# Patient Record
Sex: MALE | Race: WHITE | NOT HISPANIC OR LATINO | URBAN - METROPOLITAN AREA
[De-identification: names, ages, dates, MRNs, and addresses within clinical notes are randomized per-mention and may not be internally consistent; named-entity substitution may affect disease eponyms.]

---

## 2019-01-01 ENCOUNTER — OUTPATIENT (OUTPATIENT)
Dept: OUTPATIENT SERVICES | Facility: HOSPITAL | Age: 64
LOS: 1 days | End: 2019-01-01
Payer: MEDICAID

## 2019-01-01 PROCEDURE — G9001: CPT

## 2019-01-11 ENCOUNTER — INPATIENT (INPATIENT)
Facility: HOSPITAL | Age: 64
LOS: 4 days | Discharge: HOME HEALTH | End: 2019-01-16
Attending: INTERNAL MEDICINE | Admitting: INTERNAL MEDICINE
Payer: MEDICAID

## 2019-01-11 VITALS
SYSTOLIC BLOOD PRESSURE: 167 MMHG | HEART RATE: 88 BPM | TEMPERATURE: 98 F | DIASTOLIC BLOOD PRESSURE: 82 MMHG | OXYGEN SATURATION: 82 % | RESPIRATION RATE: 16 BRPM

## 2019-01-11 LAB
ALBUMIN SERPL ELPH-MCNC: 4 G/DL — SIGNIFICANT CHANGE UP (ref 3.3–5)
ALP SERPL-CCNC: 112 U/L — SIGNIFICANT CHANGE UP (ref 40–120)
ALT FLD-CCNC: 25 U/L — SIGNIFICANT CHANGE UP (ref 4–41)
ANION GAP SERPL CALC-SCNC: 10 MEQ/L — SIGNIFICANT CHANGE UP (ref 7–14)
APTT BLD: 33.1 SEC — SIGNIFICANT CHANGE UP (ref 27.5–36.3)
AST SERPL-CCNC: 20 U/L — SIGNIFICANT CHANGE UP (ref 4–40)
BASE EXCESS BLDV CALC-SCNC: 11.8 MMOL/L — SIGNIFICANT CHANGE UP
BASOPHILS # BLD AUTO: 0.09 K/UL — SIGNIFICANT CHANGE UP (ref 0–0.2)
BASOPHILS NFR BLD AUTO: 0.7 % — SIGNIFICANT CHANGE UP (ref 0–2)
BILIRUB SERPL-MCNC: 0.4 MG/DL — SIGNIFICANT CHANGE UP (ref 0.2–1.2)
BLOOD GAS VENOUS - CREATININE: 0.71 MG/DL — SIGNIFICANT CHANGE UP (ref 0.5–1.3)
BUN SERPL-MCNC: 10 MG/DL — SIGNIFICANT CHANGE UP (ref 7–23)
CALCIUM SERPL-MCNC: 9.1 MG/DL — SIGNIFICANT CHANGE UP (ref 8.4–10.5)
CHLORIDE BLDV-SCNC: 100 MMOL/L — SIGNIFICANT CHANGE UP (ref 96–108)
CHLORIDE SERPL-SCNC: 97 MMOL/L — LOW (ref 98–107)
CO2 SERPL-SCNC: 33 MMOL/L — HIGH (ref 22–31)
CREAT SERPL-MCNC: 0.79 MG/DL — SIGNIFICANT CHANGE UP (ref 0.5–1.3)
EOSINOPHIL # BLD AUTO: 0.39 K/UL — SIGNIFICANT CHANGE UP (ref 0–0.5)
EOSINOPHIL NFR BLD AUTO: 3 % — SIGNIFICANT CHANGE UP (ref 0–6)
GAS PNL BLDV: 139 MMOL/L — SIGNIFICANT CHANGE UP (ref 136–146)
GLUCOSE BLDV-MCNC: 166 — HIGH (ref 70–99)
GLUCOSE SERPL-MCNC: 153 MG/DL — HIGH (ref 70–99)
HCO3 BLDV-SCNC: 31 MMOL/L — HIGH (ref 20–27)
HCT VFR BLD CALC: 45.1 % — SIGNIFICANT CHANGE UP (ref 39–50)
HCT VFR BLDV CALC: 45 % — SIGNIFICANT CHANGE UP (ref 39–51)
HGB BLD-MCNC: 14.3 G/DL — SIGNIFICANT CHANGE UP (ref 13–17)
HGB BLDV-MCNC: 14.7 G/DL — SIGNIFICANT CHANGE UP (ref 13–17)
IMM GRANULOCYTES NFR BLD AUTO: 0.9 % — SIGNIFICANT CHANGE UP (ref 0–1.5)
INR BLD: 1.05 — SIGNIFICANT CHANGE UP (ref 0.88–1.17)
LACTATE BLDV-MCNC: 1.4 MMOL/L — SIGNIFICANT CHANGE UP (ref 0.5–2)
LYMPHOCYTES # BLD AUTO: 1.85 K/UL — SIGNIFICANT CHANGE UP (ref 1–3.3)
LYMPHOCYTES # BLD AUTO: 14.3 % — SIGNIFICANT CHANGE UP (ref 13–44)
MCHC RBC-ENTMCNC: 27.1 PG — SIGNIFICANT CHANGE UP (ref 27–34)
MCHC RBC-ENTMCNC: 31.7 % — LOW (ref 32–36)
MCV RBC AUTO: 85.6 FL — SIGNIFICANT CHANGE UP (ref 80–100)
MONOCYTES # BLD AUTO: 0.87 K/UL — SIGNIFICANT CHANGE UP (ref 0–0.9)
MONOCYTES NFR BLD AUTO: 6.7 % — SIGNIFICANT CHANGE UP (ref 2–14)
NEUTROPHILS # BLD AUTO: 9.63 K/UL — HIGH (ref 1.8–7.4)
NEUTROPHILS NFR BLD AUTO: 74.4 % — SIGNIFICANT CHANGE UP (ref 43–77)
NRBC # FLD: 0 K/UL — LOW (ref 25–125)
NT-PROBNP SERPL-SCNC: 204.7 PG/ML — SIGNIFICANT CHANGE UP
PCO2 BLDV: 80 MMHG — CRITICAL HIGH (ref 41–51)
PH BLDV: 7.3 PH — LOW (ref 7.32–7.43)
PLATELET # BLD AUTO: 344 K/UL — SIGNIFICANT CHANGE UP (ref 150–400)
PMV BLD: 9.2 FL — SIGNIFICANT CHANGE UP (ref 7–13)
PO2 BLDV: 25 MMHG — LOW (ref 35–40)
POTASSIUM BLDV-SCNC: 3.9 MMOL/L — SIGNIFICANT CHANGE UP (ref 3.4–4.5)
POTASSIUM SERPL-MCNC: 4.1 MMOL/L — SIGNIFICANT CHANGE UP (ref 3.5–5.3)
POTASSIUM SERPL-SCNC: 4.1 MMOL/L — SIGNIFICANT CHANGE UP (ref 3.5–5.3)
PROT SERPL-MCNC: 7.2 G/DL — SIGNIFICANT CHANGE UP (ref 6–8.3)
PROTHROM AB SERPL-ACNC: 11.7 SEC — SIGNIFICANT CHANGE UP (ref 9.8–13.1)
RBC # BLD: 5.27 M/UL — SIGNIFICANT CHANGE UP (ref 4.2–5.8)
RBC # FLD: 14.4 % — SIGNIFICANT CHANGE UP (ref 10.3–14.5)
SAO2 % BLDV: 40.4 % — LOW (ref 60–85)
SODIUM SERPL-SCNC: 140 MMOL/L — SIGNIFICANT CHANGE UP (ref 135–145)
TROPONIN T, HIGH SENSITIVITY: 19 NG/L — SIGNIFICANT CHANGE UP (ref ?–14)
WBC # BLD: 12.94 K/UL — HIGH (ref 3.8–10.5)
WBC # FLD AUTO: 12.94 K/UL — HIGH (ref 3.8–10.5)

## 2019-01-11 PROCEDURE — 71046 X-RAY EXAM CHEST 2 VIEWS: CPT | Mod: 26

## 2019-01-11 PROCEDURE — 71250 CT THORAX DX C-: CPT | Mod: 26

## 2019-01-11 RX ORDER — CEFTRIAXONE 500 MG/1
1 INJECTION, POWDER, FOR SOLUTION INTRAMUSCULAR; INTRAVENOUS ONCE
Qty: 0 | Refills: 0 | Status: COMPLETED | OUTPATIENT
Start: 2019-01-11 | End: 2019-01-11

## 2019-01-11 RX ORDER — IPRATROPIUM/ALBUTEROL SULFATE 18-103MCG
3 AEROSOL WITH ADAPTER (GRAM) INHALATION ONCE
Qty: 0 | Refills: 0 | Status: COMPLETED | OUTPATIENT
Start: 2019-01-11 | End: 2019-01-11

## 2019-01-11 RX ORDER — AZITHROMYCIN 500 MG/1
500 TABLET, FILM COATED ORAL ONCE
Qty: 0 | Refills: 0 | Status: COMPLETED | OUTPATIENT
Start: 2019-01-11 | End: 2019-01-11

## 2019-01-11 RX ADMIN — AZITHROMYCIN 250 MILLIGRAM(S): 500 TABLET, FILM COATED ORAL at 20:39

## 2019-01-11 RX ADMIN — Medication 3 MILLILITER(S): at 19:30

## 2019-01-11 RX ADMIN — CEFTRIAXONE 100 GRAM(S): 500 INJECTION, POWDER, FOR SOLUTION INTRAMUSCULAR; INTRAVENOUS at 19:53

## 2019-01-11 RX ADMIN — CEFTRIAXONE 1 GRAM(S): 500 INJECTION, POWDER, FOR SOLUTION INTRAMUSCULAR; INTRAVENOUS at 20:38

## 2019-01-11 NOTE — ED PROVIDER NOTE - OBJECTIVE STATEMENT
Pt is a 62 y/o M heavy smoker PMHx HTN, DM, HLD p/w sob and cough x 6 weeks.  Pt notes gradual onset, worsening nonproductive nonbloody cough associated with gradually worsening shortness of breath and AGUIRRE, particularly worsening for past "few days."  Pt notes associated orthopnea and wheezing.  Pt notes years of bilateral lower extremity swelling, which is unchanged. Pt denies any fevers, chills, chest pain, calf pain, h/o dvt/pe in past, hemoptysis, recent surgeries, h/o malignancy, recent prolonged immobilization. Pt is a 62 y/o M heavy smoker PMHx HTN, DM, HLD p/w sob and cough x 6 weeks.  Pt notes gradual onset, worsening nonproductive nonbloody cough associated with gradually worsening shortness of breath and AGUIRRE, particularly worsening for past "few days."  Pt notes associated orthopnea and wheezing.  Pt notes years of bilateral lower extremity swelling, which is unchanged. Pt denies any fevers, chills, chest pain, calf pain, h/o dvt/pe in past, hemoptysis, recent surgeries, h/o malignancy, recent prolonged immobilization.  PMD: Triston Chavez

## 2019-01-11 NOTE — ED PROVIDER NOTE - MEDICAL DECISION MAKING DETAILS
Pt is a 62 y/o M heavy smoker PMHx HTN, DM, HLD p/w sob and cough x 6 weeks -- possible new onset COPD, possible CHF, low concern for ACS -- labs, trop, bnp, cxr, duonebs, steroids, admit

## 2019-01-11 NOTE — ED ADULT TRIAGE NOTE - CHIEF COMPLAINT QUOTE
Pt. c/o sob and cough x 3-4 wks. Respirations labored and Spo2 in 80s. Placed on 4L NC. Denies dizziness or palpitations.

## 2019-01-11 NOTE — ED PROVIDER NOTE - ATTENDING CONTRIBUTION TO CARE
I agree with the above H&P.  Briefly this is a 63 year old with extensive smoking hx presents with progressive sob and hypoxia on ra. concern for copd exacerbation vs pna vs lung mass vs ptx will get labs, abg, blood gas, cxr.  will give o2 vs bipap and nebs. likely admit

## 2019-01-11 NOTE — ED PROVIDER NOTE - CHPI ED SYMPTOMS NEG
no body aches/no diaphoresis/no headache/no hemoptysis/no chills no diaphoresis/no hemoptysis/no body aches/no headache

## 2019-01-11 NOTE — ED ADULT NURSE NOTE - OBJECTIVE STATEMENT
Pt w/ hx of hld htn DM received to rm 8 aaox4 ambulatory c/o dry cough and SOB x 1 month.  Pt denies chest pain fever chills NO uro or GI symptoms Pt p/w dry cough otherwise in NAD breaths even unlabored skin warm dry intact 20 g IV access obtained at R.AC labs and nebulizer  as ordered.  Pt reports some resolution of symptoms, will continue to monitor

## 2019-01-11 NOTE — ED PROVIDER NOTE - PROGRESS NOTE DETAILS
Corrie WELLINGTON, PGY-4: bipap started for hypercarbia. CT shows probable lung cancer (much more likely than PNA with empyema as pt well-appearing and afebrile). Spoke to [facility] fellow - they have a bed as long as RVP negative. Corrie WELLINGTON, PGY-4: bipap started for hypercarbia. CT shows probable lung cancer (much more likely than PNA with empyema as pt well-appearing and afebrile). Abx given. Spoke to [facility] fellow - they have a bed as long as RVP negative. Corrie WELLINGTON, PGY-4: pt accepted to [facility].

## 2019-01-12 DIAGNOSIS — R91.8 OTHER NONSPECIFIC ABNORMAL FINDING OF LUNG FIELD: ICD-10-CM

## 2019-01-12 DIAGNOSIS — I10 ESSENTIAL (PRIMARY) HYPERTENSION: ICD-10-CM

## 2019-01-12 DIAGNOSIS — J44.1 CHRONIC OBSTRUCTIVE PULMONARY DISEASE WITH (ACUTE) EXACERBATION: ICD-10-CM

## 2019-01-12 DIAGNOSIS — E11.9 TYPE 2 DIABETES MELLITUS WITHOUT COMPLICATIONS: ICD-10-CM

## 2019-01-12 DIAGNOSIS — Z29.9 ENCOUNTER FOR PROPHYLACTIC MEASURES, UNSPECIFIED: ICD-10-CM

## 2019-01-12 LAB
ANION GAP SERPL CALC-SCNC: 11 MEQ/L — SIGNIFICANT CHANGE UP (ref 7–14)
B PERT DNA SPEC QL NAA+PROBE: NOT DETECTED — SIGNIFICANT CHANGE UP
BASE EXCESS BLDV CALC-SCNC: 9.9 MMOL/L — SIGNIFICANT CHANGE UP
BUN SERPL-MCNC: 11 MG/DL — SIGNIFICANT CHANGE UP (ref 7–23)
C PNEUM DNA SPEC QL NAA+PROBE: NOT DETECTED — SIGNIFICANT CHANGE UP
CALCIUM SERPL-MCNC: 9.5 MG/DL — SIGNIFICANT CHANGE UP (ref 8.4–10.5)
CHLORIDE SERPL-SCNC: 97 MMOL/L — LOW (ref 98–107)
CO2 SERPL-SCNC: 33 MMOL/L — HIGH (ref 22–31)
CREAT SERPL-MCNC: 0.82 MG/DL — SIGNIFICANT CHANGE UP (ref 0.5–1.3)
FLUAV H1 2009 PAND RNA SPEC QL NAA+PROBE: NOT DETECTED — SIGNIFICANT CHANGE UP
FLUAV H1 RNA SPEC QL NAA+PROBE: NOT DETECTED — SIGNIFICANT CHANGE UP
FLUAV H3 RNA SPEC QL NAA+PROBE: NOT DETECTED — SIGNIFICANT CHANGE UP
FLUAV SUBTYP SPEC NAA+PROBE: NOT DETECTED — SIGNIFICANT CHANGE UP
FLUBV RNA SPEC QL NAA+PROBE: NOT DETECTED — SIGNIFICANT CHANGE UP
GAS PNL BLDV: 133 MMOL/L — LOW (ref 136–146)
GLUCOSE BLDC GLUCOMTR-MCNC: 181 MG/DL — HIGH (ref 70–99)
GLUCOSE BLDC GLUCOMTR-MCNC: 185 MG/DL — HIGH (ref 70–99)
GLUCOSE BLDC GLUCOMTR-MCNC: 265 MG/DL — HIGH (ref 70–99)
GLUCOSE BLDC GLUCOMTR-MCNC: 326 MG/DL — HIGH (ref 70–99)
GLUCOSE BLDV-MCNC: 270 — HIGH (ref 70–99)
GLUCOSE SERPL-MCNC: 281 MG/DL — HIGH (ref 70–99)
HADV DNA SPEC QL NAA+PROBE: NOT DETECTED — SIGNIFICANT CHANGE UP
HBA1C BLD-MCNC: 9.4 % — HIGH (ref 4–5.6)
HCO3 BLDV-SCNC: 31 MMOL/L — HIGH (ref 20–27)
HCOV PNL SPEC NAA+PROBE: SIGNIFICANT CHANGE UP
HCT VFR BLD CALC: 47.4 % — SIGNIFICANT CHANGE UP (ref 39–50)
HCT VFR BLDV CALC: 44.5 % — SIGNIFICANT CHANGE UP (ref 39–51)
HGB BLD-MCNC: 14.2 G/DL — SIGNIFICANT CHANGE UP (ref 13–17)
HGB BLDV-MCNC: 14.5 G/DL — SIGNIFICANT CHANGE UP (ref 13–17)
HMPV RNA SPEC QL NAA+PROBE: NOT DETECTED — SIGNIFICANT CHANGE UP
HPIV1 RNA SPEC QL NAA+PROBE: NOT DETECTED — SIGNIFICANT CHANGE UP
HPIV2 RNA SPEC QL NAA+PROBE: NOT DETECTED — SIGNIFICANT CHANGE UP
HPIV3 RNA SPEC QL NAA+PROBE: NOT DETECTED — SIGNIFICANT CHANGE UP
HPIV4 RNA SPEC QL NAA+PROBE: NOT DETECTED — SIGNIFICANT CHANGE UP
MCHC RBC-ENTMCNC: 26.2 PG — LOW (ref 27–34)
MCHC RBC-ENTMCNC: 30 % — LOW (ref 32–36)
MCV RBC AUTO: 87.5 FL — SIGNIFICANT CHANGE UP (ref 80–100)
NRBC # FLD: 0 K/UL — LOW (ref 25–125)
PCO2 BLDV: 82 MMHG — CRITICAL HIGH (ref 41–51)
PH BLDV: 7.28 PH — LOW (ref 7.32–7.43)
PLATELET # BLD AUTO: 355 K/UL — SIGNIFICANT CHANGE UP (ref 150–400)
PMV BLD: 9.5 FL — SIGNIFICANT CHANGE UP (ref 7–13)
PO2 BLDV: 125 MMHG — HIGH (ref 35–40)
POTASSIUM BLDV-SCNC: 4.2 MMOL/L — SIGNIFICANT CHANGE UP (ref 3.4–4.5)
POTASSIUM SERPL-MCNC: 4.6 MMOL/L — SIGNIFICANT CHANGE UP (ref 3.5–5.3)
POTASSIUM SERPL-SCNC: 4.6 MMOL/L — SIGNIFICANT CHANGE UP (ref 3.5–5.3)
RBC # BLD: 5.42 M/UL — SIGNIFICANT CHANGE UP (ref 4.2–5.8)
RBC # FLD: 14.6 % — HIGH (ref 10.3–14.5)
RSV RNA SPEC QL NAA+PROBE: NOT DETECTED — SIGNIFICANT CHANGE UP
RV+EV RNA SPEC QL NAA+PROBE: NOT DETECTED — SIGNIFICANT CHANGE UP
SAO2 % BLDV: 96.7 % — HIGH (ref 60–85)
SODIUM SERPL-SCNC: 141 MMOL/L — SIGNIFICANT CHANGE UP (ref 135–145)
WBC # BLD: 13.81 K/UL — HIGH (ref 3.8–10.5)
WBC # FLD AUTO: 13.81 K/UL — HIGH (ref 3.8–10.5)

## 2019-01-12 PROCEDURE — 99223 1ST HOSP IP/OBS HIGH 75: CPT

## 2019-01-12 PROCEDURE — 99223 1ST HOSP IP/OBS HIGH 75: CPT | Mod: GC

## 2019-01-12 RX ORDER — GLUCAGON INJECTION, SOLUTION 0.5 MG/.1ML
1 INJECTION, SOLUTION SUBCUTANEOUS ONCE
Qty: 0 | Refills: 0 | Status: DISCONTINUED | OUTPATIENT
Start: 2019-01-12 | End: 2019-01-14

## 2019-01-12 RX ORDER — INSULIN LISPRO 100/ML
VIAL (ML) SUBCUTANEOUS
Qty: 0 | Refills: 0 | Status: DISCONTINUED | OUTPATIENT
Start: 2019-01-12 | End: 2019-01-14

## 2019-01-12 RX ORDER — DEXTROSE 50 % IN WATER 50 %
25 SYRINGE (ML) INTRAVENOUS ONCE
Qty: 0 | Refills: 0 | Status: DISCONTINUED | OUTPATIENT
Start: 2019-01-12 | End: 2019-01-14

## 2019-01-12 RX ORDER — IPRATROPIUM/ALBUTEROL SULFATE 18-103MCG
3 AEROSOL WITH ADAPTER (GRAM) INHALATION EVERY 6 HOURS
Qty: 0 | Refills: 0 | Status: DISCONTINUED | OUTPATIENT
Start: 2019-01-12 | End: 2019-01-16

## 2019-01-12 RX ORDER — LOSARTAN POTASSIUM 100 MG/1
50 TABLET, FILM COATED ORAL DAILY
Qty: 0 | Refills: 0 | Status: DISCONTINUED | OUTPATIENT
Start: 2019-01-12 | End: 2019-01-16

## 2019-01-12 RX ORDER — CEFTRIAXONE 500 MG/1
1 INJECTION, POWDER, FOR SOLUTION INTRAMUSCULAR; INTRAVENOUS EVERY 24 HOURS
Qty: 0 | Refills: 0 | Status: DISCONTINUED | OUTPATIENT
Start: 2019-01-12 | End: 2019-01-16

## 2019-01-12 RX ORDER — AZITHROMYCIN 500 MG/1
500 TABLET, FILM COATED ORAL EVERY 24 HOURS
Qty: 0 | Refills: 0 | Status: DISCONTINUED | OUTPATIENT
Start: 2019-01-12 | End: 2019-01-16

## 2019-01-12 RX ORDER — HEPARIN SODIUM 5000 [USP'U]/ML
5000 INJECTION INTRAVENOUS; SUBCUTANEOUS EVERY 8 HOURS
Qty: 0 | Refills: 0 | Status: DISCONTINUED | OUTPATIENT
Start: 2019-01-12 | End: 2019-01-16

## 2019-01-12 RX ORDER — DEXTROSE 50 % IN WATER 50 %
12.5 SYRINGE (ML) INTRAVENOUS ONCE
Qty: 0 | Refills: 0 | Status: DISCONTINUED | OUTPATIENT
Start: 2019-01-12 | End: 2019-01-14

## 2019-01-12 RX ORDER — SODIUM CHLORIDE 9 MG/ML
1000 INJECTION, SOLUTION INTRAVENOUS
Qty: 0 | Refills: 0 | Status: DISCONTINUED | OUTPATIENT
Start: 2019-01-12 | End: 2019-01-14

## 2019-01-12 RX ORDER — DEXTROSE 50 % IN WATER 50 %
15 SYRINGE (ML) INTRAVENOUS ONCE
Qty: 0 | Refills: 0 | Status: DISCONTINUED | OUTPATIENT
Start: 2019-01-12 | End: 2019-01-14

## 2019-01-12 RX ADMIN — Medication 6: at 12:29

## 2019-01-12 RX ADMIN — CEFTRIAXONE 100 GRAM(S): 500 INJECTION, POWDER, FOR SOLUTION INTRAMUSCULAR; INTRAVENOUS at 08:12

## 2019-01-12 RX ADMIN — AZITHROMYCIN 250 MILLIGRAM(S): 500 TABLET, FILM COATED ORAL at 09:02

## 2019-01-12 RX ADMIN — Medication 2: at 17:35

## 2019-01-12 RX ADMIN — Medication 3 MILLILITER(S): at 11:04

## 2019-01-12 RX ADMIN — LOSARTAN POTASSIUM 50 MILLIGRAM(S): 100 TABLET, FILM COATED ORAL at 15:32

## 2019-01-12 RX ADMIN — Medication 2: at 09:01

## 2019-01-12 RX ADMIN — HEPARIN SODIUM 5000 UNIT(S): 5000 INJECTION INTRAVENOUS; SUBCUTANEOUS at 15:32

## 2019-01-12 RX ADMIN — Medication 3 MILLILITER(S): at 21:50

## 2019-01-12 RX ADMIN — HEPARIN SODIUM 5000 UNIT(S): 5000 INJECTION INTRAVENOUS; SUBCUTANEOUS at 22:19

## 2019-01-12 RX ADMIN — Medication 3 MILLILITER(S): at 15:59

## 2019-01-12 RX ADMIN — Medication 40 MILLIGRAM(S): at 15:32

## 2019-01-12 NOTE — H&P ADULT - NSHPPHYSICALEXAM_GEN_ALL_CORE
Vital Signs Last 24 Hrs  T(C): 36.3 (12 Jan 2019 05:56), Max: 37.1 (11 Jan 2019 20:44)  T(F): 97.3 (12 Jan 2019 05:56), Max: 98.7 (11 Jan 2019 20:44)  HR: 80 (12 Jan 2019 07:34) (76 - 97)  BP: 131/67 (12 Jan 2019 05:56) (131/67 - 170/79)  BP(mean): --  RR: 18 (12 Jan 2019 05:56) (16 - 18)  SpO2: 96% (12 Jan 2019 07:34) (82% - 99%)

## 2019-01-12 NOTE — H&P ADULT - ATTENDING COMMENTS
Agree with above. Seen with NP and fellow on rounds. Has loculated effusion on left that needs to be tapped for diagnosis. Otherwise stable respiratory status.

## 2019-01-12 NOTE — PROVIDER CONTACT NOTE (CRITICAL VALUE NOTIFICATION) - ACTION/TREATMENT ORDERED:
Provider notified, ordered to monitor. No action ordered. Call bell within reach. Hourly rounding done. Safety maintained; will continue to monitor.

## 2019-01-12 NOTE — H&P ADULT - HISTORY OF PRESENT ILLNESS
63 y.o. man with history of DM II, HLD, P/W cough with sputum production and SOB of several days in duration. Patient state that he smokes 1 ppd and has been doing so for the past 50 years and only quit 2 days ago. Patient came to the ER for further and was noted to have an elevated PCO2 and was admitted to the pulmonary service for further care. Patient was given solumedrol and placed on BiPAP. Presently, he has no complaints. He also reports that he discontinued all his medications.

## 2019-01-12 NOTE — H&P ADULT - PROBLEM SELECTOR PLAN 1
- Prednisone 40 mg PO daily  - Duoneb Q 6 hrs  - Continue with BiPAP  - Repeat VBG in AM  - Pulmonary evaluation in AM

## 2019-01-13 LAB
ANION GAP SERPL CALC-SCNC: 10 MEQ/L — SIGNIFICANT CHANGE UP (ref 7–14)
BUN SERPL-MCNC: 18 MG/DL — SIGNIFICANT CHANGE UP (ref 7–23)
CALCIUM SERPL-MCNC: 9.8 MG/DL — SIGNIFICANT CHANGE UP (ref 8.4–10.5)
CHLORIDE SERPL-SCNC: 98 MMOL/L — SIGNIFICANT CHANGE UP (ref 98–107)
CO2 SERPL-SCNC: 33 MMOL/L — HIGH (ref 22–31)
CREAT SERPL-MCNC: 0.87 MG/DL — SIGNIFICANT CHANGE UP (ref 0.5–1.3)
GLUCOSE BLDC GLUCOMTR-MCNC: 174 MG/DL — HIGH (ref 70–99)
GLUCOSE BLDC GLUCOMTR-MCNC: 320 MG/DL — HIGH (ref 70–99)
GLUCOSE BLDC GLUCOMTR-MCNC: 329 MG/DL — HIGH (ref 70–99)
GLUCOSE BLDC GLUCOMTR-MCNC: 350 MG/DL — HIGH (ref 70–99)
GLUCOSE SERPL-MCNC: 175 MG/DL — HIGH (ref 70–99)
HCT VFR BLD CALC: 45.1 % — SIGNIFICANT CHANGE UP (ref 39–50)
HGB BLD-MCNC: 13.6 G/DL — SIGNIFICANT CHANGE UP (ref 13–17)
MCHC RBC-ENTMCNC: 26.4 PG — LOW (ref 27–34)
MCHC RBC-ENTMCNC: 30.2 % — LOW (ref 32–36)
MCV RBC AUTO: 87.4 FL — SIGNIFICANT CHANGE UP (ref 80–100)
NRBC # FLD: 0 K/UL — LOW (ref 25–125)
PLATELET # BLD AUTO: 330 K/UL — SIGNIFICANT CHANGE UP (ref 150–400)
PMV BLD: 9.3 FL — SIGNIFICANT CHANGE UP (ref 7–13)
POTASSIUM SERPL-MCNC: 4.9 MMOL/L — SIGNIFICANT CHANGE UP (ref 3.5–5.3)
POTASSIUM SERPL-SCNC: 4.9 MMOL/L — SIGNIFICANT CHANGE UP (ref 3.5–5.3)
RBC # BLD: 5.16 M/UL — SIGNIFICANT CHANGE UP (ref 4.2–5.8)
RBC # FLD: 14.2 % — SIGNIFICANT CHANGE UP (ref 10.3–14.5)
SODIUM SERPL-SCNC: 141 MMOL/L — SIGNIFICANT CHANGE UP (ref 135–145)
WBC # BLD: 12.13 K/UL — HIGH (ref 3.8–10.5)
WBC # FLD AUTO: 12.13 K/UL — HIGH (ref 3.8–10.5)

## 2019-01-13 RX ORDER — INSULIN GLARGINE 100 [IU]/ML
5 INJECTION, SOLUTION SUBCUTANEOUS AT BEDTIME
Qty: 0 | Refills: 0 | Status: DISCONTINUED | OUTPATIENT
Start: 2019-01-13 | End: 2019-01-14

## 2019-01-13 RX ADMIN — Medication 3 MILLILITER(S): at 22:36

## 2019-01-13 RX ADMIN — INSULIN GLARGINE 5 UNIT(S): 100 INJECTION, SOLUTION SUBCUTANEOUS at 21:37

## 2019-01-13 RX ADMIN — HEPARIN SODIUM 5000 UNIT(S): 5000 INJECTION INTRAVENOUS; SUBCUTANEOUS at 06:02

## 2019-01-13 RX ADMIN — HEPARIN SODIUM 5000 UNIT(S): 5000 INJECTION INTRAVENOUS; SUBCUTANEOUS at 17:52

## 2019-01-13 RX ADMIN — HEPARIN SODIUM 5000 UNIT(S): 5000 INJECTION INTRAVENOUS; SUBCUTANEOUS at 21:37

## 2019-01-13 RX ADMIN — CEFTRIAXONE 100 GRAM(S): 500 INJECTION, POWDER, FOR SOLUTION INTRAMUSCULAR; INTRAVENOUS at 09:02

## 2019-01-13 RX ADMIN — Medication 3 MILLILITER(S): at 03:44

## 2019-01-13 RX ADMIN — Medication 3 MILLILITER(S): at 09:45

## 2019-01-13 RX ADMIN — LOSARTAN POTASSIUM 50 MILLIGRAM(S): 100 TABLET, FILM COATED ORAL at 06:02

## 2019-01-13 RX ADMIN — Medication 8: at 12:34

## 2019-01-13 RX ADMIN — Medication 3 MILLILITER(S): at 16:11

## 2019-01-13 RX ADMIN — Medication 2: at 09:02

## 2019-01-13 RX ADMIN — Medication 40 MILLIGRAM(S): at 06:02

## 2019-01-13 RX ADMIN — Medication 8: at 17:52

## 2019-01-13 RX ADMIN — AZITHROMYCIN 250 MILLIGRAM(S): 500 TABLET, FILM COATED ORAL at 12:34

## 2019-01-13 NOTE — PROGRESS NOTE ADULT - PROBLEM SELECTOR PLAN 2
CT chest showed moderate to large complex mildly loculated left pleural effusion with layering mildly dense material; onsolidations within the left upper and lower lobes, partially related to mass effect from the pleural effusion; suggestion of ill-defined low density within a left upper lobe consolidation, concerning for underlying infection and/or mass; Mediastinal and hilar lymphadenopathy.  - Pulmonary evaluation in AM CT chest showed moderate to large complex mildly loculated left pleural effusion with layering mildly dense material; onsolidations within the left upper and lower lobes, partially related to mass effect from the pleural effusion; suggestion of ill-defined low density within a left upper lobe consolidation, concerning for underlying infection and/or mass; Mediastinal and hilar lymphadenopathy.  - Pulmonary evaluation in AM  - Diagnostic thora?

## 2019-01-13 NOTE — PROGRESS NOTE ADULT - PROBLEM SELECTOR PLAN 4
HBA1c 9.4  - FSBS with moderate dose lispro coverage HBA1c 9.4. Persistent Hyperglycemia in 300s  - Will initiate Lantus 5 un qHS  - FSBS with moderate dose lispro coverage

## 2019-01-13 NOTE — PROGRESS NOTE ADULT - PROBLEM SELECTOR PLAN 1
50 pack year smoking history. Admission Pco2 80 on VBG. Patient was given solumedrol and placed on BiPAP. RVP negative. CT chest showed moderate to large complex mildly loculated left pleural effusion with layering mildly dense material; onsolidations within the left upper and lower lobes, partially related to mass effect from the pleural effusion; suggestion of ill-defined low density within a left upper lobe consolidation, concerning for underlying infection and/or mass; Mediastinal and hilar lymphadenopathy.  - c/w Prednisone 40 mg PO daily  - c/w Ceft and Azithro  - c/w Duoneb Q 6 hrs  - c/w BiPAP  - Repeat VBG in AM  - Pulmonary evaluation in AM 50 pack year smoking history. Admission Pco2 80 on VBG. Patient was given solumedrol and placed on BiPAP. RVP negative. CT chest showed moderate to large complex mildly loculated left pleural effusion with layering mildly dense material; consolidations within the left upper and lower lobes, partially related to mass effect from the pleural effusion; suggestion of ill-defined low density within a left upper lobe consolidation, concerning for underlying infection and/or mass; Mediastinal and hilar lymphadenopathy.  - c/w Prednisone 40 mg PO daily  - c/w Ceft and Azithro  - c/w Duoneb Q 6 hrs  - c/w BiPAP  - Repeat VBG in AM  - Pulmonary evaluation in AM

## 2019-01-13 NOTE — PROGRESS NOTE ADULT - ASSESSMENT
Patient is a 64 yo M w/ HLD, HTN, T2DM, smoker admitted for COPD exacerbation and possible lung mass. Patient is a 64 yo M w/ HLD, HTN, T2DM, smoker admitted for progressive chronic SOB/ cough 2/2 COPD exacerbation and possible lung mass.

## 2019-01-13 NOTE — PROGRESS NOTE ADULT - SUBJECTIVE AND OBJECTIVE BOX
Patient is a 63y old  Male who presents with a chief complaint of SOB (12 Jan 2019 06:33)      SUBJECTIVE / OVERNIGHT EVENTS:    MEDICATIONS  (STANDING):  ALBUTerol/ipratropium for Nebulization 3 milliLiter(s) Nebulizer every 6 hours  azithromycin  IVPB 500 milliGRAM(s) IV Intermittent every 24 hours  cefTRIAXone   IVPB 1 Gram(s) IV Intermittent every 24 hours  dextrose 5%. 1000 milliLiter(s) (50 mL/Hr) IV Continuous <Continuous>  dextrose 50% Injectable 12.5 Gram(s) IV Push once  dextrose 50% Injectable 25 Gram(s) IV Push once  dextrose 50% Injectable 25 Gram(s) IV Push once  heparin  Injectable 5000 Unit(s) SubCutaneous every 8 hours  insulin lispro (HumaLOG) corrective regimen sliding scale   SubCutaneous three times a day before meals  losartan 50 milliGRAM(s) Oral daily  predniSONE   Tablet 40 milliGRAM(s) Oral daily    MEDICATIONS  (PRN):  dextrose 40% Gel 15 Gram(s) Oral once PRN Blood Glucose LESS THAN 70 milliGRAM(s)/deciliter  glucagon  Injectable 1 milliGRAM(s) IntraMuscular once PRN Glucose LESS THAN 70 milligrams/deciliter      Vital Signs Last 24 Hrs  T(C): 36.3 (13 Jan 2019 05:58), Max: 36.7 (12 Jan 2019 15:18)  T(F): 97.4 (13 Jan 2019 05:58), Max: 98.1 (12 Jan 2019 15:18)  HR: 65 (13 Jan 2019 05:58) (63 - 87)  BP: 114/58 (13 Jan 2019 05:58) (114/58 - 138/62)  BP(mean): --  RR: 18 (13 Jan 2019 05:58) (18 - 19)  SpO2: 100% (13 Jan 2019 05:58) (95% - 100%)  CAPILLARY BLOOD GLUCOSE      POCT Blood Glucose.: 174 mg/dL (13 Jan 2019 08:30)  POCT Blood Glucose.: 326 mg/dL (12 Jan 2019 21:52)  POCT Blood Glucose.: 185 mg/dL (12 Jan 2019 17:07)  POCT Blood Glucose.: 265 mg/dL (12 Jan 2019 12:23)    I&O's Summary      REVIEW OF SYSTEMS    General: No fevers/chills  Skin/Breast: No rash  Ophthalmologic: No vision changes  ENMT:No dysphagia or odynophagia  Respiratory and Thorax: No SOB, wheezing, cough  Cardiovascular: No chest pain or palpitations  Gastrointestinal: No n/v/d, No melena, No Hematochezia  Genitourinary:  No dysuria, No change in urinary frequency  Musculoskeletal: No joint or muscle pains.  Neurological: No focal deficits, No headache    PHYSICAL EXAM:  GENERAL: NAD, well-developed  HEAD:  Atraumatic, Normocephalic  EYES: EOMI, PERRLA, conjunctiva and sclera clear  NECK: Supple, No JVD  CHEST/LUNG: Clear to auscultation bilaterally; No wheeze  HEART: Regular rate and rhythm; No murmurs, rubs, or gallops  ABDOMEN: Soft, Nontender, Nondistended; Bowel sounds present  EXTREMITIES:  2+ Peripheral Pulses, No clubbing, cyanosis, or edema  PSYCH: AAOx3  NEUROLOGY: non-focal  SKIN: No rashes or lesions    LABS:                        13.6   12.13 )-----------( 330      ( 13 Jan 2019 06:20 )             45.1     01-13    141  |  98  |  18  ----------------------------<  175<H>  4.9   |  33<H>  |  0.87    Ca    9.8      13 Jan 2019 06:20    TPro  7.2  /  Alb  4.0  /  TBili  0.4  /  DBili  x   /  AST  20  /  ALT  25  /  AlkPhos  112  01-11    PT/INR - ( 11 Jan 2019 19:23 )   PT: 11.7 SEC;   INR: 1.05          PTT - ( 11 Jan 2019 19:23 )  PTT:33.1 SEC          RADIOLOGY & ADDITIONAL TESTS:    Imaging Personally Reviewed:    Consultant(s) Notes Reviewed:      Care Discussed with Consultants/Other Providers: Patient is a 63y old  Male who presents with a chief complaint of SOB (12 Jan 2019 06:33)      SUBJECTIVE / OVERNIGHT EVENTS: Complaints of difficulty sleeping overnight due to people coming in and out. No complaints otherwise.     MEDICATIONS  (STANDING):  ALBUTerol/ipratropium for Nebulization 3 milliLiter(s) Nebulizer every 6 hours  azithromycin  IVPB 500 milliGRAM(s) IV Intermittent every 24 hours  cefTRIAXone   IVPB 1 Gram(s) IV Intermittent every 24 hours  dextrose 5%. 1000 milliLiter(s) (50 mL/Hr) IV Continuous <Continuous>  dextrose 50% Injectable 12.5 Gram(s) IV Push once  dextrose 50% Injectable 25 Gram(s) IV Push once  dextrose 50% Injectable 25 Gram(s) IV Push once  heparin  Injectable 5000 Unit(s) SubCutaneous every 8 hours  insulin lispro (HumaLOG) corrective regimen sliding scale   SubCutaneous three times a day before meals  losartan 50 milliGRAM(s) Oral daily  predniSONE   Tablet 40 milliGRAM(s) Oral daily    MEDICATIONS  (PRN):  dextrose 40% Gel 15 Gram(s) Oral once PRN Blood Glucose LESS THAN 70 milliGRAM(s)/deciliter  glucagon  Injectable 1 milliGRAM(s) IntraMuscular once PRN Glucose LESS THAN 70 milligrams/deciliter      Vital Signs Last 24 Hrs  T(C): 36.3 (13 Jan 2019 05:58), Max: 36.7 (12 Jan 2019 15:18)  T(F): 97.4 (13 Jan 2019 05:58), Max: 98.1 (12 Jan 2019 15:18)  HR: 65 (13 Jan 2019 05:58) (63 - 87)  BP: 114/58 (13 Jan 2019 05:58) (114/58 - 138/62)  BP(mean): --  RR: 18 (13 Jan 2019 05:58) (18 - 19)  SpO2: 100% (13 Jan 2019 05:58) (95% - 100%)  CAPILLARY BLOOD GLUCOSE      POCT Blood Glucose.: 174 mg/dL (13 Jan 2019 08:30)  POCT Blood Glucose.: 326 mg/dL (12 Jan 2019 21:52)  POCT Blood Glucose.: 185 mg/dL (12 Jan 2019 17:07)  POCT Blood Glucose.: 265 mg/dL (12 Jan 2019 12:23)    I&O's Summary      REVIEW OF SYSTEMS    General: No fevers/chills  Skin/Breast: No rash  Ophthalmologic: No vision changes  ENMT:No dysphagia or odynophagia  Respiratory and Thorax: No SOB, wheezing, cough  Cardiovascular: No chest pain or palpitations  Gastrointestinal: No n/v/d, No melena, No Hematochezia  Genitourinary:  No dysuria, No change in urinary frequency  Musculoskeletal: No joint or muscle pains.  Neurological: No focal deficits, No headache    PHYSICAL EXAM:  GENERAL: NAD, well-developed  HEAD:  Atraumatic, Normocephalic  EYES: EOMI, PERRLA, conjunctiva and sclera clear  NECK: Supple, No JVD  CHEST/LUNG: Clear to auscultation bilaterally, Decreased breath sounds throughout, less on L; No wheeze  HEART: Regular rate and rhythm; No murmurs, rubs, or gallops  ABDOMEN: Soft, Nontender, Nondistended; Bowel sounds present  EXTREMITIES:  No clubbing, cyanosis, or edema  PSYCH: AAOx3  NEUROLOGY: non-focal  SKIN: No rashes or lesions    LABS:                        13.6   12.13 )-----------( 330      ( 13 Jan 2019 06:20 )             45.1     01-13    141  |  98  |  18  ----------------------------<  175<H>  4.9   |  33<H>  |  0.87    Ca    9.8      13 Jan 2019 06:20    TPro  7.2  /  Alb  4.0  /  TBili  0.4  /  DBili  x   /  AST  20  /  ALT  25  /  AlkPhos  112  01-11    PT/INR - ( 11 Jan 2019 19:23 )   PT: 11.7 SEC;   INR: 1.05          PTT - ( 11 Jan 2019 19:23 )  PTT:33.1 SEC          RADIOLOGY & ADDITIONAL TESTS:    Imaging Personally Reviewed:    Consultant(s) Notes Reviewed:      Care Discussed with Consultants/Other Providers: Patient is a 63y old  Male who presents with a chief complaint of SOB (12 Jan 2019 06:33)      SUBJECTIVE / OVERNIGHT EVENTS: Complaints of difficulty sleeping overnight due to people coming in and out. No complaints otherwise.     MEDICATIONS  (STANDING):  ALBUTerol/ipratropium for Nebulization 3 milliLiter(s) Nebulizer every 6 hours  azithromycin  IVPB 500 milliGRAM(s) IV Intermittent every 24 hours  cefTRIAXone   IVPB 1 Gram(s) IV Intermittent every 24 hours  dextrose 5%. 1000 milliLiter(s) (50 mL/Hr) IV Continuous <Continuous>  dextrose 50% Injectable 12.5 Gram(s) IV Push once  dextrose 50% Injectable 25 Gram(s) IV Push once  dextrose 50% Injectable 25 Gram(s) IV Push once  heparin  Injectable 5000 Unit(s) SubCutaneous every 8 hours  insulin lispro (HumaLOG) corrective regimen sliding scale   SubCutaneous three times a day before meals  losartan 50 milliGRAM(s) Oral daily  predniSONE   Tablet 40 milliGRAM(s) Oral daily    MEDICATIONS  (PRN):  dextrose 40% Gel 15 Gram(s) Oral once PRN Blood Glucose LESS THAN 70 milliGRAM(s)/deciliter  glucagon  Injectable 1 milliGRAM(s) IntraMuscular once PRN Glucose LESS THAN 70 milligrams/deciliter      Vital Signs Last 24 Hrs  T(C): 36.3 (13 Jan 2019 05:58), Max: 36.7 (12 Jan 2019 15:18)  T(F): 97.4 (13 Jan 2019 05:58), Max: 98.1 (12 Jan 2019 15:18)  HR: 65 (13 Jan 2019 05:58) (63 - 87)  BP: 114/58 (13 Jan 2019 05:58) (114/58 - 138/62)  BP(mean): --  RR: 18 (13 Jan 2019 05:58) (18 - 19)  SpO2: 100% (13 Jan 2019 05:58) (95% - 100%)  CAPILLARY BLOOD GLUCOSE      POCT Blood Glucose.: 174 mg/dL (13 Jan 2019 08:30)  POCT Blood Glucose.: 326 mg/dL (12 Jan 2019 21:52)  POCT Blood Glucose.: 185 mg/dL (12 Jan 2019 17:07)  POCT Blood Glucose.: 265 mg/dL (12 Jan 2019 12:23)    I&O's Summary      REVIEW OF SYSTEMS    General: No fevers/chills  Skin/Breast: No rash  Ophthalmologic: No vision changes  ENMT:No dysphagia or odynophagia  Respiratory and Thorax: No SOB, wheezing, cough  Cardiovascular: No chest pain or palpitations  Gastrointestinal: No n/v/d, No melena, No Hematochezia  Genitourinary:  No dysuria, No change in urinary frequency  Musculoskeletal: No joint or muscle pains.  Neurological: No focal deficits, No headache    PHYSICAL EXAM:  GENERAL: NAD, well-developed  HEAD:  Atraumatic, Normocephalic  EYES: EOMI, PERRLA, conjunctiva and sclera clear  NECK: Supple, No JVD  CHEST/LUNG: Clear to auscultation bilaterally, Decreased breath sounds throughout, less on L; No wheeze  HEART: Regular rate and rhythm; No murmurs, rubs, or gallops  ABDOMEN: Soft, Nontender, Nondistended; Bowel sounds present  EXTREMITIES:  Trace pitting edema b/l shins. No clubbing or cyanosis  PSYCH: AAOx3  NEUROLOGY: non-focal  SKIN: No rashes or lesions    LABS:                        13.6   12.13 )-----------( 330      ( 13 Jan 2019 06:20 )             45.1     01-13    141  |  98  |  18  ----------------------------<  175<H>  4.9   |  33<H>  |  0.87    Ca    9.8      13 Jan 2019 06:20    TPro  7.2  /  Alb  4.0  /  TBili  0.4  /  DBili  x   /  AST  20  /  ALT  25  /  AlkPhos  112  01-11    PT/INR - ( 11 Jan 2019 19:23 )   PT: 11.7 SEC;   INR: 1.05          PTT - ( 11 Jan 2019 19:23 )  PTT:33.1 SEC          RADIOLOGY & ADDITIONAL TESTS:    Imaging Personally Reviewed:    Consultant(s) Notes Reviewed:      Care Discussed with Consultants/Other Providers:

## 2019-01-14 ENCOUNTER — RESULT REVIEW (OUTPATIENT)
Age: 64
End: 2019-01-14

## 2019-01-14 DIAGNOSIS — E78.5 HYPERLIPIDEMIA, UNSPECIFIED: ICD-10-CM

## 2019-01-14 DIAGNOSIS — I10 ESSENTIAL (PRIMARY) HYPERTENSION: ICD-10-CM

## 2019-01-14 LAB
ALBUMIN FLD-MCNC: 2.3 G/DL — SIGNIFICANT CHANGE UP
ANION GAP SERPL CALC-SCNC: 12 MEQ/L — SIGNIFICANT CHANGE UP (ref 7–14)
BASE EXCESS BLDA CALC-SCNC: 10.2 MMOL/L — SIGNIFICANT CHANGE UP
BASE EXCESS BLDV CALC-SCNC: 10.6 MMOL/L — SIGNIFICANT CHANGE UP
BODY FLUID TYPE: SIGNIFICANT CHANGE UP
BUN SERPL-MCNC: 19 MG/DL — SIGNIFICANT CHANGE UP (ref 7–23)
CA-I BLDA-SCNC: 1.15 MMOL/L — SIGNIFICANT CHANGE UP (ref 1.15–1.29)
CALCIUM SERPL-MCNC: 9.1 MG/DL — SIGNIFICANT CHANGE UP (ref 8.4–10.5)
CHLORIDE SERPL-SCNC: 99 MMOL/L — SIGNIFICANT CHANGE UP (ref 98–107)
CLARITY SPEC: SIGNIFICANT CHANGE UP
CO2 SERPL-SCNC: 30 MMOL/L — SIGNIFICANT CHANGE UP (ref 22–31)
COLOR FLD: SIGNIFICANT CHANGE UP
CREAT SERPL-MCNC: 0.85 MG/DL — SIGNIFICANT CHANGE UP (ref 0.5–1.3)
GAS PNL BLDV: 141 MMOL/L — SIGNIFICANT CHANGE UP (ref 136–146)
GLUCOSE BLDA-MCNC: 354 MG/DL — HIGH (ref 70–99)
GLUCOSE BLDC GLUCOMTR-MCNC: 205 MG/DL — HIGH (ref 70–99)
GLUCOSE BLDC GLUCOMTR-MCNC: 296 MG/DL — HIGH (ref 70–99)
GLUCOSE BLDC GLUCOMTR-MCNC: 338 MG/DL — HIGH (ref 70–99)
GLUCOSE BLDC GLUCOMTR-MCNC: 351 MG/DL — HIGH (ref 70–99)
GLUCOSE BLDV-MCNC: 181 — HIGH (ref 70–99)
GLUCOSE FLD-MCNC: 46 MG/DL — SIGNIFICANT CHANGE UP
GLUCOSE SERPL-MCNC: 183 MG/DL — HIGH (ref 70–99)
GRAM STN FLD: SIGNIFICANT CHANGE UP
HCO3 BLDA-SCNC: 33 MMOL/L — HIGH (ref 22–26)
HCO3 BLDV-SCNC: 30 MMOL/L — HIGH (ref 20–27)
HCT VFR BLD CALC: 42.4 % — SIGNIFICANT CHANGE UP (ref 39–50)
HCT VFR BLDA CALC: 40.3 % — SIGNIFICANT CHANGE UP (ref 39–51)
HCT VFR BLDV CALC: 41.9 % — SIGNIFICANT CHANGE UP (ref 39–51)
HGB BLD-MCNC: 12.9 G/DL — LOW (ref 13–17)
HGB BLDA-MCNC: 13.1 G/DL — SIGNIFICANT CHANGE UP (ref 13–17)
HGB BLDV-MCNC: 13.6 G/DL — SIGNIFICANT CHANGE UP (ref 13–17)
LACTATE BLDA-SCNC: 2 MMOL/L — SIGNIFICANT CHANGE UP (ref 0.5–2)
LDH SERPL L TO P-CCNC: 266 U/L — HIGH (ref 135–225)
LDH SERPL L TO P-CCNC: 4036 U/L — SIGNIFICANT CHANGE UP
LYMPHOCYTES NFR FLD: 60 % — SIGNIFICANT CHANGE UP
MAGNESIUM SERPL-MCNC: 2.2 MG/DL — SIGNIFICANT CHANGE UP (ref 1.6–2.6)
MCHC RBC-ENTMCNC: 26.4 PG — LOW (ref 27–34)
MCHC RBC-ENTMCNC: 30.4 % — LOW (ref 32–36)
MCV RBC AUTO: 86.9 FL — SIGNIFICANT CHANGE UP (ref 80–100)
MESOTHL CELL # FLD: 23 % — SIGNIFICANT CHANGE UP
MONOCYTES # FLD: 5 % — SIGNIFICANT CHANGE UP
NEUTS SEG NFR FLD MANUAL: 12 % — SIGNIFICANT CHANGE UP
NRBC # FLD: 0 K/UL — LOW (ref 25–125)
PCO2 BLDA: 57 MMHG — HIGH (ref 35–48)
PCO2 BLDV: 82 MMHG — CRITICAL HIGH (ref 41–51)
PH BLDA: 7.41 PH — SIGNIFICANT CHANGE UP (ref 7.35–7.45)
PH BLDV: 7.28 PH — LOW (ref 7.32–7.43)
PHOSPHATE SERPL-MCNC: 2.9 MG/DL — SIGNIFICANT CHANGE UP (ref 2.5–4.5)
PLATELET # BLD AUTO: 325 K/UL — SIGNIFICANT CHANGE UP (ref 150–400)
PMV BLD: 9.5 FL — SIGNIFICANT CHANGE UP (ref 7–13)
PO2 BLDA: 65 MMHG — LOW (ref 83–108)
PO2 BLDV: 31 MMHG — LOW (ref 35–40)
POTASSIUM BLDA-SCNC: 4.4 MMOL/L — SIGNIFICANT CHANGE UP (ref 3.4–4.5)
POTASSIUM BLDV-SCNC: 5 MMOL/L — HIGH (ref 3.4–4.5)
POTASSIUM SERPL-MCNC: 5 MMOL/L — SIGNIFICANT CHANGE UP (ref 3.5–5.3)
POTASSIUM SERPL-SCNC: 5 MMOL/L — SIGNIFICANT CHANGE UP (ref 3.5–5.3)
PROT FLD-MCNC: 4 G/DL — SIGNIFICANT CHANGE UP
RBC # BLD: 4.88 M/UL — SIGNIFICANT CHANGE UP (ref 4.2–5.8)
RBC # FLD: 14.3 % — SIGNIFICANT CHANGE UP (ref 10.3–14.5)
RCV VOL RI: HIGH CELL/UL (ref 0–5)
SAO2 % BLDA: 92 % — LOW (ref 95–99)
SAO2 % BLDV: 52.4 % — LOW (ref 60–85)
SODIUM BLDA-SCNC: 133 MMOL/L — LOW (ref 136–146)
SODIUM SERPL-SCNC: 141 MMOL/L — SIGNIFICANT CHANGE UP (ref 135–145)
SPECIMEN SOURCE: SIGNIFICANT CHANGE UP
TOTAL CELLS COUNTED, BODY FLUID: 100 CELLS — SIGNIFICANT CHANGE UP
TOTAL NUCLEATED CELL COUNT, BODY FLUID: 8890 CELL/UL — HIGH (ref 0–5)
WBC # BLD: 13.42 K/UL — HIGH (ref 3.8–10.5)
WBC # FLD AUTO: 13.42 K/UL — HIGH (ref 3.8–10.5)

## 2019-01-14 PROCEDURE — 88342 IMHCHEM/IMCYTCHM 1ST ANTB: CPT | Mod: 26

## 2019-01-14 PROCEDURE — 88112 CYTOPATH CELL ENHANCE TECH: CPT | Mod: 26

## 2019-01-14 PROCEDURE — 32555 ASPIRATE PLEURA W/ IMAGING: CPT | Mod: GC

## 2019-01-14 PROCEDURE — 99233 SBSQ HOSP IP/OBS HIGH 50: CPT | Mod: GC,25

## 2019-01-14 PROCEDURE — 99223 1ST HOSP IP/OBS HIGH 75: CPT

## 2019-01-14 PROCEDURE — 88305 TISSUE EXAM BY PATHOLOGIST: CPT | Mod: 26

## 2019-01-14 PROCEDURE — 71045 X-RAY EXAM CHEST 1 VIEW: CPT | Mod: 26

## 2019-01-14 PROCEDURE — 88341 IMHCHEM/IMCYTCHM EA ADD ANTB: CPT | Mod: 26

## 2019-01-14 RX ORDER — LIDOCAINE HCL 20 MG/ML
10 VIAL (ML) INJECTION ONCE
Qty: 0 | Refills: 0 | Status: DISCONTINUED | OUTPATIENT
Start: 2019-01-14 | End: 2019-01-14

## 2019-01-14 RX ORDER — INSULIN LISPRO 100/ML
8 VIAL (ML) SUBCUTANEOUS
Qty: 0 | Refills: 0 | Status: DISCONTINUED | OUTPATIENT
Start: 2019-01-14 | End: 2019-01-16

## 2019-01-14 RX ORDER — INSULIN GLARGINE 100 [IU]/ML
4 INJECTION, SOLUTION SUBCUTANEOUS AT BEDTIME
Qty: 0 | Refills: 0 | Status: DISCONTINUED | OUTPATIENT
Start: 2019-01-14 | End: 2019-01-16

## 2019-01-14 RX ORDER — CHLORHEXIDINE GLUCONATE 213 G/1000ML
1 SOLUTION TOPICAL
Qty: 0 | Refills: 0 | Status: DISCONTINUED | OUTPATIENT
Start: 2019-01-14 | End: 2019-01-16

## 2019-01-14 RX ORDER — DEXTROSE 50 % IN WATER 50 %
15 SYRINGE (ML) INTRAVENOUS ONCE
Qty: 0 | Refills: 0 | Status: DISCONTINUED | OUTPATIENT
Start: 2019-01-14 | End: 2019-01-16

## 2019-01-14 RX ORDER — HUMAN INSULIN 100 [IU]/ML
10 INJECTION, SUSPENSION SUBCUTANEOUS
Qty: 0 | Refills: 0 | Status: DISCONTINUED | OUTPATIENT
Start: 2019-01-14 | End: 2019-01-14

## 2019-01-14 RX ORDER — KETOROLAC TROMETHAMINE 30 MG/ML
30 SYRINGE (ML) INJECTION ONCE
Qty: 0 | Refills: 0 | Status: DISCONTINUED | OUTPATIENT
Start: 2019-01-14 | End: 2019-01-14

## 2019-01-14 RX ORDER — LIDOCAINE HCL 20 MG/ML
10 VIAL (ML) INJECTION ONCE
Qty: 0 | Refills: 0 | Status: COMPLETED | OUTPATIENT
Start: 2019-01-14 | End: 2019-01-14

## 2019-01-14 RX ORDER — GLUCAGON INJECTION, SOLUTION 0.5 MG/.1ML
1 INJECTION, SOLUTION SUBCUTANEOUS ONCE
Qty: 0 | Refills: 0 | Status: DISCONTINUED | OUTPATIENT
Start: 2019-01-14 | End: 2019-01-16

## 2019-01-14 RX ORDER — INSULIN GLARGINE 100 [IU]/ML
12 INJECTION, SOLUTION SUBCUTANEOUS AT BEDTIME
Qty: 0 | Refills: 0 | Status: DISCONTINUED | OUTPATIENT
Start: 2019-01-14 | End: 2019-01-14

## 2019-01-14 RX ORDER — HUMAN INSULIN 100 [IU]/ML
10 INJECTION, SUSPENSION SUBCUTANEOUS DAILY
Qty: 0 | Refills: 0 | Status: DISCONTINUED | OUTPATIENT
Start: 2019-01-15 | End: 2019-01-16

## 2019-01-14 RX ORDER — SODIUM CHLORIDE 9 MG/ML
1000 INJECTION, SOLUTION INTRAVENOUS
Qty: 0 | Refills: 0 | Status: DISCONTINUED | OUTPATIENT
Start: 2019-01-14 | End: 2019-01-16

## 2019-01-14 RX ORDER — DEXTROSE 50 % IN WATER 50 %
12.5 SYRINGE (ML) INTRAVENOUS ONCE
Qty: 0 | Refills: 0 | Status: DISCONTINUED | OUTPATIENT
Start: 2019-01-14 | End: 2019-01-16

## 2019-01-14 RX ORDER — INSULIN LISPRO 100/ML
VIAL (ML) SUBCUTANEOUS
Qty: 0 | Refills: 0 | Status: DISCONTINUED | OUTPATIENT
Start: 2019-01-14 | End: 2019-01-16

## 2019-01-14 RX ORDER — DEXTROSE 50 % IN WATER 50 %
25 SYRINGE (ML) INTRAVENOUS ONCE
Qty: 0 | Refills: 0 | Status: DISCONTINUED | OUTPATIENT
Start: 2019-01-14 | End: 2019-01-16

## 2019-01-14 RX ORDER — INSULIN LISPRO 100/ML
VIAL (ML) SUBCUTANEOUS AT BEDTIME
Qty: 0 | Refills: 0 | Status: DISCONTINUED | OUTPATIENT
Start: 2019-01-14 | End: 2019-01-15

## 2019-01-14 RX ADMIN — Medication 2: at 21:57

## 2019-01-14 RX ADMIN — HEPARIN SODIUM 5000 UNIT(S): 5000 INJECTION INTRAVENOUS; SUBCUTANEOUS at 17:41

## 2019-01-14 RX ADMIN — Medication 3 MILLILITER(S): at 10:43

## 2019-01-14 RX ADMIN — CEFTRIAXONE 100 GRAM(S): 500 INJECTION, POWDER, FOR SOLUTION INTRAMUSCULAR; INTRAVENOUS at 08:31

## 2019-01-14 RX ADMIN — Medication 8: at 12:11

## 2019-01-14 RX ADMIN — HEPARIN SODIUM 5000 UNIT(S): 5000 INJECTION INTRAVENOUS; SUBCUTANEOUS at 21:58

## 2019-01-14 RX ADMIN — LOSARTAN POTASSIUM 50 MILLIGRAM(S): 100 TABLET, FILM COATED ORAL at 05:31

## 2019-01-14 RX ADMIN — INSULIN GLARGINE 4 UNIT(S): 100 INJECTION, SOLUTION SUBCUTANEOUS at 21:56

## 2019-01-14 RX ADMIN — Medication 3 MILLILITER(S): at 15:39

## 2019-01-14 RX ADMIN — Medication 40 MILLIGRAM(S): at 05:31

## 2019-01-14 RX ADMIN — Medication 10 MILLILITER(S): at 17:34

## 2019-01-14 RX ADMIN — HEPARIN SODIUM 5000 UNIT(S): 5000 INJECTION INTRAVENOUS; SUBCUTANEOUS at 05:31

## 2019-01-14 RX ADMIN — Medication 4: at 08:30

## 2019-01-14 RX ADMIN — AZITHROMYCIN 250 MILLIGRAM(S): 500 TABLET, FILM COATED ORAL at 11:03

## 2019-01-14 RX ADMIN — Medication 5: at 17:41

## 2019-01-14 RX ADMIN — Medication 8 UNIT(S): at 17:40

## 2019-01-14 NOTE — CONSULT NOTE ADULT - PROBLEM SELECTOR RECOMMENDATION 9
check FSBG TID qac and hs  carb consistent diet   94kg= TDD 38 units, bolus insulin will 60% on steroids, therefore 8 units bolus TID, and 14 on basal (NPH 10 and lantus 4 units)  - Lantus  4 u qhs (may dc all together with NPH on board tomorrow)  -Humalog 8-8-8 with meals  -low dose SS TIDAC  -NPH 10 units with prednisone dose tomorrow morning (will need to assess 24 hr data, may increase NPH next day and dc the Lantus)    for DC  dc on max dose metformin if GFR and LFTS remain stable and Januvia (please assess coverage)  If patient wishes to follow up with [facility]     [facility]  [address], [address], [place] [address]  [phone]   Please call to schedule appointment with CDE/Nutritionist and MD appointment next available

## 2019-01-14 NOTE — PROGRESS NOTE ADULT - PROBLEM SELECTOR PLAN 1
50 pack year smoking history. Admission Pco2 80 on VBG. Patient was given solumedrol and placed on BiPAP. RVP negative. CT chest showed moderate to large complex mildly loculated left pleural effusion with layering mildly dense material; consolidations within the left upper and lower lobes, partially related to mass effect from the pleural effusion; suggestion of ill-defined low density within a left upper lobe consolidation, concerning for underlying infection and/or mass; Mediastinal and hilar lymphadenopathy.  - c/w Prednisone 40 mg PO daily  - c/w Ceft and Azithro  - c/w Duoneb Q 6 hrs  - c/w BiPAP  - c/w BC  - thoracentesis today?  - chest CT after thoracentesis

## 2019-01-14 NOTE — PROGRESS NOTE ADULT - ASSESSMENT
Patient is a 62 yo M w/ HLD, HTN, T2DM, smoker admitted for progressive chronic SOB/ cough 2/2 COPD exacerbation and possible lung mass.

## 2019-01-14 NOTE — PROGRESS NOTE ADULT - SUBJECTIVE AND OBJECTIVE BOX
CHIEF COMPLAINT:    Interval Events:    REVIEW OF SYSTEMS:  Constitutional: denies fever, chills, general malaise, fatigue, weight loss, weight gain, diaphoresis   HEENT: denies dry mouth, sore throat, runny nose, photophobia, blurry vision, double vision, discharge, eye pain, difficulty hearing, vertigo, dysphagia, epistaxis, recent dental work    Neck: denies pain or stiffness  Breasts: denies pain, masses, or nipple discharge  Respiratory: denies SOB, AGUIRRE, cough, phlegm, wheezing, hemoptysis  Cardiovascular: denies CP, palpitations, edema, diaphoresis   Gastrointestinal: denies nausea, vomiting or hematemesis, diarrhea, constipation, abdominal or epigastric pain, melena or hematochezia   Genitourinary: denies dysuria, frequency, urgency, incontinence, hematuria   Skin: denies rash, hives, itching, burning, masses  Musculoskeletal: denies myalgias, arthritis, joint swelling, muscle weakness  Neurologic: denies syncope, LOC, headache, weakness, dizziness, parasthesias, numbness, seizures, confusion, dementia   Psychiatric: denies feeling anxious, depressed, suicidal, homicidal  Endocrine: denies increased fingerstick glucoses, cold or heat intolerance, polydipsia, polyuria, polyphagia, hair loss  Hematology/Oncology: denies bruising, tender or enlarged lymph nodes   Allergy/immunologic: denies hives or eczema  ROS negative x 10 systems except as noted above    OBJECTIVE:  ICU Vital Signs Last 24 Hrs  T(C): 36.6 (14 Jan 2019 05:28), Max: 36.7 (13 Jan 2019 14:56)  T(F): 97.9 (14 Jan 2019 05:28), Max: 98.1 (13 Jan 2019 14:56)  HR: 63 (14 Jan 2019 07:25) (62 - 77)  BP: 124/53 (14 Jan 2019 05:28) (120/61 - 148/65)  BP(mean): --  ABP: --  ABP(mean): --  RR: 17 (14 Jan 2019 05:28) (17 - 18)  SpO2: 92% (14 Jan 2019 07:25) (92% - 100%)        CAPILLARY BLOOD GLUCOSE      POCT Blood Glucose.: 205 mg/dL (14 Jan 2019 08:06)      PHYSICAL EXAM:  Constitutional: NAD, well groomed, well-developed  HEENT: PERRLA, EOMI, no drainage or redness  Neck: Supple, No JVD  Back: Normal spine flexure, No CVA tenderness, No deformity or limitation of movement  Respiratory: Breath sounds equal & clear bilaterally to auscultation, no accessory muscle use noted  CV: Regular rate and rhythm, normal S1,S2; no murmurs or rubs  GI: Soft, non-tender, non distended, no hepatosplenomegaly, normal bowel sounds  Extremities: JOYNER x 4, no peripheral edema, no cyanosis, no clubbing  Vascular: Equal and normal pulses; 2+ peripheral pulses noted throughout  Neuro: A&O x 3; speech clear and intact; no sensory or motor deficits, normal reflexes  Psych: calm, normal mood and affect  MSK: No joint swelling or deformity; no limitation of movement  Skin: warm, dry, well perfused, no rashes    LINES:    HOSPITAL MEDICATIONS:  Standing Meds:  ALBUTerol/ipratropium for Nebulization 3 milliLiter(s) Nebulizer every 6 hours  azithromycin  IVPB 500 milliGRAM(s) IV Intermittent every 24 hours  cefTRIAXone   IVPB 1 Gram(s) IV Intermittent every 24 hours  dextrose 5%. 1000 milliLiter(s) IV Continuous <Continuous>  dextrose 50% Injectable 12.5 Gram(s) IV Push once  dextrose 50% Injectable 25 Gram(s) IV Push once  dextrose 50% Injectable 25 Gram(s) IV Push once  heparin  Injectable 5000 Unit(s) SubCutaneous every 8 hours  insulin glargine Injectable (LANTUS) 5 Unit(s) SubCutaneous at bedtime  insulin lispro (HumaLOG) corrective regimen sliding scale   SubCutaneous three times a day before meals  losartan 50 milliGRAM(s) Oral daily  predniSONE   Tablet 40 milliGRAM(s) Oral daily      PRN Meds:  dextrose 40% Gel 15 Gram(s) Oral once PRN  glucagon  Injectable 1 milliGRAM(s) IntraMuscular once PRN      LABS:                        12.9   13.42 )-----------( 325      ( 14 Jan 2019 05:40 )             42.4     Hgb Trend: 12.9<--, 13.6<--, 14.2<--, 14.3<--  01-14    141  |  99  |  19  ----------------------------<  183<H>  5.0   |  30  |  0.85    Ca    9.1      14 Jan 2019 05:40  Phos  2.9     01-14  Mg     2.2     01-14      Creatinine Trend: 0.85<--, 0.87<--, 0.82<--, 0.79<--        Venous Blood Gas:  01-14 @ 05:40  7.28/82/31/30/52.4  VBG Lactate: --  Venous Blood Gas:  01-12 @ 11:21  7.28/82/125/31/96.7  VBG Lactate: --      MICROBIOLOGY:     RADIOLOGY:  [ ] Reviewed and interpreted by me    EKG: CHIEF COMPLAINT: SOB 2/2 COPD    Interval Events: no events overnigh    REVIEW OF SYSTEMS:    Respiratory: denies SOB, AGUIRRE, cough, phlegm, wheezing, hemoptysis  Cardiovascular: denies CP, palpitations, edema, diaphoresis       OBJECTIVE:  ICU Vital Signs Last 24 Hrs  T(C): 36.6 (14 Jan 2019 05:28), Max: 36.7 (13 Jan 2019 14:56)  T(F): 97.9 (14 Jan 2019 05:28), Max: 98.1 (13 Jan 2019 14:56)  HR: 63 (14 Jan 2019 07:25) (62 - 77)  BP: 124/53 (14 Jan 2019 05:28) (120/61 - 148/65)  BP(mean): --  ABP: --  ABP(mean): --  RR: 17 (14 Jan 2019 05:28) (17 - 18)  SpO2: 92% (14 Jan 2019 07:25) (92% - 100%)        CAPILLARY BLOOD GLUCOSE      POCT Blood Glucose.: 205 mg/dL (14 Jan 2019 08:06)      PHYSICAL EXAM:  Constitutional: NAD, well groomed, well-developed  HEENT: PERRLA, EOMI, no drainage or redness  Neck: Supple, No JVD  Back: Normal spine flexure, No CVA tenderness, No deformity or limitation of movement  Respiratory: Breath sounds equal & clear bilaterally to auscultation, no accessory muscle use noted  CV: Regular rate and rhythm, normal S1,S2; no murmurs or rubs  GI: Soft, non-tender, non distended, no hepatosplenomegaly, normal bowel sounds  Extremities: JOYNER x 4, no peripheral edema, no cyanosis, no clubbing  Vascular: Equal and normal pulses; 2+ peripheral pulses noted throughout  Neuro: A&O x 3; speech clear and intact; no sensory or motor deficits, normal reflexes  Psych: calm, normal mood and affect  MSK: No joint swelling or deformity; no limitation of movement  Skin: warm, dry, well perfused, no rashes    LINES:    HOSPITAL MEDICATIONS:  Standing Meds:  ALBUTerol/ipratropium for Nebulization 3 milliLiter(s) Nebulizer every 6 hours  azithromycin  IVPB 500 milliGRAM(s) IV Intermittent every 24 hours  cefTRIAXone   IVPB 1 Gram(s) IV Intermittent every 24 hours  dextrose 5%. 1000 milliLiter(s) IV Continuous <Continuous>  dextrose 50% Injectable 12.5 Gram(s) IV Push once  dextrose 50% Injectable 25 Gram(s) IV Push once  dextrose 50% Injectable 25 Gram(s) IV Push once  heparin  Injectable 5000 Unit(s) SubCutaneous every 8 hours  insulin glargine Injectable (LANTUS) 5 Unit(s) SubCutaneous at bedtime  insulin lispro (HumaLOG) corrective regimen sliding scale   SubCutaneous three times a day before meals  losartan 50 milliGRAM(s) Oral daily  predniSONE   Tablet 40 milliGRAM(s) Oral daily      PRN Meds:  dextrose 40% Gel 15 Gram(s) Oral once PRN  glucagon  Injectable 1 milliGRAM(s) IntraMuscular once PRN      LABS:                        12.9   13.42 )-----------( 325      ( 14 Jan 2019 05:40 )             42.4     Hgb Trend: 12.9<--, 13.6<--, 14.2<--, 14.3<--  01-14    141  |  99  |  19  ----------------------------<  183<H>  5.0   |  30  |  0.85    Ca    9.1      14 Jan 2019 05:40  Phos  2.9     01-14  Mg     2.2     01-14      Creatinine Trend: 0.85<--, 0.87<--, 0.82<--, 0.79<--        Venous Blood Gas:  01-14 @ 05:40  7.28/82/31/30/52.4  VBG Lactate: --  Venous Blood Gas:  01-12 @ 11:21  7.28/82/125/31/96.7  VBG Lactate: --      MICROBIOLOGY:     RADIOLOGY:  [ ] Reviewed and interpreted by me    EKG:

## 2019-01-14 NOTE — PROCEDURE NOTE - NSPROCDETAILS_GEN_ALL_CORE
ultrasound assessment of effusion (size)/location identified, draped/prepped, sterile technique used, needle inserted/introduced/Seldinger technique/ultrasound assessment of effusion (localization)/catheter inserted over needle/connection to syringe

## 2019-01-14 NOTE — PROGRESS NOTE ADULT - PROBLEM SELECTOR PLAN 2
CT chest showed moderate to large complex mildly loculated left pleural effusion with layering mildly dense material;   -consolidations within the left upper and lower lobes, partially related to mass effect from the pleural effusion; suggestion of ill-defined low density within a left upper lobe consolidation,   -concerning for underlying infection and/or mass; Mediastinal and hilar lymphadenopathy.  - Diagnostic thora today ?  - repeat CT after Thora

## 2019-01-14 NOTE — CONSULT NOTE ADULT - ASSESSMENT
63 year old with HTN, HL, DM type 2 A1C 9.4 on metformin who comes in with SOB found to hypercarbic respiratory failure, left pleural effusion and probable underlying mass. s/p thoracentesis today undergoing further workup.  Pt is currently on prednisone and has marked hyperglycemia.  endocrine called for further management      Pt needs basal coverage with prednisone and bolus insulin to cover prednisone induced hyperglycemia.  Pt actually need minimal Lantus as he dropped significantly from 350 to 205 this morning with 5 lantus.  Therefore will focus on prandial coverage and basal coverage with prednisone

## 2019-01-14 NOTE — DIETITIAN INITIAL EVALUATION ADULT. - OTHER INFO
Nutrition consult received for education on 1/12/19 , noted 2+ R & L ankle edema , Hemoglobin A1C 9.4% , met with pt.  and spouse , reviewed therapeutic diet, handed written  information on diet , appeared understanding , expect compliance , encouraged follow up with endocrine as out patient . Pt. tolerating PO nutrition, able to take > 75% of the meals , no known food allergies .

## 2019-01-14 NOTE — PROGRESS NOTE ADULT - PROBLEM SELECTOR PLAN 4
HBA1c 9.4. Persistent Hyperglycemia in 300s  - Will initiate Lantus 5 un qHS  - FSBS with moderate dose lispro coverage

## 2019-01-14 NOTE — CONSULT NOTE ADULT - ATTENDING COMMENTS
Rosaura Lucas MD  Pager [phone] ([facility])/ [phone] ([facility]) [please provide 10 digit call back number]  Nights and weekends: [phone]  Please note that this patient may be followed by a different provider tomorrow. If no answer or after hours, please contact [phone].  For final dc reccomendations, please call [phone] or page the endocrine fellow on call.

## 2019-01-14 NOTE — CONSULT NOTE ADULT - SUBJECTIVE AND OBJECTIVE BOX
Reason For Consult:     HPI:  63 y.o. man with history of DM II, HLD, P/W cough with sputum production and SOB of several days in duration. Patient state that he smokes 1 ppd and has been doing so for the past 50 years and only quit 2 days ago. Patient came to the ER for further and was noted to have an elevated PCO2 and was admitted to the pulmonary service for further care. Patient was given solumedrol and placed on BiPAP. Presently, he has no complaints. He also reports that he discontinued all his medications. (12 Jan 2019 06:33)      Endocrine called for help with management of hyperglycemia      Endocrine hx:  pt states he was recently diagnosed last year with DM and placed on metformin 500mg BID.  he does not check FSBG.  he does endorse a high carb diet at home which he has been trying to change and  adjust, also with juice, snacking and soda at home.  pt has not seen a podiatrist or opthomologist since the diagnoses.  He has a strong family hx of DM in the family with parents and grandparents.           PAST MEDICAL & SURGICAL HISTORY:  HLD (hyperlipidemia)  Diabetes mellitus  Hypertension  No significant past surgical history      FAMILY HISTORY:  Family history of diabetes mellitus (Father, Mother): Parents      Social History:  works as   current every day smoker, however states he quit right prior to the hospital and is motivated to quit.  lives with wife    Outpatient Medications:  lasix  valsartan   amolodpine   lipitor 40mg   metformin 500mg bid     MEDICATIONS  (STANDING):  ALBUTerol/ipratropium for Nebulization 3 milliLiter(s) Nebulizer every 6 hours  azithromycin  IVPB 500 milliGRAM(s) IV Intermittent every 24 hours  cefTRIAXone   IVPB 1 Gram(s) IV Intermittent every 24 hours  chlorhexidine 4% Liquid 1 Application(s) Topical <User Schedule>  heparin  Injectable 5000 Unit(s) SubCutaneous every 8 hours  lidocaine 2% Injectable 10 milliLiter(s) Local Injection once  losartan 50 milliGRAM(s) Oral daily  predniSONE   Tablet 40 milliGRAM(s) Oral daily    MEDICATIONS  (PRN):      Allergies    No Known Allergies    Intolerances      Review of Systems:  Constitutional: No fever  Eyes: No blurry vision  Neuro: No tremors  HEENT: No pain  Cardiovascular: No chest pain, palpitations  Respiratory: + SOB, no cough  GI: No nausea, vomiting, abdominal pain  : No dysuria  Skin: no rash  Psych: no depression  Endocrine: no polyuria, polydipsia  Hem/lymph: no swelling      ALL OTHER SYSTEMS REVIEWED AND NEGATIVE      PHYSICAL EXAM:  VITALS: T(C): 36.8 (01-14-19 @ 14:25)  T(F): 98.3 (01-14-19 @ 14:25), Max: 98.3 (01-14-19 @ 14:25)  HR: 86 (01-14-19 @ 15:39) (62 - 88)  BP: 143/70 (01-14-19 @ 14:25) (124/53 - 153/68)  RR:  (17 - 20)  SpO2:  (92% - 100%)  Wt(kg): --  GENERAL: NAD, well-groomed, well-developed  EYES: No proptosis, no lid lag, anicteric  HEENT:  Atraumatic, Normocephalic, moist mucous membranes  RESPIRATORY:: decreased BS bl at the bases   CARDIOVASCULAR: Regular rate and rhythm; No murmurs; no peripheral edema  GI: Soft, nontender, non distended, normal bowel sounds, +obese ABDOMEN, CENTRAL OBESITY   SKIN: 2+ LE edema in the LE, gross sensation intact in the feet, dry skin   MUSCULOSKELETAL: Full range of motion, normal strength  NEURO: sensation intact, extraocular movements intact, no tremor,  PSYCH: Alert and oriented x 3, normal affect, normal mood      POCT Blood Glucose.: 338 mg/dL (01-14-19 @ 11:43)  POCT Blood Glucose.: 205 mg/dL (01-14-19 @ 08:06)  POCT Blood Glucose.: 350 mg/dL (01-13-19 @ 21:24)  POCT Blood Glucose.: 320 mg/dL (01-13-19 @ 16:56)  POCT Blood Glucose.: 329 mg/dL (01-13-19 @ 12:27)  POCT Blood Glucose.: 174 mg/dL (01-13-19 @ 08:30)  POCT Blood Glucose.: 326 mg/dL (01-12-19 @ 21:52)  POCT Blood Glucose.: 185 mg/dL (01-12-19 @ 17:07)  POCT Blood Glucose.: 265 mg/dL (01-12-19 @ 12:23)  POCT Blood Glucose.: 181 mg/dL (01-12-19 @ 08:27)                            12.9   13.42 )-----------( 325      ( 14 Jan 2019 05:40 )             42.4       01-14    141  |  99  |  19  ----------------------------<  183<H>  5.0   |  30  |  0.85    EGFR if : 107  EGFR if non : 92    Ca    9.1      01-14  Mg     2.2     01-14  Phos  2.9     01-14    TPro  7.2  /  Alb  4.0  /  TBili  0.4  /  DBili  x   /  AST  20  /  ALT  25  /  AlkPhos  112  01-11      Thyroid Function Tests:      Hemoglobin A1C, Whole Blood: 9.4 % <H> [4.0 - 5.6] (01-12-19 @ 11:21)          Radiology:

## 2019-01-15 ENCOUNTER — TRANSCRIPTION ENCOUNTER (OUTPATIENT)
Age: 64
End: 2019-01-15

## 2019-01-15 PROBLEM — E78.5 HYPERLIPIDEMIA, UNSPECIFIED: Chronic | Status: ACTIVE | Noted: 2019-01-11

## 2019-01-15 PROBLEM — E11.9 TYPE 2 DIABETES MELLITUS WITHOUT COMPLICATIONS: Chronic | Status: ACTIVE | Noted: 2019-01-11

## 2019-01-15 PROBLEM — I10 ESSENTIAL (PRIMARY) HYPERTENSION: Chronic | Status: ACTIVE | Noted: 2019-01-11

## 2019-01-15 LAB
BASE EXCESS BLDA CALC-SCNC: 10.9 MMOL/L — SIGNIFICANT CHANGE UP
CULTURE - ACID FAST SMEAR CONCENTRATED: SIGNIFICANT CHANGE UP
GLUCOSE BLDA-MCNC: 192 MG/DL — HIGH (ref 70–99)
GLUCOSE BLDC GLUCOMTR-MCNC: 173 MG/DL — HIGH (ref 70–99)
GLUCOSE BLDC GLUCOMTR-MCNC: 193 MG/DL — HIGH (ref 70–99)
GLUCOSE BLDC GLUCOMTR-MCNC: 212 MG/DL — HIGH (ref 70–99)
GLUCOSE BLDC GLUCOMTR-MCNC: 240 MG/DL — HIGH (ref 70–99)
GLUCOSE BLDC GLUCOMTR-MCNC: 256 MG/DL — HIGH (ref 70–99)
HCO3 BLDA-SCNC: 33 MMOL/L — HIGH (ref 22–26)
HCT VFR BLDA CALC: 41.2 % — SIGNIFICANT CHANGE UP (ref 39–51)
HGB BLDA-MCNC: 13.4 G/DL — SIGNIFICANT CHANGE UP (ref 13–17)
PCO2 BLDA: 57 MMHG — HIGH (ref 35–48)
PH BLDA: 7.42 PH — SIGNIFICANT CHANGE UP (ref 7.35–7.45)
PH FLD: 7.3 PH — SIGNIFICANT CHANGE UP
PO2 BLDA: 90 MMHG — SIGNIFICANT CHANGE UP (ref 83–108)
POTASSIUM BLDA-SCNC: 3.6 MMOL/L — SIGNIFICANT CHANGE UP (ref 3.4–4.5)
SAO2 % BLDA: 94.9 % — LOW (ref 95–99)
SODIUM BLDA-SCNC: 137 MMOL/L — SIGNIFICANT CHANGE UP (ref 136–146)
SPECIMEN SOURCE: SIGNIFICANT CHANGE UP

## 2019-01-15 PROCEDURE — 71260 CT THORAX DX C+: CPT | Mod: 26

## 2019-01-15 PROCEDURE — 99233 SBSQ HOSP IP/OBS HIGH 50: CPT | Mod: GC

## 2019-01-15 PROCEDURE — 99232 SBSQ HOSP IP/OBS MODERATE 35: CPT

## 2019-01-15 RX ORDER — FUROSEMIDE 40 MG
20 TABLET ORAL ONCE
Qty: 0 | Refills: 0 | Status: COMPLETED | OUTPATIENT
Start: 2019-01-15 | End: 2019-01-15

## 2019-01-15 RX ADMIN — LOSARTAN POTASSIUM 50 MILLIGRAM(S): 100 TABLET, FILM COATED ORAL at 05:45

## 2019-01-15 RX ADMIN — Medication 8 UNIT(S): at 12:34

## 2019-01-15 RX ADMIN — Medication 3 MILLILITER(S): at 21:36

## 2019-01-15 RX ADMIN — CHLORHEXIDINE GLUCONATE 1 APPLICATION(S): 213 SOLUTION TOPICAL at 09:16

## 2019-01-15 RX ADMIN — Medication 3 MILLILITER(S): at 16:24

## 2019-01-15 RX ADMIN — Medication 1: at 12:34

## 2019-01-15 RX ADMIN — Medication 40 MILLIGRAM(S): at 12:33

## 2019-01-15 RX ADMIN — AZITHROMYCIN 250 MILLIGRAM(S): 500 TABLET, FILM COATED ORAL at 09:15

## 2019-01-15 RX ADMIN — HEPARIN SODIUM 5000 UNIT(S): 5000 INJECTION INTRAVENOUS; SUBCUTANEOUS at 05:45

## 2019-01-15 RX ADMIN — Medication 8 UNIT(S): at 17:37

## 2019-01-15 RX ADMIN — HUMAN INSULIN 10 UNIT(S): 100 INJECTION, SUSPENSION SUBCUTANEOUS at 12:33

## 2019-01-15 RX ADMIN — CEFTRIAXONE 100 GRAM(S): 500 INJECTION, POWDER, FOR SOLUTION INTRAMUSCULAR; INTRAVENOUS at 08:14

## 2019-01-15 RX ADMIN — Medication 20 MILLIGRAM(S): at 17:38

## 2019-01-15 RX ADMIN — Medication 3 MILLILITER(S): at 09:41

## 2019-01-15 RX ADMIN — Medication 1: at 08:15

## 2019-01-15 RX ADMIN — Medication 2: at 17:37

## 2019-01-15 RX ADMIN — Medication 8 UNIT(S): at 08:15

## 2019-01-15 RX ADMIN — HEPARIN SODIUM 5000 UNIT(S): 5000 INJECTION INTRAVENOUS; SUBCUTANEOUS at 21:49

## 2019-01-15 RX ADMIN — INSULIN GLARGINE 4 UNIT(S): 100 INJECTION, SOLUTION SUBCUTANEOUS at 21:49

## 2019-01-15 RX ADMIN — HEPARIN SODIUM 5000 UNIT(S): 5000 INJECTION INTRAVENOUS; SUBCUTANEOUS at 13:38

## 2019-01-15 NOTE — PROGRESS NOTE ADULT - SUBJECTIVE AND OBJECTIVE BOX
CHIEF COMPLAINT:    Interval Events:    REVIEW OF SYSTEMS:  Constitutional: denies fever, chills, general malaise, fatigue, weight loss, weight gain, diaphoresis   HEENT: denies dry mouth, sore throat, runny nose, photophobia, blurry vision, double vision, discharge, eye pain, difficulty hearing, vertigo, dysphagia, epistaxis, recent dental work    Neck: denies pain or stiffness  Breasts: denies pain, masses, or nipple discharge  Respiratory: denies SOB, AGUIRRE, cough, phlegm, wheezing, hemoptysis  Cardiovascular: denies CP, palpitations, edema, diaphoresis   Gastrointestinal: denies nausea, vomiting or hematemesis, diarrhea, constipation, abdominal or epigastric pain, melena or hematochezia   Genitourinary: denies dysuria, frequency, urgency, incontinence, hematuria   Skin: denies rash, hives, itching, burning, masses  Musculoskeletal: denies myalgias, arthritis, joint swelling, muscle weakness  Neurologic: denies syncope, LOC, headache, weakness, dizziness, parasthesias, numbness, seizures, confusion, dementia   Psychiatric: denies feeling anxious, depressed, suicidal, homicidal  Endocrine: denies increased fingerstick glucoses, cold or heat intolerance, polydipsia, polyuria, polyphagia, hair loss  Hematology/Oncology: denies bruising, tender or enlarged lymph nodes   Allergy/immunologic: denies hives or eczema  ROS negative x 10 systems except as noted above    OBJECTIVE:  ICU Vital Signs Last 24 Hrs  T(C): 36.7 (15 Ugo 2019 05:45), Max: 36.8 (14 Jan 2019 14:25)  T(F): 98.1 (15 Ugo 2019 05:45), Max: 98.3 (14 Jan 2019 14:25)  HR: 71 (15 Ugo 2019 08:24) (70 - 88)  BP: 132/74 (15 Ugo 2019 05:45) (129/72 - 153/68)  BP(mean): --  ABP: --  ABP(mean): --  RR: 22 (15 Ugo 2019 05:45) (20 - 22)  SpO2: 94% (15 Ugo 2019 08:24) (94% - 96%)        01-14 @ 07:01  -  01-15 @ 07:00  --------------------------------------------------------  IN: 0 mL / OUT: 1000 mL / NET: -1000 mL      CAPILLARY BLOOD GLUCOSE      POCT Blood Glucose.: 173 mg/dL (15 Ugo 2019 07:58)      PHYSICAL EXAM:  Constitutional: NAD, well groomed, well-developed  HEENT: PERRLA, EOMI, no drainage or redness  Neck: Supple, No JVD  Back: Normal spine flexure, No CVA tenderness, No deformity or limitation of movement  Respiratory: Breath sounds equal & clear bilaterally to auscultation, no accessory muscle use noted  CV: Regular rate and rhythm, normal S1,S2; no murmurs or rubs  GI: Soft, non-tender, non distended, no hepatosplenomegaly, normal bowel sounds  Extremities: JOYNER x 4, no peripheral edema, no cyanosis, no clubbing  Vascular: Equal and normal pulses; 2+ peripheral pulses noted throughout  Neuro: A&O x 3; speech clear and intact; no sensory or motor deficits, normal reflexes  Psych: calm, normal mood and affect  MSK: No joint swelling or deformity; no limitation of movement  Skin: warm, dry, well perfused, no rashes    LINES:    HOSPITAL MEDICATIONS:  Standing Meds:  ALBUTerol/ipratropium for Nebulization 3 milliLiter(s) Nebulizer every 6 hours  azithromycin  IVPB 500 milliGRAM(s) IV Intermittent every 24 hours  cefTRIAXone   IVPB 1 Gram(s) IV Intermittent every 24 hours  chlorhexidine 4% Liquid 1 Application(s) Topical <User Schedule>  dextrose 5%. 1000 milliLiter(s) IV Continuous <Continuous>  dextrose 50% Injectable 12.5 Gram(s) IV Push once  dextrose 50% Injectable 25 Gram(s) IV Push once  dextrose 50% Injectable 25 Gram(s) IV Push once  heparin  Injectable 5000 Unit(s) SubCutaneous every 8 hours  insulin glargine Injectable (LANTUS) 4 Unit(s) SubCutaneous at bedtime  insulin lispro (HumaLOG) corrective regimen sliding scale   SubCutaneous three times a day before meals  insulin lispro (HumaLOG) corrective regimen sliding scale   SubCutaneous at bedtime  insulin lispro Injectable (HumaLOG) 8 Unit(s) SubCutaneous three times a day before meals  insulin NPH human recombinant 10 Unit(s) SubCutaneous daily  losartan 50 milliGRAM(s) Oral daily  predniSONE   Tablet 40 milliGRAM(s) Oral daily      PRN Meds:  dextrose 40% Gel 15 Gram(s) Oral once PRN  glucagon  Injectable 1 milliGRAM(s) IntraMuscular once PRN      LABS:                        12.9   13.42 )-----------( 325      ( 14 Jan 2019 05:40 )             42.4     Hgb Trend: 12.9<--, 13.6<--, 14.2<--, 14.3<--  01-14    141  |  99  |  19  ----------------------------<  183<H>  5.0   |  30  |  0.85    Ca    9.1      14 Jan 2019 05:40  Phos  2.9     01-14  Mg     2.2     01-14      Creatinine Trend: 0.85<--, 0.87<--, 0.82<--, 0.79<--      Arterial Blood Gas:  01-15 @ 08:50  7.42/57/90/33/94.9/10.9  ABG lactate: --  Arterial Blood Gas:  01-14 @ 15:30  7.41/57/65/33/92.0/10.2  ABG lactate: 2.0    Venous Blood Gas:  01-14 @ 05:40  7.28/82/31/30/52.4  VBG Lactate: --      MICROBIOLOGY:     Culture - Body Fluid with Gram Stain (collected 14 Jan 2019 17:09)  Source: PLEURAL FLUID  Gram Stain (14 Jan 2019 19:13):    NOS^No Organisms Seen    WBC^White Blood Cells    QNTY CELLS IN GRAM STAIN: FEW (2+)  Preliminary Report (15 Ugo 2019 07:20):    CULTURE IN PROGRESS, FURTHER REPORT TO FOLLOW.      RADIOLOGY:  [ ] Reviewed and interpreted by me    EKG: CHIEF COMPLAINT:    Interval Events: No acute events overnight    HPI:  63 y.o. man with history of DM II, HLD, P/W cough with sputum production and SOB of several days in duration. Patient state that he smokes 1 ppd and has been doing so for the past 50 years and only quit 2 days ago. Patient came to the ER for further and was noted to have an elevated PCO2 and was admitted to the pulmonary service for further care. Patient was given solumedrol and placed on BiPAP. Presently, he has no complaints. He also reports that he discontinued all his medications. (12 Jan 2019 06:33)      REVIEW OF SYSTEMS:  Constitutional: denies fever, chills, general malaise, fatigue, weight loss, weight gain, diaphoresis   HEENT: denies dry mouth, sore throat, runny nose, photophobia, blurry vision, double vision, discharge, eye pain, difficulty hearing  Neck: denies pain or stiffness  Respiratory: denies SOB, AGUIRRE, cough, phlegm, wheezing, hemoptysis  Cardiovascular: denies CP, palpitations, edema, diaphoresis   Gastrointestinal: denies nausea, vomiting or hematemesis, diarrhea, constipation, abdominal or epigastric pain, melena or hematochezia   Genitourinary: denies dysuria, frequency, urgency, incontinence, hematuria   Skin: denies rash, hives, itching, burning, masses  Musculoskeletal: denies myalgias, arthritis, joint swelling, muscle weakness  Neurologic: denies syncope, LOC, headache, weakness, dizziness, parasthesias, numbness, seizures, confusion, dementia   Psychiatric: denies feeling anxious, depressed, suicidal, homicidal  ROS negative x 10 systems except as noted above    OBJECTIVE:  ICU Vital Signs Last 24 Hrs  T(C): 36.7 (15 Ugo 2019 05:45), Max: 36.8 (14 Jan 2019 14:25)  T(F): 98.1 (15 Ugo 2019 05:45), Max: 98.3 (14 Jan 2019 14:25)  HR: 71 (15 Ugo 2019 08:24) (70 - 88)  BP: 132/74 (15 Ugo 2019 05:45) (129/72 - 153/68)  BP(mean): --  ABP: --  ABP(mean): --  RR: 22 (15 Ugo 2019 05:45) (20 - 22)  SpO2: 94% (15 Ugo 2019 08:24) (94% - 96%)        01-14 @ 07:01  -  01-15 @ 07:00  --------------------------------------------------------  IN: 0 mL / OUT: 1000 mL / NET: -1000 mL      CAPILLARY BLOOD GLUCOSE      POCT Blood Glucose.: 173 mg/dL (15 Ugo 2019 07:58)      PHYSICAL EXAM:  Constitutional: NAD, well groomed, well-developed  HEENT: PERRLA, EOMI, no drainage or redness  Neck: Supple, No JVD  Back: Normal spine flexure, No CVA tenderness, No deformity or limitation of movement  Respiratory: Breath sounds equal & clear bilaterally to auscultation, no accessory muscle use noted  CV: Regular rate and rhythm, normal S1,S2; no murmurs or rubs  GI: Soft, non-tender, non distended, no hepatosplenomegaly, normal bowel sounds  Extremities: JOYNER x 4, no peripheral edema, no cyanosis, no clubbing  Vascular: Equal and normal pulses; 2+ peripheral pulses noted throughout  Neuro: A&O x 3; speech clear and intact; no sensory or motor deficits, normal reflexes  Psych: calm, normal mood and affect  MSK: No joint swelling or deformity; no limitation of movement  Skin: warm, dry, well perfused, no rashes    LINES:    HOSPITAL MEDICATIONS:  Standing Meds:  ALBUTerol/ipratropium for Nebulization 3 milliLiter(s) Nebulizer every 6 hours  azithromycin  IVPB 500 milliGRAM(s) IV Intermittent every 24 hours  cefTRIAXone   IVPB 1 Gram(s) IV Intermittent every 24 hours  chlorhexidine 4% Liquid 1 Application(s) Topical <User Schedule>  dextrose 5%. 1000 milliLiter(s) IV Continuous <Continuous>  dextrose 50% Injectable 12.5 Gram(s) IV Push once  dextrose 50% Injectable 25 Gram(s) IV Push once  dextrose 50% Injectable 25 Gram(s) IV Push once  heparin  Injectable 5000 Unit(s) SubCutaneous every 8 hours  insulin glargine Injectable (LANTUS) 4 Unit(s) SubCutaneous at bedtime  insulin lispro (HumaLOG) corrective regimen sliding scale   SubCutaneous three times a day before meals  insulin lispro (HumaLOG) corrective regimen sliding scale   SubCutaneous at bedtime  insulin lispro Injectable (HumaLOG) 8 Unit(s) SubCutaneous three times a day before meals  insulin NPH human recombinant 10 Unit(s) SubCutaneous daily  losartan 50 milliGRAM(s) Oral daily  predniSONE   Tablet 40 milliGRAM(s) Oral daily      PRN Meds:  dextrose 40% Gel 15 Gram(s) Oral once PRN  glucagon  Injectable 1 milliGRAM(s) IntraMuscular once PRN      LABS:                        12.9   13.42 )-----------( 325      ( 14 Jan 2019 05:40 )             42.4     Hgb Trend: 12.9<--, 13.6<--, 14.2<--, 14.3<--  01-14    141  |  99  |  19  ----------------------------<  183<H>  5.0   |  30  |  0.85    Ca    9.1      14 Jan 2019 05:40  Phos  2.9     01-14  Mg     2.2     01-14      Creatinine Trend: 0.85<--, 0.87<--, 0.82<--, 0.79<--      Arterial Blood Gas:  01-15 @ 08:50  7.42/57/90/33/94.9/10.9  ABG lactate: --  Arterial Blood Gas:  01-14 @ 15:30  7.41/57/65/33/92.0/10.2  ABG lactate: 2.0    Venous Blood Gas:  01-14 @ 05:40  7.28/82/31/30/52.4  VBG Lactate: --      MICROBIOLOGY:     Culture - Body Fluid with Gram Stain (collected 14 Jan 2019 17:09)  Source: PLEURAL FLUID  Gram Stain (14 Jan 2019 19:13):    NOS^No Organisms Seen    WBC^White Blood Cells    QNTY CELLS IN GRAM STAIN: FEW (2+)  Preliminary Report (15 Ugo 2019 07:20):    CULTURE IN PROGRESS, FURTHER REPORT TO FOLLOW.      RADIOLOGY:  [ ] Reviewed and interpreted by me    EKG:

## 2019-01-15 NOTE — PROGRESS NOTE ADULT - ASSESSMENT
63 year old with HTN, HL, DM type 2 A1C 9.4 on metformin who comes in with SOB found to hypercarbic respiratory failure, left pleural effusion and probable underlying mass. s/p thoracentesis today undergoing further workup.  Pt is currently on prednisone and has marked hyperglycemia.  endocrine called for further management      Pt needs basal coverage with prednisone and bolus insulin to cover prednisone induced hyperglycemia.  Pt actually need minimal Lantus as he dropped significantly from 350 to 205 with 5 lantus.  Therefore will focus on prandial coverage and basal coverage with prednisone with NPH

## 2019-01-15 NOTE — DISCHARGE NOTE ADULT - HOME CARE AGENCY
[facility] [phone] Nurse to visit on the day following discharge. Other appropriate services to be arranged thereafter. Please contact the home care agency at the above phone number if you have not heard from them by approximately 12 noon on the day after your hospital discharge.

## 2019-01-15 NOTE — DISCHARGE NOTE ADULT - HOSPITAL COURSE
63 year old male with history of DM II, HLD, P/W cough with sputum production and SOB of several days in duration. Patient state that he smokes 1 ppd and has been doing so for the past 50 years and only quit 2 days ago. Patient came to the ER for further and was noted to have an elevated PCO2 and was admitted to the pulmonary service for further care. Patient was given solumedrol and placed on BiPAP. Presently, he has no complaints. He also reports that he discontinued all his medications. (12 Jan 2019 06:33)    1/14/19 - Diagnostic thoracentesis perfomed.  1 liter drained and sent for cyto, patho, cell count.  Treated COPD exacerbation with azithromycin/ceftriaxone, nebulizer treatments, steroids and BIPAP.      1/15/19 - Off of BIPAP x 24 hours.  ABG - ( 15 Ugo 2019 08:50 )  pH, Arterial: 7.42  pH, Blood: x     /  pCO2: 57    /  pO2: 90    / HCO3: 33    / Base Excess: 10.9  /  SaO2: 94.9    Rescan CT Chest with oral contrast for evaluation of mass now that lung has reexpanded. 63 year old male with history of DM II, HLD, P/W cough with sputum production and SOB of several days in duration. Patient state that he smokes 1 ppd and has been doing so for the past 50 years and only quit 2 days ago. Patient came to the ER for further and was noted to have an elevated PCO2 and was admitted to the pulmonary service for further care. Patient was given solumedrol and placed on BiPAP. Presently, he has no complaints. He also reports that he discontinued all his medications. (12 Jan 2019 06:33)    1/14/19 - Diagnostic thoracentesis perfomed.  1 liter drained and sent for cyto, patho, cell count.  Treated COPD exacerbation with azithromycin/ceftriaxone, nebulizer treatments, steroids and BIPAP.      1/15/19 - Off of BIPAP x 24 hours.  ABG - ( 15 Ugo 2019 08:50 )  pH, Arterial: 7.42  pH, Blood: x     /  pCO2: 57    /  pO2: 90    / HCO3: 33    / Base Excess: 10.9  /  SaO2: 94.9    Rescan CT Chest with oral contrast for evaluation of mass now that lung has reexpanded.      Dispo: home with home PT 63 year old male with history of DM II, HLD, P/W cough with sputum production and SOB of several days in duration. Patient state that he smokes 1 ppd and has been doing so for the past 50 years and only quit 2 days ago. Patient came to the ER for further and was noted to have an elevated PCO2 and was admitted to the pulmonary service for further care. Patient was given solumedrol and placed on BiPAP. Presently, he has no complaints. He also reports that he discontinued all his medications. (12 Jan 2019 06:33)  1/14/19 - Diagnostic thoracentesis performed.  1 liter drained and sent for cyto, patho, cell count.  Treated COPD exacerbation with azithromycin/ceftriaxone, nebulizer treatments, steroids(completed 5 day course) and BIPAP.    1/15/19 - Off of BIPAP x 24 hours.  ABG - ( 15 Ugo 2019 08:50 )  pH, Arterial: 7.42  pH, Blood: x     /  pCO2: 57    /  pO2: 90    / HCO3: 33    / Base Excess: 10.9  /  SaO2: 94.9    Rescan CT Chest with oral contrast for evaluation of mass now that lung has reexpanded.    - COPD - course of antibiotics and steroids completed  Diabetes - evaluated by [facility] endocrine    Dispo: home with home care

## 2019-01-15 NOTE — PROGRESS NOTE ADULT - PROBLEM SELECTOR PLAN 2
CT chest showed moderate to large complex mildly loculated left pleural effusion with layering mildly dense material;   -consolidations within the left upper and lower lobes, partially related to mass effect from the pleural effusion; suggestion of ill-defined low density within a left upper lobe consolidation,   -concerning for underlying infection and/or mass; Mediastinal and hilar lymphadenopathy.  - Diagnostic thora today ?  - repeat CT after Thora CT chest showed moderate to large complex mildly loculated left pleural effusion with layering mildly dense material;   -consolidations within the left upper and lower lobes, partially related to mass effect from the pleural effusion; suggestion of ill-defined low density within a left upper lobe consolidation,   -concerning for underlying infection and/or mass; Mediastinal and hilar lymphadenopathy.  - repeat CT after Thora  -to follow up as an outpatient

## 2019-01-15 NOTE — DISCHARGE NOTE ADULT - PATIENT PORTAL LINK FT
You can access the [facility], offered by [facility], by registering with the following website: [email or web address]

## 2019-01-15 NOTE — DISCHARGE NOTE ADULT - CARE PLAN
Principal Discharge DX:	COPD exacerbation  Goal:	Return to Baseline pulmonary function  Secondary Diagnosis:	Lung mass  Goal:	Work up Principal Discharge DX:	COPD exacerbation  Goal:	Return to Baseline pulmonary function  Assessment and plan of treatment:	ABG normalized.  Follow up with Dr. Kern within 1 week.  You will take 1 more day of azithromycin by mouth tomorrow to complete a 5 day course of antibiotics.  Secondary Diagnosis:	Lung mass  Goal:	Work up  Assessment and plan of treatment:	Follow up with Dr. Kern within 1 week Principal Discharge DX:	COPD exacerbation  Goal:	Return to Baseline pulmonary function  Assessment and plan of treatment:	ABG normalized.  Follow up with Dr. Kern within 1 week.  You will take 1 more day of azithromycin by mouth tomorrow to complete a 5 day course of antibiotics.  Secondary Diagnosis:	Lung mass  Goal:	Work up  Assessment and plan of treatment:	Follow up with Dr. Kern within 1 week  Secondary Diagnosis:	Hypertension  Goal:	Normalize blood pressure  Assessment and plan of treatment:	Losartan 50mg by mouth daily  Follow up with PMD within 1 week  Secondary Diagnosis:	Diabetes mellitus  Goal:	Keep glucose below 200  Assessment and plan of treatment:	metformin 1000mg BID  sitagliptin 100mg daily  Follow up with [facility] Endocrinology within 1 week. Principal Discharge DX:	COPD exacerbation  Goal:	Return to Baseline pulmonary function  Assessment and plan of treatment:	ABG normalized.  Follow up with Dr. Kern within 1 week.  You will take 1 more day of azithromycin by mouth tomorrow to complete a 5 day course of antibiotics.  Secondary Diagnosis:	Lung mass  Goal:	Work up  Assessment and plan of treatment:	Follow up with Dr. Kern within 1 week  Secondary Diagnosis:	Hypertension  Goal:	Normalize blood pressure  Assessment and plan of treatment:	Losartan 50mg by mouth daily  Follow up with PMD within 1 week  Secondary Diagnosis:	Diabetes mellitus  Goal:	Keep glucose below 200  Assessment and plan of treatment:	metformin ER 1000mg BID with breakfast and lunch  sitagliptin 100mg daily  Humalog ISS Correction with prednisone  Follow up with [facility] Endocrinology within 1 week. Principal Discharge DX:	COPD exacerbation  Goal:	Return to Baseline pulmonary function  Assessment and plan of treatment:	ABG normalized.  Follow up with Dr. Kern within 1 week.  You will take 1 more day of azithromycin by mouth tomorrow to complete a 5 day course of antibiotics. Please continue to use your inhalers as prescribed and follow-up with your primary care provider/pulmonologist for further care/recommendations. It is recommended to undergo annual pulmonary function testings. Monitor for signs/symptoms of COPD exacerbation, such as, shortness of breath, increased sputum production, increased cough, wheezing, difficulty breathing, or fever - Report to the emergency room if symptoms are not relieved by usual regimen.  Secondary Diagnosis:	Lung mass  Goal:	Work up  Assessment and plan of treatment:	Follow up with Dr. Kern within 1 week  Secondary Diagnosis:	Hypertension  Goal:	Normalize blood pressure  Assessment and plan of treatment:	Losartan 50mg by mouth daily.  Continue blood pressure medication regimen as directed. Monitor for any visual changes, headaches or dizziness.  Monitor blood pressure regularly.  Follow up with your PCP for further management for high blood pressure.  Secondary Diagnosis:	Diabetes mellitus  Goal:	Keep glucose below 200  Assessment and plan of treatment:	metformin ER 1000mg BID with breakfast and lunch  sitagliptin 100mg daily  Continue your medication regimen and a consistent carbohydrate diet (Meaning eating the same amount of carbohydrates at the same time each day). Monitor blood glucose levels throughout the day before meals and at bedtime. Record blood sugars and bring to outpatient providers appointment in order to be reviewed by your doctor for management modifications. If your sugars are more than 400 or less than 70 you should contact your PCP immediately. Monitor for signs/symptoms of low blood glucose, such as, dizziness, altered mental status, or cool/clammy skin. In addition, monitor for signs/symptoms of high blood glucose, such as, feeling hot, dry, fatigued, or with increased thirst/urination. Make regular podiatry appointments in order to have feet checked for wounds and uncontrolled toe nail growth to prevent infections, as well as, appointments with an ophthalmologist to monitor your vision.    Humalog ISS Correction with prednisone  Follow up with [facility] Endocrinology within 1 week. Principal Discharge DX:	COPD exacerbation  Goal:	Return to Baseline pulmonary function  Assessment and plan of treatment:	ABG normalized.  Follow up with Dr. Kern within 1 week.    Continue to use oxygen supplementation as needed  Please continue to use your inhalers as prescribed and follow-up with your primary care provider/pulmonologist for further care/recommendations. It is recommended to undergo annual pulmonary function testings. Monitor for signs/symptoms of COPD exacerbation, such as, shortness of breath, increased sputum production, increased cough, wheezing, difficulty breathing, or fever - Report to the emergency room if symptoms are not relieved by usual regimen.  Secondary Diagnosis:	Lung mass  Goal:	Work up  Assessment and plan of treatment:	Follow up with Dr. Kern within 1 week  Secondary Diagnosis:	Hypertension  Goal:	Normalize blood pressure  Assessment and plan of treatment:	Diovan by mouth daily.  Continue blood pressure medication regimen as directed. Monitor for any visual changes, headaches or dizziness.  Monitor blood pressure regularly.  Follow up with your PCP for further management for high blood pressure.  Secondary Diagnosis:	Diabetes mellitus  Goal:	Keep glucose below 200  Assessment and plan of treatment:	metformin ER 1000mg BID with breakfast and lunch  sitagliptin 100mg daily  Continue your medication regimen and a consistent carbohydrate diet (Meaning eating the same amount of carbohydrates at the same time each day). Monitor blood glucose levels throughout the day before meals and at bedtime. Record blood sugars and bring to outpatient providers appointment in order to be reviewed by your doctor for management modifications. If your sugars are more than 400 or less than 70 you should contact your PCP immediately. Monitor for signs/symptoms of low blood glucose, such as, dizziness, altered mental status, or cool/clammy skin. In addition, monitor for signs/symptoms of high blood glucose, such as, feeling hot, dry, fatigued, or with increased thirst/urination. Make regular podiatry appointments in order to have feet checked for wounds and uncontrolled toe nail growth to prevent infections, as well as, appointments with an ophthalmologist to monitor your vision.  Follow up with [facility] Endocrinology within 1 week. Principal Discharge DX:	COPD exacerbation  Goal:	Return to Baseline pulmonary function  Assessment and plan of treatment:	ABG normalized.  Follow up with Dr. Kern within 1 week.    Continue to use oxygen supplementation as needed  Please continue to use your inhalers as prescribed and follow-up with your primary care provider/pulmonologist for further care/recommendations. It is recommended to undergo annual pulmonary function testings. Monitor for signs/symptoms of COPD exacerbation, such as, shortness of breath, increased sputum production, increased cough, wheezing, difficulty breathing, or fever - Report to the emergency room if symptoms are not relieved by usual regimen.  Secondary Diagnosis:	Lung mass  Goal:	Work up  Assessment and plan of treatment:	Follow up with Dr. Kern within 1 week - 1/23/19 at 9:00 am  Secondary Diagnosis:	Hypertension  Goal:	Normalize blood pressure  Assessment and plan of treatment:	Diovan by mouth daily.  Continue blood pressure medication regimen as directed. Monitor for any visual changes, headaches or dizziness.  Monitor blood pressure regularly.  Follow up with your PCP for further management for high blood pressure.  Secondary Diagnosis:	Diabetes mellitus  Goal:	Keep glucose below 200  Assessment and plan of treatment:	metformin ER 1000mg BID with breakfast and lunch  sitagliptin 100mg daily  Continue your medication regimen and a consistent carbohydrate diet (Meaning eating the same amount of carbohydrates at the same time each day). Monitor blood glucose levels throughout the day before meals and at bedtime. Record blood sugars and bring to outpatient providers appointment in order to be reviewed by your doctor for management modifications. If your sugars are more than 400 or less than 70 you should contact your PCP immediately. Monitor for signs/symptoms of low blood glucose, such as, dizziness, altered mental status, or cool/clammy skin. In addition, monitor for signs/symptoms of high blood glucose, such as, feeling hot, dry, fatigued, or with increased thirst/urination. Make regular podiatry appointments in order to have feet checked for wounds and uncontrolled toe nail growth to prevent infections, as well as, appointments with an ophthalmologist to monitor your vision.  Follow up with [facility] Endocrinology within 1 week.

## 2019-01-15 NOTE — PROGRESS NOTE ADULT - PROBLEM SELECTOR PLAN 4
HBA1c 9.4. Persistent Hyperglycemia in 300s  - Will initiate Lantus 5 un qHS  - FSBS with moderate dose lispro coverage HBA1c 9.4. Persistent Hyperglycemia in 300s  -Lantus 4 u, Humalog 8 units before meals with correctional, NPH 10 units SQ with prednisone.

## 2019-01-15 NOTE — DISCHARGE NOTE ADULT - CARE PROVIDER_API CALL
Chucho Kern), Critical Care Medicine; Internal Medicine; Pulmonary Disease  [address] [id number]  Phone: [phone]  Fax: [phone] Chucho Kern), Critical Care Medicine; Internal Medicine; Pulmonary Disease  [address] [id number]  Phone: [phone]  Fax: [phone]    [address]  Phone: [phone]  Fax: (   )    -

## 2019-01-15 NOTE — DISCHARGE NOTE ADULT - CARE PROVIDERS DIRECT ADDRESSES
[email or web address],DirectAddress_Unknown

## 2019-01-15 NOTE — PROGRESS NOTE ADULT - PROBLEM SELECTOR PLAN 1
check FSBG TID qac and hs  carb consistent diet   94kg= TDD 38 units, bolus insulin will 60% on steroids, therefore 8 units bolus TID, and 14 on basal (NPH 10 and lantus 4 units)  - Lantus  4 u qhs   -Humalog 8-8-8 with meals  -low dose SS TIDAC  -NPH 10 units with prednisone dose tomorrow morning     for DC  dc on max dose metformin if GFR and LFTS remain stable and Januvia 100mg (please assess coverage)  If patient wishes to follow up with [facility] Endocrinology     [facility]  [address] [place] [address] [address], [place] [address]  [phone]   Please call to schedule appointment with CDE/Nutritionist and MD appointment next available.

## 2019-01-15 NOTE — DISCHARGE NOTE ADULT - PLAN OF CARE
Return to Baseline pulmonary function Work up ABG normalized.  Follow up with Dr. Kern within 1 week.  You will take 1 more day of azithromycin by mouth tomorrow to complete a 5 day course of antibiotics. Follow up with Dr. Kern within 1 week Losartan 50mg by mouth daily  Follow up with PMD within 1 week Keep glucose below 200 metformin 1000mg BID  sitagliptin 100mg daily  Follow up with [facility] Endocrinology within 1 week. Normalize blood pressure metformin ER 1000mg BID with breakfast and lunch  sitagliptin 100mg daily  Humalog ISS Correction with prednisone  Follow up with [facility] Endocrinology within 1 week. ABG normalized.  Follow up with Dr. Kern within 1 week.  You will take 1 more day of azithromycin by mouth tomorrow to complete a 5 day course of antibiotics. Please continue to use your inhalers as prescribed and follow-up with your primary care provider/pulmonologist for further care/recommendations. It is recommended to undergo annual pulmonary function testings. Monitor for signs/symptoms of COPD exacerbation, such as, shortness of breath, increased sputum production, increased cough, wheezing, difficulty breathing, or fever - Report to the emergency room if symptoms are not relieved by usual regimen. Losartan 50mg by mouth daily.  Continue blood pressure medication regimen as directed. Monitor for any visual changes, headaches or dizziness.  Monitor blood pressure regularly.  Follow up with your PCP for further management for high blood pressure. metformin ER 1000mg BID with breakfast and lunch  sitagliptin 100mg daily  Continue your medication regimen and a consistent carbohydrate diet (Meaning eating the same amount of carbohydrates at the same time each day). Monitor blood glucose levels throughout the day before meals and at bedtime. Record blood sugars and bring to outpatient providers appointment in order to be reviewed by your doctor for management modifications. If your sugars are more than 400 or less than 70 you should contact your PCP immediately. Monitor for signs/symptoms of low blood glucose, such as, dizziness, altered mental status, or cool/clammy skin. In addition, monitor for signs/symptoms of high blood glucose, such as, feeling hot, dry, fatigued, or with increased thirst/urination. Make regular podiatry appointments in order to have feet checked for wounds and uncontrolled toe nail growth to prevent infections, as well as, appointments with an ophthalmologist to monitor your vision.    Humalog ISS Correction with prednisone  Follow up with [facility] Endocrinology within 1 week. ABG normalized.  Follow up with Dr. Kern within 1 week.    Continue to use oxygen supplementation as needed  Please continue to use your inhalers as prescribed and follow-up with your primary care provider/pulmonologist for further care/recommendations. It is recommended to undergo annual pulmonary function testings. Monitor for signs/symptoms of COPD exacerbation, such as, shortness of breath, increased sputum production, increased cough, wheezing, difficulty breathing, or fever - Report to the emergency room if symptoms are not relieved by usual regimen. Diovan by mouth daily.  Continue blood pressure medication regimen as directed. Monitor for any visual changes, headaches or dizziness.  Monitor blood pressure regularly.  Follow up with your PCP for further management for high blood pressure. metformin ER 1000mg BID with breakfast and lunch  sitagliptin 100mg daily  Continue your medication regimen and a consistent carbohydrate diet (Meaning eating the same amount of carbohydrates at the same time each day). Monitor blood glucose levels throughout the day before meals and at bedtime. Record blood sugars and bring to outpatient providers appointment in order to be reviewed by your doctor for management modifications. If your sugars are more than 400 or less than 70 you should contact your PCP immediately. Monitor for signs/symptoms of low blood glucose, such as, dizziness, altered mental status, or cool/clammy skin. In addition, monitor for signs/symptoms of high blood glucose, such as, feeling hot, dry, fatigued, or with increased thirst/urination. Make regular podiatry appointments in order to have feet checked for wounds and uncontrolled toe nail growth to prevent infections, as well as, appointments with an ophthalmologist to monitor your vision.  Follow up with [facility] Endocrinology within 1 week. Follow up with Dr. Kern within 1 week - 1/23/19 at 9:00 am

## 2019-01-15 NOTE — PROGRESS NOTE ADULT - SUBJECTIVE AND OBJECTIVE BOX
Chief Complaint/Follow-up on:   DM    Subjective:  AM FSBG and lunch FSBG reviewed   better controlled blood glucose with NPH   feels okay   seen ambulating in the unit         MEDICATIONS  (STANDING):  ALBUTerol/ipratropium for Nebulization 3 milliLiter(s) Nebulizer every 6 hours  azithromycin  IVPB 500 milliGRAM(s) IV Intermittent every 24 hours  cefTRIAXone   IVPB 1 Gram(s) IV Intermittent every 24 hours  chlorhexidine 4% Liquid 1 Application(s) Topical <User Schedule>  dextrose 5%. 1000 milliLiter(s) (50 mL/Hr) IV Continuous <Continuous>  dextrose 50% Injectable 12.5 Gram(s) IV Push once  dextrose 50% Injectable 25 Gram(s) IV Push once  dextrose 50% Injectable 25 Gram(s) IV Push once  heparin  Injectable 5000 Unit(s) SubCutaneous every 8 hours  insulin glargine Injectable (LANTUS) 4 Unit(s) SubCutaneous at bedtime  insulin lispro (HumaLOG) corrective regimen sliding scale   SubCutaneous three times a day before meals  insulin lispro (HumaLOG) corrective regimen sliding scale   SubCutaneous at bedtime  insulin lispro Injectable (HumaLOG) 8 Unit(s) SubCutaneous three times a day before meals  insulin NPH human recombinant 10 Unit(s) SubCutaneous daily  losartan 50 milliGRAM(s) Oral daily  predniSONE   Tablet 40 milliGRAM(s) Oral daily    MEDICATIONS  (PRN):  dextrose 40% Gel 15 Gram(s) Oral once PRN Blood Glucose LESS THAN 70 milliGRAM(s)/deciliter  glucagon  Injectable 1 milliGRAM(s) IntraMuscular once PRN Glucose LESS THAN 70 milligrams/deciliter      PHYSICAL EXAM:  VITALS: T(C): 36.6 (01-15-19 @ 13:35)  T(F): 97.9 (01-15-19 @ 13:35), Max: 98.1 (01-15-19 @ 05:45)  HR: 82 (01-15-19 @ 17:47) (70 - 85)  BP: 148/72 (01-15-19 @ 17:47) (118/96 - 148/72)  RR:  (20 - 22)  SpO2:  (86% - 98%)  Wt(kg): --  GENERAL: NAD, well-groomed, well-developed  EYES: No proptosis, no injection  HEENT:  Atraumatic, Normocephalic, moist mucous membranes  RESPIRATORY: Clear to auscultation bilaterally; No rales, rhonchi, wheezing, or rubs  CARDIOVASCULAR: Regular rate and rhythm; No murmurs; no peripheral edema  GI: OBESE, Soft, nontender, non distended, normal bowel sounds      POCT Blood Glucose.: 193 mg/dL (01-15-19 @ 11:51)  POCT Blood Glucose.: 173 mg/dL (01-15-19 @ 07:58)  POCT Blood Glucose.: 212 mg/dL (01-15-19 @ 05:13)  POCT Blood Glucose.: 296 mg/dL (01-14-19 @ 21:40)  POCT Blood Glucose.: 351 mg/dL (01-14-19 @ 17:03)  POCT Blood Glucose.: 338 mg/dL (01-14-19 @ 11:43)  POCT Blood Glucose.: 205 mg/dL (01-14-19 @ 08:06)  POCT Blood Glucose.: 350 mg/dL (01-13-19 @ 21:24)  POCT Blood Glucose.: 320 mg/dL (01-13-19 @ 16:56)  POCT Blood Glucose.: 329 mg/dL (01-13-19 @ 12:27)  POCT Blood Glucose.: 174 mg/dL (01-13-19 @ 08:30)  POCT Blood Glucose.: 326 mg/dL (01-12-19 @ 21:52)    01-14    141  |  99  |  19  ----------------------------<  183<H>  5.0   |  30  |  0.85    EGFR if : 107  EGFR if non : 92    Ca    9.1      01-14  Mg     2.2     01-14  Phos  2.9     01-14            Thyroid Function Tests:      Hemoglobin A1C, Whole Blood: 9.4 % <H> [4.0 - 5.6] (01-12-19 @ 11:21)

## 2019-01-15 NOTE — DISCHARGE NOTE ADULT - MEDICATION SUMMARY - MEDICATIONS TO TAKE
I will START or STAY ON the medications listed below when I get home from the hospital:    aspirin 81 mg oral delayed release tablet  -- 1 tab(s) by mouth once a day  -- Indication: For CAD prophylaxis    valsartan 320 mg oral tablet  -- 1 tab(s) by mouth once a day  -- Indication: For Hypertension    metFORMIN 1000 mg oral tablet, extended release  -- 1 tab(s) by mouth 2 times a day   -- Check with your doctor before becoming pregnant.  Do not drink alcoholic beverages when taking this medication.  Obtain medical advice before taking any non-prescription drugs as some may affect the action of this medication.  Swallow whole.  Do not crush.  Take with food or milk.    -- Indication: For Diabetes mellitus    Januvia 100 mg oral tablet  -- 1 tab(s) by mouth once a day   -- Do not drink alcoholic beverages when taking this medication.    -- Indication: For Diabetes mellitus    Lipitor 40 mg oral tablet  -- 1 tab(s) by mouth once a day  -- Indication: For hyperlipidemia    albuterol 90 mcg/inh inhalation aerosol  -- 2 puff(s) inhaled every 6 hours, As Needed   -- For inhalation only.  It is very important that you take or use this exactly as directed.  Do not skip doses or discontinue unless directed by your doctor.  Obtain medical advice before taking any non-prescription drugs as some may affect the action of this medication.  Shake well before use.    -- Indication: For shortness of breath    amLODIPine 10 mg oral tablet  -- 1 tab(s) by mouth once a day  -- Indication: For Hypertension    Lasix 40 mg oral tablet  -- 1 tab(s) by mouth once a day  -- Indication: For Diuretic    Vitamin B6 100 mg oral tablet  -- 1 tab(s) by mouth once a day  -- Indication: For supplement

## 2019-01-15 NOTE — PROGRESS NOTE ADULT - ASSESSMENT
Patient is a 64 yo M w/ HLD, HTN, T2DM, smoker admitted for progressive chronic SOB/ cough 2/2 COPD exacerbation and possible lung mass.

## 2019-01-15 NOTE — DISCHARGE NOTE ADULT - PROVIDER TOKENS
TOKEN:'[id number]' TOKEN:'[id number]',FREE:[LAST:[[facility]],PHONE:[phone]],FAX:[(   )    -],ADDRESS:[[address] [place] [place] [address]]]

## 2019-01-15 NOTE — PROGRESS NOTE ADULT - PROBLEM SELECTOR PLAN 1
50 pack year smoking history. Admission Pco2 80 on VBG. Patient was given solumedrol and placed on BiPAP. RVP negative. CT chest showed moderate to large complex mildly loculated left pleural effusion with layering mildly dense material; consolidations within the left upper and lower lobes, partially related to mass effect from the pleural effusion; suggestion of ill-defined low density within a left upper lobe consolidation, concerning for underlying infection and/or mass; Mediastinal and hilar lymphadenopathy.  - c/w Prednisone 40 mg PO daily  - c/w Ceft and Azithro  - c/w Duoneb Q 6 hrs  - c/w BiPAP  - c/w BC  - thoracentesis today?  - chest CT after thoracentesis 50 pack year smoking history. Admission Pco2 80 on VBG. Patient was given solumedrol and placed on BiPAP. RVP negative. CT chest showed moderate to large complex mildly loculated left pleural effusion with layering mildly dense material; consolidations within the left upper and lower lobes, partially related to mass effect from the pleural effusion; suggestion of ill-defined low density within a left upper lobe consolidation, concerning for underlying infection and/or mass; Mediastinal and hilar lymphadenopathy.  - c/w Prednisone 40 mg PO daily  - c/w Ceft and Azithro  - c/w Duoneb Q 6 hrs  - c/w BiPAP  - c/w BC  -chest CT after thoracentesis

## 2019-01-16 VITALS
HEART RATE: 78 BPM | TEMPERATURE: 98 F | OXYGEN SATURATION: 97 % | DIASTOLIC BLOOD PRESSURE: 75 MMHG | SYSTOLIC BLOOD PRESSURE: 132 MMHG | RESPIRATION RATE: 18 BRPM

## 2019-01-16 DIAGNOSIS — Z71.89 OTHER SPECIFIED COUNSELING: ICD-10-CM

## 2019-01-16 PROBLEM — Z00.00 ENCOUNTER FOR PREVENTIVE HEALTH EXAMINATION: Status: ACTIVE | Noted: 2019-01-16

## 2019-01-16 LAB
GLUCOSE BLDC GLUCOMTR-MCNC: 179 MG/DL — HIGH (ref 70–99)
GLUCOSE BLDC GLUCOMTR-MCNC: 242 MG/DL — HIGH (ref 70–99)
NON-GYNECOLOGICAL CYTOLOGY STUDY: SIGNIFICANT CHANGE UP
SPECIMEN SOURCE: SIGNIFICANT CHANGE UP

## 2019-01-16 PROCEDURE — 99232 SBSQ HOSP IP/OBS MODERATE 35: CPT | Mod: GC

## 2019-01-16 PROCEDURE — 99232 SBSQ HOSP IP/OBS MODERATE 35: CPT

## 2019-01-16 RX ORDER — METFORMIN HYDROCHLORIDE 850 MG/1
1 TABLET ORAL
Qty: 0 | Refills: 0 | COMMUNITY

## 2019-01-16 RX ORDER — SITAGLIPTIN 50 MG/1
1 TABLET, FILM COATED ORAL
Qty: 30 | Refills: 0 | OUTPATIENT
Start: 2019-01-16 | End: 2019-02-14

## 2019-01-16 RX ORDER — METFORMIN HYDROCHLORIDE 850 MG/1
1 TABLET ORAL
Qty: 60 | Refills: 0 | OUTPATIENT
Start: 2019-01-16 | End: 2019-02-14

## 2019-01-16 RX ORDER — ALBUTEROL 90 UG/1
2 AEROSOL, METERED ORAL
Qty: 1 | Refills: 0 | OUTPATIENT
Start: 2019-01-16

## 2019-01-16 RX ADMIN — HUMAN INSULIN 10 UNIT(S): 100 INJECTION, SUSPENSION SUBCUTANEOUS at 07:47

## 2019-01-16 RX ADMIN — LOSARTAN POTASSIUM 50 MILLIGRAM(S): 100 TABLET, FILM COATED ORAL at 05:35

## 2019-01-16 RX ADMIN — Medication 40 MILLIGRAM(S): at 05:39

## 2019-01-16 RX ADMIN — Medication 3 MILLILITER(S): at 09:33

## 2019-01-16 RX ADMIN — Medication 3 MILLILITER(S): at 04:20

## 2019-01-16 RX ADMIN — Medication 1: at 07:47

## 2019-01-16 RX ADMIN — CHLORHEXIDINE GLUCONATE 1 APPLICATION(S): 213 SOLUTION TOPICAL at 12:24

## 2019-01-16 RX ADMIN — Medication 2: at 12:23

## 2019-01-16 RX ADMIN — Medication 8 UNIT(S): at 12:23

## 2019-01-16 RX ADMIN — AZITHROMYCIN 250 MILLIGRAM(S): 500 TABLET, FILM COATED ORAL at 08:27

## 2019-01-16 RX ADMIN — Medication 8 UNIT(S): at 07:47

## 2019-01-16 RX ADMIN — HEPARIN SODIUM 5000 UNIT(S): 5000 INJECTION INTRAVENOUS; SUBCUTANEOUS at 05:35

## 2019-01-16 RX ADMIN — CEFTRIAXONE 100 GRAM(S): 500 INJECTION, POWDER, FOR SOLUTION INTRAMUSCULAR; INTRAVENOUS at 07:48

## 2019-01-16 NOTE — PROGRESS NOTE ADULT - PROBLEM SELECTOR PLAN 1
check FSBG TID qac and hs- target bg 100-180 mg/dl  carb consistent diet   - c/w Lantus  4 u qhs   -c/w Humalog 8-8-8 with meals  -c/w low dose SS TIDAC  - one dose left of NPH for tomorrow however patient is being discharged.  Last prednisone dose today.  Discussed with patient that BG's may be higher than ususal tomorrow but should imrove by following day once prednisone has cleared from systrem.      for DC  dc on max dose metformin 1000mg po bid and Januvia 100mg po Daily  If patient wishes to follow up with [facility] Endocrinology     [facility]  [address] [facility], [address], [place] [address]  [phone] , pt can also follow up with PMD.

## 2019-01-16 NOTE — PROGRESS NOTE ADULT - PROBLEM SELECTOR PLAN 3
Started on losartan 50 mg PO daily for BP control  - Low sodium diet  - c/w losartan Started on losartan 50 mg PO daily for BP control  - Low sodium diet  - c/w losartan - restart patients home diovan upon discharge

## 2019-01-16 NOTE — PROGRESS NOTE ADULT - PROBLEM SELECTOR PLAN 4
HBA1c 9.4. Persistent Hyperglycemia in 300s  -Lantus 4 u, Humalog 8 units before meals with correctional, NPH 10 units SQ with prednisone. HBA1c 9.4. Persistent Hyperglycemia in 300s  -Lantus 4 u, Humalog 8 units before meals with correctional, NPH 10 units SQ with prednisone.  discharge recommendations for DM control obtained form endocrine - metformin ER 1000mg po BID with januvia 100 mg po daily

## 2019-01-16 NOTE — PROGRESS NOTE ADULT - RS GEN PE MLT RESP DETAILS PC
good air movement/wheezes/diminished breath sounds, L
respirations non-labored/breath sounds equal/good air movement/airway patent/normal

## 2019-01-16 NOTE — PROGRESS NOTE ADULT - PROBLEM SELECTOR PROBLEM 1
COPD exacerbation
COPD exacerbation
Type 2 diabetes mellitus without complication, without long-term current use of insulin
Type 2 diabetes mellitus without complication, without long-term current use of insulin
COPD exacerbation
COPD exacerbation

## 2019-01-16 NOTE — PROGRESS NOTE ADULT - ATTENDING COMMENTS
Rosaura Lucas MD  Pager [phone] ([facility])/ [phone] ([facility]) [please provide 10 digit call back number]  Nights and weekends: [phone]  Please note that this patient may be followed by a different provider tomorrow. If no answer or after hours, please contact [phone].  For final dc reccomendations, please call [phone] or page the endocrine fellow on call.
patient improved after thoracentesis. Will f/u results as outpatient. Repeat CT chest today.
Patient s/p thoracentesis, will d/c home and f/u cytology results as outpatient.
Patient with hypercarbic respiratory failure, left pleural effusion and probable underlying mass. Plan for thoracentesis today, repeat Ct to evaluate underlying parenchyma

## 2019-01-16 NOTE — PROGRESS NOTE ADULT - SUBJECTIVE AND OBJECTIVE BOX
Chief Complaint/Follow-up on:   DM    Subjective:  DC home today    MEDICATIONS  (STANDING):  ALBUTerol/ipratropium for Nebulization 3 milliLiter(s) Nebulizer every 6 hours  azithromycin  IVPB 500 milliGRAM(s) IV Intermittent every 24 hours  cefTRIAXone   IVPB 1 Gram(s) IV Intermittent every 24 hours  chlorhexidine 4% Liquid 1 Application(s) Topical <User Schedule>  dextrose 5%. 1000 milliLiter(s) (50 mL/Hr) IV Continuous <Continuous>  dextrose 50% Injectable 12.5 Gram(s) IV Push once  dextrose 50% Injectable 25 Gram(s) IV Push once  dextrose 50% Injectable 25 Gram(s) IV Push once  heparin  Injectable 5000 Unit(s) SubCutaneous every 8 hours  insulin glargine Injectable (LANTUS) 4 Unit(s) SubCutaneous at bedtime  insulin lispro (HumaLOG) corrective regimen sliding scale   SubCutaneous three times a day before meals  insulin lispro (HumaLOG) corrective regimen sliding scale   SubCutaneous at bedtime  insulin lispro Injectable (HumaLOG) 8 Unit(s) SubCutaneous three times a day before meals  insulin NPH human recombinant 10 Unit(s) SubCutaneous daily  losartan 50 milliGRAM(s) Oral daily  predniSONE   Tablet 40 milliGRAM(s) Oral daily    MEDICATIONS  (PRN):  dextrose 40% Gel 15 Gram(s) Oral once PRN Blood Glucose LESS THAN 70 milliGRAM(s)/deciliter  glucagon  Injectable 1 milliGRAM(s) IntraMuscular once PRN Glucose LESS THAN 70 milligrams/deciliter      PHYSICAL EXAM:  Vital Signs Last 24 Hrs  T(C): 36.6 (16 Jan 2019 05:35), Max: 36.7 (15 Ugo 2019 22:21)  T(F): 97.9 (16 Jan 2019 05:35), Max: 98 (15 Ugo 2019 22:21)  HR: 74 (16 Jan 2019 09:33) (65 - 82)  BP: 134/72 (16 Jan 2019 05:35) (134/72 - 148/72)  BP(mean): --  RR: 18 (16 Jan 2019 05:35) (18 - 22)  SpO2: 96% (16 Jan 2019 09:33) (86% - 98%)  GENERAL: NAD, well-groomed, well-developed  EYES: No proptosis, no injection  HEENT:  Atraumatic, Normocephalic, moist mucous membranes  RESPIRATORY: Clear to auscultation bilaterally; No rales, rhonchi, wheezing, or rubs  CARDIOVASCULAR: Regular rate and rhythm; No murmurs; no peripheral edema  GI: OBESE, Soft, nontender, non distended, normal bowel sounds    CAPILLARY BLOOD GLUCOSE      POCT Blood Glucose.: 242 mg/dL (16 Jan 2019 11:50)  POCT Blood Glucose.: 179 mg/dL (16 Jan 2019 07:22)  POCT Blood Glucose.: 256 mg/dL (15 Ugo 2019 22:06)  POCT Blood Glucose.: 240 mg/dL (15 Ugo 2019 17:18)    POCT Blood Glucose.: 193 mg/dL (01-15-19 @ 11:51)  POCT Blood Glucose.: 173 mg/dL (01-15-19 @ 07:58)  POCT Blood Glucose.: 212 mg/dL (01-15-19 @ 05:13)  POCT Blood Glucose.: 296 mg/dL (01-14-19 @ 21:40)  POCT Blood Glucose.: 351 mg/dL (01-14-19 @ 17:03)  POCT Blood Glucose.: 338 mg/dL (01-14-19 @ 11:43)  POCT Blood Glucose.: 205 mg/dL (01-14-19 @ 08:06)  POCT Blood Glucose.: 350 mg/dL (01-13-19 @ 21:24)  POCT Blood Glucose.: 320 mg/dL (01-13-19 @ 16:56)  POCT Blood Glucose.: 329 mg/dL (01-13-19 @ 12:27)  POCT Blood Glucose.: 174 mg/dL (01-13-19 @ 08:30)  POCT Blood Glucose.: 326 mg/dL (01-12-19 @ 21:52)    01-14    141  |  99  |  19  ----------------------------<  183<H>  5.0   |  30  |  0.85    EGFR if : 107  EGFR if non : 92    Ca    9.1      01-14  Mg     2.2     01-14  Phos  2.9     01-14            Thyroid Function Tests:      Hemoglobin A1C, Whole Blood: 9.4 % <H> [4.0 - 5.6] (01-12-19 @ 11:21)

## 2019-01-16 NOTE — PROGRESS NOTE ADULT - PROBLEM SELECTOR PLAN 2
CT chest showed moderate to large complex mildly loculated left pleural effusion with layering mildly dense material;   -consolidations within the left upper and lower lobes, partially related to mass effect from the pleural effusion; suggestion of ill-defined low density within a left upper lobe consolidation,   -concerning for underlying infection and/or mass; Mediastinal and hilar lymphadenopathy.  - repeat CT after Thora  -to follow up as an outpatient CT chest showed moderate to large complex mildly loculated left pleural effusion with layering mildly dense material;   -consolidations within the left upper and lower lobes, partially related to mass effect from the pleural effusion; suggestion of ill-defined low density within a left upper lobe consolidation,   -concerning for underlying infection and/or mass; Mediastinal and hilar lymphadenopathy.  - repeat CT after Thora  -to follow up as an outpatient - appointment made for 1/23

## 2019-01-16 NOTE — PROGRESS NOTE ADULT - SUBJECTIVE AND OBJECTIVE BOX
CHIEF COMPLAINT: Patient is a 63y old  Male who presents with a chief complaint of SOB (15 Ugo 2019 17:54)    Interval Events:    OBJECTIVE:  ICU Vital Signs Last 24 Hrs  T(C): 36.6 (16 Jan 2019 05:35), Max: 36.7 (15 Ugo 2019 22:21)  T(F): 97.9 (16 Jan 2019 05:35), Max: 98 (15 Ugo 2019 22:21)  HR: 72 (16 Jan 2019 05:35) (65 - 82)  BP: 134/72 (16 Jan 2019 05:35) (118/96 - 148/72)  BP(mean): --  ABP: --  ABP(mean): --  RR: 18 (16 Jan 2019 05:35) (18 - 22)  SpO2: 96% (16 Jan 2019 05:35) (86% - 98%)        CAPILLARY BLOOD GLUCOSE      POCT Blood Glucose.: 179 mg/dL (16 Jan 2019 07:22)        HOSPITAL MEDICATIONS:  MEDICATIONS  (STANDING):  ALBUTerol/ipratropium for Nebulization 3 milliLiter(s) Nebulizer every 6 hours  azithromycin  IVPB 500 milliGRAM(s) IV Intermittent every 24 hours  cefTRIAXone   IVPB 1 Gram(s) IV Intermittent every 24 hours  chlorhexidine 4% Liquid 1 Application(s) Topical <User Schedule>  dextrose 5%. 1000 milliLiter(s) (50 mL/Hr) IV Continuous <Continuous>  dextrose 50% Injectable 12.5 Gram(s) IV Push once  dextrose 50% Injectable 25 Gram(s) IV Push once  dextrose 50% Injectable 25 Gram(s) IV Push once  heparin  Injectable 5000 Unit(s) SubCutaneous every 8 hours  insulin glargine Injectable (LANTUS) 4 Unit(s) SubCutaneous at bedtime  insulin lispro (HumaLOG) corrective regimen sliding scale   SubCutaneous three times a day before meals  insulin lispro Injectable (HumaLOG) 8 Unit(s) SubCutaneous three times a day before meals  insulin NPH human recombinant 10 Unit(s) SubCutaneous daily  losartan 50 milliGRAM(s) Oral daily  predniSONE   Tablet 40 milliGRAM(s) Oral daily    MEDICATIONS  (PRN):  dextrose 40% Gel 15 Gram(s) Oral once PRN Blood Glucose LESS THAN 70 milliGRAM(s)/deciliter  glucagon  Injectable 1 milliGRAM(s) IntraMuscular once PRN Glucose LESS THAN 70 milligrams/deciliter      LABS:              Arterial Blood Gas:  01-15 @ 08:50  7.42/57/90/33/94.9/10.9  ABG lactate: --  Arterial Blood Gas:  01-14 @ 15:30  7.41/57/65/33/92.0/10.2  ABG lactate: 2.0        MICROBIOLOGY:     RADIOLOGY:  [ ] Reviewed and interpreted by me    PULMONARY FUNCTION TESTS:    EKG: CHIEF COMPLAINT: Patient is a 63y old  Male who presents with a chief complaint of SOB (15 Ugo 2019 17:54)    Interval Events: no acute events    OBJECTIVE:  ICU Vital Signs Last 24 Hrs  T(C): 36.6 (16 Jan 2019 05:35), Max: 36.7 (15 Ugo 2019 22:21)  T(F): 97.9 (16 Jan 2019 05:35), Max: 98 (15 Ugo 2019 22:21)  HR: 72 (16 Jan 2019 05:35) (65 - 82)  BP: 134/72 (16 Jan 2019 05:35) (118/96 - 148/72)  BP(mean): --  ABP: --  ABP(mean): --  RR: 18 (16 Jan 2019 05:35) (18 - 22)  SpO2: 96% (16 Jan 2019 05:35) (86% - 98%)        CAPILLARY BLOOD GLUCOSE      POCT Blood Glucose.: 179 mg/dL (16 Jan 2019 07:22)        HOSPITAL MEDICATIONS:  MEDICATIONS  (STANDING):  ALBUTerol/ipratropium for Nebulization 3 milliLiter(s) Nebulizer every 6 hours  azithromycin  IVPB 500 milliGRAM(s) IV Intermittent every 24 hours  cefTRIAXone   IVPB 1 Gram(s) IV Intermittent every 24 hours  chlorhexidine 4% Liquid 1 Application(s) Topical <User Schedule>  dextrose 5%. 1000 milliLiter(s) (50 mL/Hr) IV Continuous <Continuous>  dextrose 50% Injectable 12.5 Gram(s) IV Push once  dextrose 50% Injectable 25 Gram(s) IV Push once  dextrose 50% Injectable 25 Gram(s) IV Push once  heparin  Injectable 5000 Unit(s) SubCutaneous every 8 hours  insulin glargine Injectable (LANTUS) 4 Unit(s) SubCutaneous at bedtime  insulin lispro (HumaLOG) corrective regimen sliding scale   SubCutaneous three times a day before meals  insulin lispro Injectable (HumaLOG) 8 Unit(s) SubCutaneous three times a day before meals  insulin NPH human recombinant 10 Unit(s) SubCutaneous daily  losartan 50 milliGRAM(s) Oral daily  predniSONE   Tablet 40 milliGRAM(s) Oral daily    MEDICATIONS  (PRN):  dextrose 40% Gel 15 Gram(s) Oral once PRN Blood Glucose LESS THAN 70 milliGRAM(s)/deciliter  glucagon  Injectable 1 milliGRAM(s) IntraMuscular once PRN Glucose LESS THAN 70 milligrams/deciliter      LABS:              Arterial Blood Gas:  01-15 @ 08:50  7.42/57/90/33/94.9/10.9  ABG lactate: --  Arterial Blood Gas:  01-14 @ 15:30  7.41/57/65/33/92.0/10.2  ABG lactate: 2.0        MICROBIOLOGY:     RADIOLOGY:  [ ] Reviewed and interpreted by me    PULMONARY FUNCTION TESTS:    EKG:

## 2019-01-16 NOTE — PROGRESS NOTE ADULT - PROBLEM SELECTOR PLAN 5
- Heparin 5,000 units s/c Q8hrs

## 2019-01-16 NOTE — PROGRESS NOTE ADULT - PROBLEM SELECTOR PLAN 1
50 pack year smoking history. Admission Pco2 80 on VBG. Patient was given solumedrol and placed on BiPAP. RVP negative. CT chest showed moderate to large complex mildly loculated left pleural effusion with layering mildly dense material; consolidations within the left upper and lower lobes, partially related to mass effect from the pleural effusion; suggestion of ill-defined low density within a left upper lobe consolidation, concerning for underlying infection and/or mass; Mediastinal and hilar lymphadenopathy.  - c/w Prednisone 40 mg PO daily  - c/w Ceft and Azithro  - c/w Duoneb Q 6 hrs  - c/w BiPAP  - c/w BC  -chest CT after thoracentesis 50 pack year smoking history. Admission Pco2 80 on VBG. Patient was given solumedrol and placed on BiPAP. RVP negative. CT chest showed moderate to large complex mildly loculated left pleural effusion with layering mildly dense material; consolidations within the left upper and lower lobes, partially related to mass effect from the pleural effusion; suggestion of ill-defined low density within a left upper lobe consolidation, concerning for underlying infection and/or mass; Mediastinal and hilar lymphadenopathy.  - c/w Prednisone 40 mg PO daily- completed 5 day course today  - c/w Ceft and Azithro - completed 5 day course today  - c/w Duoneb Q 6 hrs - albuterol prn upon discharge

## 2019-01-17 ENCOUNTER — MEDICATION RENEWAL (OUTPATIENT)
Age: 64
End: 2019-01-17

## 2019-01-17 RX ORDER — ALBUTEROL SULFATE 2.5 MG/3ML
(2.5 MG/3ML) SOLUTION RESPIRATORY (INHALATION)
Qty: 1 | Refills: 3 | Status: ACTIVE | COMMUNITY
Start: 2019-01-17 | End: 1900-01-01

## 2019-01-20 LAB — BACTERIA FLD CULT: SIGNIFICANT CHANGE UP

## 2019-01-23 ENCOUNTER — APPOINTMENT (OUTPATIENT)
Dept: PULMONOLOGY | Facility: CLINIC | Age: 64
End: 2019-01-23
Payer: MEDICAID

## 2019-01-23 VITALS
HEART RATE: 96 BPM | HEIGHT: 66 IN | OXYGEN SATURATION: 95 % | DIASTOLIC BLOOD PRESSURE: 68 MMHG | RESPIRATION RATE: 18 BRPM | WEIGHT: 214 LBS | TEMPERATURE: 99.1 F | SYSTOLIC BLOOD PRESSURE: 126 MMHG | BODY MASS INDEX: 34.39 KG/M2

## 2019-01-23 PROCEDURE — 99214 OFFICE O/P EST MOD 30 MIN: CPT

## 2019-01-23 NOTE — HISTORY OF PRESENT ILLNESS
[FreeTextEntry1] : Patient recently seen in hospital with COPD exacerbation (new dx, no PFTs) and new loculated pleural effusion on left. Thoracentesis performed, suggestive but not dx for carcinoma. Patieint clinically improved on current inhaler regimen. Patient already approved for thoracic needle bx of BLANCA mass.

## 2019-01-23 NOTE — ASSESSMENT
[FreeTextEntry1] : 1. COPD: PFts next visit, continue inhaler regimen of duoneb by nebulizer. \par \par 2. ?CA: Patient approved for transthoracic bx of BLANCA mass.

## 2019-01-23 NOTE — PHYSICAL EXAM
[General Appearance - Well Developed] : well developed [Normal Appearance] : normal appearance [Well Groomed] : well groomed [General Appearance - Well Nourished] : well nourished [No Deformities] : no deformities [General Appearance - In No Acute Distress] : no acute distress [Normal Conjunctiva] : the conjunctiva exhibited no abnormalities [Eyelids - No Xanthelasma] : the eyelids demonstrated no xanthelasmas [Normal Oropharynx] : normal oropharynx [Neck Appearance] : the appearance of the neck was normal [Neck Cervical Mass (___cm)] : no neck mass was observed [Jugular Venous Distention Increased] : there was no jugular-venous distention [Thyroid Diffuse Enlargement] : the thyroid was not enlarged [Thyroid Nodule] : there were no palpable thyroid nodules [Heart Rate And Rhythm] : heart rate and rhythm were normal [Heart Sounds] : normal S1 and S2 [Murmurs] : no murmurs present [FreeTextEntry1] : decreased breath sounds left base, occ wheeze [Abdomen Soft] : soft [Abdomen Tenderness] : non-tender [Abdomen Mass (___ Cm)] : no abdominal mass palpated [Nail Clubbing] : no clubbing of the fingernails [Cyanosis, Localized] : no localized cyanosis [Petechial Hemorrhages (___cm)] : no petechial hemorrhages [] : no ischemic changes

## 2019-01-29 ENCOUNTER — APPOINTMENT (OUTPATIENT)
Dept: ENDOCRINOLOGY | Facility: CLINIC | Age: 64
End: 2019-01-29
Payer: MEDICAID

## 2019-01-29 LAB — HBA1C MFR BLD HPLC: 9.4

## 2019-01-29 PROCEDURE — G0108 DIAB MANAGE TRN  PER INDIV: CPT

## 2019-01-29 RX ORDER — SITAGLIPTIN 100 MG/1
100 TABLET, FILM COATED ORAL DAILY
Qty: 90 | Refills: 0 | Status: ACTIVE | COMMUNITY
Start: 2019-01-29 | End: 1900-01-01

## 2019-01-29 RX ORDER — METFORMIN HYDROCHLORIDE 1000 MG/1
1000 TABLET, COATED ORAL
Qty: 180 | Refills: 0 | Status: ACTIVE | COMMUNITY
Start: 2019-01-29 | End: 1900-01-01

## 2019-01-30 ENCOUNTER — OUTPATIENT (OUTPATIENT)
Dept: OUTPATIENT SERVICES | Facility: HOSPITAL | Age: 64
LOS: 1 days | End: 2019-01-30
Payer: MEDICAID

## 2019-01-30 ENCOUNTER — RESULT REVIEW (OUTPATIENT)
Age: 64
End: 2019-01-30

## 2019-01-30 ENCOUNTER — APPOINTMENT (OUTPATIENT)
Dept: ULTRASOUND IMAGING | Facility: HOSPITAL | Age: 64
End: 2019-01-30

## 2019-01-30 DIAGNOSIS — J94.9 PLEURAL CONDITION, UNSPECIFIED: ICD-10-CM

## 2019-01-30 PROCEDURE — 76942 ECHO GUIDE FOR BIOPSY: CPT

## 2019-01-30 PROCEDURE — 88341 IMHCHEM/IMCYTCHM EA ADD ANTB: CPT | Mod: 26

## 2019-01-30 PROCEDURE — 88173 CYTOPATH EVAL FNA REPORT: CPT | Mod: 26

## 2019-01-30 PROCEDURE — 88342 IMHCHEM/IMCYTCHM 1ST ANTB: CPT | Mod: 26

## 2019-01-30 PROCEDURE — 88305 TISSUE EXAM BY PATHOLOGIST: CPT

## 2019-01-30 PROCEDURE — 76942 ECHO GUIDE FOR BIOPSY: CPT | Mod: 26

## 2019-01-30 PROCEDURE — 32405: CPT

## 2019-01-30 PROCEDURE — 88305 TISSUE EXAM BY PATHOLOGIST: CPT | Mod: 26

## 2019-01-30 PROCEDURE — 71045 X-RAY EXAM CHEST 1 VIEW: CPT

## 2019-01-30 PROCEDURE — 88341 IMHCHEM/IMCYTCHM EA ADD ANTB: CPT

## 2019-01-30 PROCEDURE — 88360 TUMOR IMMUNOHISTOCHEM/MANUAL: CPT

## 2019-01-30 PROCEDURE — 88172 CYTP DX EVAL FNA 1ST EA SITE: CPT

## 2019-01-30 PROCEDURE — 71045 X-RAY EXAM CHEST 1 VIEW: CPT | Mod: 26

## 2019-01-30 PROCEDURE — 88360 TUMOR IMMUNOHISTOCHEM/MANUAL: CPT | Mod: 26,59

## 2019-01-30 PROCEDURE — 88342 IMHCHEM/IMCYTCHM 1ST ANTB: CPT

## 2019-01-30 PROCEDURE — 88173 CYTOPATH EVAL FNA REPORT: CPT

## 2019-02-04 ENCOUNTER — OUTPATIENT (OUTPATIENT)
Dept: OUTPATIENT SERVICES | Facility: HOSPITAL | Age: 64
LOS: 1 days | Discharge: HOME | End: 2019-02-04

## 2019-02-04 DIAGNOSIS — C34.90 MALIGNANT NEOPLASM OF UNSPECIFIED PART OF UNSPECIFIED BRONCHUS OR LUNG: ICD-10-CM

## 2019-02-04 DIAGNOSIS — C34.92 MALIGNANT NEOPLASM OF UNSPECIFIED PART OF LEFT BRONCHUS OR LUNG: ICD-10-CM

## 2019-02-08 ENCOUNTER — INPATIENT (INPATIENT)
Facility: HOSPITAL | Age: 64
LOS: 6 days | Discharge: HOME HEALTH | End: 2019-02-15
Attending: THORACIC SURGERY (CARDIOTHORACIC VASCULAR SURGERY) | Admitting: THORACIC SURGERY (CARDIOTHORACIC VASCULAR SURGERY)
Payer: MEDICAID

## 2019-02-08 VITALS
TEMPERATURE: 98 F | SYSTOLIC BLOOD PRESSURE: 145 MMHG | RESPIRATION RATE: 19 BRPM | HEART RATE: 81 BPM | DIASTOLIC BLOOD PRESSURE: 64 MMHG | OXYGEN SATURATION: 99 %

## 2019-02-08 DIAGNOSIS — J90 PLEURAL EFFUSION, NOT ELSEWHERE CLASSIFIED: ICD-10-CM

## 2019-02-08 LAB
ALBUMIN SERPL ELPH-MCNC: 3.7 G/DL — SIGNIFICANT CHANGE UP (ref 3.3–5)
ALP SERPL-CCNC: 94 U/L — SIGNIFICANT CHANGE UP (ref 40–120)
ALT FLD-CCNC: 18 U/L — SIGNIFICANT CHANGE UP (ref 4–41)
ANION GAP SERPL CALC-SCNC: 13 MMO/L — SIGNIFICANT CHANGE UP (ref 7–14)
AST SERPL-CCNC: 16 U/L — SIGNIFICANT CHANGE UP (ref 4–40)
B PERT DNA SPEC QL NAA+PROBE: NOT DETECTED — SIGNIFICANT CHANGE UP
BASE EXCESS BLDV CALC-SCNC: 10.3 MMOL/L — SIGNIFICANT CHANGE UP
BASOPHILS # BLD AUTO: 0.09 K/UL — SIGNIFICANT CHANGE UP (ref 0–0.2)
BASOPHILS NFR BLD AUTO: 0.7 % — SIGNIFICANT CHANGE UP (ref 0–2)
BILIRUB SERPL-MCNC: 0.3 MG/DL — SIGNIFICANT CHANGE UP (ref 0.2–1.2)
BLOOD GAS VENOUS - CREATININE: 0.59 MG/DL — SIGNIFICANT CHANGE UP (ref 0.5–1.3)
BUN SERPL-MCNC: 9 MG/DL — SIGNIFICANT CHANGE UP (ref 7–23)
C PNEUM DNA SPEC QL NAA+PROBE: NOT DETECTED — SIGNIFICANT CHANGE UP
CALCIUM SERPL-MCNC: 8.9 MG/DL — SIGNIFICANT CHANGE UP (ref 8.4–10.5)
CHLORIDE BLDV-SCNC: 101 MMOL/L — SIGNIFICANT CHANGE UP (ref 96–108)
CHLORIDE SERPL-SCNC: 95 MMOL/L — LOW (ref 98–107)
CO2 SERPL-SCNC: 31 MMOL/L — SIGNIFICANT CHANGE UP (ref 22–31)
CREAT SERPL-MCNC: 0.71 MG/DL — SIGNIFICANT CHANGE UP (ref 0.5–1.3)
EOSINOPHIL # BLD AUTO: 0.38 K/UL — SIGNIFICANT CHANGE UP (ref 0–0.5)
EOSINOPHIL NFR BLD AUTO: 2.8 % — SIGNIFICANT CHANGE UP (ref 0–6)
FLUAV H1 2009 PAND RNA SPEC QL NAA+PROBE: NOT DETECTED — SIGNIFICANT CHANGE UP
FLUAV H1 RNA SPEC QL NAA+PROBE: NOT DETECTED — SIGNIFICANT CHANGE UP
FLUAV H3 RNA SPEC QL NAA+PROBE: NOT DETECTED — SIGNIFICANT CHANGE UP
FLUAV SUBTYP SPEC NAA+PROBE: NOT DETECTED — SIGNIFICANT CHANGE UP
FLUBV RNA SPEC QL NAA+PROBE: NOT DETECTED — SIGNIFICANT CHANGE UP
GAS PNL BLDV: 133 MMOL/L — LOW (ref 136–146)
GLUCOSE BLDC GLUCOMTR-MCNC: 155 MG/DL — HIGH (ref 70–99)
GLUCOSE BLDC GLUCOMTR-MCNC: 195 MG/DL — HIGH (ref 70–99)
GLUCOSE BLDV-MCNC: 153 — HIGH (ref 70–99)
GLUCOSE SERPL-MCNC: 153 MG/DL — HIGH (ref 70–99)
HADV DNA SPEC QL NAA+PROBE: NOT DETECTED — SIGNIFICANT CHANGE UP
HCO3 BLDV-SCNC: 31 MMOL/L — HIGH (ref 20–27)
HCOV PNL SPEC NAA+PROBE: SIGNIFICANT CHANGE UP
HCT VFR BLD CALC: 43 % — SIGNIFICANT CHANGE UP (ref 39–50)
HCT VFR BLDV CALC: 40.9 % — SIGNIFICANT CHANGE UP (ref 39–51)
HGB BLD-MCNC: 13.2 G/DL — SIGNIFICANT CHANGE UP (ref 13–17)
HGB BLDV-MCNC: 13.3 G/DL — SIGNIFICANT CHANGE UP (ref 13–17)
HMPV RNA SPEC QL NAA+PROBE: NOT DETECTED — SIGNIFICANT CHANGE UP
HPIV1 RNA SPEC QL NAA+PROBE: NOT DETECTED — SIGNIFICANT CHANGE UP
HPIV2 RNA SPEC QL NAA+PROBE: NOT DETECTED — SIGNIFICANT CHANGE UP
HPIV3 RNA SPEC QL NAA+PROBE: NOT DETECTED — SIGNIFICANT CHANGE UP
HPIV4 RNA SPEC QL NAA+PROBE: NOT DETECTED — SIGNIFICANT CHANGE UP
IMM GRANULOCYTES NFR BLD AUTO: 0.8 % — SIGNIFICANT CHANGE UP (ref 0–1.5)
LACTATE BLDV-MCNC: 1.2 MMOL/L — SIGNIFICANT CHANGE UP (ref 0.5–2)
LYMPHOCYTES # BLD AUTO: 1.17 K/UL — SIGNIFICANT CHANGE UP (ref 1–3.3)
LYMPHOCYTES # BLD AUTO: 8.6 % — LOW (ref 13–44)
MCHC RBC-ENTMCNC: 26.2 PG — LOW (ref 27–34)
MCHC RBC-ENTMCNC: 30.7 % — LOW (ref 32–36)
MCV RBC AUTO: 85.3 FL — SIGNIFICANT CHANGE UP (ref 80–100)
MONOCYTES # BLD AUTO: 0.88 K/UL — SIGNIFICANT CHANGE UP (ref 0–0.9)
MONOCYTES NFR BLD AUTO: 6.5 % — SIGNIFICANT CHANGE UP (ref 2–14)
NEUTROPHILS # BLD AUTO: 10.98 K/UL — HIGH (ref 1.8–7.4)
NEUTROPHILS NFR BLD AUTO: 80.6 % — HIGH (ref 43–77)
NRBC # FLD: 0 K/UL — LOW (ref 25–125)
NT-PROBNP SERPL-SCNC: 244.4 PG/ML — SIGNIFICANT CHANGE UP
PCO2 BLDV: 77 MMHG — HIGH (ref 41–51)
PH BLDV: 7.3 PH — LOW (ref 7.32–7.43)
PLATELET # BLD AUTO: 373 K/UL — SIGNIFICANT CHANGE UP (ref 150–400)
PMV BLD: 9.2 FL — SIGNIFICANT CHANGE UP (ref 7–13)
PO2 BLDV: 51 MMHG — HIGH (ref 35–40)
POTASSIUM BLDV-SCNC: 4.3 MMOL/L — SIGNIFICANT CHANGE UP (ref 3.4–4.5)
POTASSIUM SERPL-MCNC: 4.3 MMOL/L — SIGNIFICANT CHANGE UP (ref 3.5–5.3)
POTASSIUM SERPL-SCNC: 4.3 MMOL/L — SIGNIFICANT CHANGE UP (ref 3.5–5.3)
PROT SERPL-MCNC: 7.1 G/DL — SIGNIFICANT CHANGE UP (ref 6–8.3)
RBC # BLD: 5.04 M/UL — SIGNIFICANT CHANGE UP (ref 4.2–5.8)
RBC # FLD: 14.3 % — SIGNIFICANT CHANGE UP (ref 10.3–14.5)
RSV RNA SPEC QL NAA+PROBE: NOT DETECTED — SIGNIFICANT CHANGE UP
RV+EV RNA SPEC QL NAA+PROBE: NOT DETECTED — SIGNIFICANT CHANGE UP
SAO2 % BLDV: 81.2 % — SIGNIFICANT CHANGE UP (ref 60–85)
SODIUM SERPL-SCNC: 139 MMOL/L — SIGNIFICANT CHANGE UP (ref 135–145)
TROPONIN T, HIGH SENSITIVITY: 21 NG/L — SIGNIFICANT CHANGE UP (ref ?–14)
TROPONIN T, HIGH SENSITIVITY: 22 NG/L — SIGNIFICANT CHANGE UP (ref ?–14)
WBC # BLD: 13.61 K/UL — HIGH (ref 3.8–10.5)
WBC # FLD AUTO: 13.61 K/UL — HIGH (ref 3.8–10.5)

## 2019-02-08 PROCEDURE — 71045 X-RAY EXAM CHEST 1 VIEW: CPT | Mod: 26,77

## 2019-02-08 PROCEDURE — 74176 CT ABD & PELVIS W/O CONTRAST: CPT | Mod: 26

## 2019-02-08 PROCEDURE — 99223 1ST HOSP IP/OBS HIGH 75: CPT

## 2019-02-08 PROCEDURE — 71250 CT THORAX DX C-: CPT | Mod: 26

## 2019-02-08 PROCEDURE — 71045 X-RAY EXAM CHEST 1 VIEW: CPT | Mod: 26

## 2019-02-08 RX ORDER — HEPARIN SODIUM 5000 [USP'U]/ML
5000 INJECTION INTRAVENOUS; SUBCUTANEOUS EVERY 12 HOURS
Qty: 0 | Refills: 0 | Status: DISCONTINUED | OUTPATIENT
Start: 2019-02-08 | End: 2019-02-15

## 2019-02-08 RX ORDER — DEXTROSE 50 % IN WATER 50 %
25 SYRINGE (ML) INTRAVENOUS ONCE
Qty: 0 | Refills: 0 | Status: DISCONTINUED | OUTPATIENT
Start: 2019-02-08 | End: 2019-02-15

## 2019-02-08 RX ORDER — AMLODIPINE BESYLATE 2.5 MG/1
10 TABLET ORAL DAILY
Qty: 0 | Refills: 0 | Status: DISCONTINUED | OUTPATIENT
Start: 2019-02-08 | End: 2019-02-08

## 2019-02-08 RX ORDER — IPRATROPIUM BROMIDE 0.2 MG/ML
500 SOLUTION, NON-ORAL INHALATION EVERY 6 HOURS
Qty: 0 | Refills: 0 | Status: DISCONTINUED | OUTPATIENT
Start: 2019-02-08 | End: 2019-02-15

## 2019-02-08 RX ORDER — FUROSEMIDE 40 MG
40 TABLET ORAL DAILY
Qty: 0 | Refills: 0 | Status: DISCONTINUED | OUTPATIENT
Start: 2019-02-08 | End: 2019-02-10

## 2019-02-08 RX ORDER — PANTOPRAZOLE SODIUM 20 MG/1
40 TABLET, DELAYED RELEASE ORAL
Qty: 0 | Refills: 0 | Status: DISCONTINUED | OUTPATIENT
Start: 2019-02-08 | End: 2019-02-15

## 2019-02-08 RX ORDER — OXYCODONE HYDROCHLORIDE 5 MG/1
5 TABLET ORAL EVERY 6 HOURS
Qty: 0 | Refills: 0 | Status: ACTIVE | OUTPATIENT
Start: 2019-02-08 | End: 2019-02-15

## 2019-02-08 RX ORDER — PYRIDOXINE HCL (VITAMIN B6) 100 MG
100 TABLET ORAL DAILY
Qty: 0 | Refills: 0 | Status: DISCONTINUED | OUTPATIENT
Start: 2019-02-08 | End: 2019-02-15

## 2019-02-08 RX ORDER — LEVALBUTEROL 1.25 MG/.5ML
0.63 SOLUTION, CONCENTRATE RESPIRATORY (INHALATION) EVERY 6 HOURS
Qty: 0 | Refills: 0 | Status: DISCONTINUED | OUTPATIENT
Start: 2019-02-08 | End: 2019-02-15

## 2019-02-08 RX ORDER — ACETAMINOPHEN 500 MG
1000 TABLET ORAL ONCE
Qty: 0 | Refills: 0 | Status: COMPLETED | OUTPATIENT
Start: 2019-02-08 | End: 2019-02-08

## 2019-02-08 RX ORDER — GLUCAGON INJECTION, SOLUTION 0.5 MG/.1ML
1 INJECTION, SOLUTION SUBCUTANEOUS ONCE
Qty: 0 | Refills: 0 | Status: DISCONTINUED | OUTPATIENT
Start: 2019-02-08 | End: 2019-02-15

## 2019-02-08 RX ORDER — ALBUTEROL 90 UG/1
2 AEROSOL, METERED ORAL EVERY 6 HOURS
Qty: 0 | Refills: 0 | Status: DISCONTINUED | OUTPATIENT
Start: 2019-02-08 | End: 2019-02-08

## 2019-02-08 RX ORDER — DEXTROSE 50 % IN WATER 50 %
15 SYRINGE (ML) INTRAVENOUS ONCE
Qty: 0 | Refills: 0 | Status: DISCONTINUED | OUTPATIENT
Start: 2019-02-08 | End: 2019-02-15

## 2019-02-08 RX ORDER — ATORVASTATIN CALCIUM 80 MG/1
40 TABLET, FILM COATED ORAL AT BEDTIME
Qty: 0 | Refills: 0 | Status: DISCONTINUED | OUTPATIENT
Start: 2019-02-08 | End: 2019-02-15

## 2019-02-08 RX ORDER — METOPROLOL TARTRATE 50 MG
2.5 TABLET ORAL ONCE
Qty: 0 | Refills: 0 | Status: COMPLETED | OUTPATIENT
Start: 2019-02-08 | End: 2019-02-08

## 2019-02-08 RX ORDER — DEXTROSE 50 % IN WATER 50 %
12.5 SYRINGE (ML) INTRAVENOUS ONCE
Qty: 0 | Refills: 0 | Status: DISCONTINUED | OUTPATIENT
Start: 2019-02-08 | End: 2019-02-15

## 2019-02-08 RX ORDER — OXYCODONE HYDROCHLORIDE 5 MG/1
10 TABLET ORAL EVERY 6 HOURS
Qty: 0 | Refills: 0 | Status: DISCONTINUED | OUTPATIENT
Start: 2019-02-08 | End: 2019-02-12

## 2019-02-08 RX ORDER — SODIUM CHLORIDE 9 MG/ML
1000 INJECTION, SOLUTION INTRAVENOUS
Qty: 0 | Refills: 0 | Status: DISCONTINUED | OUTPATIENT
Start: 2019-02-08 | End: 2019-02-15

## 2019-02-08 RX ORDER — INSULIN LISPRO 100/ML
VIAL (ML) SUBCUTANEOUS
Qty: 0 | Refills: 0 | Status: DISCONTINUED | OUTPATIENT
Start: 2019-02-08 | End: 2019-02-15

## 2019-02-08 RX ORDER — METOPROLOL TARTRATE 50 MG
25 TABLET ORAL
Qty: 0 | Refills: 0 | Status: DISCONTINUED | OUTPATIENT
Start: 2019-02-08 | End: 2019-02-15

## 2019-02-08 RX ORDER — HYDROMORPHONE HYDROCHLORIDE 2 MG/ML
0.5 INJECTION INTRAMUSCULAR; INTRAVENOUS; SUBCUTANEOUS ONCE
Qty: 0 | Refills: 0 | Status: DISCONTINUED | OUTPATIENT
Start: 2019-02-08 | End: 2019-02-08

## 2019-02-08 RX ADMIN — Medication 2: at 17:44

## 2019-02-08 RX ADMIN — Medication 500 MICROGRAM(S): at 14:51

## 2019-02-08 RX ADMIN — LEVALBUTEROL 0.63 MILLIGRAM(S): 1.25 SOLUTION, CONCENTRATE RESPIRATORY (INHALATION) at 22:08

## 2019-02-08 RX ADMIN — ATORVASTATIN CALCIUM 40 MILLIGRAM(S): 80 TABLET, FILM COATED ORAL at 21:58

## 2019-02-08 RX ADMIN — Medication 40 MILLIGRAM(S): at 17:25

## 2019-02-08 RX ADMIN — Medication 500 MICROGRAM(S): at 22:09

## 2019-02-08 RX ADMIN — LEVALBUTEROL 0.63 MILLIGRAM(S): 1.25 SOLUTION, CONCENTRATE RESPIRATORY (INHALATION) at 14:57

## 2019-02-08 RX ADMIN — Medication 2.5 MILLIGRAM(S): at 14:44

## 2019-02-08 RX ADMIN — HYDROMORPHONE HYDROCHLORIDE 0.5 MILLIGRAM(S): 2 INJECTION INTRAMUSCULAR; INTRAVENOUS; SUBCUTANEOUS at 19:23

## 2019-02-08 RX ADMIN — OXYCODONE HYDROCHLORIDE 10 MILLIGRAM(S): 5 TABLET ORAL at 23:48

## 2019-02-08 RX ADMIN — HYDROMORPHONE HYDROCHLORIDE 0.5 MILLIGRAM(S): 2 INJECTION INTRAMUSCULAR; INTRAVENOUS; SUBCUTANEOUS at 20:00

## 2019-02-08 RX ADMIN — Medication 400 MILLIGRAM(S): at 17:25

## 2019-02-08 RX ADMIN — Medication 1000 MILLIGRAM(S): at 18:00

## 2019-02-08 RX ADMIN — Medication 25 MILLIGRAM(S): at 17:43

## 2019-02-08 RX ADMIN — HEPARIN SODIUM 5000 UNIT(S): 5000 INJECTION INTRAVENOUS; SUBCUTANEOUS at 17:25

## 2019-02-08 NOTE — H&P ADULT - ASSESSMENT
63M hx COPD former smoker with SOB and bx proven adenocarcinoma of the l. lung. Radiologic studies demonstrate recurrence of pleural effusion, likely chronic and loculated. Patient to be admitted to [facility] for pigtail and possible OR on Monday.    - admit to [facility] Moore MD. Thoracic surgery. 8T  - will place right pleural pigtail catheter for pleural effusion  - patient to be scheduled for possible OR 2/11 for l. vats, evacuation of pleural effusion, pleurodesis, possible pleux  - regular diet  - cbc, bmp, mg, phos, type and screen, coags  - encourage cough / deep breathing / ambulation  - pain control  - vte ppx    Patient seen and examined with CASSANDRA Loomis NP and plan d/w [facility] MD.    LEXUS Farrar MD, PGY-I  Thoracic Surgery  Pager: [phone]

## 2019-02-08 NOTE — PROCEDURE NOTE - ADDITIONAL PROCEDURE DETAILS
Left sided PTC placed for large pleural effusion on CT. US guided placement. 1000 blood tinged fluid drained on WS. CT clamped for re-expansion symptoms. CXR pending. Will re-eval this evening to see in PTC can be unclamped and put back to WS.

## 2019-02-08 NOTE — H&P ADULT - NSHPLABSRESULTS_GEN_ALL_CORE
02-08    139  |  95<L>  |  9   ----------------------------<  153<H>  4.3   |  31  |  0.71    Ca    8.9      08 Feb 2019 04:30    TPro  7.1  /  Alb  3.7  /  TBili  0.3  /  DBili  x   /  AST  16  /  ALT  18  /  AlkPhos  94  02-08                            13.2   13.61 )-----------( 373      ( 08 Feb 2019 04:30 )             43.0

## 2019-02-08 NOTE — PATIENT PROFILE ADULT - NSSCCAGEALCOHOLGUILTYABOUT_GEN_A_NUR
Pt c/o redness drainage from incision or left ankle. pt had  arthroscopy 1 week ago, had PT yesterday and states he was more active than usual but has no increased pain. Denies fevers.  Is ambulatory
no

## 2019-02-08 NOTE — ED PROVIDER NOTE - NS ED ROS FT
Paige Orellana MD:   General: denies fever, chills  HENT: denies nasal congestion, sore throat, rhinorrhea  Eyes: denies vision changes  CV: denies chest pain  Resp: +difficulty breathing, cough  Abdominal: denies nausea, vomiting, diarrhea, +abdominal pain, blood in stool, dark stool  : denies pain with urination  MSK: denies recent trauma  Neuro: denies headaches, numbness, tingling, dizziness, lightheadedness.  Skin: denies new rashes  Endocrine: denies recent weight loss

## 2019-02-08 NOTE — ED PROVIDER NOTE - PROGRESS NOTE DETAILS
Gopi Sauceda MD PGY4: signout Dr. Reyes refused admission w/o CT surgery consult, no beds in RCU, CT surgery consulted, pt stable Gopi Sauceda MD PGY4: CT surgery requesting CT chest abdomen and pelvis Gopi Sauceda MD PGY4: CT surgery waiting to hear back from their attending regarding tap.

## 2019-02-08 NOTE — ED ADULT NURSE REASSESSMENT NOTE - NS ED NURSE REASSESS COMMENT FT1
Pt. is alert and oriented x 4 no c/o pain no respiratory distress but experience shortness of breath with activities. Abdomen distended, will continue to monitor.

## 2019-02-08 NOTE — H&P ADULT - HISTORY OF PRESENT ILLNESS
This is an incomplete note to be updated as soon as possible-    In short- This is a 63M with bx proven adenocarcinoma of the l lung who presents with shortness of breath and found to have recurrent pleural effusion.     Patient should be admitted to [facility] MD [facility]. 63M hx COPD, HTN, HLD, bx proven adenocarcinoma of the l lung p/w progressively worsening shortness of breath. Patient with 1L of effusion drained 3 weeks ago with inconclusive pathology, s/p US guided bx demonstrating adenocarcinoma. Patient now presents with worsening shortness of breath on exertion and pain worse with deep inspiration at the left lower chest, left upper abdomen. Denies nausea / vomiting. Denies centrally located CP or CP on exertion. No changes in bowel or bladder.

## 2019-02-08 NOTE — ED PROVIDER NOTE - PHYSICAL EXAMINATION
Paige Orellana MD:   CONSTITUTIONAL: elderly male, sitting up in mild respiratory distress, tired appearing, on 3L O2 NC  HEAD: Normocephalic; atraumatic  EYES: Normal inspection, EOMI  ENMT: External appears normal; normal oropharynx  NECK: Supple; non-tender; no cervical lymphadenopathy  CARD: RRR; no audible murmurs, rubs, or gallops  RESP: No lungs sounds on left side, mild crackles at base of R  ABD: distended; non-tender; no rebound or guarding  EXT: 2+ LE pitting edema b/l, no calf tenderness; distal pulses intact with good capillary refill  SKIN: Warm, dry, intact  NEURO: aaox3, moving all extremities spontaneously

## 2019-02-08 NOTE — ED ADULT TRIAGE NOTE - CHIEF COMPLAINT QUOTE
pt with newly DX L. Lung CA not on treatment presenting with SOB and abdominal since this morning- PMH COPD on 2L O2 at home, denies CP- on 3L O2 currently pt with newly DX L. Lung CA not on treatment presenting with SOB and abdominal since this morning- abdomen appears distended, last BM today-PMH COPD on 2L O2 at home, denies CP, urinary symptoms- on 3L O2 currently

## 2019-02-08 NOTE — ED ADULT NURSE NOTE - NSIMPLEMENTINTERV_GEN_ALL_ED
Implemented All Universal Safety Interventions:  Novelty to call system. Call bell, personal items and telephone within reach. Instruct patient to call for assistance. Room bathroom lighting operational. Non-slip footwear when patient is off stretcher. Physically safe environment: no spills, clutter or unnecessary equipment. Stretcher in lowest position, wheels locked, appropriate side rails in place.

## 2019-02-08 NOTE — ED PROVIDER NOTE - ATTENDING CONTRIBUTION TO CARE
62 y/o M with h/o COPD on home O2 PRN, recently dx with lung ca (recent admission at end of Jan revealing left adenocarcinoma with left pleural effusion - pt had diagnostic thoracentesis at the time, pending follow-up regarding treatment options) here with SOB.  Pt reports similar sxs to previous hospitalizations.  States he has had orthopnea since prior to that visit - sleeps with 3 pillows and sister reports that he frequently wakes up in the middle of the night feeling like he cannot breathe.  SOB has been worsening since discharge - mostly exertional with any movement around the home but tonight pt reports SOB did not quickly improve with rest.  Pt reports relief with supplemental O2 via nasal cannula.  No fever, cough, cp, back pain, abd pain, leg pain or swelling.  Pt sitting in stretcher, awake and alert, nontoxic.  VSS, hypoxic to 90% on room air, improved to high 90s with 2L NC.  Lungs clear on right, no bs present on left.  Cards nl S1/S2, RRR, no MRG.  Abd soft ntnd.  No pedal edema or calf tenderness.

## 2019-02-08 NOTE — H&P ADULT - NSHPREVIEWOFSYSTEMS_GEN_ALL_CORE
GEN: [id number].  CV: No CP. No palpitations  RESP: +SOB. +l. chest pain, intermittent, worsened with deep inspiration.   GI: -nausea. -vomiting. +BM

## 2019-02-08 NOTE — H&P ADULT - NSHPPHYSICALEXAM_GEN_ALL_CORE
PHYSICAL EXAM:    Gen: WD, WN, in no acute distress.  HEENT: PERRLA, EOMI. oropharynx clear.  Lungs: Lung sounds decreased on the l. hemithorax. Wheezes present diffusely on the r. hemithorax.   Cor: S1S2. Sinus tachycardia  Abd: Soft, nontender, mildly distended. nontympanic   Neuro: A/Ox3. Cranial nerve intact. No focal deficit.

## 2019-02-08 NOTE — ED ADULT NURSE NOTE - OBJECTIVE STATEMENT
Pt received in rm 22, 63Y male, Aox4, c/o sob and abd pain. Pmhx COPD, HTN, DM, HLD, Lung Ca. Pt reports feeling sob x1 week worsening today. Pt was previously on O2 2L NC at home for COPD but stopped using it after newly diagnosed Lung Ca. Pt reports feeling sob worsening today and tried 3L NC at home with no relief. Noted abd distention. Pt is a previous smoker, denies any cp, dizziness, fevers, chills, N/V/D. Vitals as charted, MD at bedside for eval, will continue to monitor.

## 2019-02-08 NOTE — ED PROVIDER NOTE - PMH
COPD (chronic obstructive pulmonary disease)    Diabetes mellitus    HLD (hyperlipidemia)    Hypertension    Lung cancer

## 2019-02-08 NOTE — ED PROVIDER NOTE - OBJECTIVE STATEMENT
Paige Orellana MD: 62yo Male with PMH of recently diagnosed left lung ca (not on treatment), COPD, HTN, HLD, DM presents with 1 day of worsening SOB and abdominal pain. As per wife at bedside, pt was hypoxic to 70's at home, usually not on O2. Recent biopsy 1 week ago, with L atelectasis and pleural effusion that was drained. Wife also states pt has been having diffuse abdominal pain and swelling that is usually relieved by lifting abdomen. Pt was given 3x albuterol treatment at home with minimal improvement in symptoms. Was better when put on 2-3L of O2. No fever, chills, CP, N/V/D, syncope, lightheadedness, abdominal pain currently, dysuria, hematuria, hematochezia, melena, focal neurological deficits, recent sick contacts, recent travel.

## 2019-02-08 NOTE — PROCEDURE NOTE - PROCEDURE
<<-----Click on this checkbox to enter Procedure Chest tube placement  02/08/2019    Active  [id number]

## 2019-02-08 NOTE — ED ADULT NURSE NOTE - CHIEF COMPLAINT QUOTE
pt with newly DX L. Lung CA not on treatment presenting with SOB and abdominal since this morning- abdomen appears distended, last BM today-PMH COPD on 2L O2 at home, denies CP, urinary symptoms- on 3L O2 currently

## 2019-02-08 NOTE — ED PROVIDER NOTE - MEDICAL DECISION MAKING DETAILS
Paige Orellana MD: 64yo Male with PMH of recently diagnosed left lung ca (not on treatment), COPD, HTN, HLD, DM presents with 1 day of worsening SOB and abdominal pain. Pt hemodynamically stable, afebrile. hypoxic to 90% without NC, but otherwise satting high 90's with supplemental O2. No breath sounds on left. Possible PTX vs worsening pleural effusion vs other. Plan: labs, troponin, VBG, CXR, EKG

## 2019-02-09 LAB
ANION GAP SERPL CALC-SCNC: 12 MMO/L — SIGNIFICANT CHANGE UP (ref 7–14)
BUN SERPL-MCNC: 18 MG/DL — SIGNIFICANT CHANGE UP (ref 7–23)
CALCIUM SERPL-MCNC: 9.5 MG/DL — SIGNIFICANT CHANGE UP (ref 8.4–10.5)
CHLORIDE SERPL-SCNC: 96 MMOL/L — LOW (ref 98–107)
CO2 SERPL-SCNC: 33 MMOL/L — HIGH (ref 22–31)
CREAT SERPL-MCNC: 0.87 MG/DL — SIGNIFICANT CHANGE UP (ref 0.5–1.3)
GLUCOSE BLDC GLUCOMTR-MCNC: 151 MG/DL — HIGH (ref 70–99)
GLUCOSE BLDC GLUCOMTR-MCNC: 188 MG/DL — HIGH (ref 70–99)
GLUCOSE BLDC GLUCOMTR-MCNC: 198 MG/DL — HIGH (ref 70–99)
GLUCOSE BLDC GLUCOMTR-MCNC: 209 MG/DL — HIGH (ref 70–99)
GLUCOSE SERPL-MCNC: 174 MG/DL — HIGH (ref 70–99)
HCT VFR BLD CALC: 44.7 % — SIGNIFICANT CHANGE UP (ref 39–50)
HGB BLD-MCNC: 13.8 G/DL — SIGNIFICANT CHANGE UP (ref 13–17)
MCHC RBC-ENTMCNC: 26.2 PG — LOW (ref 27–34)
MCHC RBC-ENTMCNC: 30.9 % — LOW (ref 32–36)
MCV RBC AUTO: 84.8 FL — SIGNIFICANT CHANGE UP (ref 80–100)
NRBC # FLD: 0 K/UL — LOW (ref 25–125)
PLATELET # BLD AUTO: 384 K/UL — SIGNIFICANT CHANGE UP (ref 150–400)
PMV BLD: 9.2 FL — SIGNIFICANT CHANGE UP (ref 7–13)
POTASSIUM SERPL-MCNC: 4.4 MMOL/L — SIGNIFICANT CHANGE UP (ref 3.5–5.3)
POTASSIUM SERPL-SCNC: 4.4 MMOL/L — SIGNIFICANT CHANGE UP (ref 3.5–5.3)
RBC # BLD: 5.27 M/UL — SIGNIFICANT CHANGE UP (ref 4.2–5.8)
RBC # FLD: 14.1 % — SIGNIFICANT CHANGE UP (ref 10.3–14.5)
SODIUM SERPL-SCNC: 141 MMOL/L — SIGNIFICANT CHANGE UP (ref 135–145)
WBC # BLD: 12.51 K/UL — HIGH (ref 3.8–10.5)
WBC # FLD AUTO: 12.51 K/UL — HIGH (ref 3.8–10.5)

## 2019-02-09 PROCEDURE — 71045 X-RAY EXAM CHEST 1 VIEW: CPT | Mod: 26

## 2019-02-09 RX ADMIN — Medication 500 MICROGRAM(S): at 23:37

## 2019-02-09 RX ADMIN — Medication 25 MILLIGRAM(S): at 17:28

## 2019-02-09 RX ADMIN — LEVALBUTEROL 0.63 MILLIGRAM(S): 1.25 SOLUTION, CONCENTRATE RESPIRATORY (INHALATION) at 04:33

## 2019-02-09 RX ADMIN — Medication 500 MICROGRAM(S): at 11:23

## 2019-02-09 RX ADMIN — LEVALBUTEROL 0.63 MILLIGRAM(S): 1.25 SOLUTION, CONCENTRATE RESPIRATORY (INHALATION) at 23:37

## 2019-02-09 RX ADMIN — HEPARIN SODIUM 5000 UNIT(S): 5000 INJECTION INTRAVENOUS; SUBCUTANEOUS at 06:02

## 2019-02-09 RX ADMIN — Medication 4: at 12:44

## 2019-02-09 RX ADMIN — LEVALBUTEROL 0.63 MILLIGRAM(S): 1.25 SOLUTION, CONCENTRATE RESPIRATORY (INHALATION) at 15:11

## 2019-02-09 RX ADMIN — LEVALBUTEROL 0.63 MILLIGRAM(S): 1.25 SOLUTION, CONCENTRATE RESPIRATORY (INHALATION) at 11:23

## 2019-02-09 RX ADMIN — Medication 100 MILLIGRAM(S): at 11:58

## 2019-02-09 RX ADMIN — Medication 40 MILLIGRAM(S): at 06:02

## 2019-02-09 RX ADMIN — Medication 2: at 08:32

## 2019-02-09 RX ADMIN — Medication 2: at 18:21

## 2019-02-09 RX ADMIN — Medication 500 MICROGRAM(S): at 04:34

## 2019-02-09 RX ADMIN — Medication 25 MILLIGRAM(S): at 06:02

## 2019-02-09 RX ADMIN — HEPARIN SODIUM 5000 UNIT(S): 5000 INJECTION INTRAVENOUS; SUBCUTANEOUS at 17:28

## 2019-02-09 RX ADMIN — ATORVASTATIN CALCIUM 40 MILLIGRAM(S): 80 TABLET, FILM COATED ORAL at 21:12

## 2019-02-09 RX ADMIN — Medication 2: at 21:34

## 2019-02-09 RX ADMIN — Medication 500 MICROGRAM(S): at 15:11

## 2019-02-09 RX ADMIN — OXYCODONE HYDROCHLORIDE 10 MILLIGRAM(S): 5 TABLET ORAL at 00:40

## 2019-02-09 RX ADMIN — PANTOPRAZOLE SODIUM 40 MILLIGRAM(S): 20 TABLET, DELAYED RELEASE ORAL at 06:02

## 2019-02-09 NOTE — PROGRESS NOTE ADULT - SUBJECTIVE AND OBJECTIVE BOX
Subjective: Im breathing better  No events overnight    Vital Signs:  Vital Signs Last 24 Hrs  T(C): 37.1 (02-09-19 @ 16:35), Max: 37.1 (02-09-19 @ 16:35)  T(F): 98.8 (02-09-19 @ 16:35), Max: 98.8 (02-09-19 @ 16:35)  HR: 81 (02-09-19 @ 16:35) (60 - 113)  BP: 136/57 (02-09-19 @ 16:35) (104/47 - 136/57)  RR: 20 (02-09-19 @ 16:35) (20 - 20)  SpO2: 98% (02-09-19 @ 16:35) (90% - 98%) on (O2)    Telemetry/Alarms:  General: WN/WD NAD  Neurology: Awake, nonfocal, JOYNER x 4  Eyes: Scleras clear, PERRLA/ EOMI, Gross vision intact  ENT:Gross hearing intact, grossly patent pharynx, no stridor  Neck: Neck supple, trachea midline, No JVD,   Respiratory: CTA B/L, No wheezing, rales, rhonchi  CV: RRR, S1S2, no murmurs, rubs or gallops  Abdominal: Soft, NT, ND +BS,   Extremities: No edema, + peripheral pulses  Skin: No Rashes, Hematoma, Ecchymosis  Lymphatic: No Neck, axilla, groin LAD  Psych: Oriented x 3, normal affect  Incisions:   Tubes:  Relevant labs, radiology and Medications reviewed               < from: Xray Chest 1 View- PORTABLE-Routine (02.09.19 @ 08:44) >  EXAM:  XR CHEST PORTABLE ROUTINE 1V        PROCEDURE DATE:  Feb 9 2019         INTERPRETATION:  EXAMINATION: XR CHEST PORTABLE ROUTINE 1V    CLINICAL INDICATION: Lung cancer    TECHNIQUE: Frontal radiograph of the chest was obtained.    COMPARISON: 2.8.19.    FINDINGS:     Cardiac silhouette not well evaluated. Left-sided chest tube. Elevation   left hemidiaphragm with small left pleural effusion. No definite   pneumothorax. Right lung is clear.    IMPRESSION:   No change from the prior study.                  SHANNON DYSON M.D., ATTENDING RADIOLOGIST  This document has been electronically signed. Feb 9 2019 12:05PM    < end of copied text >               13.8   12.51 )-----------( 384      ( 09 Feb 2019 05:50 )             44.7     02-09    141  |  96<L>  |  18  ----------------------------<  174<H>  4.4   |  33<H>  |  0.87    Ca    9.5      09 Feb 2019 05:50    TPro  7.1  /  Alb  3.7  /  TBili  0.3  /  DBili  x   /  AST  16  /  ALT  18  /  AlkPhos  94  02-08      MEDICATIONS  (STANDING):  atorvastatin 40 milliGRAM(s) Oral at bedtime  dextrose 5%. 1000 milliLiter(s) (50 mL/Hr) IV Continuous <Continuous>  dextrose 50% Injectable 12.5 Gram(s) IV Push once  dextrose 50% Injectable 25 Gram(s) IV Push once  dextrose 50% Injectable 25 Gram(s) IV Push once  furosemide    Tablet 40 milliGRAM(s) Oral daily  heparin  Injectable 5000 Unit(s) SubCutaneous every 12 hours  insulin lispro (HumaLOG) corrective regimen sliding scale   SubCutaneous Before meals and at bedtime  ipratropium    for Nebulization 500 MICROGram(s) Nebulizer every 6 hours  levalbuterol Inhalation 0.63 milliGRAM(s) Inhalation every 6 hours  metoprolol tartrate 25 milliGRAM(s) Oral two times a day  pantoprazole    Tablet 40 milliGRAM(s) Oral before breakfast  pyridoxine 100 milliGRAM(s) Oral daily    MEDICATIONS  (PRN):  dextrose 40% Gel 15 Gram(s) Oral once PRN Blood Glucose LESS THAN 70 milliGRAM(s)/deciliter  glucagon  Injectable 1 milliGRAM(s) IntraMuscular once PRN Glucose LESS THAN 70 milligrams/deciliter  oxyCODONE    IR 10 milliGRAM(s) Oral every 6 hours PRN Severe Pain (7 - 10)  oxyCODONE    IR 5 milliGRAM(s) Oral every 6 hours PRN Moderate Pain (4 - 6)    Pertinent Physical Exam  I&O's Summary    08 Feb 2019 07:01  -  09 Feb 2019 07:00  --------------------------------------------------------  IN: 0 mL / OUT: 2240 mL / NET: -2240 mL    09 Feb 2019 07:01  -  09 Feb 2019 17:26  --------------------------------------------------------  IN: 720 mL / OUT: 112 mL / NET: 608 mL        Assessment  63y Male  w/ PAST MEDICAL & SURGICAL HISTORY:  Lung cancer  COPD (chronic obstructive pulmonary disease)  HLD (hyperlipidemia)  Diabetes mellitus  Hypertension  No significant past surgical history  admitted with complaints of Patient is a 63y old  Male who presents with a chief complaint of recurrent left pleural effusion (08 Feb 2019 13:33)  .  On  2/8/19, patient underwent Chest tube placement  Chest tube placement with US guidance  . Postoperative course/issues: Pt has a small air leak from pigtail drain    PLAN  Neuro: Pain management  Pulm: Encourage coughing, deep breathing and use of incentive spirometry. Wean off supplemental oxygen as able. Daily CXR.   Cardio: Monitor telemetry/alarms  GI: Tolerating diet. Continue stool softeners.  Renal: monitor urine output, supplement electrolytes as needed  Vasc: Heparin SC/SCDs for DVT prophylaxis  Heme: Stable H/H. .   ID: Off antibiotics. Stable.  Therapy: OOB/ambulate  Tubes: Monitor Chest tube output  Disposition: Aim to D/C to home after Pleurex catheter is placed on Monday  Discussed with Cardiothoracic Team at AM rounds.

## 2019-02-09 NOTE — CONSULT NOTE ADULT - ASSESSMENT
EKG : ST 1degree AV block , TWI I, AVL, V4-6    Echo pending     Tele Afib with RVR    Assessement and Plan     1) Afib : denies history of a fib, [id number] score of 1, will start asa 325 post op     2) Perioperative risk assessment : Abnormal EKG with TWI , will need Nuclear stress to r/o ischemia prior to VATS    3) Lung ca/ Pleural effusion : adenoca, t/t per Surgery and Oncology     4) DVT PPX heparin

## 2019-02-09 NOTE — CONSULT NOTE ADULT - SUBJECTIVE AND OBJECTIVE BOX
Robinson Hathaway MD  Interventional Cardiology   [place] Office : [address] [place] [address] [place] [address]  Tel:   [place] Office : [address] [facility] [place] [address]  Tel: [phone]  Cell : [phone]    HISTORY OF PRESENT ILLNESS:    63M hx COPD, HTN, HLD, bx proven adenocarcinoma of the l lung p/w progressively worsening shortness of breath. Patient with 1L of effusion drained 3 weeks ago with inconclusive pathology, s/p US guided bx demonstrating adenocarcinoma. Patient now presents with worsening shortness of breath on exertion and pain worse with deep inspiration at the left lower chest, left upper abdomen. Denies nausea / vomiting. Denies centrally located CP or CP on exertion. No changes in bowel or bladder. on admission found to have new onset a fib , Planned for VATS/evacuation of pleural effusion/Pleurodesis     PAST MEDICAL & SURGICAL HISTORY:  Lung cancer  COPD (chronic obstructive pulmonary disease)  HLD (hyperlipidemia)  Diabetes mellitus  Hypertension  No significant past surgical history    	    MEDICATIONS:  furosemide    Tablet 40 milliGRAM(s) Oral daily  heparin  Injectable 5000 Unit(s) SubCutaneous every 12 hours  metoprolol tartrate 25 milliGRAM(s) Oral two times a day      ipratropium    for Nebulization 500 MICROGram(s) Nebulizer every 6 hours  levalbuterol Inhalation 0.63 milliGRAM(s) Inhalation every 6 hours    oxyCODONE    IR 10 milliGRAM(s) Oral every 6 hours PRN  oxyCODONE    IR 5 milliGRAM(s) Oral every 6 hours PRN    pantoprazole    Tablet 40 milliGRAM(s) Oral before breakfast    atorvastatin 40 milliGRAM(s) Oral at bedtime  dextrose 40% Gel 15 Gram(s) Oral once PRN  dextrose 50% Injectable 12.5 Gram(s) IV Push once  dextrose 50% Injectable 25 Gram(s) IV Push once  dextrose 50% Injectable 25 Gram(s) IV Push once  glucagon  Injectable 1 milliGRAM(s) IntraMuscular once PRN  insulin lispro (HumaLOG) corrective regimen sliding scale   SubCutaneous Before meals and at bedtime    dextrose 5%. 1000 milliLiter(s) IV Continuous <Continuous>  pyridoxine 100 milliGRAM(s) Oral daily      FAMILY HISTORY:  Family history of diabetes mellitus (Father, Mother): Parents        Allergies    No Known Allergies    Intolerances    	      PHYSICAL EXAM:  T(C): 37.4 (02-09-19 @ 20:38), Max: 37.4 (02-09-19 @ 20:38)  HR: 73 (02-09-19 @ 20:38) (60 - 108)  BP: 117/54 (02-09-19 @ 20:38) (104/47 - 136/57)  RR: 20 (02-09-19 @ 20:38) (20 - 20)  SpO2: 98% (02-09-19 @ 20:38) (90% - 98%)  Wt(kg): --  I&O's Summary    08 Feb 2019 07:01  -  09 Feb 2019 07:00  --------------------------------------------------------  IN: 0 mL / OUT: 2240 mL / NET: -2240 mL    09 Feb 2019 07:01  -  09 Feb 2019 20:48  --------------------------------------------------------  IN: 1200 mL / OUT: 262 mL / NET: 938 mL        Appearance: 	  HEENT:   Normal oral mucosa, 2 L NC  Cardiovascular: Normal S1 S2, No JVD, No murmurs, No edema  Respiratory: Decreased bs B/L 	  Gastrointestinal:  Soft, Non-tender, + BS	  Extremities:  No clubbing, cyanosis, 1+ edema B/L     LABS:	 	    CARDIAC MARKERS:                                  13.8   12.51 )-----------( 384      ( 09 Feb 2019 05:50 )             44.7     02-09    141  |  96<L>  |  18  ----------------------------<  174<H>  4.4   |  33<H>  |  0.87    Ca    9.5      09 Feb 2019 05:50    TPro  7.1  /  Alb  3.7  /  TBili  0.3  /  DBili  x   /  AST  16  /  ALT  18  /  AlkPhos  94  02-08    proBNP:   Lipid Profile:   HgA1c:   TSH:     ASSESSMENT/PLAN:

## 2019-02-10 ENCOUNTER — TRANSCRIPTION ENCOUNTER (OUTPATIENT)
Age: 64
End: 2019-02-10

## 2019-02-10 LAB
ANION GAP SERPL CALC-SCNC: 12 MMO/L — SIGNIFICANT CHANGE UP (ref 7–14)
APTT BLD: 30.7 SEC — SIGNIFICANT CHANGE UP (ref 27.5–36.3)
BLD GP AB SCN SERPL QL: NEGATIVE — SIGNIFICANT CHANGE UP
BUN SERPL-MCNC: 17 MG/DL — SIGNIFICANT CHANGE UP (ref 7–23)
CALCIUM SERPL-MCNC: 8.9 MG/DL — SIGNIFICANT CHANGE UP (ref 8.4–10.5)
CHLORIDE SERPL-SCNC: 96 MMOL/L — LOW (ref 98–107)
CO2 SERPL-SCNC: 33 MMOL/L — HIGH (ref 22–31)
CREAT SERPL-MCNC: 0.79 MG/DL — SIGNIFICANT CHANGE UP (ref 0.5–1.3)
GLUCOSE BLDC GLUCOMTR-MCNC: 148 MG/DL — HIGH (ref 70–99)
GLUCOSE BLDC GLUCOMTR-MCNC: 155 MG/DL — HIGH (ref 70–99)
GLUCOSE BLDC GLUCOMTR-MCNC: 183 MG/DL — HIGH (ref 70–99)
GLUCOSE BLDC GLUCOMTR-MCNC: 215 MG/DL — HIGH (ref 70–99)
GLUCOSE BLDC GLUCOMTR-MCNC: 218 MG/DL — HIGH (ref 70–99)
GLUCOSE SERPL-MCNC: 155 MG/DL — HIGH (ref 70–99)
HCT VFR BLD CALC: 41.5 % — SIGNIFICANT CHANGE UP (ref 39–50)
HGB BLD-MCNC: 12.7 G/DL — LOW (ref 13–17)
INR BLD: 1.12 — SIGNIFICANT CHANGE UP (ref 0.88–1.17)
MCHC RBC-ENTMCNC: 25.7 PG — LOW (ref 27–34)
MCHC RBC-ENTMCNC: 30.6 % — LOW (ref 32–36)
MCV RBC AUTO: 84 FL — SIGNIFICANT CHANGE UP (ref 80–100)
NRBC # FLD: 0 K/UL — LOW (ref 25–125)
PLATELET # BLD AUTO: 386 K/UL — SIGNIFICANT CHANGE UP (ref 150–400)
PMV BLD: 9.7 FL — SIGNIFICANT CHANGE UP (ref 7–13)
POTASSIUM SERPL-MCNC: 3.9 MMOL/L — SIGNIFICANT CHANGE UP (ref 3.5–5.3)
POTASSIUM SERPL-SCNC: 3.9 MMOL/L — SIGNIFICANT CHANGE UP (ref 3.5–5.3)
PROTHROM AB SERPL-ACNC: 12.5 SEC — SIGNIFICANT CHANGE UP (ref 9.8–13.1)
RBC # BLD: 4.94 M/UL — SIGNIFICANT CHANGE UP (ref 4.2–5.8)
RBC # FLD: 14.2 % — SIGNIFICANT CHANGE UP (ref 10.3–14.5)
RH IG SCN BLD-IMP: POSITIVE — SIGNIFICANT CHANGE UP
SODIUM SERPL-SCNC: 141 MMOL/L — SIGNIFICANT CHANGE UP (ref 135–145)
WBC # BLD: 12.17 K/UL — HIGH (ref 3.8–10.5)
WBC # FLD AUTO: 12.17 K/UL — HIGH (ref 3.8–10.5)

## 2019-02-10 PROCEDURE — 71045 X-RAY EXAM CHEST 1 VIEW: CPT | Mod: 26

## 2019-02-10 RX ORDER — FUROSEMIDE 40 MG
40 TABLET ORAL
Qty: 0 | Refills: 0 | Status: DISCONTINUED | OUTPATIENT
Start: 2019-02-10 | End: 2019-02-12

## 2019-02-10 RX ADMIN — Medication 40 MILLIGRAM(S): at 05:18

## 2019-02-10 RX ADMIN — Medication 100 MILLIGRAM(S): at 12:54

## 2019-02-10 RX ADMIN — Medication 25 MILLIGRAM(S): at 05:18

## 2019-02-10 RX ADMIN — HEPARIN SODIUM 5000 UNIT(S): 5000 INJECTION INTRAVENOUS; SUBCUTANEOUS at 17:30

## 2019-02-10 RX ADMIN — PANTOPRAZOLE SODIUM 40 MILLIGRAM(S): 20 TABLET, DELAYED RELEASE ORAL at 05:18

## 2019-02-10 RX ADMIN — Medication 4: at 18:13

## 2019-02-10 RX ADMIN — LEVALBUTEROL 0.63 MILLIGRAM(S): 1.25 SOLUTION, CONCENTRATE RESPIRATORY (INHALATION) at 04:41

## 2019-02-10 RX ADMIN — Medication 500 MICROGRAM(S): at 15:35

## 2019-02-10 RX ADMIN — Medication 25 MILLIGRAM(S): at 17:31

## 2019-02-10 RX ADMIN — Medication 2: at 22:38

## 2019-02-10 RX ADMIN — HEPARIN SODIUM 5000 UNIT(S): 5000 INJECTION INTRAVENOUS; SUBCUTANEOUS at 05:18

## 2019-02-10 RX ADMIN — Medication 40 MILLIGRAM(S): at 17:31

## 2019-02-10 RX ADMIN — ATORVASTATIN CALCIUM 40 MILLIGRAM(S): 80 TABLET, FILM COATED ORAL at 22:48

## 2019-02-10 RX ADMIN — LEVALBUTEROL 0.63 MILLIGRAM(S): 1.25 SOLUTION, CONCENTRATE RESPIRATORY (INHALATION) at 15:35

## 2019-02-10 RX ADMIN — LEVALBUTEROL 0.63 MILLIGRAM(S): 1.25 SOLUTION, CONCENTRATE RESPIRATORY (INHALATION) at 09:45

## 2019-02-10 RX ADMIN — Medication 500 MICROGRAM(S): at 22:22

## 2019-02-10 RX ADMIN — Medication 500 MICROGRAM(S): at 09:45

## 2019-02-10 RX ADMIN — Medication 4: at 12:55

## 2019-02-10 RX ADMIN — LEVALBUTEROL 0.63 MILLIGRAM(S): 1.25 SOLUTION, CONCENTRATE RESPIRATORY (INHALATION) at 22:22

## 2019-02-10 RX ADMIN — Medication 500 MICROGRAM(S): at 04:41

## 2019-02-10 NOTE — PROGRESS NOTE ADULT - SUBJECTIVE AND OBJECTIVE BOX
Robinson Hathaway MD  Interventional Cardiology  [place] Office : [address] [place] [address]  Tel:   [place] Office : [address] [facility] [place] [address]  Tel: [phone]  Cell : [phone]    Subjective : Pt lying in bed comfortable, not in distress, denies any chest pain or SOB  	  MEDICATIONS:  furosemide   Injectable 40 milliGRAM(s) IV Push two times a day  heparin  Injectable 5000 Unit(s) SubCutaneous every 12 hours  metoprolol tartrate 25 milliGRAM(s) Oral two times a day      ipratropium    for Nebulization 500 MICROGram(s) Nebulizer every 6 hours  levalbuterol Inhalation 0.63 milliGRAM(s) Inhalation every 6 hours    oxyCODONE    IR 10 milliGRAM(s) Oral every 6 hours PRN  oxyCODONE    IR 5 milliGRAM(s) Oral every 6 hours PRN    pantoprazole    Tablet 40 milliGRAM(s) Oral before breakfast    atorvastatin 40 milliGRAM(s) Oral at bedtime  dextrose 40% Gel 15 Gram(s) Oral once PRN  dextrose 50% Injectable 12.5 Gram(s) IV Push once  dextrose 50% Injectable 25 Gram(s) IV Push once  dextrose 50% Injectable 25 Gram(s) IV Push once  glucagon  Injectable 1 milliGRAM(s) IntraMuscular once PRN  insulin lispro (HumaLOG) corrective regimen sliding scale   SubCutaneous Before meals and at bedtime    dextrose 5%. 1000 milliLiter(s) IV Continuous <Continuous>  pyridoxine 100 milliGRAM(s) Oral daily      PHYSICAL EXAM:  T(C): 37.4 (02-10-19 @ 23:57), Max: 37.6 (02-10-19 @ 19:52)  HR: 80 (02-10-19 @ 23:57) (68 - 103)  BP: 123/53 (02-10-19 @ 23:57) (111/54 - 131/56)  RR: 18 (02-10-19 @ 23:57) (16 - 20)  SpO2: 95% (02-10-19 @ 23:57) (93% - 98%)  Wt(kg): --  I&O's Summary    09 Feb 2019 07:01  -  10 Feb 2019 07:00  --------------------------------------------------------  IN: 1200 mL / OUT: 1212 mL / NET: -12 mL    10 Feb 2019 07:01  -  11 Feb 2019 00:40  --------------------------------------------------------  IN: 0 mL / OUT: 1500 mL / NET: -1500 mL        Appearance: 	  HEENT:   Normal oral mucosa, 2 L NC  Cardiovascular: Normal S1 S2, No JVD, No murmurs, No edema  Respiratory: Decreased bs B/L 	  Gastrointestinal:  Soft, Non-tender, + BS	  Extremities:  No clubbing, cyanosis, 1+ edema B/L                                     12.7   12.17 )-----------( 386      ( 10 Feb 2019 05:40 )             41.5     02-10    141  |  96<L>  |  17  ----------------------------<  155<H>  3.9   |  33<H>  |  0.79    Ca    8.9      10 Feb 2019 05:40      proBNP:   Lipid Profile:   HgA1c:   TSH:

## 2019-02-10 NOTE — PROGRESS NOTE ADULT - SUBJECTIVE AND OBJECTIVE BOX
Subjective: " my legs have been swollen for months," no other acute complaints    Vital Signs:  Vital Signs Last 24 Hrs  T(C): 36.7 (02-10-19 @ 12:42), Max: 37.4 (02-09-19 @ 20:38)  T(F): 98.1 (02-10-19 @ 12:42), Max: 99.3 (02-09-19 @ 20:38)  HR: 75 (02-10-19 @ 12:42) (66 - 84)  BP: 117/50 (02-10-19 @ 12:42) (111/54 - 136/57)  RR: 16 (02-10-19 @ 12:42) (16 - 20)  SpO2: 98% (02-10-19 @ 12:42) (95% - 98%) on (O2)    Telemetry/Alarms:  General: WN/WD NAD  Neurology: Awake, nonfocal, JOYNER x 4  Eyes: Scleras clear, PERRLA/ EOMI, Gross vision intact  ENT:Gross hearing intact, grossly patent pharynx, no stridor  Neck: Neck supple, trachea midline, No JVD,   Respiratory: CTA B/L, No wheezing, rales, rhonchi  CV: RRR, S1S2, no murmurs, rubs or gallops  Abdominal: Soft, NT, ND +BS,   Extremities: No edema, + peripheral pulses  Skin: No Rashes, Hematoma, Ecchymosis  Lymphatic: No Neck, axilla, groin LAD  Psych: Oriented x 3, normal affect  Incisions: c,d,i  Tubes: L PTC to waterseal, drained 462, no air leak  Relevant labs, radiology and Medications reviewed                        12.7   12.17 )-----------( 386      ( 10 Feb 2019 05:40 )             41.5     02-10    141  |  96<L>  |  17  ----------------------------<  155<H>  3.9   |  33<H>  |  0.79    Ca    8.9      10 Feb 2019 05:40      PT/INR - ( 10 Feb 2019 05:40 )   PT: 12.5 SEC;   INR: 1.12          PTT - ( 10 Feb 2019 05:40 )  PTT:30.7 SEC  MEDICATIONS  (STANDING):  atorvastatin 40 milliGRAM(s) Oral at bedtime  dextrose 5%. 1000 milliLiter(s) (50 mL/Hr) IV Continuous <Continuous>  dextrose 50% Injectable 12.5 Gram(s) IV Push once  dextrose 50% Injectable 25 Gram(s) IV Push once  dextrose 50% Injectable 25 Gram(s) IV Push once  furosemide   Injectable 40 milliGRAM(s) IV Push two times a day  heparin  Injectable 5000 Unit(s) SubCutaneous every 12 hours  insulin lispro (HumaLOG) corrective regimen sliding scale   SubCutaneous Before meals and at bedtime  ipratropium    for Nebulization 500 MICROGram(s) Nebulizer every 6 hours  levalbuterol Inhalation 0.63 milliGRAM(s) Inhalation every 6 hours  metoprolol tartrate 25 milliGRAM(s) Oral two times a day  pantoprazole    Tablet 40 milliGRAM(s) Oral before breakfast  pyridoxine 100 milliGRAM(s) Oral daily    MEDICATIONS  (PRN):  dextrose 40% Gel 15 Gram(s) Oral once PRN Blood Glucose LESS THAN 70 milliGRAM(s)/deciliter  glucagon  Injectable 1 milliGRAM(s) IntraMuscular once PRN Glucose LESS THAN 70 milligrams/deciliter  oxyCODONE    IR 10 milliGRAM(s) Oral every 6 hours PRN Severe Pain (7 - 10)  oxyCODONE    IR 5 milliGRAM(s) Oral every 6 hours PRN Moderate Pain (4 - 6)    Pertinent Physical Exam  I&O's Summary    09 Feb 2019 07:01  -  10 Feb 2019 07:00  --------------------------------------------------------  IN: 1200 mL / OUT: 1212 mL / NET: -12 mL    10 Feb 2019 07:01  -  10 Feb 2019 14:58  --------------------------------------------------------  IN: 0 mL / OUT: 500 mL / NET: -500 mL        Assessment  On  2/8/19, patient underwent Chest tube placement  Chest tube placement with US guidance  . Postoperative course/issues: Preop pleurX pending cardiology clearance    PLAN  Neuro: Pain management  Pulm: Encourage coughing, deep breathing and use of incentive spirometry. Wean off supplemental oxygen as able. Daily CXR.   Cardio: Monitor telemetry/alarms  GI: Tolerating diet. Continue stool softeners.  Renal: monitor urine output, supplement electrolytes as needed  Vasc: Heparin SC/SCDs for DVT prophylaxis  Heme: Stable H/H. .   ID: Off antibiotics. Stable.  Therapy: OOB/ambulate  Tubes: Monitor Chest tube output  Disposition: Awaiting cardiology clearnace, then aim to D/C to home after Pleurex catheter is placed on Monday  Discussed with Cardiothoracic Team at AM rounds.

## 2019-02-10 NOTE — PROGRESS NOTE ADULT - ASSESSMENT
EKG : ST 1degree AV block, LVH TWI I, AVL, V4-6    Echo pending     Tele Afib with RVR    Assessement and Plan     1) Afib : denies history of a fib, [id number] score of 1, will start asa 325 post op     2) Perioperative risk assessment : TWI on EKG likely 2/2 LVH, Denies CP, Echo pending, Pt at moderate risk for MACE with VATS/Pleurodesis pending echo with normal LV function and no significant valvular abnormality     3) Lung ca/ Pleural effusion : adenoca, t/t per Surgery and Oncology     4) DVT PPX heparin EKG : ST 1degree AV block, LVH TWI I, AVL, V4-6    Echo pending     Tele Afib with RVR    Assessement and Plan     1) Afib : denies history of a fib, [id number] score of 1, will start asa 325 post op , mild;y volume up on exam , switch to lasix 40 IV BID     2) Perioperative risk assessment : TWI on EKG likely 2/2 LVH, Denies CP, Echo pending, Pt at moderate risk for MACE with VATS/Pleurodesis pending echo with normal LV function and no significant valvular abnormality     3) Lung ca/ Pleural effusion : adenoca, t/t per Surgery and Oncology     4) DVT PPX heparin

## 2019-02-11 ENCOUNTER — TRANSCRIPTION ENCOUNTER (OUTPATIENT)
Age: 64
End: 2019-02-11

## 2019-02-11 ENCOUNTER — APPOINTMENT (OUTPATIENT)
Dept: THORACIC SURGERY | Facility: HOSPITAL | Age: 64
End: 2019-02-11

## 2019-02-11 LAB
ANION GAP SERPL CALC-SCNC: 11 MMO/L — SIGNIFICANT CHANGE UP (ref 7–14)
BLD GP AB SCN SERPL QL: NEGATIVE — SIGNIFICANT CHANGE UP
BUN SERPL-MCNC: 17 MG/DL — SIGNIFICANT CHANGE UP (ref 7–23)
CALCIUM SERPL-MCNC: 8.9 MG/DL — SIGNIFICANT CHANGE UP (ref 8.4–10.5)
CHLORIDE SERPL-SCNC: 97 MMOL/L — LOW (ref 98–107)
CO2 SERPL-SCNC: 34 MMOL/L — HIGH (ref 22–31)
CREAT SERPL-MCNC: 0.82 MG/DL — SIGNIFICANT CHANGE UP (ref 0.5–1.3)
GLUCOSE BLDC GLUCOMTR-MCNC: 148 MG/DL — HIGH (ref 70–99)
GLUCOSE BLDC GLUCOMTR-MCNC: 151 MG/DL — HIGH (ref 70–99)
GLUCOSE BLDC GLUCOMTR-MCNC: 159 MG/DL — HIGH (ref 70–99)
GLUCOSE SERPL-MCNC: 157 MG/DL — HIGH (ref 70–99)
POTASSIUM SERPL-MCNC: 3.6 MMOL/L — SIGNIFICANT CHANGE UP (ref 3.5–5.3)
POTASSIUM SERPL-SCNC: 3.6 MMOL/L — SIGNIFICANT CHANGE UP (ref 3.5–5.3)
RH IG SCN BLD-IMP: POSITIVE — SIGNIFICANT CHANGE UP
SODIUM SERPL-SCNC: 142 MMOL/L — SIGNIFICANT CHANGE UP (ref 135–145)

## 2019-02-11 PROCEDURE — 71045 X-RAY EXAM CHEST 1 VIEW: CPT | Mod: 26,77

## 2019-02-11 PROCEDURE — 32601 THORACOSCOPY DIAGNOSTIC: CPT

## 2019-02-11 PROCEDURE — 93306 TTE W/DOPPLER COMPLETE: CPT | Mod: 26

## 2019-02-11 PROCEDURE — 71045 X-RAY EXAM CHEST 1 VIEW: CPT | Mod: 26

## 2019-02-11 PROCEDURE — 32601 THORACOSCOPY DIAGNOSTIC: CPT | Mod: AS

## 2019-02-11 PROCEDURE — 32551 INSERTION OF CHEST TUBE: CPT | Mod: 59

## 2019-02-11 PROCEDURE — 31622 DX BRONCHOSCOPE/WASH: CPT

## 2019-02-11 RX ORDER — DIPHENHYDRAMINE HCL 50 MG
25 CAPSULE ORAL EVERY 4 HOURS
Qty: 0 | Refills: 0 | Status: DISCONTINUED | OUTPATIENT
Start: 2019-02-11 | End: 2019-02-12

## 2019-02-11 RX ORDER — DOCUSATE SODIUM 100 MG
100 CAPSULE ORAL THREE TIMES A DAY
Qty: 0 | Refills: 0 | Status: DISCONTINUED | OUTPATIENT
Start: 2019-02-11 | End: 2019-02-15

## 2019-02-11 RX ORDER — HYDROMORPHONE HYDROCHLORIDE 2 MG/ML
30 INJECTION INTRAMUSCULAR; INTRAVENOUS; SUBCUTANEOUS
Qty: 0 | Refills: 0 | Status: DISCONTINUED | OUTPATIENT
Start: 2019-02-11 | End: 2019-02-12

## 2019-02-11 RX ORDER — NALOXONE HYDROCHLORIDE 4 MG/.1ML
0.1 SPRAY NASAL
Qty: 0 | Refills: 0 | Status: DISCONTINUED | OUTPATIENT
Start: 2019-02-11 | End: 2019-02-12

## 2019-02-11 RX ORDER — SODIUM CHLORIDE 9 MG/ML
1000 INJECTION, SOLUTION INTRAVENOUS
Qty: 0 | Refills: 0 | Status: DISCONTINUED | OUTPATIENT
Start: 2019-02-11 | End: 2019-02-12

## 2019-02-11 RX ORDER — ONDANSETRON 8 MG/1
4 TABLET, FILM COATED ORAL EVERY 6 HOURS
Qty: 0 | Refills: 0 | Status: DISCONTINUED | OUTPATIENT
Start: 2019-02-11 | End: 2019-02-15

## 2019-02-11 RX ORDER — HYDROMORPHONE HYDROCHLORIDE 2 MG/ML
0.5 INJECTION INTRAMUSCULAR; INTRAVENOUS; SUBCUTANEOUS
Qty: 0 | Refills: 0 | Status: DISCONTINUED | OUTPATIENT
Start: 2019-02-11 | End: 2019-02-12

## 2019-02-11 RX ORDER — FENTANYL CITRATE 50 UG/ML
25 INJECTION INTRAVENOUS
Qty: 0 | Refills: 0 | Status: DISCONTINUED | OUTPATIENT
Start: 2019-02-11 | End: 2019-02-12

## 2019-02-11 RX ADMIN — Medication 500 MICROGRAM(S): at 22:29

## 2019-02-11 RX ADMIN — HYDROMORPHONE HYDROCHLORIDE 30 MILLILITER(S): 2 INJECTION INTRAMUSCULAR; INTRAVENOUS; SUBCUTANEOUS at 19:07

## 2019-02-11 RX ADMIN — Medication 25 MILLIGRAM(S): at 19:08

## 2019-02-11 RX ADMIN — LEVALBUTEROL 0.63 MILLIGRAM(S): 1.25 SOLUTION, CONCENTRATE RESPIRATORY (INHALATION) at 22:30

## 2019-02-11 RX ADMIN — LEVALBUTEROL 0.63 MILLIGRAM(S): 1.25 SOLUTION, CONCENTRATE RESPIRATORY (INHALATION) at 11:11

## 2019-02-11 RX ADMIN — ATORVASTATIN CALCIUM 40 MILLIGRAM(S): 80 TABLET, FILM COATED ORAL at 21:17

## 2019-02-11 RX ADMIN — Medication 25 MILLIGRAM(S): at 05:48

## 2019-02-11 RX ADMIN — Medication 40 MILLIGRAM(S): at 19:08

## 2019-02-11 RX ADMIN — FENTANYL CITRATE 25 MICROGRAM(S): 50 INJECTION INTRAVENOUS at 15:15

## 2019-02-11 RX ADMIN — FENTANYL CITRATE 25 MICROGRAM(S): 50 INJECTION INTRAVENOUS at 15:00

## 2019-02-11 RX ADMIN — Medication 500 MICROGRAM(S): at 11:11

## 2019-02-11 RX ADMIN — Medication 2: at 23:03

## 2019-02-11 RX ADMIN — Medication 500 MICROGRAM(S): at 03:47

## 2019-02-11 RX ADMIN — HEPARIN SODIUM 5000 UNIT(S): 5000 INJECTION INTRAVENOUS; SUBCUTANEOUS at 19:08

## 2019-02-11 RX ADMIN — FENTANYL CITRATE 25 MICROGRAM(S): 50 INJECTION INTRAVENOUS at 15:10

## 2019-02-11 RX ADMIN — Medication 100 MILLIGRAM(S): at 11:33

## 2019-02-11 RX ADMIN — HYDROMORPHONE HYDROCHLORIDE 30 MILLILITER(S): 2 INJECTION INTRAMUSCULAR; INTRAVENOUS; SUBCUTANEOUS at 16:25

## 2019-02-11 RX ADMIN — PANTOPRAZOLE SODIUM 40 MILLIGRAM(S): 20 TABLET, DELAYED RELEASE ORAL at 05:48

## 2019-02-11 RX ADMIN — HEPARIN SODIUM 5000 UNIT(S): 5000 INJECTION INTRAVENOUS; SUBCUTANEOUS at 05:48

## 2019-02-11 RX ADMIN — LEVALBUTEROL 0.63 MILLIGRAM(S): 1.25 SOLUTION, CONCENTRATE RESPIRATORY (INHALATION) at 03:46

## 2019-02-11 RX ADMIN — Medication 100 MILLIGRAM(S): at 21:17

## 2019-02-11 RX ADMIN — Medication 40 MILLIGRAM(S): at 07:57

## 2019-02-11 NOTE — PROGRESS NOTE ADULT - SUBJECTIVE AND OBJECTIVE BOX
Subjective: 62 y/o male found sitting up in chair in NAD.     Vital Signs:  Vital Signs Last 24 Hrs  T(C): 37 (02-11-19 @ 07:49), Max: 37.6 (02-10-19 @ 19:52)  T(F): 98.6 (02-11-19 @ 07:49), Max: 99.7 (02-10-19 @ 19:52)  HR: 67 (02-11-19 @ 07:49) (67 - 103)  BP: 140/46 (02-11-19 @ 07:49) (112/47 - 140/46)  RR: 18 (02-11-19 @ 07:49) (16 - 20)  SpO2: 93% (02-11-19 @ 07:49) (93% - 98%) on (O2)    Pertinent Physical Exam:  Telemetry/Alarms:  General: WN/WD NAD  Neurology: Awake and non focal  Eyes: Scleras clear, PERRLA/ EOMI, Gross vision intact  Neck: Neck supple, trachea midline, No JVD,   Respiratory: Diminished lung sounds to left lower   CV: RRR, S1S2, no murmurs, rubs or gallops  Abdominal: Soft, NT, ND +BS,   Extremities: No edema, + peripheral pulses  Skin: No Rashes, Hematoma, Ecchymosis  Lymphatic: No Neck, axilla, groin LAD  Psych: Oriented x 3, normal affect  Incisions:   Tubes:    Chest Tube: Right Side/Left Side    Air Leak: Yes[] / No[]    Drainage:     I&O's Summary    10 Feb 2019 07:01  -  11 Feb 2019 07:00  --------------------------------------------------------  IN: 0 mL / OUT: 1970 mL / NET: -1970 mL    11 Feb 2019 07:01  -  11 Feb 2019 08:38  --------------------------------------------------------  IN: 0 mL / OUT: 40 mL / NET: -40 mL        Relevant labs, radiology and Medications reviewed                        12.7   12.17 )-----------( 386      ( 10 Feb 2019 05:40 )             41.5     02-11    142  |  97<L>  |  17  ----------------------------<  157<H>  3.6   |  34<H>  |  0.82    Ca    8.9      11 Feb 2019 05:11      PT/INR - ( 10 Feb 2019 05:40 )   PT: 12.5 SEC;   INR: 1.12          PTT - ( 10 Feb 2019 05:40 )  PTT:30.7 SEC  MEDICATIONS  (STANDING):  atorvastatin 40 milliGRAM(s) Oral at bedtime  dextrose 5%. 1000 milliLiter(s) (50 mL/Hr) IV Continuous <Continuous>  dextrose 50% Injectable 12.5 Gram(s) IV Push once  dextrose 50% Injectable 25 Gram(s) IV Push once  dextrose 50% Injectable 25 Gram(s) IV Push once  furosemide   Injectable 40 milliGRAM(s) IV Push two times a day  heparin  Injectable 5000 Unit(s) SubCutaneous every 12 hours  insulin lispro (HumaLOG) corrective regimen sliding scale   SubCutaneous Before meals and at bedtime  ipratropium    for Nebulization 500 MICROGram(s) Nebulizer every 6 hours  levalbuterol Inhalation 0.63 milliGRAM(s) Inhalation every 6 hours  metoprolol tartrate 25 milliGRAM(s) Oral two times a day  pantoprazole    Tablet 40 milliGRAM(s) Oral before breakfast  pyridoxine 100 milliGRAM(s) Oral daily    MEDICATIONS  (PRN):  dextrose 40% Gel 15 Gram(s) Oral once PRN Blood Glucose LESS THAN 70 milliGRAM(s)/deciliter  glucagon  Injectable 1 milliGRAM(s) IntraMuscular once PRN Glucose LESS THAN 70 milligrams/deciliter  oxyCODONE    IR 10 milliGRAM(s) Oral every 6 hours PRN Severe Pain (7 - 10)  oxyCODONE    IR 5 milliGRAM(s) Oral every 6 hours PRN Moderate Pain (4 - 6)      Assessment  63y Male  w/ PAST MEDICAL & SURGICAL HISTORY:  Lung cancer  COPD (chronic obstructive pulmonary disease)  HLD (hyperlipidemia)  Diabetes mellitus  Hypertension  No significant past surgical history  admitted with complaints of Patient is a 63y old  Male who presents with a chief complaint of recurrent left pleural effusion (10 Feb 2019 14:57)  .  On (Date), patient underwent Chest tube placement  Chest tube placement with US guidance  .   Postoperative course/issues:    PLAN  Neuro: Pain management  Pulm: Encourage coughing, deep breathing and use of incentive spirometry. Wean off supplemental oxygen as able. Daily CXR.   Cardio: Monitor telemetry/alarms  GI: Tolerating diet. Continue stool softeners.  Renal: monitor urine output, supplement electrolytes as needed  Vasc: Heparin SC/SCDs for DVT prophylaxis  Heme: Stable H/H. .   ID: Off antibiotics. Stable.  Therapy: OOB/ambulate  Tubes: Monitor Chest tube output  Disposition: Aim to D/C to home on  Discussed with Cardiothoracic Team at AM rounds.

## 2019-02-11 NOTE — PROGRESS NOTE ADULT - SUBJECTIVE AND OBJECTIVE BOX
Robinson Hathaway MD  Interventional Cardiology / Advance Heart Failure and Cardiac Transplant Specialist  [place] Office : [address] [place] [address] [place] [address]  Tel:   [place] Office : [address] [facility] [place] [address]  Tel: [phone]  Cell : [phone]    Subjective : Pt lying in bed comfortable, not in distress, denies any chest pain or SOB  	  MEDICATIONS:  furosemide   Injectable 40 milliGRAM(s) IV Push two times a day  heparin  Injectable 5000 Unit(s) SubCutaneous every 12 hours  metoprolol tartrate 25 milliGRAM(s) Oral two times a day      diphenhydrAMINE   Injectable 25 milliGRAM(s) IV Push every 4 hours PRN  ipratropium    for Nebulization 500 MICROGram(s) Nebulizer every 6 hours  levalbuterol Inhalation 0.63 milliGRAM(s) Inhalation every 6 hours    fentaNYL    Injectable 25 MICROGram(s) IV Push every 5 minutes PRN  HYDROmorphone PCA (1 mG/mL) 30 milliLiter(s) PCA Continuous PCA Continuous  HYDROmorphone PCA (1 mG/mL) Rescue Clinician Bolus 0.5 milliGRAM(s) IV Push every 15 minutes PRN  ondansetron Injectable 4 milliGRAM(s) IV Push every 6 hours PRN  oxyCODONE    IR 10 milliGRAM(s) Oral every 6 hours PRN  oxyCODONE    IR 5 milliGRAM(s) Oral every 6 hours PRN    docusate sodium 100 milliGRAM(s) Oral three times a day  pantoprazole    Tablet 40 milliGRAM(s) Oral before breakfast    atorvastatin 40 milliGRAM(s) Oral at bedtime  dextrose 40% Gel 15 Gram(s) Oral once PRN  dextrose 50% Injectable 12.5 Gram(s) IV Push once  dextrose 50% Injectable 25 Gram(s) IV Push once  dextrose 50% Injectable 25 Gram(s) IV Push once  glucagon  Injectable 1 milliGRAM(s) IntraMuscular once PRN  insulin lispro (HumaLOG) corrective regimen sliding scale   SubCutaneous Before meals and at bedtime    dextrose 5%. 1000 milliLiter(s) IV Continuous <Continuous>  lactated ringers. 1000 milliLiter(s) IV Continuous <Continuous>  pyridoxine 100 milliGRAM(s) Oral daily      PHYSICAL EXAM:  T(C): 36.8 (02-11-19 @ 14:55), Max: 37.6 (02-10-19 @ 19:52)  HR: 79 (02-11-19 @ 16:00) (67 - 103)  BP: 102/54 (02-11-19 @ 16:00) (100/82 - 151/83)  RR: 20 (02-11-19 @ 16:00) (15 - 26)  SpO2: 94% (02-11-19 @ 16:00) (93% - 98%)  Wt(kg): --  I&O's Summary    10 Feb 2019 07:01  -  11 Feb 2019 07:00  --------------------------------------------------------  IN: 0 mL / OUT: 1970 mL / NET: -1970 mL    11 Feb 2019 07:01  -  11 Feb 2019 16:18  --------------------------------------------------------  IN: 0 mL / OUT: 460 mL / NET: -460 mL      Height (cm): 167.64 (02-11 @ 11:56)  Weight (kg): 87.4 (02-11 @ 11:56)  BMI (kg/m2): 31.1 (02-11 @ 11:56)  BSA (m2): 1.97 (02-11 @ 11:56)    Appearance: Normal	  HEENT:   Normal oral mucosa, PERRL, EOMI	  Cardiovascular: Normal S1 S2, No JVD, No murmurs, No edema  Respiratory: Lungs clear to auscultation	  Gastrointestinal:  Soft, Non-tender, + BS	  Extremities: Normal range of motion, No clubbing, cyanosis or edema                                    12.7   12.17 )-----------( 386      ( 10 Feb 2019 05:40 )             41.5     02-11    142  |  97<L>  |  17  ----------------------------<  157<H>  3.6   |  34<H>  |  0.82    Ca    8.9      11 Feb 2019 05:11      proBNP:   Lipid Profile:   HgA1c:   TSH:

## 2019-02-11 NOTE — BRIEF OPERATIVE NOTE - PROCEDURE
<<-----Click on this checkbox to enter Procedure Thoracoscopic drainage of pleural cavity  02/11/2019  Left uniport VATS, Drainage of pleural effusion, PleureX insertion  Active  [facility]

## 2019-02-11 NOTE — PROGRESS NOTE ADULT - ASSESSMENT
EKG : ST 1degree AV block, LVH TWI I, AVL, V4-6    Echo pending     Tele Afib with RVR    Assessement and Plan     1) Afib : denies history of a fib, [id number] score of 1, will start asa 325 post op , mild;y volume up on exam , switch to lasix 40 IV BID     2) Perioperative risk assessment : TWI on EKG likely 2/2 LVH, Denies CP, , Pt at moderate risk for MACE with VATS/Pleurodesis pt echo with normal LV function and no significant valvular abnormality     3) Lung ca/ Pleural effusion : adenoca, t/t per Surgery and Oncology     4) DVT PPX heparin

## 2019-02-11 NOTE — CONSULT NOTE ADULT - SUBJECTIVE AND OBJECTIVE BOX
Patient is a 63y old  Male for whom dental consult was sought for mobile teeth posing an aspiration risk for intended surgical procedure      HPI:  63M hx COPD, HTN, HLD, bx proven adenocarcinoma of the l lung p/w progressively worsening shortness of breath. Patient with 1L of effusion drained 3 weeks ago with inconclusive pathology, s/p US guided bx demonstrating adenocarcinoma. Patient now presents with worsening shortness of breath on exertion and pain worse with deep inspiration at the left lower chest, left upper abdomen. Denies nausea / vomiting. Denies centrally located CP or CP on exertion. No changes in bowel or bladder. (08 Feb 2019 13:33)      PAST MEDICAL & SURGICAL HISTORY:  Lung cancer  COPD (chronic obstructive pulmonary disease)  HLD (hyperlipidemia)  Diabetes mellitus  Hypertension  No significant past surgical history      MEDICATIONS  (STANDING):  atorvastatin 40 milliGRAM(s) Oral at bedtime  dextrose 5%. 1000 milliLiter(s) (50 mL/Hr) IV Continuous <Continuous>  dextrose 50% Injectable 12.5 Gram(s) IV Push once  dextrose 50% Injectable 25 Gram(s) IV Push once  dextrose 50% Injectable 25 Gram(s) IV Push once  docusate sodium 100 milliGRAM(s) Oral three times a day  furosemide   Injectable 40 milliGRAM(s) IV Push two times a day  heparin  Injectable 5000 Unit(s) SubCutaneous every 12 hours  HYDROmorphone PCA (1 mG/mL) 30 milliLiter(s) PCA Continuous PCA Continuous  insulin lispro (HumaLOG) corrective regimen sliding scale   SubCutaneous Before meals and at bedtime  ipratropium    for Nebulization 500 MICROGram(s) Nebulizer every 6 hours  lactated ringers. 1000 milliLiter(s) (30 mL/Hr) IV Continuous <Continuous>  levalbuterol Inhalation 0.63 milliGRAM(s) Inhalation every 6 hours  metoprolol tartrate 25 milliGRAM(s) Oral two times a day  pantoprazole    Tablet 40 milliGRAM(s) Oral before breakfast  pyridoxine 100 milliGRAM(s) Oral daily    MEDICATIONS  (PRN):  dextrose 40% Gel 15 Gram(s) Oral once PRN Blood Glucose LESS THAN 70 milliGRAM(s)/deciliter  diphenhydrAMINE   Injectable 25 milliGRAM(s) IV Push every 4 hours PRN Pruritus  fentaNYL    Injectable 25 MICROGram(s) IV Push every 5 minutes PRN Moderate Pain (4 - 6)  glucagon  Injectable 1 milliGRAM(s) IntraMuscular once PRN Glucose LESS THAN 70 milligrams/deciliter  HYDROmorphone PCA (1 mG/mL) Rescue Clinician Bolus 0.5 milliGRAM(s) IV Push every 15 minutes PRN for Pain Scale GREATER THAN 6  naloxone Injectable 0.1 milliGRAM(s) IV Push every 3 minutes PRN For ANY of the following changes in patient status:  A. RR LESS THAN 10 breaths per minute, B. Oxygen saturation LESS THAN 90%, C. Sedation score of 6  ondansetron Injectable 4 milliGRAM(s) IV Push every 6 hours PRN Nausea  oxyCODONE    IR 10 milliGRAM(s) Oral every 6 hours PRN Severe Pain (7 - 10)      Allergies    No Known Allergies      FAMILY HISTORY:  Family history of diabetes mellitus (Father, Mother): Parents        Vital Signs Last 24 Hrs  T(C): 37.1 (11 Feb 2019 19:48), Max: 37.4 (10 Feb 2019 23:57)  T(F): 98.8 (11 Feb 2019 19:48), Max: 99.4 (10 Feb 2019 23:57)  HR: 76 (11 Feb 2019 19:48) (67 - 103)  BP: 120/66 (11 Feb 2019 19:48) (94/66 - 151/83)  BP(mean): 66 (11 Feb 2019 17:45) (64 - 101)  RR: 20 (11 Feb 2019 19:48) (15 - 26)  SpO2: 97% (11 Feb 2019 19:48) (93% - 100%)    EOE:    Mandible - FROM             Facial bones and MOM grossly intact             ( - ) trismus             ( - ) LAD             ( - ) swelling             ( - ) asymmetry             ( - ) palpation             ( - ) SOB             ( - ) dysphagia             ( - ) LOC    IOE:  permanent dentition: multiply restored, multiple missing teeth           hard/soft palate:  ( - ) palatal torus           tongue/FOM - WNL           labial/buccal mucosa - WNL     Patient has a cantilevered fixed dental prosthesis representing teeth #s 23, 24, 25 and 26 (mandibular left and right central and lateral incisors), which is anchored on the root of #24 alone.       Radiographs: None-taken    LABS:                        12.7   12.17 )-----------( 386      ( 10 Feb 2019 05:40 )             41.5     02-11    142  |  97<L>  |  17  ----------------------------<  157<H>  3.6   |  34<H>  |  0.82    Ca    8.9      11 Feb 2019 05:11        Platelet Count - Automated: 386 K/uL [150 - 400] (02-10 @ 05:40)    INR: 1.12 [0.88 - 1.17] (02-10 @ 05:40)        ASSESSMENT:  The patient has a cantilevered fixed dental prosthesis representing teeth #s 23, 24, 25 and 26 (mandibular left and right central and lateral incisors), which is anchored on the root of #24 alone. The prosthesis demonstrates grade III mobility and appears to be retained primarily by soft tissue to the root of #24. This prosthesis represents an aspiration risk, and it was determined that its removal should be performed prior to the intended surgical procedure.    PROCEDURE:  EOE and IOE.   Verbal and written consent given - enclosed in patient's physical chart  Tooth #24 and associated soft tissue anesthetized with 1.7cc Septocaine 4% HCl with epi 1:100,000 via local infiltration  The tooth and its associated prosthesis (#s 23-26) was elevated and delivered with a periosteal elevator and forceps without complication.   Hemostasis was achieved with gauze and pressure. EBL: 2cc.  Post-op instructions given verbally and written    RECOMMENDATIONS:   1) Soft diet, avoiding hot/spicy food and drink  2) Avoid spitting, use of straws, and smoking   3) Maintain oral hygiene with warm salt water rinses  4) Dental F/U with outpatient dentist for comprehensive dental care.   5) If any difficulty swallowing/breathing, fever occur, page dental.     Migel Vyas DMD & Mariama Martinez DDS | Pager # [phone]

## 2019-02-11 NOTE — PROVIDER CONTACT NOTE (OTHER) - ACTION/TREATMENT ORDERED:
PA aware. Ordered accucheck q6, will change order, entered order to notify if below 70, or greater than 200, otherwise no insulin coverage needed.

## 2019-02-12 ENCOUNTER — APPOINTMENT (OUTPATIENT)
Dept: HEMATOLOGY ONCOLOGY | Facility: CLINIC | Age: 64
End: 2019-02-12

## 2019-02-12 LAB
ANION GAP SERPL CALC-SCNC: 14 MMO/L — SIGNIFICANT CHANGE UP (ref 7–14)
BUN SERPL-MCNC: 19 MG/DL — SIGNIFICANT CHANGE UP (ref 7–23)
CALCIUM SERPL-MCNC: 9.1 MG/DL — SIGNIFICANT CHANGE UP (ref 8.4–10.5)
CHLORIDE SERPL-SCNC: 91 MMOL/L — LOW (ref 98–107)
CO2 SERPL-SCNC: 34 MMOL/L — HIGH (ref 22–31)
CREAT SERPL-MCNC: 0.99 MG/DL — SIGNIFICANT CHANGE UP (ref 0.5–1.3)
FUNGUS SPEC QL CULT: SIGNIFICANT CHANGE UP
GLUCOSE BLDC GLUCOMTR-MCNC: 159 MG/DL — HIGH (ref 70–99)
GLUCOSE BLDC GLUCOMTR-MCNC: 229 MG/DL — HIGH (ref 70–99)
GLUCOSE BLDC GLUCOMTR-MCNC: 255 MG/DL — HIGH (ref 70–99)
GLUCOSE BLDC GLUCOMTR-MCNC: 283 MG/DL — HIGH (ref 70–99)
GLUCOSE SERPL-MCNC: 221 MG/DL — HIGH (ref 70–99)
HCT VFR BLD CALC: 41.3 % — SIGNIFICANT CHANGE UP (ref 39–50)
HGB BLD-MCNC: 12.7 G/DL — LOW (ref 13–17)
MCHC RBC-ENTMCNC: 26 PG — LOW (ref 27–34)
MCHC RBC-ENTMCNC: 30.8 % — LOW (ref 32–36)
MCV RBC AUTO: 84.5 FL — SIGNIFICANT CHANGE UP (ref 80–100)
NRBC # FLD: 0 K/UL — LOW (ref 25–125)
PLATELET # BLD AUTO: 405 K/UL — HIGH (ref 150–400)
PMV BLD: 9.6 FL — SIGNIFICANT CHANGE UP (ref 7–13)
POTASSIUM SERPL-MCNC: 3.8 MMOL/L — SIGNIFICANT CHANGE UP (ref 3.5–5.3)
POTASSIUM SERPL-SCNC: 3.8 MMOL/L — SIGNIFICANT CHANGE UP (ref 3.5–5.3)
RBC # BLD: 4.89 M/UL — SIGNIFICANT CHANGE UP (ref 4.2–5.8)
RBC # FLD: 14.2 % — SIGNIFICANT CHANGE UP (ref 10.3–14.5)
SODIUM SERPL-SCNC: 139 MMOL/L — SIGNIFICANT CHANGE UP (ref 135–145)
WBC # BLD: 11.9 K/UL — HIGH (ref 3.8–10.5)
WBC # FLD AUTO: 11.9 K/UL — HIGH (ref 3.8–10.5)

## 2019-02-12 PROCEDURE — 99223 1ST HOSP IP/OBS HIGH 75: CPT | Mod: GC

## 2019-02-12 PROCEDURE — 99233 SBSQ HOSP IP/OBS HIGH 50: CPT

## 2019-02-12 PROCEDURE — 71045 X-RAY EXAM CHEST 1 VIEW: CPT | Mod: 26

## 2019-02-12 RX ORDER — POTASSIUM CHLORIDE 20 MEQ
40 PACKET (EA) ORAL ONCE
Qty: 0 | Refills: 0 | Status: COMPLETED | OUTPATIENT
Start: 2019-02-12 | End: 2019-02-12

## 2019-02-12 RX ORDER — ASPIRIN/CALCIUM CARB/MAGNESIUM 324 MG
325 TABLET ORAL DAILY
Qty: 0 | Refills: 0 | Status: DISCONTINUED | OUTPATIENT
Start: 2019-02-12 | End: 2019-02-15

## 2019-02-12 RX ORDER — FUROSEMIDE 40 MG
40 TABLET ORAL DAILY
Qty: 0 | Refills: 0 | Status: DISCONTINUED | OUTPATIENT
Start: 2019-02-13 | End: 2019-02-14

## 2019-02-12 RX ORDER — OXYCODONE HYDROCHLORIDE 5 MG/1
5 TABLET ORAL
Qty: 0 | Refills: 0 | Status: DISCONTINUED | OUTPATIENT
Start: 2019-02-12 | End: 2019-02-15

## 2019-02-12 RX ORDER — ACETAMINOPHEN 500 MG
650 TABLET ORAL EVERY 6 HOURS
Qty: 0 | Refills: 0 | Status: COMPLETED | OUTPATIENT
Start: 2019-02-12 | End: 2019-02-14

## 2019-02-12 RX ORDER — OXYCODONE HYDROCHLORIDE 5 MG/1
2.5 TABLET ORAL
Qty: 0 | Refills: 0 | Status: DISCONTINUED | OUTPATIENT
Start: 2019-02-12 | End: 2019-02-15

## 2019-02-12 RX ADMIN — LEVALBUTEROL 0.63 MILLIGRAM(S): 1.25 SOLUTION, CONCENTRATE RESPIRATORY (INHALATION) at 10:46

## 2019-02-12 RX ADMIN — Medication 650 MILLIGRAM(S): at 11:58

## 2019-02-12 RX ADMIN — Medication 500 MICROGRAM(S): at 10:46

## 2019-02-12 RX ADMIN — Medication 500 MICROGRAM(S): at 21:05

## 2019-02-12 RX ADMIN — Medication 2: at 12:53

## 2019-02-12 RX ADMIN — Medication 650 MILLIGRAM(S): at 17:55

## 2019-02-12 RX ADMIN — Medication 100 MILLIGRAM(S): at 05:14

## 2019-02-12 RX ADMIN — HYDROMORPHONE HYDROCHLORIDE 30 MILLILITER(S): 2 INJECTION INTRAMUSCULAR; INTRAVENOUS; SUBCUTANEOUS at 07:31

## 2019-02-12 RX ADMIN — ATORVASTATIN CALCIUM 40 MILLIGRAM(S): 80 TABLET, FILM COATED ORAL at 21:40

## 2019-02-12 RX ADMIN — Medication 650 MILLIGRAM(S): at 12:55

## 2019-02-12 RX ADMIN — HEPARIN SODIUM 5000 UNIT(S): 5000 INJECTION INTRAVENOUS; SUBCUTANEOUS at 05:13

## 2019-02-12 RX ADMIN — PANTOPRAZOLE SODIUM 40 MILLIGRAM(S): 20 TABLET, DELAYED RELEASE ORAL at 05:14

## 2019-02-12 RX ADMIN — Medication 25 MILLIGRAM(S): at 05:14

## 2019-02-12 RX ADMIN — Medication 100 MILLIGRAM(S): at 21:40

## 2019-02-12 RX ADMIN — HEPARIN SODIUM 5000 UNIT(S): 5000 INJECTION INTRAVENOUS; SUBCUTANEOUS at 17:54

## 2019-02-12 RX ADMIN — Medication 100 MILLIGRAM(S): at 11:58

## 2019-02-12 RX ADMIN — Medication 650 MILLIGRAM(S): at 23:37

## 2019-02-12 RX ADMIN — Medication 40 MILLIEQUIVALENT(S): at 16:50

## 2019-02-12 RX ADMIN — Medication 6: at 08:49

## 2019-02-12 RX ADMIN — Medication 6: at 22:38

## 2019-02-12 RX ADMIN — Medication 650 MILLIGRAM(S): at 18:47

## 2019-02-12 RX ADMIN — Medication 40 MILLIGRAM(S): at 08:31

## 2019-02-12 RX ADMIN — Medication 325 MILLIGRAM(S): at 13:13

## 2019-02-12 RX ADMIN — Medication 25 MILLIGRAM(S): at 17:54

## 2019-02-12 RX ADMIN — LEVALBUTEROL 0.63 MILLIGRAM(S): 1.25 SOLUTION, CONCENTRATE RESPIRATORY (INHALATION) at 21:04

## 2019-02-12 RX ADMIN — Medication 4: at 17:54

## 2019-02-12 NOTE — DISCHARGE NOTE ADULT - CONDITION (STATED IN TERMS THAT PERMIT A SPECIFIC MEASURABLE COMPARISON WITH CONDITION ON ADMISSION):
Stable                                                                                                                                                                                                                                                                                                                                                                                                                                                 stable

## 2019-02-12 NOTE — DISCHARGE NOTE ADULT - PROVIDER TOKENS
PROVIDER:[TOKEN:[[id number]]] PROVIDER:[TOKEN:[[id number]]],PROVIDER:[TOKEN:[[id number]]]

## 2019-02-12 NOTE — DISCHARGE NOTE ADULT - ADDITIONAL INSTRUCTIONS
See Dr Moore in 2 weeks- call for an appointment and bring a new Chest X-ray when you come.  If there is any pus or bleeding from the chestt tube site, call Dr Moore. You had an irregular heart rhythm called Afib. You were started on a new heart medication. Please see a Cardiologist or your PCP about this next week. Call for an apt. You had an irregular heart rhythm called Afib. You were started on a new heart medication. Please see  Cardiologist Dr. Hathaway or your PCP about this next week. Call for an apt.

## 2019-02-12 NOTE — PROGRESS NOTE ADULT - SUBJECTIVE AND OBJECTIVE BOX
Pt is without complaints s/p L VATS, drainage of pleural effusion, PleurX placement.  He voided and was OOB.    VSS    Chest: L PleurX is in place, clean and dry; Positive AL noted with cough    Stable post-op    Plan: continue current management           VNS for PlaurX to be arranged

## 2019-02-12 NOTE — DISCHARGE NOTE ADULT - MEDICATION SUMMARY - MEDICATIONS TO TAKE
I will START or STAY ON the medications listed below when I get home from the hospital:    aspirin 81 mg oral delayed release tablet  -- 1 tab(s) by mouth once a day  -- Indication: For Anti platelet    oxyCODONE 5 mg oral tablet  -- 2 tab(s) by mouth every 4 hours, As Needed -Severe Pain (7 - 10) Can take 1 tab for moderate MDD:12  -- Indication: For Pain    valsartan 320 mg oral tablet  -- 1 tab(s) by mouth once a day  -- Indication: For blood pressure    metFORMIN 1000 mg oral tablet, extended release  -- 1 tab(s) by mouth 2 times a day   -- Check with your doctor before becoming pregnant.  Do not drink alcoholic beverages when taking this medication.  Obtain medical advice before taking any non-prescription drugs as some may affect the action of this medication.  Swallow whole.  Do not crush.  Take with food or milk.    -- Indication: For diabetes    Januvia 100 mg oral tablet  -- 1 tab(s) by mouth once a day   -- Do not drink alcoholic beverages when taking this medication.    -- Indication: For diabetes    Lipitor 40 mg oral tablet  -- 1 tab(s) by mouth once a day  -- Indication: For Cholesterol    metoprolol tartrate 25 mg oral tablet  -- 1 tab(s) by mouth 2 times a day  -- Indication: For heart rate control    albuterol 90 mcg/inh inhalation aerosol  -- 2 puff(s) inhaled every 6 hours, As Needed   -- For inhalation only.  It is very important that you take or use this exactly as directed.  Do not skip doses or discontinue unless directed by your doctor.  Obtain medical advice before taking any non-prescription drugs as some may affect the action of this medication.  Shake well before use.    -- Indication: For breathing    amLODIPine 10 mg oral tablet  -- 1 tab(s) by mouth once a day  -- Indication: For blood pressure    Lasix 40 mg oral tablet  -- 1 tab(s) by mouth once a day  -- Indication: For diuretic    docusate sodium 100 mg oral capsule  -- 1 cap(s) by mouth 3 times a day  -- Indication: For COnstiaption    Vitamin B6 100 mg oral tablet  -- 1 tab(s) by mouth once a day  -- Indication: For vitamin I will START or STAY ON the medications listed below when I get home from the hospital:    aspirin 81 mg oral delayed release tablet  -- 1 tab(s) by mouth once a day  -- Indication: For Anti platelet    oxyCODONE 5 mg oral tablet  -- 2 tab(s) by mouth every 4 hours, As Needed -Severe Pain (7 - 10) Can take 1 tab for moderate MDD:12  -- Indication: For Pain    valsartan 320 mg oral tablet  -- 1 tab(s) by mouth once a day  -- Indication: For blood pressure    metFORMIN 1000 mg oral tablet, extended release  -- 1 tab(s) by mouth 2 times a day   -- Check with your doctor before becoming pregnant.  Do not drink alcoholic beverages when taking this medication.  Obtain medical advice before taking any non-prescription drugs as some may affect the action of this medication.  Swallow whole.  Do not crush.  Take with food or milk.    -- Indication: For diabetes    Januvia 100 mg oral tablet  -- 1 tab(s) by mouth once a day   -- Do not drink alcoholic beverages when taking this medication.    -- Indication: For diabetes    Lipitor 40 mg oral tablet  -- 1 tab(s) by mouth once a day  -- Indication: For Cholesterol    metoprolol tartrate 25 mg oral tablet  -- 1 tab(s) by mouth 2 times a day  -- Indication: For heart rate control    albuterol 90 mcg/inh inhalation aerosol  -- 2 puff(s) inhaled every 6 hours, As Needed   -- For inhalation only.  It is very important that you take or use this exactly as directed.  Do not skip doses or discontinue unless directed by your doctor.  Obtain medical advice before taking any non-prescription drugs as some may affect the action of this medication.  Shake well before use.    -- Indication: For breathing    amLODIPine 10 mg oral tablet  -- 1 tab(s) by mouth once a day  -- Indication: For blood pressure    Lasix 40 mg oral tablet  -- 1.5 tab(s) by mouth once a day   -- Avoid prolonged or excessive exposure to direct and/or artificial sunlight while taking this medication.  It is very important that you take or use this exactly as directed.  Do not skip doses or discontinue unless directed by your doctor.  It may be advisable to drink a full glass orange juice or eat a banana daily while taking this medication.    -- Indication: For diuretic    docusate sodium 100 mg oral capsule  -- 1 cap(s) by mouth 3 times a day  -- Indication: For COnstiaption    potassium chloride 20 mEq oral tablet, extended release  -- 1 tab(s) by mouth once a day  -- Indication: For supplement     Vitamin B6 100 mg oral tablet  -- 1 tab(s) by mouth once a day  -- Indication: For vitamin

## 2019-02-12 NOTE — CONSULT NOTE ADULT - SUBJECTIVE AND OBJECTIVE BOX
63M hx COPD, HTN, HLD, bx proven adenocarcinoma of the l lung p/w progressively worsening shortness of breath. Patient was admitted 1/12/19 with cough, SOB, found to have pleural effusion. 1L of effusion drained 3 weeks ago with inconclusive pathology. He then had US guided bx which showed adenocarcinoma of the lungs with PDL1 70% positive.Patient now presents with worsening shortness of breath on exertion and pain worse with deep inspiration at the left lower chest, left upper abdomen. Denies nausea / vomiting. CT showed large malignant loculated left pleural effusion, i Denies centrally located CP or CP on exertion. No changes in bowel or bladder. Pt is now s/p VATs in pleurex catheter placement.    Allergies  No Known Allergies      PAST MEDICAL & SURGICAL HISTORY:  Lung cancer  COPD (chronic obstructive pulmonary disease)  HLD (hyperlipidemia)  Diabetes mellitus  Hypertension  No significant past surgical history      FAMILY HISTORY:  Family history of diabetes mellitus (Father, Mother): Parents      Social History:  Former smoker.   Lives with wife.   Denies IVDU    REVIEW OF SYSTEMS:  CONSTITUTIONAL: No weakness, fevers or chills  EYES/ENT: No visual changes, no throat pain   RESPIRATORY: + shortness of breath  CARDIOVASCULAR: No chest pain or palpitations  GASTROINTESTINAL: No abdominal pain, nausea, vomiting, or hematemesis; No diarrhea or constipation. No melena or hematochezia.  GENITOURINARY: No dysuria, frequency or hematuria  MUSCULOSKELETAL: No joint pain.  NEUROLOGICAL: No dizziness, numbness, or weakness  SKIN: No itching, burning, rashes, or lesions   All other review of systems is negative unless indicated above.    VITAL SIGNS:  Vital Signs Last 24 Hrs  T(C): 36.9 (12 Feb 2019 12:00), Max: 37.2 (11 Feb 2019 17:00)  T(F): 98.4 (12 Feb 2019 12:00), Max: 99 (11 Feb 2019 17:00)  HR: 74 (12 Feb 2019 12:00) (66 - 101)  BP: 114/48 (12 Feb 2019 12:00) (94/66 - 151/83)  BP(mean): 66 (11 Feb 2019 17:45) (64 - 101)  RR: 18 (12 Feb 2019 12:00) (15 - 26)  SpO2: 96% (12 Feb 2019 12:00) (88% - 100%)      PHYSICAL EXAM:   GENERAL: no acute distress  HEENT: EOMI, neck supple  RESPIRATORY: +scattered wheezing on left lung, Pleurex tubes in place  CARDIOVASCULAR: RRR, no murmurs, gallops, rubs  ABDOMINAL: soft, non-tender, non-distended, positive bowel sounds   EXTREMITIES: no clubbing, cyanosis, or edema  NEUROLOGICAL: alert and oriented x 3, non-focal  SKIN: no rashes or lesions   MUSCULOSKELETAL: no gross joint deformity                          12.7   11.90 )-----------( 405      ( 12 Feb 2019 05:10 )             41.3     02-12    139  |  91<L>  |  19  ----------------------------<  221<H>  3.8   |  34<H>  |  0.99    Ca    9.1      12 Feb 2019 05:10        MEDICATIONS  (STANDING):  acetaminophen   Tablet .. 650 milliGRAM(s) Oral every 6 hours  aspirin 325 milliGRAM(s) Oral daily  atorvastatin 40 milliGRAM(s) Oral at bedtime  dextrose 5%. 1000 milliLiter(s) (50 mL/Hr) IV Continuous <Continuous>  dextrose 50% Injectable 12.5 Gram(s) IV Push once  dextrose 50% Injectable 25 Gram(s) IV Push once  dextrose 50% Injectable 25 Gram(s) IV Push once  docusate sodium 100 milliGRAM(s) Oral three times a day  furosemide   Injectable 40 milliGRAM(s) IV Push two times a day  heparin  Injectable 5000 Unit(s) SubCutaneous every 12 hours  insulin lispro (HumaLOG) corrective regimen sliding scale   SubCutaneous Before meals and at bedtime  ipratropium    for Nebulization 500 MICROGram(s) Nebulizer every 6 hours  levalbuterol Inhalation 0.63 milliGRAM(s) Inhalation every 6 hours  metoprolol tartrate 25 milliGRAM(s) Oral two times a day  pantoprazole    Tablet 40 milliGRAM(s) Oral before breakfast  pyridoxine 100 milliGRAM(s) Oral daily    < from: CT Chest No Cont (02.08.19 @ 10:09) >  IMPRESSION:   Large malignant loculated left pleural effusion, increased in size.    Redemonstrated left upper lobe lung mass and diffuse mediastinal   lymphadenopathy.    < end of copied text >

## 2019-02-12 NOTE — DISCHARGE NOTE ADULT - INSTRUCTIONS
Regular diet Keep surgical incision clean and dry. Report to Emergency Department for heavy output from drain or any signs of infection, fever, chills or shortness of breath.

## 2019-02-12 NOTE — DISCHARGE NOTE ADULT - CARE PLAN
Principal Discharge DX:	Pleural effusion on left  Goal:	Regular drainage to keep the fluid from accumulating  Assessment and plan of treatment:	Stable for outpatient follow up; routine drainage by the visitng nurse three times a week Principal Discharge DX:	Pleural effusion on left  Goal:	Regular drainage to keep the fluid from accumulating  Assessment and plan of treatment:	Stable for outpatient follow up; routine drainage by the visiting nurse three times a week Principal Discharge DX:	Pleural effusion on left  Goal:	Regular drainage to keep the fluid from accumulating  Assessment and plan of treatment:	Please walk 4-5 x per day, Increase as tolerated. You may climb stairs. Continue to use incentive spirometer.   You are being discharged with a mini atrium. A visiting nurse will come every other day to drain the atrium.    See Dr. Moore's office in 2 weeks. Please call [phone] for an apt. Please get an CXR the day before your appointment and bring it with you to your follow up appointment.  Please call the office at [phone] if you have fever's chills, worsening shortness of breath, chest pain, warmth, redness or purulent discharge from the insertion site. Principal Discharge DX:	Pleural effusion on left  Goal:	Regular drainage to keep the fluid from accumulating  Assessment and plan of treatment:	Please walk 4-5 x per day, Increase as tolerated. You may climb stairs. Continue to use incentive spirometer.   You are being discharged with a mini atrium. A visiting nurse will come every other day to drain the atrium.  You must keep Pleurx site clean and dry. No showering for now.   Dr. Moore will be away next week. Please see his partner Dr. Griffin on Feb 21st at 9am to have tube checked. Once there is no longer an air leak they will cap off the Pleurx catheter and then you will need Pleurx drainages by a Visiting Nurse 3 x per week.  Please call [phone] for an apt. with one of his partners. Please get an CXR the day before your appointment and bring it with you to your follow up appointment.  Please call the office at [phone] if you have fever's chills, worsening shortness of breath, chest pain, warmth, redness or purulent discharge from the insertion site.

## 2019-02-12 NOTE — DISCHARGE NOTE ADULT - NS AS ACTIVITY OBS
Walking-Indoors allowed/Sex allowed/Do not drive or operate machinery/Stairs allowed/Walking-Outdoors allowed/Showering allowed/No Heavy lifting/straining Do not drive or operate machinery/Walking-Outdoors allowed/Sex allowed/Stairs allowed/Walking-Indoors allowed/No Heavy lifting/straining Do not drive or operate machinery/Walking-Outdoors allowed/Sex allowed/Stairs allowed/No Heavy lifting/straining/Walking-Indoors allowed/Do not make important decisions

## 2019-02-12 NOTE — DISCHARGE NOTE ADULT - PLAN OF CARE
Regular drainage to keep the fluid from accumulating Stable for outpatient follow up; routine drainage by the visitng nurse three times a week Stable for outpatient follow up; routine drainage by the visiting nurse three times a week Please walk 4-5 x per day, Increase as tolerated. You may climb stairs. Continue to use incentive spirometer.   You are being discharged with a mini atrium. A visiting nurse will come every other day to drain the atrium.    See Dr. Moore's office in 2 weeks. Please call [phone] for an apt. Please get an CXR the day before your appointment and bring it with you to your follow up appointment.  Please call the office at [phone] if you have fever's chills, worsening shortness of breath, chest pain, warmth, redness or purulent discharge from the insertion site. Please walk 4-5 x per day, Increase as tolerated. You may climb stairs. Continue to use incentive spirometer.   You are being discharged with a mini atrium. A visiting nurse will come every other day to drain the atrium.  You must keep Pleurx site clean and dry. No showering for now.   Dr. Moore will be away next week. Please see his partner Dr. Griffin on Feb 21st at 9am to have tube checked. Once there is no longer an air leak they will cap off the Pleurx catheter and then you will need Pleurx drainages by a Visiting Nurse 3 x per week.  Please call [phone] for an apt. with one of his partners. Please get an CXR the day before your appointment and bring it with you to your follow up appointment.  Please call the office at [phone] if you have fever's chills, worsening shortness of breath, chest pain, warmth, redness or purulent discharge from the insertion site.

## 2019-02-12 NOTE — DISCHARGE NOTE ADULT - HOME CARE AGENCY
[facility] [place] [phone] initial visit will be day after discharge home. A nurse will call prior to the home visit.

## 2019-02-12 NOTE — DISCHARGE NOTE ADULT - HOSPITAL COURSE
63 year old male with history of COPD, HTN, HLD, bx proven adenocarcinoma of the L lung p/w progressively worsening shortness of breath. Patient with 1L of effusion drained 3 weeks ago with inconclusive pathology.  Also s/p US guided bx demonstrating adenocarcinoma. Patient now presents with worsening shortness of breath on exertion and pain worse with deep inspiration at the left lower chest, left upper abdomen. Denies nausea / vomiting. Denies centrally located CP or CP on exertion. No changes in bowel or bladder. As a CXR showed a large loculated pleural effusion, he was admitted and when cleared by cardiology, the PleurX catheter was placed This 63 year old male with history of COPD, HTN, HLD, and adenocarcinoma of the L lung p/w progressively worsening shortness of breath.  He had 1L of effusion drained 3 weeks PTA.  The patient presented for this admission with worsening shortness of breath on exertion and pain that worsened with deep inspiration at the left lower chest & left upper abdomen.  As a CXR showed a large loculated pleural effusion, he was admitted and when cleared by cardiology, a PleurX catheter was placed.  He was discharged when the VNS was arranged to drain the effusion 3 times a week. This 63 year old male with history of COPD, HTN, HLD, and adenocarcinoma of the L lung p/w progressively worsening shortness of breath.  He had 1L of effusion drained 3 weeks PTA.  The patient presented for this admission with worsening shortness of breath on exertion and pain that worsened with deep inspiration at the left lower chest & left upper abdomen.  As a CXR showed a large loculated pleural effusion, he was admitted and when cleared by cardiology, a PleurX catheter was placed. Post op course was complicated by a persistant air leak. PleurX was attached to a mini atrium and patient was cleared to be discharged home with VNS was arrangements. This 63 year old male with history of COPD, HTN, HLD, and adenocarcinoma of the L lung p/w progressively worsening shortness of breath.  He had 1L of effusion drained 3 weeks PTA.  The patient presented for this admission with worsening shortness of breath on exertion and pain that worsened with deep inspiration at the left lower chest & left upper abdomen.  As a CXR showed a large loculated pleural effusion, he was admitted and when cleared by cardiology, a PleurX catheter was placed. Post op course was complicated by a persistant air leak. PleurX was attached to a mini atrium and patient was cleared to be discharged home with VNS was arrangements. Pt will return to office next week to see if air leak has resolved. Pt today feels well. NO SOB or CP . VATS c/d/i. AMbulating without issues. Cleared for d/c to home by Dr. Moore  Vital Signs Last 24 Hrs  T(C): 36.6 (15 Feb 2019 08:18), Max: 36.9 (14 Feb 2019 11:46)  T(F): 97.8 (15 Feb 2019 08:18), Max: 98.4 (14 Feb 2019 11:46)  HR: 90 (15 Feb 2019 09:32) (73 - 92)  BP: 124/58 (15 Feb 2019 08:18) (124/58 - 137/55)  BP(mean): --  RR: 17 (15 Feb 2019 08:18) (17 - 19)  SpO2: 93% (15 Feb 2019 08:18) (91% - 96%) This 63 year old male with history of COPD, HTN, HLD, and adenocarcinoma of the L lung p/w progressively worsening shortness of breath.  He had 1L of effusion drained 3 weeks PTA.  The patient presented for this admission with worsening shortness of breath on exertion and pain that worsened with deep inspiration at the left lower chest & left upper abdomen.  As a CXR showed a large loculated pleural effusion, he was admitted and when cleared by cardiology, a PleurX catheter was placed. Post op course was complicated by a persistant air leak. PleurX was attached to a mini atrium and patient was cleared to be discharged home with VNS was arrangements. Pt will return to office next week to see if air leak has resolved. Pt today feels well. NO SOB or CP . VATS c/d/i. Lasix increased upon discharge as per Cardiology w plans for outpt follow up. AMbulating without issues. Cleared for d/c to home by Dr. Moore  Vital Signs Last 24 Hrs  T(C): 36.6 (15 Feb 2019 08:18), Max: 36.9 (14 Feb 2019 11:46)  T(F): 97.8 (15 Feb 2019 08:18), Max: 98.4 (14 Feb 2019 11:46)  HR: 90 (15 Feb 2019 09:32) (73 - 92)  BP: 124/58 (15 Feb 2019 08:18) (124/58 - 137/55)  BP(mean): --  RR: 17 (15 Feb 2019 08:18) (17 - 19)  SpO2: 93% (15 Feb 2019 08:18) (91% - 96%)

## 2019-02-12 NOTE — PROGRESS NOTE ADULT - SUBJECTIVE AND OBJECTIVE BOX
Anesthesia Pain Management Service- Attending Addendum    SUBJECTIVE: Patient's pain control adequate    Therapy:	  [ X] IV PCA	   [ ] Epidural           [ ] s/p Spinal Opoid              [ ] Postpartum infusion	  [ ] Patient controlled regional anesthesia (PCRA)    [ ] prn Analgesics    Allergies    No Known Allergies    Intolerances      MEDICATIONS  (STANDING):  acetaminophen   Tablet .. 650 milliGRAM(s) Oral every 6 hours  aspirin 325 milliGRAM(s) Oral daily  atorvastatin 40 milliGRAM(s) Oral at bedtime  dextrose 5%. 1000 milliLiter(s) (50 mL/Hr) IV Continuous <Continuous>  dextrose 50% Injectable 12.5 Gram(s) IV Push once  dextrose 50% Injectable 25 Gram(s) IV Push once  dextrose 50% Injectable 25 Gram(s) IV Push once  docusate sodium 100 milliGRAM(s) Oral three times a day  furosemide   Injectable 40 milliGRAM(s) IV Push two times a day  heparin  Injectable 5000 Unit(s) SubCutaneous every 12 hours  insulin lispro (HumaLOG) corrective regimen sliding scale   SubCutaneous Before meals and at bedtime  ipratropium    for Nebulization 500 MICROGram(s) Nebulizer every 6 hours  levalbuterol Inhalation 0.63 milliGRAM(s) Inhalation every 6 hours  metoprolol tartrate 25 milliGRAM(s) Oral two times a day  pantoprazole    Tablet 40 milliGRAM(s) Oral before breakfast  pyridoxine 100 milliGRAM(s) Oral daily    MEDICATIONS  (PRN):  dextrose 40% Gel 15 Gram(s) Oral once PRN Blood Glucose LESS THAN 70 milliGRAM(s)/deciliter  glucagon  Injectable 1 milliGRAM(s) IntraMuscular once PRN Glucose LESS THAN 70 milligrams/deciliter  ondansetron Injectable 4 milliGRAM(s) IV Push every 6 hours PRN Nausea  oxyCODONE    IR 5 milliGRAM(s) Oral every 3 hours PRN Severe Pain (7 - 10)  oxyCODONE    IR 2.5 milliGRAM(s) Oral every 3 hours PRN Moderate Pain (4 - 6)      OBJECTIVE:   [X] No new signs     [ ] Other:    Side Effects:  [X ] None			[ ] Other:      ASSESSMENT/PLAN  -Discontinue current therapy    [ ] Therapy changed to:    [ ] IV PCA       [ ] Epidural     [ X] prn Analgesics     Comments: Pain management per primary team, APS to sign off

## 2019-02-12 NOTE — PROGRESS NOTE ADULT - SUBJECTIVE AND OBJECTIVE BOX
Anesthesia Pain Management Service    SUBJECTIVE: Patient states that he is not in any pain and that he doesn't want to use the IV PCA pump.  Daughter admits that patient has pain when he moves at the surgical site, but it comes and goes.    Pain Scale Score	At rest: _0/10__ 	With Activity: _5/10__ 	[X ] Refer to charted pain scores    THERAPY:    [ ] IV PCA Morphine		[ ] 5 mg/mL	[ ] 1 mg/mL  [X ] IV PCA Hydromorphone	[ ] 5 mg/mL	[X ] 1 mg/mL  [ ] IV PCA Fentanyl		[ ] 50 micrograms/mL    Demand dose __0.2_ lockout __6_ (minutes) Continuous Rate _0__ Total: _0.4__   mg used (in past 24 hrs)      MEDICATIONS  (STANDING):  acetaminophen   Tablet .. 650 milliGRAM(s) Oral every 6 hours  atorvastatin 40 milliGRAM(s) Oral at bedtime  dextrose 5%. 1000 milliLiter(s) (50 mL/Hr) IV Continuous <Continuous>  dextrose 50% Injectable 12.5 Gram(s) IV Push once  dextrose 50% Injectable 25 Gram(s) IV Push once  dextrose 50% Injectable 25 Gram(s) IV Push once  docusate sodium 100 milliGRAM(s) Oral three times a day  furosemide   Injectable 40 milliGRAM(s) IV Push two times a day  heparin  Injectable 5000 Unit(s) SubCutaneous every 12 hours  insulin lispro (HumaLOG) corrective regimen sliding scale   SubCutaneous Before meals and at bedtime  ipratropium    for Nebulization 500 MICROGram(s) Nebulizer every 6 hours  lactated ringers. 1000 milliLiter(s) (30 mL/Hr) IV Continuous <Continuous>  levalbuterol Inhalation 0.63 milliGRAM(s) Inhalation every 6 hours  metoprolol tartrate 25 milliGRAM(s) Oral two times a day  pantoprazole    Tablet 40 milliGRAM(s) Oral before breakfast  pyridoxine 100 milliGRAM(s) Oral daily    MEDICATIONS  (PRN):  dextrose 40% Gel 15 Gram(s) Oral once PRN Blood Glucose LESS THAN 70 milliGRAM(s)/deciliter  glucagon  Injectable 1 milliGRAM(s) IntraMuscular once PRN Glucose LESS THAN 70 milligrams/deciliter  ondansetron Injectable 4 milliGRAM(s) IV Push every 6 hours PRN Nausea  oxyCODONE    IR 5 milliGRAM(s) Oral every 3 hours PRN Severe Pain (7 - 10)  oxyCODONE    IR 2.5 milliGRAM(s) Oral every 3 hours PRN Moderate Pain (4 - 6)      OBJECTIVE:  Patient sitting up in chair with pleurex catheter to drain.    Sedation Score:	[ X] Alert	[ ] Drowsy 	[ ] Arousable	[ ] Asleep	[ ] Unresponsive    Side Effects:	[X ] None	[ ] Nausea	[ ] Vomiting	[ ] Pruritus  		[ ] Other:    Vital Signs Last 24 Hrs  T(C): 36.8 (12 Feb 2019 08:08), Max: 37.2 (11 Feb 2019 17:00)  T(F): 98.2 (12 Feb 2019 08:08), Max: 99 (11 Feb 2019 17:00)  HR: 66 (12 Feb 2019 08:08) (66 - 101)  BP: 108/48 (12 Feb 2019 08:08) (94/66 - 151/83)  BP(mean): 66 (11 Feb 2019 17:45) (64 - 101)  RR: 18 (12 Feb 2019 08:08) (15 - 26)  SpO2: 97% (12 Feb 2019 08:08) (88% - 100%)    ASSESSMENT/ PLAN    Therapy to  be:	[ ] Continue   [ X] Discontinued   [X ] Change to prn Analgesics    Documentation and Verification of current medications:   [X] Done	[ ] Not done, not elligible    Comments: Discussed patient with team, ok to discontinue IV PCA.  PRN Oral/IV opioids and/or Adjuvant non-opioid medication to be ordered at this point.

## 2019-02-12 NOTE — DISCHARGE NOTE ADULT - DURABLE MEDICAL EQUIPMENT AGENCY
[facility] [phone] ( patient already has oxygen) [facility] [phone] ( patient already has oxygen).  [facility] [employer] [phone] for PleurX kits (3 given to patient) [facility] [phone] ( patient already has oxygen).

## 2019-02-12 NOTE — PROGRESS NOTE ADULT - SUBJECTIVE AND OBJECTIVE BOX
Robinson Hathaway MD  Interventional Cardiology / Advance Heart Failure and Cardiac Transplant Specialist  [place] Office : [address] [place] [address] [place]. [address]  Tel:   [place] Office : [address] [facility] [place] [address]  Tel: [phone]  Cell : [phone]    Subjective : Pt lying in bed comfortable, not in distress, some chest pain s/p VATs and pleurx catheter  	  MEDICATIONS:  furosemide   Injectable 40 milliGRAM(s) IV Push two times a day  heparin  Injectable 5000 Unit(s) SubCutaneous every 12 hours  metoprolol tartrate 25 milliGRAM(s) Oral two times a day      ipratropium    for Nebulization 500 MICROGram(s) Nebulizer every 6 hours  levalbuterol Inhalation 0.63 milliGRAM(s) Inhalation every 6 hours    acetaminophen   Tablet .. 650 milliGRAM(s) Oral every 6 hours  aspirin 325 milliGRAM(s) Oral daily  ondansetron Injectable 4 milliGRAM(s) IV Push every 6 hours PRN  oxyCODONE    IR 5 milliGRAM(s) Oral every 3 hours PRN  oxyCODONE    IR 2.5 milliGRAM(s) Oral every 3 hours PRN    docusate sodium 100 milliGRAM(s) Oral three times a day  pantoprazole    Tablet 40 milliGRAM(s) Oral before breakfast    atorvastatin 40 milliGRAM(s) Oral at bedtime  dextrose 40% Gel 15 Gram(s) Oral once PRN  dextrose 50% Injectable 12.5 Gram(s) IV Push once  dextrose 50% Injectable 25 Gram(s) IV Push once  dextrose 50% Injectable 25 Gram(s) IV Push once  glucagon  Injectable 1 milliGRAM(s) IntraMuscular once PRN  insulin lispro (HumaLOG) corrective regimen sliding scale   SubCutaneous Before meals and at bedtime    dextrose 5%. 1000 milliLiter(s) IV Continuous <Continuous>  pyridoxine 100 milliGRAM(s) Oral daily      PHYSICAL EXAM:  T(C): 36.9 (02-12-19 @ 12:00), Max: 37.2 (02-11-19 @ 17:00)  HR: 74 (02-12-19 @ 12:00) (66 - 101)  BP: 114/48 (02-12-19 @ 12:00) (94/66 - 151/83)  RR: 18 (02-12-19 @ 12:00) (15 - 26)  SpO2: 96% (02-12-19 @ 12:00) (88% - 100%)  Wt(kg): --  I&O's Summary    11 Feb 2019 07:01  -  12 Feb 2019 07:00  --------------------------------------------------------  IN: 90 mL / OUT: 1860 mL / NET: -1770 mL    12 Feb 2019 07:01  -  12 Feb 2019 12:09  --------------------------------------------------------  IN: 350 mL / OUT: 830 mL / NET: -480 mL            HEENT:   Normal oral mucosa, PERRL, EOMI	  Cardiovascular: Normal S1 S2, No JVD, No murmurs, No edema  Respiratory: b/l rhonchi  Gastrointestinal:  Soft, Non-tender, + BS	  Extremities: Normal range of motion, No clubbing, cyanosis or edema                                    12.7   11.90 )-----------( 405      ( 12 Feb 2019 05:10 )             41.3     02-12    139  |  91<L>  |  19  ----------------------------<  221<H>  3.8   |  34<H>  |  0.99    Ca    9.1      12 Feb 2019 05:10      proBNP:   Lipid Profile:   HgA1c:   TSH:

## 2019-02-12 NOTE — PROGRESS NOTE ADULT - ASSESSMENT
EKG : ST 1degree AV block, LVH TWI I, AVL, V4-6    Echo pending     Tele Afib with RVR    Assessement and Plan     1) Afib : denies history of a fib, [id number] score of 1, will start asa 325 post op , mild;y volume up on exam , cont lasix 40 IV BID , switch to lasix 40mg po tomorrow    2) Perioperative risk assessment : s/p VATS and pleurx catheter no issues    3) Lung ca/ Pleural effusion : adenoca, t/t per Surgery and Oncology     4) DVT PPX heparin

## 2019-02-12 NOTE — CONSULT NOTE ADULT - ASSESSMENT
63M hx COPD, HTN, HLD, bx proven adenocarcinoma of the l lung p/w progressively worsening shortness of breath found to have reaccumulation of pleural fluid, now s/p VATs and pleurex placement.    # NSCLC  -pathology showed adenocarcinoma  -PDL1 positive 70%  -pt will need follow up at [facility] after discharge to discuss treatment plan which will likely include surgical resection follow by chemotherapy +/- RT  - will send email to scheduling.    Stephen Bishop MD.  Heme-Onc fellow, PGY 4  [phone]; [id number] 63M hx COPD, HTN, HLD, bx proven adenocarcinoma of the l lung p/w progressively worsening shortness of breath found to have reaccumulation of pleural fluid, now s/p VATs and pleurex placement.    # NSCLC  -pathology showed adenocarcinoma  -PDL1 positive 70%  -pt will need follow up at [facility] after discharge to discuss treatment plan  - will send email to scheduling.    Stephen Bishpo MD.  Heme-Onc fellow, PGY 4  [phone]; [phone]

## 2019-02-12 NOTE — DISCHARGE NOTE ADULT - CARE PROVIDERS DIRECT ADDRESSES
[email or web address],DirectAddress_Unknown

## 2019-02-12 NOTE — DISCHARGE NOTE ADULT - CARE PROVIDER_API CALL
Armando Moore)  Surgery; Thoracic Surgery  [address] [place] [address], [place] [address]  Phone: [phone]  Fax: [phone]  Follow Up Time: Armando Moore)  Surgery; Thoracic Surgery  [address] [place] [address], [place] [address]  Phone: [phone]  Fax: [phone]  Follow Up Time:     Robinson Hathaway)  Cardiovascular Disease; Internal Medicine; Nuclear Cardiology  [address] [place] [address]  Phone: [phone]  Fax: [phone]  Follow Up Time:

## 2019-02-12 NOTE — PROGRESS NOTE ADULT - SUBJECTIVE AND OBJECTIVE BOX
Subjective:  "My breathing is a little better" Pt OOB in chair. SOB w exertion. NO CP. Family at bedside. c/o LE edema.     Vital Signs:  Vital Signs Last 24 Hrs  T(C): 36.9 (02-12-19 @ 12:00), Max: 37.2 (02-11-19 @ 17:00)  T(F): 98.4 (02-12-19 @ 12:00), Max: 99 (02-11-19 @ 17:00)  HR: 74 (02-12-19 @ 12:00) (66 - 92)  BP: 114/48 (02-12-19 @ 12:00) (94/66 - 130/53)  RR: 18 (02-12-19 @ 12:00) (17 - 26)  SpO2: 96% (02-12-19 @ 12:00) (88% - 100%) on (O2)    Telemetry/Alarms:  General: WN/WD NAD  Neurology: Awake, nonfocal, JOYNER x 4  Eyes: Scleras clear, PERRLA/ EOMI, Gross vision intact  ENT:Gross hearing intact, grossly patent pharynx, no stridor  Neck: Neck supple, trachea midline, No JVD,   Respiratory: decreased throughout.   CV: RRR, S1S2, no murmurs, rubs or gallops. NO further Afib.   Abdominal: Soft, NT, ND +BS, no BM  Extremities: +LE edema, + peripheral pulses  Skin: No Rashes, Hematoma, Ecchymosis  Lymphatic: No Neck, axilla, groin LAD  Psych: Oriented x 3, normal affect  Incisions: VATS c/d/i  Tubes: Left Pleurx to suction this am, + aL output 210cc/24hrs placed to WS  Relevant labs, radiology and Medications reviewed           CXR w trapped lung             12.7   11.90 )-----------( 405      ( 12 Feb 2019 05:10 )             41.3     02-12    139  |  91<L>  |  19  ----------------------------<  221<H>  3.8   |  34<H>  |  0.99    Ca    9.1      12 Feb 2019 05:10        MEDICATIONS  (STANDING):  acetaminophen   Tablet .. 650 milliGRAM(s) Oral every 6 hours  aspirin 325 milliGRAM(s) Oral daily  atorvastatin 40 milliGRAM(s) Oral at bedtime  dextrose 5%. 1000 milliLiter(s) (50 mL/Hr) IV Continuous <Continuous>  dextrose 50% Injectable 12.5 Gram(s) IV Push once  dextrose 50% Injectable 25 Gram(s) IV Push once  dextrose 50% Injectable 25 Gram(s) IV Push once  docusate sodium 100 milliGRAM(s) Oral three times a day  furosemide   Injectable 40 milliGRAM(s) IV Push two times a day  heparin  Injectable 5000 Unit(s) SubCutaneous every 12 hours  insulin lispro (HumaLOG) corrective regimen sliding scale   SubCutaneous Before meals and at bedtime  ipratropium    for Nebulization 500 MICROGram(s) Nebulizer every 6 hours  levalbuterol Inhalation 0.63 milliGRAM(s) Inhalation every 6 hours  metoprolol tartrate 25 milliGRAM(s) Oral two times a day  pantoprazole    Tablet 40 milliGRAM(s) Oral before breakfast  pyridoxine 100 milliGRAM(s) Oral daily    MEDICATIONS  (PRN):  dextrose 40% Gel 15 Gram(s) Oral once PRN Blood Glucose LESS THAN 70 milliGRAM(s)/deciliter  glucagon  Injectable 1 milliGRAM(s) IntraMuscular once PRN Glucose LESS THAN 70 milligrams/deciliter  ondansetron Injectable 4 milliGRAM(s) IV Push every 6 hours PRN Nausea  oxyCODONE    IR 5 milliGRAM(s) Oral every 3 hours PRN Severe Pain (7 - 10)  oxyCODONE    IR 2.5 milliGRAM(s) Oral every 3 hours PRN Moderate Pain (4 - 6)    Pertinent Physical Exam  I&O's Summary    11 Feb 2019 07:01  -  12 Feb 2019 07:00  --------------------------------------------------------  IN: 90 mL / OUT: 1860 mL / NET: -1770 mL    12 Feb 2019 07:01  -  12 Feb 2019 15:33  --------------------------------------------------------  IN: 350 mL / OUT: 830 mL / NET: -480 mL        Assessment  63y Male  w/ PAST MEDICAL & SURGICAL HISTORY:  Lung cancer  COPD (chronic obstructive pulmonary disease)  HLD (hyperlipidemia)  Diabetes mellitus  Hypertension  No significant past surgical history  admitted with complaints of Patient is a 63y old  Male who presents with a chief complaint of recurrent left pleural effusion (12 Feb 2019 12:49)  .  On (Date), patient underwent Thoracoscopic drainage of pleural cavity  Chest tube placement  Chest tube placement with US guidance  . Postoperative course/issues:    PLAN  Neuro: Pain management  Pulm: Encourage coughing, deep breathing and use of incentive spirometry. Wean off supplemental oxygen as able. Daily CXR.   Cardio: Monitor telemetry/alarms  GI: Tolerating diet. Continue stool softeners.  Renal: monitor urine output, supplement electrolytes as needed  Vasc: Heparin SC/SCDs for DVT prophylaxis  Heme: Stable H/H. .   ID: Off antibiotics. Stable.  Therapy: OOB/ambulate  Tubes: Monitor Chest tube output  Disposition: Aim to D/C to home on  Discussed with Cardiothoracic Team at AM rounds. Subjective:  "My breathing is a little better" Pt OOB in chair. SOB w exertion. NO CP. Family at bedside. c/o LE edema.     Vital Signs:  Vital Signs Last 24 Hrs  T(C): 36.9 (02-12-19 @ 12:00), Max: 37.2 (02-11-19 @ 17:00)  T(F): 98.4 (02-12-19 @ 12:00), Max: 99 (02-11-19 @ 17:00)  HR: 74 (02-12-19 @ 12:00) (66 - 92)  BP: 114/48 (02-12-19 @ 12:00) (94/66 - 130/53)  RR: 18 (02-12-19 @ 12:00) (17 - 26)  SpO2: 96% (02-12-19 @ 12:00) (88% - 100%) on (O2)    Telemetry/Alarms:  General: WN/WD NAD  Neurology: Awake, nonfocal, JOYNER x 4  Eyes: Scleras clear, PERRLA/ EOMI, Gross vision intact  ENT:Gross hearing intact, grossly patent pharynx, no stridor  Neck: Neck supple, trachea midline, No JVD,   Respiratory: decreased throughout.   CV: RRR, S1S2, no murmurs, rubs or gallops. NO further Afib.   Abdominal: Soft, NT, ND +BS, no BM  Extremities: +LE edema, + peripheral pulses  Skin: No Rashes, Hematoma, Ecchymosis  Lymphatic: No Neck, axilla, groin LAD  Psych: Oriented x 3, normal affect  Incisions: VATS c/d/i  Tubes: Left Pleurx to suction this am, + aL output 210cc/24hrs placed to WS  Relevant labs, radiology and Medications reviewed           CXR w trapped lung             12.7   11.90 )-----------( 405      ( 12 Feb 2019 05:10 )             41.3     02-12    139  |  91<L>  |  19  ----------------------------<  221<H>  3.8   |  34<H>  |  0.99    Ca    9.1      12 Feb 2019 05:10        MEDICATIONS  (STANDING):  acetaminophen   Tablet .. 650 milliGRAM(s) Oral every 6 hours  aspirin 325 milliGRAM(s) Oral daily  atorvastatin 40 milliGRAM(s) Oral at bedtime  dextrose 5%. 1000 milliLiter(s) (50 mL/Hr) IV Continuous <Continuous>  dextrose 50% Injectable 12.5 Gram(s) IV Push once  dextrose 50% Injectable 25 Gram(s) IV Push once  dextrose 50% Injectable 25 Gram(s) IV Push once  docusate sodium 100 milliGRAM(s) Oral three times a day  furosemide   Injectable 40 milliGRAM(s) IV Push two times a day  heparin  Injectable 5000 Unit(s) SubCutaneous every 12 hours  insulin lispro (HumaLOG) corrective regimen sliding scale   SubCutaneous Before meals and at bedtime  ipratropium    for Nebulization 500 MICROGram(s) Nebulizer every 6 hours  levalbuterol Inhalation 0.63 milliGRAM(s) Inhalation every 6 hours  metoprolol tartrate 25 milliGRAM(s) Oral two times a day  pantoprazole    Tablet 40 milliGRAM(s) Oral before breakfast  pyridoxine 100 milliGRAM(s) Oral daily    MEDICATIONS  (PRN):  dextrose 40% Gel 15 Gram(s) Oral once PRN Blood Glucose LESS THAN 70 milliGRAM(s)/deciliter  glucagon  Injectable 1 milliGRAM(s) IntraMuscular once PRN Glucose LESS THAN 70 milligrams/deciliter  ondansetron Injectable 4 milliGRAM(s) IV Push every 6 hours PRN Nausea  oxyCODONE    IR 5 milliGRAM(s) Oral every 3 hours PRN Severe Pain (7 - 10)  oxyCODONE    IR 2.5 milliGRAM(s) Oral every 3 hours PRN Moderate Pain (4 - 6)    Pertinent Physical Exam  I&O's Summary    11 Feb 2019 07:01  -  12 Feb 2019 07:00  --------------------------------------------------------  IN: 90 mL / OUT: 1860 mL / NET: -1770 mL    12 Feb 2019 07:01  -  12 Feb 2019 15:33  --------------------------------------------------------  IN: 350 mL / OUT: 830 mL / NET: -480 mL    Social: , lives with wife  Former smoker  No ETOH or IVDA    Assessment  63y Male  w/ PAST MEDICAL & SURGICAL HISTORY:  Lung cancer  COPD (chronic obstructive pulmonary disease)  HLD (hyperlipidemia)  Diabetes mellitus  Hypertension  No significant past surgical history  admitted with complaints of Patient is a 63y old  Male who presents with a chief complaint of recurrent left pleural effusion (12 Feb 2019 12:49)  History of Present Illness: 	  63M hx COPD, HTN, HLD, bx proven adenocarcinoma of the l lung p/w progressively worsening shortness of breath. Patient with 1L of effusion drained 3 weeks ago with inconclusive pathology, s/p US guided bx demonstrating adenocarcinoma. Patient now presents with worsening shortness of breath on exertion and pain worse with deep inspiration at the left lower chest, left upper abdomen. Denies nausea / vomiting. Denies centrally located CP or CP on exertion. No changes in bowel or bladder.   On 2/8- Bedside left PTC placed. Cardiology consulted for new onset afib. Then converted to SR. Started on IV diuretics. 2/11-To OR for Left VATS Drainage of pleural effusion, Pleurx placement.     PLAN  Neuro: Pain management  Pulm: Encourage coughing, deep breathing and use of incentive spirometry. Hypoxic on RA, home O2 in place Daily CXR.   Cardio: Monitor telemetry/alarms. NO further afib. Will start daily ASA per Cardiology  GI: Tolerating diet. Continue stool softeners.  Renal: monitor urine output, supplement electrolytes as needed. Cont IV diuresis  Vasc: Heparin SC/SCDs for DVT prophylaxis  Heme: Stable H/H. .   ID: Off antibiotics. Stable.  Therapy: OOB/ambulate  Tubes: Monitor Chest tube output and air leak. Cont to waterseal. Possible cap tomorrow.   Disposition: Aim to D/C to home tomorrow w VNS  Discussed with Cardiothoracic Team at AM rounds.

## 2019-02-12 NOTE — PROGRESS NOTE ADULT - SUBJECTIVE AND OBJECTIVE BOX
ANESTHESIA POSTOP CHECK    63y Male POSTOP DAY 1 S/P Left VATS    Vital Signs Last 24 Hrs  T(C): 36.8 (12 Feb 2019 08:08), Max: 37.2 (11 Feb 2019 17:00)  T(F): 98.2 (12 Feb 2019 08:08), Max: 99 (11 Feb 2019 17:00)  HR: 66 (12 Feb 2019 08:08) (66 - 101)  BP: 108/48 (12 Feb 2019 08:08) (94/66 - 151/83)  BP(mean): 66 (11 Feb 2019 17:45) (64 - 101)  RR: 18 (12 Feb 2019 08:08) (15 - 26)  SpO2: 97% (12 Feb 2019 08:08) (88% - 100%)  I&O's Summary    11 Feb 2019 07:01  -  12 Feb 2019 07:00  --------------------------------------------------------  IN: 90 mL / OUT: 1860 mL / NET: -1770 mL    12 Feb 2019 07:01  -  12 Feb 2019 10:13  --------------------------------------------------------  IN: 350 mL / OUT: 390 mL / NET: -40 mL        [x ] NO APPARENT ANESTHESIA COMPLICATIONS      Comments:

## 2019-02-12 NOTE — DISCHARGE NOTE ADULT - PATIENT PORTAL LINK FT
You can access the [facility] Patient Portal, offered by [facility], by registering with the following website: [email or web address]

## 2019-02-13 PROBLEM — C34.90 MALIGNANT NEOPLASM OF UNSPECIFIED PART OF UNSPECIFIED BRONCHUS OR LUNG: Chronic | Status: ACTIVE | Noted: 2019-02-08

## 2019-02-13 PROBLEM — J44.9 CHRONIC OBSTRUCTIVE PULMONARY DISEASE, UNSPECIFIED: Chronic | Status: ACTIVE | Noted: 2019-02-08

## 2019-02-13 LAB
ANION GAP SERPL CALC-SCNC: 10 MMO/L — SIGNIFICANT CHANGE UP (ref 7–14)
BUN SERPL-MCNC: 20 MG/DL — SIGNIFICANT CHANGE UP (ref 7–23)
CALCIUM SERPL-MCNC: 9.5 MG/DL — SIGNIFICANT CHANGE UP (ref 8.4–10.5)
CHLORIDE SERPL-SCNC: 92 MMOL/L — LOW (ref 98–107)
CO2 SERPL-SCNC: 34 MMOL/L — HIGH (ref 22–31)
CREAT SERPL-MCNC: 0.86 MG/DL — SIGNIFICANT CHANGE UP (ref 0.5–1.3)
GLUCOSE BLDC GLUCOMTR-MCNC: 127 MG/DL — HIGH (ref 70–99)
GLUCOSE BLDC GLUCOMTR-MCNC: 128 MG/DL — HIGH (ref 70–99)
GLUCOSE BLDC GLUCOMTR-MCNC: 258 MG/DL — HIGH (ref 70–99)
GLUCOSE BLDC GLUCOMTR-MCNC: 263 MG/DL — HIGH (ref 70–99)
GLUCOSE SERPL-MCNC: 139 MG/DL — HIGH (ref 70–99)
HCT VFR BLD CALC: 41.3 % — SIGNIFICANT CHANGE UP (ref 39–50)
HGB BLD-MCNC: 12.7 G/DL — LOW (ref 13–17)
MCHC RBC-ENTMCNC: 25.8 PG — LOW (ref 27–34)
MCHC RBC-ENTMCNC: 30.8 % — LOW (ref 32–36)
MCV RBC AUTO: 83.9 FL — SIGNIFICANT CHANGE UP (ref 80–100)
NRBC # FLD: 0 K/UL — LOW (ref 25–125)
PLATELET # BLD AUTO: 461 K/UL — HIGH (ref 150–400)
PMV BLD: 9.7 FL — SIGNIFICANT CHANGE UP (ref 7–13)
POTASSIUM SERPL-MCNC: 3.8 MMOL/L — SIGNIFICANT CHANGE UP (ref 3.5–5.3)
POTASSIUM SERPL-SCNC: 3.8 MMOL/L — SIGNIFICANT CHANGE UP (ref 3.5–5.3)
RBC # BLD: 4.92 M/UL — SIGNIFICANT CHANGE UP (ref 4.2–5.8)
RBC # FLD: 14.1 % — SIGNIFICANT CHANGE UP (ref 10.3–14.5)
SODIUM SERPL-SCNC: 136 MMOL/L — SIGNIFICANT CHANGE UP (ref 135–145)
WBC # BLD: 11.02 K/UL — HIGH (ref 3.8–10.5)
WBC # FLD AUTO: 11.02 K/UL — HIGH (ref 3.8–10.5)

## 2019-02-13 PROCEDURE — 71045 X-RAY EXAM CHEST 1 VIEW: CPT | Mod: 26

## 2019-02-13 PROCEDURE — 99233 SBSQ HOSP IP/OBS HIGH 50: CPT

## 2019-02-13 RX ADMIN — Medication 500 MICROGRAM(S): at 16:00

## 2019-02-13 RX ADMIN — Medication 100 MILLIGRAM(S): at 22:07

## 2019-02-13 RX ADMIN — Medication 25 MILLIGRAM(S): at 05:49

## 2019-02-13 RX ADMIN — Medication 650 MILLIGRAM(S): at 12:58

## 2019-02-13 RX ADMIN — Medication 325 MILLIGRAM(S): at 12:58

## 2019-02-13 RX ADMIN — LEVALBUTEROL 0.63 MILLIGRAM(S): 1.25 SOLUTION, CONCENTRATE RESPIRATORY (INHALATION) at 22:30

## 2019-02-13 RX ADMIN — Medication 650 MILLIGRAM(S): at 13:30

## 2019-02-13 RX ADMIN — Medication 100 MILLIGRAM(S): at 05:50

## 2019-02-13 RX ADMIN — Medication 500 MICROGRAM(S): at 11:00

## 2019-02-13 RX ADMIN — LEVALBUTEROL 0.63 MILLIGRAM(S): 1.25 SOLUTION, CONCENTRATE RESPIRATORY (INHALATION) at 16:00

## 2019-02-13 RX ADMIN — HEPARIN SODIUM 5000 UNIT(S): 5000 INJECTION INTRAVENOUS; SUBCUTANEOUS at 05:49

## 2019-02-13 RX ADMIN — PANTOPRAZOLE SODIUM 40 MILLIGRAM(S): 20 TABLET, DELAYED RELEASE ORAL at 05:49

## 2019-02-13 RX ADMIN — Medication 650 MILLIGRAM(S): at 05:49

## 2019-02-13 RX ADMIN — Medication 650 MILLIGRAM(S): at 00:30

## 2019-02-13 RX ADMIN — Medication 500 MICROGRAM(S): at 04:37

## 2019-02-13 RX ADMIN — LEVALBUTEROL 0.63 MILLIGRAM(S): 1.25 SOLUTION, CONCENTRATE RESPIRATORY (INHALATION) at 04:35

## 2019-02-13 RX ADMIN — Medication 6: at 10:03

## 2019-02-13 RX ADMIN — Medication 650 MILLIGRAM(S): at 23:36

## 2019-02-13 RX ADMIN — Medication 650 MILLIGRAM(S): at 17:43

## 2019-02-13 RX ADMIN — LEVALBUTEROL 0.63 MILLIGRAM(S): 1.25 SOLUTION, CONCENTRATE RESPIRATORY (INHALATION) at 11:01

## 2019-02-13 RX ADMIN — Medication 6: at 22:07

## 2019-02-13 RX ADMIN — Medication 40 MILLIGRAM(S): at 05:50

## 2019-02-13 RX ADMIN — ATORVASTATIN CALCIUM 40 MILLIGRAM(S): 80 TABLET, FILM COATED ORAL at 22:07

## 2019-02-13 RX ADMIN — Medication 100 MILLIGRAM(S): at 12:58

## 2019-02-13 RX ADMIN — Medication 25 MILLIGRAM(S): at 17:07

## 2019-02-13 RX ADMIN — Medication 650 MILLIGRAM(S): at 17:07

## 2019-02-13 RX ADMIN — HEPARIN SODIUM 5000 UNIT(S): 5000 INJECTION INTRAVENOUS; SUBCUTANEOUS at 17:07

## 2019-02-13 RX ADMIN — Medication 500 MICROGRAM(S): at 22:31

## 2019-02-13 RX ADMIN — Medication 650 MILLIGRAM(S): at 06:21

## 2019-02-13 NOTE — PROGRESS NOTE ADULT - SUBJECTIVE AND OBJECTIVE BOX
Robinson Hathaway MD  Interventional Cardiology / Advance Heart Failure and Cardiac Transplant Specialist  [place] Office : [phone] [place] [id number]  Tel:   [place] Office : [address] [facility] [place] [address]  Tel: [phone]  Cell : [phone]    Subjective : Pt lying in bed comfortable, not in distress, s/p VATS   	  MEDICATIONS:  furosemide    Tablet 40 milliGRAM(s) Oral daily  heparin  Injectable 5000 Unit(s) SubCutaneous every 12 hours  metoprolol tartrate 25 milliGRAM(s) Oral two times a day      ipratropium    for Nebulization 500 MICROGram(s) Nebulizer every 6 hours  levalbuterol Inhalation 0.63 milliGRAM(s) Inhalation every 6 hours    acetaminophen   Tablet .. 650 milliGRAM(s) Oral every 6 hours  aspirin 325 milliGRAM(s) Oral daily  ondansetron Injectable 4 milliGRAM(s) IV Push every 6 hours PRN  oxyCODONE    IR 5 milliGRAM(s) Oral every 3 hours PRN  oxyCODONE    IR 2.5 milliGRAM(s) Oral every 3 hours PRN    docusate sodium 100 milliGRAM(s) Oral three times a day  pantoprazole    Tablet 40 milliGRAM(s) Oral before breakfast    atorvastatin 40 milliGRAM(s) Oral at bedtime  dextrose 40% Gel 15 Gram(s) Oral once PRN  dextrose 50% Injectable 12.5 Gram(s) IV Push once  dextrose 50% Injectable 25 Gram(s) IV Push once  dextrose 50% Injectable 25 Gram(s) IV Push once  glucagon  Injectable 1 milliGRAM(s) IntraMuscular once PRN  insulin lispro (HumaLOG) corrective regimen sliding scale   SubCutaneous Before meals and at bedtime    dextrose 5%. 1000 milliLiter(s) IV Continuous <Continuous>  pyridoxine 100 milliGRAM(s) Oral daily      PHYSICAL EXAM:  T(C): 36.7 (02-13-19 @ 19:54), Max: 37.1 (02-13-19 @ 17:08)  HR: 77 (02-13-19 @ 19:54) (67 - 86)  BP: 132/56 (02-13-19 @ 19:54) (103/47 - 132/56)  RR: 18 (02-13-19 @ 19:54) (18 - 18)  SpO2: 95% (02-13-19 @ 19:54) (92% - 97%)  Wt(kg): --  I&O's Summary    12 Feb 2019 07:01  -  13 Feb 2019 07:00  --------------------------------------------------------  IN: 810 mL / OUT: 1880 mL / NET: -1070 mL    13 Feb 2019 07:01  -  13 Feb 2019 20:00  --------------------------------------------------------  IN: 0 mL / OUT: 570 mL / NET: -570 mL            HEENT:   Normal oral mucosa, PERRL, EOMI	  Cardiovascular: Normal S1 S2, No JVD, No murmurs, No edema  Respiratory: b/l rhonchi  Gastrointestinal:  Soft, Non-tender, + BS	  Extremities: Normal range of motion, No clubbing, cyanosis or edema                                    12.7   11.02 )-----------( 461      ( 13 Feb 2019 06:00 )             41.3     02-13    136  |  92<L>  |  20  ----------------------------<  139<H>  3.8   |  34<H>  |  0.86    Ca    9.5      13 Feb 2019 06:00      proBNP:   Lipid Profile:   HgA1c:   TSH:

## 2019-02-13 NOTE — PROGRESS NOTE ADULT - SUBJECTIVE AND OBJECTIVE BOX
Subjective: 63M hx COPD, HTN, HLD, bx proven adenocarcinoma of the l lung p/w progressively worsening shortness of breath. Patient with 1L of effusion drained 3 weeks ago with inconclusive pathology, s/p US guided bx demonstrating adenocarcinoma. Patient now presents with worsening shortness of breath on exertion and pain worse with deep inspiration at the left lower chest, left upper abdomen. Denies nausea / vomiting. Denies centrally located CP or CP on exertion. No changes in bowel or bladder.   On 2/8- Bedside left PTC placed. Cardiology consulted for new onset afib. Then converted to SR. Started on IV diuretics. 2/11-To OR for Left VATS Drainage of pleural effusion, Pleurx placement.     Pt sitting in chair w/o complaints. PleurX drained 190 + AL overnight    Vital Signs:  Vital Signs Last 24 Hrs  T(C): 36.7 (02-13-19 @ 12:55), Max: 37 (02-13-19 @ 08:04)  T(F): 98.1 (02-13-19 @ 12:55), Max: 98.6 (02-13-19 @ 08:04)  HR: 73 (02-13-19 @ 12:55) (67 - 79)  BP: 127/54 (02-13-19 @ 12:55) (103/47 - 127/54)  RR: 18 (02-13-19 @ 12:55) (18 - 18)  SpO2: 97% (02-13-19 @ 12:55) (92% - 97%) on (O2)                            12.7   11.02 )-----------( 461      ( 13 Feb 2019 06:00 )             41.3     02-13    136  |  92<L>  |  20  ----------------------------<  139<H>  3.8   |  34<H>  |  0.86    Ca    9.5      13 Feb 2019 06:00        MEDICATIONS  (STANDING):  acetaminophen   Tablet .. 650 milliGRAM(s) Oral every 6 hours  aspirin 325 milliGRAM(s) Oral daily  atorvastatin 40 milliGRAM(s) Oral at bedtime  dextrose 5%. 1000 milliLiter(s) (50 mL/Hr) IV Continuous <Continuous>  dextrose 50% Injectable 12.5 Gram(s) IV Push once  dextrose 50% Injectable 25 Gram(s) IV Push once  dextrose 50% Injectable 25 Gram(s) IV Push once  docusate sodium 100 milliGRAM(s) Oral three times a day  furosemide    Tablet 40 milliGRAM(s) Oral daily  heparin  Injectable 5000 Unit(s) SubCutaneous every 12 hours  insulin lispro (HumaLOG) corrective regimen sliding scale   SubCutaneous Before meals and at bedtime  ipratropium    for Nebulization 500 MICROGram(s) Nebulizer every 6 hours  levalbuterol Inhalation 0.63 milliGRAM(s) Inhalation every 6 hours  metoprolol tartrate 25 milliGRAM(s) Oral two times a day  pantoprazole    Tablet 40 milliGRAM(s) Oral before breakfast  pyridoxine 100 milliGRAM(s) Oral daily    MEDICATIONS  (PRN):  dextrose 40% Gel 15 Gram(s) Oral once PRN Blood Glucose LESS THAN 70 milliGRAM(s)/deciliter  glucagon  Injectable 1 milliGRAM(s) IntraMuscular once PRN Glucose LESS THAN 70 milligrams/deciliter  ondansetron Injectable 4 milliGRAM(s) IV Push every 6 hours PRN Nausea  oxyCODONE    IR 5 milliGRAM(s) Oral every 3 hours PRN Severe Pain (7 - 10)  oxyCODONE    IR 2.5 milliGRAM(s) Oral every 3 hours PRN Moderate Pain (4 - 6)    Telemetry/Alarms:  General: WN/WD NAD  Neurology: Awake, nonfocal, JOYNER x 4  Eyes: Scleras clear, PERRLA/ EOMI, Gross vision intact  ENT:Gross hearing intact, grossly patent pharynx, no stridor  Neck: Neck supple, trachea midline, No JVD,   Respiratory: decreased throughout.   CV: RRR, S1S2, no murmurs, rubs or gallops. NO further Afib.   Abdominal: Soft, NT, ND +BS, no BM  Extremities: +LE edema, + peripheral pulses  Skin: No Rashes, Hematoma, Ecchymosis  Lymphatic: No Neck, axilla, groin LAD  Psych: Oriented x 3, normal affect  Incisions: VATS c/d/i  Tubes: Left Pleurx to suction , + aL output 190 cc/24hrs placed to WS  Relevant labs, radiology and Medications reviewed           CXR w trapped lung    I&O's Summary    12 Feb 2019 07:01 - 13 Feb 2019 07:00  --------------------------------------------------------  IN: 810 mL / OUT: 1880 mL / NET: -1070 mL    13 Feb 2019 07:01  -  13 Feb 2019 15:20  --------------------------------------------------------  IN: 0 mL / OUT: 100 mL / NET: -100 mL    Social: , lives with wife  Former smoker  No ETOH or IVDA    Assessment  63y Male  w/ PAST MEDICAL & SURGICAL HISTORY:  Lung cancer  COPD (chronic obstructive pulmonary disease)  HLD (hyperlipidemia)  Diabetes mellitus  Hypertension  No significant past surgical history  admitted with complaints of Patient is a 63y old  Male who presents with a chief complaint of recurrent left pleural effusion (12 Feb 2019 17:26)  63M hx COPD, HTN, HLD, bx proven adenocarcinoma of the l lung p/w progressively worsening shortness of breath. Patient with 1L of effusion drained 3 weeks ago with inconclusive pathology, s/p US guided bx demonstrating adenocarcinoma. Patient now presents with worsening shortness of breath on exertion and pain worse with deep inspiration at the left lower chest, left upper abdomen. Denies nausea / vomiting. Denies centrally located CP or CP on exertion. No changes in bowel or bladder.   On 2/8- Bedside left PTC placed. Cardiology consulted for new onset afib. Then converted to SR. Started on IV diuretics. 2/11- OR for Left VATS Drainage of pleural effusion, Pleurx placement.     PLAN  Neuro: Pain management  Pulm: Encourage coughing, deep breathing and use of incentive spirometry. Hypoxic on RA, home O2 in place Daily CXR.   Cardio: Monitor telemetry/alarms. NO further afib. Will start daily ASA per Cardiology  GI: Tolerating diet. Continue stool softeners.  Renal: monitor urine output, supplement electrolytes as needed. Cont IV diuresis  Vasc: Heparin SC/SCDs for DVT prophylaxis  Heme: Stable H/H. .   ID: Off antibiotics. Stable.  Therapy: OOB/ambulate  Tubes: Monitor Chest tube output and air leak. Cont to waterseal. Possible cap tomorrow.   Disposition: Aim to D/C to home tomorrow w VNS  Discussed with Cardiothoracic Team at AM rounds.

## 2019-02-13 NOTE — PROGRESS NOTE ADULT - ASSESSMENT
EKG : ST 1degree AV block, LVH TWI I, AVL, V4-6    Echo pending     Tele Afib with RVR    Assessement and Plan     1) Afib : denies history of a fib, [id number] score of 1, on asa 325 post op , mild;y volume up on exam , lasix 40mg po     2) Perioperative risk assessment : s/p VATS and pleurx catheter no issues    3) Lung ca/ Pleural effusion : adenoca, t/t per Surgery and Oncology     4) DVT PPX heparin

## 2019-02-13 NOTE — PHYSICAL THERAPY INITIAL EVALUATION ADULT - DISCHARGE DISPOSITION, PT EVAL
Anticipating Home with no skilled PT needs; Follow progress with PT upon completion of ambulation assessment.

## 2019-02-14 LAB
GLUCOSE BLDC GLUCOMTR-MCNC: 155 MG/DL — HIGH (ref 70–99)
GLUCOSE BLDC GLUCOMTR-MCNC: 205 MG/DL — HIGH (ref 70–99)
GLUCOSE BLDC GLUCOMTR-MCNC: 245 MG/DL — HIGH (ref 70–99)
GLUCOSE BLDC GLUCOMTR-MCNC: 347 MG/DL — HIGH (ref 70–99)

## 2019-02-14 PROCEDURE — 71045 X-RAY EXAM CHEST 1 VIEW: CPT | Mod: 26

## 2019-02-14 RX ORDER — FUROSEMIDE 40 MG
40 TABLET ORAL EVERY 12 HOURS
Qty: 0 | Refills: 0 | Status: DISCONTINUED | OUTPATIENT
Start: 2019-02-14 | End: 2019-02-15

## 2019-02-14 RX ADMIN — Medication 8: at 09:13

## 2019-02-14 RX ADMIN — HEPARIN SODIUM 5000 UNIT(S): 5000 INJECTION INTRAVENOUS; SUBCUTANEOUS at 05:45

## 2019-02-14 RX ADMIN — Medication 4: at 22:44

## 2019-02-14 RX ADMIN — LEVALBUTEROL 0.63 MILLIGRAM(S): 1.25 SOLUTION, CONCENTRATE RESPIRATORY (INHALATION) at 03:47

## 2019-02-14 RX ADMIN — Medication 40 MILLIGRAM(S): at 11:48

## 2019-02-14 RX ADMIN — HEPARIN SODIUM 5000 UNIT(S): 5000 INJECTION INTRAVENOUS; SUBCUTANEOUS at 18:09

## 2019-02-14 RX ADMIN — Medication 40 MILLIGRAM(S): at 05:45

## 2019-02-14 RX ADMIN — Medication 40 MILLIGRAM(S): at 22:44

## 2019-02-14 RX ADMIN — Medication 500 MICROGRAM(S): at 22:05

## 2019-02-14 RX ADMIN — Medication 650 MILLIGRAM(S): at 00:30

## 2019-02-14 RX ADMIN — Medication 100 MILLIGRAM(S): at 11:50

## 2019-02-14 RX ADMIN — Medication 650 MILLIGRAM(S): at 05:45

## 2019-02-14 RX ADMIN — LEVALBUTEROL 0.63 MILLIGRAM(S): 1.25 SOLUTION, CONCENTRATE RESPIRATORY (INHALATION) at 15:19

## 2019-02-14 RX ADMIN — Medication 100 MILLIGRAM(S): at 22:44

## 2019-02-14 RX ADMIN — LEVALBUTEROL 0.63 MILLIGRAM(S): 1.25 SOLUTION, CONCENTRATE RESPIRATORY (INHALATION) at 09:42

## 2019-02-14 RX ADMIN — Medication 25 MILLIGRAM(S): at 18:09

## 2019-02-14 RX ADMIN — Medication 4: at 18:09

## 2019-02-14 RX ADMIN — Medication 325 MILLIGRAM(S): at 11:50

## 2019-02-14 RX ADMIN — LEVALBUTEROL 0.63 MILLIGRAM(S): 1.25 SOLUTION, CONCENTRATE RESPIRATORY (INHALATION) at 22:03

## 2019-02-14 RX ADMIN — PANTOPRAZOLE SODIUM 40 MILLIGRAM(S): 20 TABLET, DELAYED RELEASE ORAL at 05:45

## 2019-02-14 RX ADMIN — Medication 500 MICROGRAM(S): at 09:42

## 2019-02-14 RX ADMIN — Medication 100 MILLIGRAM(S): at 05:45

## 2019-02-14 RX ADMIN — Medication 650 MILLIGRAM(S): at 07:26

## 2019-02-14 RX ADMIN — ATORVASTATIN CALCIUM 40 MILLIGRAM(S): 80 TABLET, FILM COATED ORAL at 22:44

## 2019-02-14 RX ADMIN — Medication 500 MICROGRAM(S): at 15:18

## 2019-02-14 RX ADMIN — Medication 25 MILLIGRAM(S): at 05:45

## 2019-02-14 RX ADMIN — Medication 500 MICROGRAM(S): at 03:51

## 2019-02-14 RX ADMIN — Medication 2: at 13:56

## 2019-02-14 NOTE — PROGRESS NOTE ADULT - SUBJECTIVE AND OBJECTIVE BOX
Subjective: no acute complaints    Vital Signs:  Vital Signs Last 24 Hrs  T(C): 36.9 (02-14-19 @ 11:46), Max: 37.3 (02-14-19 @ 00:02)  T(F): 98.4 (02-14-19 @ 11:46), Max: 99.1 (02-14-19 @ 00:02)  HR: 73 (02-14-19 @ 11:46) (70 - 86)  BP: 134/55 (02-14-19 @ 11:46) (127/54 - 136/53)  RR: 18 (02-14-19 @ 11:46) (18 - 18)  SpO2: 95% (02-14-19 @ 11:46) (90% - 98%) on (O2)    Telemetry/Alarms:  General: WN/WD NAD  Neurology: Awake, nonfocal, JOYNER x 4  Eyes: Scleras clear, PERRLA/ EOMI, Gross vision intact  ENT:Gross hearing intact, grossly patent pharynx, no stridor  Neck: Neck supple, trachea midline, No JVD,   Respiratory: CTA B/L, No wheezing, rales, rhonchi  CV: RRR, S1S2, no murmurs, rubs or gallops  Abdominal: Soft, NT, ND +BS,   Extremities: No edema, + peripheral pulses  Skin: No Rashes, Hematoma, Ecchymosis  Lymphatic: No Neck, axilla, groin LAD  Psych: Oriented x 3, normal affect  Incisions: c,d,i  Tubes: pleurX connected to mini atrium  Relevant labs, radiology and Medications reviewed                        12.7   11.02 )-----------( 461      ( 13 Feb 2019 06:00 )             41.3     02-13    136  |  92<L>  |  20  ----------------------------<  139<H>  3.8   |  34<H>  |  0.86    Ca    9.5      13 Feb 2019 06:00        MEDICATIONS  (STANDING):  aspirin 325 milliGRAM(s) Oral daily  atorvastatin 40 milliGRAM(s) Oral at bedtime  dextrose 5%. 1000 milliLiter(s) (50 mL/Hr) IV Continuous <Continuous>  dextrose 50% Injectable 12.5 Gram(s) IV Push once  dextrose 50% Injectable 25 Gram(s) IV Push once  dextrose 50% Injectable 25 Gram(s) IV Push once  docusate sodium 100 milliGRAM(s) Oral three times a day  furosemide   Injectable 40 milliGRAM(s) IV Push every 12 hours  heparin  Injectable 5000 Unit(s) SubCutaneous every 12 hours  insulin lispro (HumaLOG) corrective regimen sliding scale   SubCutaneous Before meals and at bedtime  ipratropium    for Nebulization 500 MICROGram(s) Nebulizer every 6 hours  levalbuterol Inhalation 0.63 milliGRAM(s) Inhalation every 6 hours  metoprolol tartrate 25 milliGRAM(s) Oral two times a day  pantoprazole    Tablet 40 milliGRAM(s) Oral before breakfast  pyridoxine 100 milliGRAM(s) Oral daily    MEDICATIONS  (PRN):  dextrose 40% Gel 15 Gram(s) Oral once PRN Blood Glucose LESS THAN 70 milliGRAM(s)/deciliter  glucagon  Injectable 1 milliGRAM(s) IntraMuscular once PRN Glucose LESS THAN 70 milligrams/deciliter  ondansetron Injectable 4 milliGRAM(s) IV Push every 6 hours PRN Nausea  oxyCODONE    IR 5 milliGRAM(s) Oral every 3 hours PRN Severe Pain (7 - 10)  oxyCODONE    IR 2.5 milliGRAM(s) Oral every 3 hours PRN Moderate Pain (4 - 6)    Pertinent Physical Exam  I&O's Summary    13 Feb 2019 07:01  -  14 Feb 2019 07:00  --------------------------------------------------------  IN: 0 mL / OUT: 700 mL / NET: -700 mL        Assessment  63y Male  w/ PAST MEDICAL & SURGICAL HISTORY:  Lung cancer  COPD (chronic obstructive pulmonary disease)  HLD (hyperlipidemia)  Diabetes mellitus  Hypertension  No significant past surgical history  admitted with complaints of Patient is a 63y old  Male who presents with a chief complaint of recurrent left pleural effusion (12 Feb 2019 17:26)  63M hx COPD, HTN, HLD, bx proven adenocarcinoma of the l lung p/w progressively worsening shortness of breath. Patient with 1L of effusion drained 3 weeks ago with inconclusive pathology, s/p US guided bx demonstrating adenocarcinoma. Patient now presents with worsening shortness of breath on exertion and pain worse with deep inspiration at the left lower chest, left upper abdomen. Denies nausea / vomiting. Denies centrally located CP or CP on exertion. No changes in bowel or bladder.   On 2/8- Bedside left PTC placed. Cardiology consulted for new onset afib. Then converted to SR. Started on IV diuretics. 2/11- OR for Left VATS Drainage of pleural effusion, Pleurx placement.     PLAN  Neuro: Pain management  Pulm: Encourage coughing, deep breathing and use of incentive spirometry. Hypoxic on RA, home O2 in place Daily CXR.   Cardio: Monitor telemetry/alarms. NO further afib. Will start daily ASA per Cardiology  GI: Tolerating diet. Continue stool softeners.  Renal: monitor urine output, supplement electrolytes as needed. Cont IV diuresis  Vasc: Heparin SC/SCDs for DVT prophylaxis  Heme: Stable H/H. .   ID: Off antibiotics. Stable.  Therapy: OOB/ambulate  Tubes: pleurX connected to mini atrium  Disposition: Aim to D/C to home tomorrow w VNS  Discussed with Cardiothoracic Team at AM rounds.

## 2019-02-14 NOTE — PROGRESS NOTE ADULT - SUBJECTIVE AND OBJECTIVE BOX
Robinson Hathaway MD  Interventional Cardiology / Advance Heart Failure and Cardiac Transplant Specialist  [place] Office : [phone] [place] [id number]  Tel:   [place] Office : [address] [facility] [place] [address]  Tel: [phone]  Cell : [phone]    Subjective : Pt lying in bed comfortable, not in distress, denies any chest pain some SOB  	  MEDICATIONS:  furosemide   Injectable 40 milliGRAM(s) IV Push every 12 hours  heparin  Injectable 5000 Unit(s) SubCutaneous every 12 hours  metoprolol tartrate 25 milliGRAM(s) Oral two times a day      ipratropium    for Nebulization 500 MICROGram(s) Nebulizer every 6 hours  levalbuterol Inhalation 0.63 milliGRAM(s) Inhalation every 6 hours    aspirin 325 milliGRAM(s) Oral daily  ondansetron Injectable 4 milliGRAM(s) IV Push every 6 hours PRN  oxyCODONE    IR 5 milliGRAM(s) Oral every 3 hours PRN  oxyCODONE    IR 2.5 milliGRAM(s) Oral every 3 hours PRN    docusate sodium 100 milliGRAM(s) Oral three times a day  pantoprazole    Tablet 40 milliGRAM(s) Oral before breakfast    atorvastatin 40 milliGRAM(s) Oral at bedtime  dextrose 40% Gel 15 Gram(s) Oral once PRN  dextrose 50% Injectable 12.5 Gram(s) IV Push once  dextrose 50% Injectable 25 Gram(s) IV Push once  dextrose 50% Injectable 25 Gram(s) IV Push once  glucagon  Injectable 1 milliGRAM(s) IntraMuscular once PRN  insulin lispro (HumaLOG) corrective regimen sliding scale   SubCutaneous Before meals and at bedtime    dextrose 5%. 1000 milliLiter(s) IV Continuous <Continuous>  pyridoxine 100 milliGRAM(s) Oral daily      PHYSICAL EXAM:  T(C): 36.8 (02-14-19 @ 16:34), Max: 37.3 (02-14-19 @ 00:02)  HR: 82 (02-14-19 @ 16:34) (70 - 82)  BP: 137/55 (02-14-19 @ 16:34) (127/56 - 137/55)  RR: 18 (02-14-19 @ 16:34) (18 - 18)  SpO2: 94% (02-14-19 @ 16:34) (90% - 98%)  Wt(kg): --  I&O's Summary    13 Feb 2019 07:01  -  14 Feb 2019 07:00  --------------------------------------------------------  IN: 0 mL / OUT: 700 mL / NET: -700 mL    14 Feb 2019 07:01  -  14 Feb 2019 18:55  --------------------------------------------------------  IN: 400 mL / OUT: 0 mL / NET: 400 mL            HEENT:   Normal oral mucosa, PERRL, EOMI	  Cardiovascular: Normal S1 S2, No JVD, No murmurs, No edema  Respiratory: b/l rhonchi  Gastrointestinal:  Soft, Non-tender, + BS	  Extremities: 2+ edema                                    12.7   11.02 )-----------( 461      ( 13 Feb 2019 06:00 )             41.3     02-13    136  |  92<L>  |  20  ----------------------------<  139<H>  3.8   |  34<H>  |  0.86    Ca    9.5      13 Feb 2019 06:00      proBNP:   Lipid Profile:   HgA1c:   TSH:

## 2019-02-14 NOTE — PROGRESS NOTE ADULT - ASSESSMENT
EKG : ST 1degree AV block, LVH TWI I, AVL, V4-6    Echo pending     Tele Afib with RVR    Assessement and Plan     1) Afib : denies history of a fib, [id number] score of 1, on asa 325 post op , mild;y volume up on exam , lasix 40mg IV q12     2) Perioperative risk assessment : s/p VATS and pleurx cathete, has air leak    3) Lung ca/ Pleural effusion : adenoca, t/t per Surgery and Oncology     4) DVT PPX heparin

## 2019-02-15 VITALS
DIASTOLIC BLOOD PRESSURE: 62 MMHG | RESPIRATION RATE: 18 BRPM | TEMPERATURE: 99 F | OXYGEN SATURATION: 99 % | HEART RATE: 74 BPM | SYSTOLIC BLOOD PRESSURE: 127 MMHG

## 2019-02-15 LAB
ANION GAP SERPL CALC-SCNC: 13 MMO/L — SIGNIFICANT CHANGE UP (ref 7–14)
BUN SERPL-MCNC: 17 MG/DL — SIGNIFICANT CHANGE UP (ref 7–23)
CALCIUM SERPL-MCNC: 9.6 MG/DL — SIGNIFICANT CHANGE UP (ref 8.4–10.5)
CHLORIDE SERPL-SCNC: 93 MMOL/L — LOW (ref 98–107)
CO2 SERPL-SCNC: 34 MMOL/L — HIGH (ref 22–31)
CREAT SERPL-MCNC: 0.87 MG/DL — SIGNIFICANT CHANGE UP (ref 0.5–1.3)
GLUCOSE BLDC GLUCOMTR-MCNC: 161 MG/DL — HIGH (ref 70–99)
GLUCOSE BLDC GLUCOMTR-MCNC: 204 MG/DL — HIGH (ref 70–99)
GLUCOSE SERPL-MCNC: 154 MG/DL — HIGH (ref 70–99)
MAGNESIUM SERPL-MCNC: 1.9 MG/DL — SIGNIFICANT CHANGE UP (ref 1.6–2.6)
POTASSIUM SERPL-MCNC: 3.7 MMOL/L — SIGNIFICANT CHANGE UP (ref 3.5–5.3)
POTASSIUM SERPL-SCNC: 3.7 MMOL/L — SIGNIFICANT CHANGE UP (ref 3.5–5.3)
SODIUM SERPL-SCNC: 140 MMOL/L — SIGNIFICANT CHANGE UP (ref 135–145)

## 2019-02-15 PROCEDURE — 99238 HOSP IP/OBS DSCHRG MGMT 30/<: CPT

## 2019-02-15 PROCEDURE — 71045 X-RAY EXAM CHEST 1 VIEW: CPT | Mod: 26

## 2019-02-15 RX ORDER — POTASSIUM CHLORIDE 20 MEQ
1 PACKET (EA) ORAL
Qty: 30 | Refills: 0 | OUTPATIENT
Start: 2019-02-15 | End: 2019-03-16

## 2019-02-15 RX ORDER — METOPROLOL TARTRATE 50 MG
1 TABLET ORAL
Qty: 60 | Refills: 0 | OUTPATIENT
Start: 2019-02-15 | End: 2019-03-16

## 2019-02-15 RX ORDER — FUROSEMIDE 40 MG
1 TABLET ORAL
Qty: 0 | Refills: 0 | COMMUNITY

## 2019-02-15 RX ORDER — FUROSEMIDE 40 MG
1 TABLET ORAL
Qty: 0 | Refills: 0 | COMMUNITY
Start: 2019-02-15 | End: 2019-03-16

## 2019-02-15 RX ORDER — DOCUSATE SODIUM 100 MG
1 CAPSULE ORAL
Qty: 0 | Refills: 0 | COMMUNITY
Start: 2019-02-15

## 2019-02-15 RX ORDER — OXYCODONE HYDROCHLORIDE 5 MG/1
2 TABLET ORAL
Qty: 60 | Refills: 0 | OUTPATIENT
Start: 2019-02-15 | End: 2019-02-19

## 2019-02-15 RX ORDER — FUROSEMIDE 40 MG
1.5 TABLET ORAL
Qty: 45 | Refills: 0 | OUTPATIENT
Start: 2019-02-15 | End: 2019-03-16

## 2019-02-15 RX ADMIN — Medication 100 MILLIGRAM(S): at 12:27

## 2019-02-15 RX ADMIN — Medication 325 MILLIGRAM(S): at 12:27

## 2019-02-15 RX ADMIN — Medication 40 MILLIGRAM(S): at 05:13

## 2019-02-15 RX ADMIN — Medication 100 MILLIGRAM(S): at 05:13

## 2019-02-15 RX ADMIN — Medication 2: at 12:27

## 2019-02-15 RX ADMIN — HEPARIN SODIUM 5000 UNIT(S): 5000 INJECTION INTRAVENOUS; SUBCUTANEOUS at 05:13

## 2019-02-15 RX ADMIN — LEVALBUTEROL 0.63 MILLIGRAM(S): 1.25 SOLUTION, CONCENTRATE RESPIRATORY (INHALATION) at 09:31

## 2019-02-15 RX ADMIN — LEVALBUTEROL 0.63 MILLIGRAM(S): 1.25 SOLUTION, CONCENTRATE RESPIRATORY (INHALATION) at 04:18

## 2019-02-15 RX ADMIN — Medication 500 MICROGRAM(S): at 09:31

## 2019-02-15 RX ADMIN — PANTOPRAZOLE SODIUM 40 MILLIGRAM(S): 20 TABLET, DELAYED RELEASE ORAL at 05:12

## 2019-02-15 RX ADMIN — Medication 4: at 09:20

## 2019-02-15 RX ADMIN — Medication 25 MILLIGRAM(S): at 05:12

## 2019-02-15 RX ADMIN — Medication 500 MICROGRAM(S): at 04:19

## 2019-02-15 NOTE — PROGRESS NOTE ADULT - SUBJECTIVE AND OBJECTIVE BOX
Robinson Hathaway MD  Interventional Cardiology / Advance Heart Failure and Cardiac Transplant Specialist  [place] Office : [address] [place] [id number]  Tel:   [place] Office : [address] [facility] [address] [address]  Tel: [phone]  Cell : [phone]    Subjective : Pt lying in bed comfortable, not in distress  	  MEDICATIONS:  furosemide   Injectable 40 milliGRAM(s) IV Push every 12 hours  heparin  Injectable 5000 Unit(s) SubCutaneous every 12 hours  metoprolol tartrate 25 milliGRAM(s) Oral two times a day      ipratropium    for Nebulization 500 MICROGram(s) Nebulizer every 6 hours  levalbuterol Inhalation 0.63 milliGRAM(s) Inhalation every 6 hours    aspirin 325 milliGRAM(s) Oral daily  ondansetron Injectable 4 milliGRAM(s) IV Push every 6 hours PRN  oxyCODONE    IR 5 milliGRAM(s) Oral every 3 hours PRN  oxyCODONE    IR 2.5 milliGRAM(s) Oral every 3 hours PRN    docusate sodium 100 milliGRAM(s) Oral three times a day  pantoprazole    Tablet 40 milliGRAM(s) Oral before breakfast    atorvastatin 40 milliGRAM(s) Oral at bedtime  dextrose 40% Gel 15 Gram(s) Oral once PRN  dextrose 50% Injectable 12.5 Gram(s) IV Push once  dextrose 50% Injectable 25 Gram(s) IV Push once  dextrose 50% Injectable 25 Gram(s) IV Push once  glucagon  Injectable 1 milliGRAM(s) IntraMuscular once PRN  insulin lispro (HumaLOG) corrective regimen sliding scale   SubCutaneous Before meals and at bedtime    dextrose 5%. 1000 milliLiter(s) IV Continuous <Continuous>  pyridoxine 100 milliGRAM(s) Oral daily      PHYSICAL EXAM:  T(C): 37.2 (02-15-19 @ 12:25), Max: 37.2 (02-15-19 @ 12:25)  HR: 74 (02-15-19 @ 12:25) (74 - 92)  BP: 127/62 (02-15-19 @ 12:25) (124/58 - 137/55)  RR: 18 (02-15-19 @ 12:25) (17 - 19)  SpO2: 99% (02-15-19 @ 12:25) (91% - 99%)  Wt(kg): --  I&O's Summary    14 Feb 2019 07:01  -  15 Feb 2019 07:00  --------------------------------------------------------  IN: 400 mL / OUT: 1400 mL / NET: -1000 mL            HEENT:   Normal oral mucosa, PERRL, EOMI	  Cardiovascular: Normal S1 S2, No JVD, No murmurs, No edema  Respiratory: b/l rhonchi  Gastrointestinal:  Soft, Non-tender, + BS	  Extremities: 2+ edema                02-15    140  |  93<L>  |  17  ----------------------------<  154<H>  3.7   |  34<H>  |  0.87    Ca    9.6      15 Feb 2019 11:49  Mg     1.9     02-15      proBNP:   Lipid Profile:   HgA1c:   TSH:

## 2019-02-15 NOTE — DIETITIAN INITIAL EVALUATION ADULT. - NS AS NUTRI INTERV MEALS SNACK
General/healthful diet/1. Recommend change diet to Low Na, CSTCHO.  2. Recommend Glucerna Shake 3x daily.

## 2019-02-15 NOTE — PROGRESS NOTE ADULT - REASON FOR ADMISSION
recurrent left pleural effusion

## 2019-02-15 NOTE — PROGRESS NOTE ADULT - ASSESSMENT
EKG : ST 1degree AV block, LVH TWI I, AVL, V4-6    Echo pending     Tele Afib with RVR    Assessement and Plan     1) Afib : denies history of a fib, [id number] score of 1, on asa 325 post op , mild;y volume up on exam , lasix 40mg IV q12     2) Perioperative risk assessment : s/p VATS and pleurx catheter    3) Lung ca/ Pleural effusion : adenoca, t/t per Surgery and Oncology     4) DVT PPX heparin

## 2019-02-21 ENCOUNTER — APPOINTMENT (OUTPATIENT)
Dept: RADIOLOGY | Facility: HOSPITAL | Age: 64
End: 2019-02-21

## 2019-02-21 ENCOUNTER — OUTPATIENT (OUTPATIENT)
Dept: OUTPATIENT SERVICES | Facility: HOSPITAL | Age: 64
LOS: 1 days | End: 2019-02-21
Payer: MEDICAID

## 2019-02-21 ENCOUNTER — APPOINTMENT (OUTPATIENT)
Dept: THORACIC SURGERY | Facility: CLINIC | Age: 64
End: 2019-02-21
Payer: MEDICAID

## 2019-02-21 VITALS
WEIGHT: 204 LBS | HEART RATE: 87 BPM | BODY MASS INDEX: 32.78 KG/M2 | OXYGEN SATURATION: 93 % | RESPIRATION RATE: 18 BRPM | DIASTOLIC BLOOD PRESSURE: 68 MMHG | TEMPERATURE: 98.7 F | SYSTOLIC BLOOD PRESSURE: 110 MMHG | HEIGHT: 66 IN

## 2019-02-21 DIAGNOSIS — J90 PLEURAL EFFUSION, NOT ELSEWHERE CLASSIFIED: ICD-10-CM

## 2019-02-21 DIAGNOSIS — F17.210 NICOTINE DEPENDENCE, CIGARETTES, UNCOMPLICATED: ICD-10-CM

## 2019-02-21 DIAGNOSIS — Z87.09 PERSONAL HISTORY OF OTHER DISEASES OF THE RESPIRATORY SYSTEM: ICD-10-CM

## 2019-02-21 PROCEDURE — 99214 OFFICE O/P EST MOD 30 MIN: CPT

## 2019-02-21 PROCEDURE — 71046 X-RAY EXAM CHEST 2 VIEWS: CPT | Mod: 26

## 2019-02-21 NOTE — ASSESSMENT
[FreeTextEntry1] : 64 y/o male, current smoker, with Pmhx of COPD, HTn, HLD, Left lung adenocarcinoma presented with progressively worsening SOB, s/p 1 L of effusion drainage on 1/14/19. He was hospitalized from 2/8 -- 2/15/19 at [facility] for dyspnea. CXR at that time revealed large loculated pleural effusion , a Pleurx catheter was placed. Postop course was complicated with persistent air leak, the Pleurx was attached to a mini atrium and pt was d/c home with it. \par \par He presents today for followup visit. He had Pleurx drained at home ( 2/16 300ml, 2/17 200ml, 2/18 250ml, 2/19 100ml, 2/20 160ml, 2/21 170ml). He admits SOB on exertion. Denies chest pain, cough and weight loss. \par \par The Pleurx catheter was checked in the office today, still revealing air leak. But he denies chest pain and worsening sob.  CXR done today, no acute disease noted. \par \par I have reviewed the patient's medical records and diagnostic images at the time of this office consultation and have made the following recommendation.\par Plan:\par 1. Continue Pleurx catheter with mini atrium with proper care at home. \par 2. RTC in 1 weeks with CXR. \par \par \par \par Written by Arcelia Portillo NP, acting as a scribe for Dr. Graciela Griffin.      \par “The documentation recorded by the scribe accurately reflects the service I personally performed and the decisions made by me.” Graciela Griffin MD.\par \par

## 2019-02-21 NOTE — PHYSICAL EXAM
[Sclera] : the sclera and conjunctiva were normal [PERRL With Normal Accommodation] : pupils were equal in size, round, and reactive to light [Neck Appearance] : the appearance of the neck was normal [Respiration, Rhythm And Depth] : normal respiratory rhythm and effort [Auscultation Breath Sounds / Voice Sounds] : lungs were clear to auscultation bilaterally [Heart Rate And Rhythm] : heart rate was normal and rhythm regular [Heart Sounds] : normal S1 and S2 [Examination Of The Chest] : the chest was normal in appearance [2+] : left 2+ [No Abnormalities] : the abdominal aorta was not enlarged and no bruit was heard [No Pulse Delay] : no pulse delay [No Pulse Discrepancy] : no pulse discrepancy detected [Full Pulse] : peripheral pulses were full [Bowel Sounds] : normal bowel sounds [Abdomen Soft] : soft [Abdomen Tenderness] : non-tender [Cervical Lymph Nodes Enlarged Posterior Bilaterally] : posterior cervical [Cervical Lymph Nodes Enlarged Anterior Bilaterally] : anterior cervical [No CVA Tenderness] : no ~M costovertebral angle tenderness [Involuntary Movements] : no involuntary movements were seen [Skin Color & Pigmentation] : normal skin color and pigmentation [Skin Turgor] : normal skin turgor [] : no rash [No Focal Deficits] : no focal deficits [Oriented To Time, Place, And Person] : oriented to person, place, and time [Affect] : the affect was normal [Mood] : the mood was normal [Fingers] :  capillary refill of the fingers was normal [Varicose Veins Of The Right Leg] : the patient has no varicose veins of the right leg [Varicose Veins Of The Left Leg] : the patient has no varicose veins of the left leg [FreeTextEntry1] : deferred

## 2019-02-22 ENCOUNTER — APPOINTMENT (OUTPATIENT)
Dept: PULMONOLOGY | Facility: CLINIC | Age: 64
End: 2019-02-22
Payer: MEDICAID

## 2019-02-22 VITALS
RESPIRATION RATE: 18 BRPM | WEIGHT: 204 LBS | SYSTOLIC BLOOD PRESSURE: 120 MMHG | HEART RATE: 71 BPM | TEMPERATURE: 97.5 F | DIASTOLIC BLOOD PRESSURE: 71 MMHG | BODY MASS INDEX: 32.78 KG/M2 | HEIGHT: 66 IN | OXYGEN SATURATION: 93 %

## 2019-02-22 PROCEDURE — 99214 OFFICE O/P EST MOD 30 MIN: CPT

## 2019-02-22 RX ORDER — UMECLIDINIUM BROMIDE AND VILANTEROL TRIFENATATE 62.5; 25 UG/1; UG/1
62.5-25 POWDER RESPIRATORY (INHALATION) DAILY
Qty: 1 | Refills: 11 | Status: ACTIVE | COMMUNITY
Start: 2019-02-22 | End: 1900-01-01

## 2019-02-22 NOTE — HISTORY OF PRESENT ILLNESS
[FreeTextEntry1] : Patient with new dx of advanced lung ca, recently admitted to [facility] with worsening dyspnea, received pleurex catheter but had air leak, sent home with portable chest tube. NOw doing ok, awaiting removal of chest tube by thoracic surgery, and awaiting first visit with oncologist to start treatment. Using duoneb four times daily for dcopd.

## 2019-02-22 NOTE — PHYSICAL EXAM
[General Appearance - Well Developed] : well developed [Normal Appearance] : normal appearance [Well Groomed] : well groomed [General Appearance - Well Nourished] : well nourished [No Deformities] : no deformities [General Appearance - In No Acute Distress] : no acute distress [Normal Conjunctiva] : the conjunctiva exhibited no abnormalities [Eyelids - No Xanthelasma] : the eyelids demonstrated no xanthelasmas [Normal Oropharynx] : normal oropharynx [Neck Appearance] : the appearance of the neck was normal [Neck Cervical Mass (___cm)] : no neck mass was observed [Jugular Venous Distention Increased] : there was no jugular-venous distention [Thyroid Diffuse Enlargement] : the thyroid was not enlarged [Thyroid Nodule] : there were no palpable thyroid nodules [Heart Rate And Rhythm] : heart rate and rhythm were normal [Heart Sounds] : normal S1 and S2 [Murmurs] : no murmurs present [Abdomen Soft] : soft [Abdomen Tenderness] : non-tender [Abdomen Mass (___ Cm)] : no abdominal mass palpated [Nail Clubbing] : no clubbing of the fingernails [Cyanosis, Localized] : no localized cyanosis [Petechial Hemorrhages (___cm)] : no petechial hemorrhages [] : no ischemic changes [FreeTextEntry1] : decreased breath sounds left base, occ wheeze

## 2019-02-22 NOTE — ASSESSMENT
[FreeTextEntry1] : 1. Lung ca: Patient now with chest tube, awaiting first visit with oncology\par \par 2. COPD: WIll try anoro. Hasnt had time for PFTs.

## 2019-02-25 LAB — ACID FAST STN FLD: SIGNIFICANT CHANGE UP

## 2019-02-28 ENCOUNTER — RESULT REVIEW (OUTPATIENT)
Age: 64
End: 2019-02-28

## 2019-02-28 ENCOUNTER — LABORATORY RESULT (OUTPATIENT)
Age: 64
End: 2019-02-28

## 2019-02-28 ENCOUNTER — APPOINTMENT (OUTPATIENT)
Dept: HEMATOLOGY ONCOLOGY | Facility: CLINIC | Age: 64
End: 2019-02-28
Payer: MEDICAID

## 2019-02-28 VITALS
WEIGHT: 205.03 LBS | OXYGEN SATURATION: 93 % | HEART RATE: 81 BPM | HEIGHT: 65.94 IN | SYSTOLIC BLOOD PRESSURE: 120 MMHG | RESPIRATION RATE: 18 BRPM | DIASTOLIC BLOOD PRESSURE: 72 MMHG | TEMPERATURE: 98.2 F | BODY MASS INDEX: 33.35 KG/M2

## 2019-02-28 DIAGNOSIS — Z83.3 FAMILY HISTORY OF DIABETES MELLITUS: ICD-10-CM

## 2019-02-28 DIAGNOSIS — Z78.9 OTHER SPECIFIED HEALTH STATUS: ICD-10-CM

## 2019-02-28 DIAGNOSIS — I10 ESSENTIAL (PRIMARY) HYPERTENSION: ICD-10-CM

## 2019-02-28 DIAGNOSIS — F17.200 NICOTINE DEPENDENCE, UNSPECIFIED, UNCOMPLICATED: ICD-10-CM

## 2019-02-28 DIAGNOSIS — E78.5 HYPERLIPIDEMIA, UNSPECIFIED: ICD-10-CM

## 2019-02-28 DIAGNOSIS — J44.9 CHRONIC OBSTRUCTIVE PULMONARY DISEASE, UNSPECIFIED: ICD-10-CM

## 2019-02-28 DIAGNOSIS — E11.9 TYPE 2 DIABETES MELLITUS W/OUT COMPLICATIONS: ICD-10-CM

## 2019-02-28 LAB
BASOPHILS # BLD AUTO: 0 K/UL — SIGNIFICANT CHANGE UP (ref 0–0.2)
BASOPHILS NFR BLD AUTO: 0.2 % — SIGNIFICANT CHANGE UP (ref 0–2)
EOSINOPHIL # BLD AUTO: 0.6 K/UL — HIGH (ref 0–0.5)
EOSINOPHIL NFR BLD AUTO: 3.9 % — SIGNIFICANT CHANGE UP (ref 0–6)
HCT VFR BLD CALC: 38.6 % — LOW (ref 39–50)
HGB BLD-MCNC: 12.4 G/DL — LOW (ref 13–17)
LYMPHOCYTES # BLD AUTO: 1.6 K/UL — SIGNIFICANT CHANGE UP (ref 1–3.3)
LYMPHOCYTES # BLD AUTO: 10.7 % — LOW (ref 13–44)
MCHC RBC-ENTMCNC: 25.9 PG — LOW (ref 27–34)
MCHC RBC-ENTMCNC: 32.2 G/DL — SIGNIFICANT CHANGE UP (ref 32–36)
MCV RBC AUTO: 80.4 FL — SIGNIFICANT CHANGE UP (ref 80–100)
MONOCYTES # BLD AUTO: 0.9 K/UL — SIGNIFICANT CHANGE UP (ref 0–0.9)
MONOCYTES NFR BLD AUTO: 6.1 % — SIGNIFICANT CHANGE UP (ref 2–14)
NEUTROPHILS # BLD AUTO: 12.1 K/UL — HIGH (ref 1.8–7.4)
NEUTROPHILS NFR BLD AUTO: 79.1 % — HIGH (ref 43–77)
PLATELET # BLD AUTO: 456 K/UL — HIGH (ref 150–400)
RBC # BLD: 4.8 M/UL — SIGNIFICANT CHANGE UP (ref 4.2–5.8)
RBC # FLD: 14.4 % — SIGNIFICANT CHANGE UP (ref 10.3–14.5)
WBC # BLD: 15.3 K/UL — HIGH (ref 3.8–10.5)
WBC # FLD AUTO: 15.3 K/UL — HIGH (ref 3.8–10.5)

## 2019-02-28 PROCEDURE — 99205 OFFICE O/P NEW HI 60 MIN: CPT

## 2019-02-28 RX ORDER — OXYCODONE HYDROCHLORIDE 5 MG/1
5 CAPSULE ORAL EVERY 4 HOURS
Qty: 360 | Refills: 0 | Status: ACTIVE | COMMUNITY
Start: 2019-02-28 | End: 1900-01-01

## 2019-02-28 NOTE — ASSESSMENT
[FreeTextEntry1] : 62 yo M with newly dx'ed lung adenoCA, PDL-1+, recently re-admitted to hospital for dyspnea and placement of pleurex catheter, now presents for first medical oncology consultation.\par \par -PET/CT and brain MRI ordered for staging\par -check labs today including CEA\par -c/w inhalers prn dyspnea. Has home O2 but does not use. \par -order oxycodone PRN pain \par -immunotherapy discussed (keytruda) as well as common adverse effects to expect\par -refer for dietary eval and counseling\par -refer for palliative/pain control\par -counseled on smoking cessation, resources provided\par -f/u with CT surgery re: removal of pleurex\par -c/w endocrine f/u for poorly controlled DM\par -return visit Monday to start keytruda\par \par Plan d/w Dr. Mao\par \par SU Rangel M.D. PGY-2\par

## 2019-02-28 NOTE — HISTORY OF PRESENT ILLNESS
[de-identified] : 63 M with PMH of T2DM, HTN, HLD, COPD, and left lung adenocarcinoma c/b pleural effusion s/p pleurX catheter 02/2019 here for initial consultation visit. Patient was initially hospitalized in 01/2018 for dyspnea, found to have  pleural effusion on CXR as well as BLANCA mass. HE underwent biopsy BLANCA US FNA which showed lung adenocarcinoma with 70% PDL1.  He was hospitalized from 2/8 - 2/15/19 at [facility] for dyspnea, found to have large loculated pleural effusion for which patient was discharged with a Pleurx by thoracic surgery. \par \par 1/11/19- presented to ED with worsening dyspnea, was admitted to hospital. CXR with L. pleural effusion and mass. \par 1/14/19 - thoracentesis performed, results suspicious for malignancy\par 1/15/19- d/c home with homecare and home O2\par 1/21/19- had lung bx + US guided FNA with Dr. Griffin, positive for adenocarcinoma, TTF+, 70% PDL1\par 1/23/19 - f/u with Dr. Kern in office\par 2/8/19- worsening dyspnea, re-admitted to hospital\par 2/11/19- pleurX catheter placed in OR\par 2/15/19- d/c home\par 2/21/19- f/u Dr. Griffin. PleurX catheter remained in place due to continued drainage. f/u CXR shows unchanged size of effusion\par 2/22/19 - f/u Dr. Kern\par 2/28/19- presents for first medical oncology visit. \par \par Today patient is accompanied by wife who endorses extreme anxiety, difficulty sleeping at night, fatigue, poor appetite without weight loss.  He has significant shortness of breath which is worse at night while lying flat. Sleeps with 3 pillows. Feels that dyspnea is sometimes resolved with ambulation, at other times resolved with rest.  Also endorsing L. shoulder pain as well as upper abdominal pain, which he attributes to pleurX catheter. Currently using tylenol PRN only for pain. Denies fevers, chills, CP, nausea, vomiting, diarrhea, muscle pains. 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 Wife disabled, not employed. Patient and wife were in process of seeking divorce however terminated this process upon learning of lung CA diagnosis.\par 2 ppd smoking history x 40 years. Cigarettes only. Wife who lives in home also smokes. ETOH "social 1-2 x weekly". No ilicit drug use.\par Medications: amlodipine, aspirin, colace, januvia, lasix, lipitor, metformin, metoprolol, potassium supp, valsarta, vitamin B6, albuterol inhaler q6 PRN, advair inhaler, tylenol PRN. Rx'ed oxycodone but did not  Rx.\par Allergies: NKDA\par \par

## 2019-02-28 NOTE — PHYSICAL EXAM
[Restricted in physically strenuous activity but ambulatory and able to carry out work of a light or sedentary nature] : Status 1- Restricted in physically strenuous activity but ambulatory and able to carry out work of a light or sedentary nature, e.g., light house work, office work [Normal] : grossly intact [de-identified] : Breathing appears mildly labored upon standing, however speaking full sentences, on room air. Wheezes b/l, L>R, decreased breath sounds at LLB.  [de-identified] : appears anxious, getting up during consultation to pace room

## 2019-02-28 NOTE — REVIEW OF SYSTEMS
[Fatigue] : fatigue [Lower Ext Edema] : lower extremity edema [Shortness Of Breath] : shortness of breath [Wheezing] : wheezing [Cough] : cough [SOB on Exertion] : shortness of breath during exertion [Abdominal Pain] : abdominal pain [Insomnia] : insomnia [Anxiety] : anxiety [Fever] : no fever [Chills] : no chills [Night Sweats] : no night sweats [Recent Change In Weight] : ~T no recent weight change [Loss of Hearing] : no loss of hearing [Chest Pain] : no chest pain [Palpitations] : no palpitations [Leg Claudication] : no intermittent leg claudication [Vomiting] : no vomiting [Constipation] : no constipation [Diarrhea] : no diarrhea [Joint Pain] : no joint pain [Muscle Pain] : no muscle pain [Skin Rash] : no skin rash [Dizziness] : no dizziness [Fainting] : no fainting [Depression] : no depression

## 2019-03-04 ENCOUNTER — APPOINTMENT (OUTPATIENT)
Dept: INFUSION THERAPY | Facility: HOSPITAL | Age: 64
End: 2019-03-04

## 2019-03-05 ENCOUNTER — APPOINTMENT (OUTPATIENT)
Dept: THORACIC SURGERY | Facility: CLINIC | Age: 64
End: 2019-03-05
Payer: MEDICAID

## 2019-03-05 ENCOUNTER — APPOINTMENT (OUTPATIENT)
Dept: RADIOLOGY | Facility: HOSPITAL | Age: 64
End: 2019-03-05

## 2019-03-05 ENCOUNTER — OUTPATIENT (OUTPATIENT)
Dept: OUTPATIENT SERVICES | Facility: HOSPITAL | Age: 64
LOS: 1 days | Discharge: HOME | End: 2019-03-05
Payer: MEDICAID

## 2019-03-05 ENCOUNTER — OUTPATIENT (OUTPATIENT)
Dept: OUTPATIENT SERVICES | Facility: HOSPITAL | Age: 64
LOS: 1 days | End: 2019-03-05
Payer: MEDICAID

## 2019-03-05 VITALS
SYSTOLIC BLOOD PRESSURE: 124 MMHG | HEIGHT: 66 IN | TEMPERATURE: 98.6 F | HEART RATE: 79 BPM | RESPIRATION RATE: 18 BRPM | OXYGEN SATURATION: 91 % | WEIGHT: 202 LBS | BODY MASS INDEX: 32.47 KG/M2 | DIASTOLIC BLOOD PRESSURE: 70 MMHG

## 2019-03-05 DIAGNOSIS — J90 PLEURAL EFFUSION, NOT ELSEWHERE CLASSIFIED: ICD-10-CM

## 2019-03-05 DIAGNOSIS — Z51.11 ENCOUNTER FOR ANTINEOPLASTIC CHEMOTHERAPY: ICD-10-CM

## 2019-03-05 DIAGNOSIS — C34.90 MALIGNANT NEOPLASM OF UNSPECIFIED PART OF UNSPECIFIED BRONCHUS OR LUNG: ICD-10-CM

## 2019-03-05 PROCEDURE — 71046 X-RAY EXAM CHEST 2 VIEWS: CPT | Mod: 26

## 2019-03-05 PROCEDURE — 99212 OFFICE O/P EST SF 10 MIN: CPT

## 2019-03-06 ENCOUNTER — FORM ENCOUNTER (OUTPATIENT)
Age: 64
End: 2019-03-06

## 2019-03-07 ENCOUNTER — APPOINTMENT (OUTPATIENT)
Dept: NUCLEAR MEDICINE | Facility: IMAGING CENTER | Age: 64
End: 2019-03-07
Payer: MEDICAID

## 2019-03-07 ENCOUNTER — APPOINTMENT (OUTPATIENT)
Dept: HEMATOLOGY ONCOLOGY | Facility: CLINIC | Age: 64
End: 2019-03-07
Payer: MEDICAID

## 2019-03-07 ENCOUNTER — APPOINTMENT (OUTPATIENT)
Dept: NUCLEAR MEDICINE | Facility: IMAGING CENTER | Age: 64
End: 2019-03-07

## 2019-03-07 ENCOUNTER — OUTPATIENT (OUTPATIENT)
Dept: OUTPATIENT SERVICES | Facility: HOSPITAL | Age: 64
LOS: 1 days | End: 2019-03-07
Payer: MEDICAID

## 2019-03-07 VITALS
RESPIRATION RATE: 17 BRPM | TEMPERATURE: 97.9 F | HEART RATE: 84 BPM | WEIGHT: 200 LBS | OXYGEN SATURATION: 91 % | DIASTOLIC BLOOD PRESSURE: 70 MMHG | BODY MASS INDEX: 33.32 KG/M2 | SYSTOLIC BLOOD PRESSURE: 119 MMHG | HEIGHT: 65 IN

## 2019-03-07 DIAGNOSIS — F41.9 ANXIETY DISORDER, UNSPECIFIED: ICD-10-CM

## 2019-03-07 DIAGNOSIS — T40.2X5A DRUG INDUCED CONSTIPATION: ICD-10-CM

## 2019-03-07 DIAGNOSIS — K59.03 DRUG INDUCED CONSTIPATION: ICD-10-CM

## 2019-03-07 DIAGNOSIS — G89.3 NEOPLASM RELATED PAIN (ACUTE) (CHRONIC): ICD-10-CM

## 2019-03-07 DIAGNOSIS — C34.92 MALIGNANT NEOPLASM OF UNSPECIFIED PART OF LEFT BRONCHUS OR LUNG: ICD-10-CM

## 2019-03-07 DIAGNOSIS — R06.00 DYSPNEA, UNSPECIFIED: ICD-10-CM

## 2019-03-07 PROCEDURE — 78815 PET IMAGE W/CT SKULL-THIGH: CPT

## 2019-03-07 PROCEDURE — A9552: CPT

## 2019-03-07 PROCEDURE — 78815 PET IMAGE W/CT SKULL-THIGH: CPT | Mod: 26,PI

## 2019-03-07 PROCEDURE — 99204 OFFICE O/P NEW MOD 45 MIN: CPT

## 2019-03-07 RX ORDER — LORAZEPAM 0.5 MG/1
0.5 TABLET ORAL
Qty: 60 | Refills: 0 | Status: ACTIVE | COMMUNITY
Start: 2019-03-07 | End: 1900-01-01

## 2019-03-07 RX ORDER — SENNOSIDES 8.6 MG/1
8.6 CAPSULE, GELATIN COATED ORAL
Qty: 60 | Refills: 0 | Status: ACTIVE | COMMUNITY
Start: 2019-03-07 | End: 1900-01-01

## 2019-03-11 NOTE — PHYSICAL EXAM
[Sclera] : the sclera and conjunctiva were normal [PERRL With Normal Accommodation] : pupils were equal in size, round, and reactive to light [Neck Appearance] : the appearance of the neck was normal [] : no respiratory distress [Respiration, Rhythm And Depth] : normal respiratory rhythm and effort [Auscultation Breath Sounds / Voice Sounds] : lungs were clear to auscultation bilaterally [Heart Sounds] : normal S1 and S2 [Murmurs] : no murmurs [Bowel Sounds] : normal bowel sounds [Abdomen Soft] : soft [Cervical Lymph Nodes Enlarged Posterior Bilaterally] : posterior cervical [Cervical Lymph Nodes Enlarged Anterior Bilaterally] : anterior cervical [Supraclavicular Lymph Nodes Enlarged Bilaterally] : supraclavicular [No CVA Tenderness] : no ~M costovertebral angle tenderness [Abnormal Walk] : normal gait [Skin Color & Pigmentation] : normal skin color and pigmentation [Skin Turgor] : normal skin turgor [No Focal Deficits] : no focal deficits [Oriented To Time, Place, And Person] : oriented to person, place, and time [Affect] : the affect was normal [Mood] : the mood was normal [FreeTextEntry1] : Left Pleurx Catheter to Mini Atrium - air leak noted

## 2019-03-11 NOTE — CONSULT LETTER
[Dear  ___] : Dear  [unfilled], [Courtesy Letter:] : I had the pleasure of seeing your patient, [unfilled], in my office today. [Please see my note below.] : Please see my note below. [Sincerely,] : Sincerely, [FreeTextEntry2] : Dr. Chucho Kern (Pulm)\par Dr. Carl Mao (HemOnc)\par \par  [FreeTextEntry3] : \par \par \par Armando Moore MD, FACS \par Chief, Division of Thoracic Surgery \par Director, Minimally Invasive Thoracic Surgery \par Department of Cardiovascular and Thoracic Surgery \par [facility] \par , Cardiovascular and Thoracic Surgery

## 2019-03-11 NOTE — ASSESSMENT
[FreeTextEntry1] : Mr. Bañuelos is a 64 y/o male with history of left pleural effusion returns today for follow up.  PMHX includes former smoker (quit January 2019), COPD, HTN, HLD, and Left lung adenocarcinoma.  1 L of effusion was drained on 1/14/19.  He was hospitalized from 2/8/19 to 2/15/19 at [facility] for dyspnea. CXR revealed large loculated pleural effusion, s/p FB, uniportal left VATS, implantation of PleurX catheter on 2/11/19.  Postop course was complicated with persistent air leak, the Pleurx was attached to a mini atrium and pt was d/c home with it. \par \par CXR today reveals: No significant interval change. Stable trace right pleural effusion.  Left basilar Pleurx catheter unchanged in position with adjacent loculated pneumothorax again seen. Continued large oval opacity in the left mid and lower lung. Small loculated left pleural effusion again seen at the left costophrenic angle and along the lateral periphery of the left chest\par \par I have reviewed the patient's medical records and diagnostic images during the time of this office visit, and I have made the following recommendation:\par 1.  Chest tube to be removed once air leak resolves\par 2.  Return to office for follow up in 1 week.  No repeat images needed.\par \par \par Written by Prerna Daley NP, acting as a scribe for Armanod Nicole MD.\par \par The documentation recorded by the scribe accurately reflects the service I personally performed and the decisions made by me. ARMANDO NICOLE MD

## 2019-03-11 NOTE — HISTORY OF PRESENT ILLNESS
[FreeTextEntry1] : Mr. Bañuelos is a 64 y/o male with history of left pleural effusion returns today for follow up.  PMHX includes former smoker (quit January 2019), COPD, HTN, HLD, and Left lung adenocarcinoma.  1 L of effusion was drained on 1/14/19.  He was hospitalized from 2/8/19 to 2/15/19 at [facility] for dyspnea. CXR revealed large loculated pleural effusion, s/p FB, uniportal left VATS, implantation of PleurX catheter on 2/11/19.  Postop course was complicated with persistent air leak, the Pleurx was attached to a mini atrium and pt was d/c home with it. \par \par CXR today reveals:\par - No significant interval change\par - Stable trace right pleural effusion. \par - Left basilar Pleurx catheter unchanged in position with adjacent loculated pneumothorax again seen\par - Continued large oval opacity in the left mid and lower lung \par - Small loculated left pleural effusion again seen at the left costophrenic angle and along the lateral periphery of the left chest\par \par He denies any fever, chills, cough, shortness of breath, chest pain, hemoptysis, or recent illness. Of note, he reports beginning treatment with Keytruda yesterday.  Left pleurx mini-atrium drained daily, approximately 100-200 daily over the past week.

## 2019-03-12 ENCOUNTER — APPOINTMENT (OUTPATIENT)
Dept: THORACIC SURGERY | Facility: CLINIC | Age: 64
End: 2019-03-12
Payer: MEDICAID

## 2019-03-12 VITALS
RESPIRATION RATE: 16 BRPM | SYSTOLIC BLOOD PRESSURE: 126 MMHG | DIASTOLIC BLOOD PRESSURE: 63 MMHG | HEIGHT: 65 IN | OXYGEN SATURATION: 90 % | WEIGHT: 200 LBS | BODY MASS INDEX: 33.32 KG/M2 | HEART RATE: 73 BPM

## 2019-03-12 DIAGNOSIS — J90 PLEURAL EFFUSION, NOT ELSEWHERE CLASSIFIED: ICD-10-CM

## 2019-03-12 PROCEDURE — 99214 OFFICE O/P EST MOD 30 MIN: CPT

## 2019-03-13 PROBLEM — R06.00 DYSPNEA: Status: ACTIVE | Noted: 2019-03-13

## 2019-03-13 PROBLEM — J90 PLEURAL EFFUSION: Status: ACTIVE | Noted: 2019-02-21

## 2019-03-13 LAB
ALBUMIN SERPL ELPH-MCNC: 3.9 G/DL
ALP BLD-CCNC: 99 U/L
ALT SERPL-CCNC: 14 U/L
ANION GAP SERPL CALC-SCNC: 16 MMOL/L
AST SERPL-CCNC: 14 U/L
BILIRUB SERPL-MCNC: 0.3 MG/DL
BUN SERPL-MCNC: 11 MG/DL
CALCIUM SERPL-MCNC: 9.7 MG/DL
CHLORIDE SERPL-SCNC: 97 MMOL/L
CO2 SERPL-SCNC: 27 MMOL/L
CREAT SERPL-MCNC: 0.89 MG/DL
GLUCOSE SERPL-MCNC: 134 MG/DL
POTASSIUM SERPL-SCNC: 4.8 MMOL/L
PROT SERPL-MCNC: 6.4 G/DL
SODIUM SERPL-SCNC: 140 MMOL/L
TSH SERPL-ACNC: 3.51 UIU/ML

## 2019-03-13 RX ORDER — METHADONE HYDROCHLORIDE 5 MG/1
5 TABLET ORAL TWICE DAILY
Qty: 60 | Refills: 0 | Status: ACTIVE | COMMUNITY
Start: 2019-03-07

## 2019-03-13 NOTE — PHYSICAL EXAM
[Sclera] : the sclera and conjunctiva were normal [Normal Oral Mucosa] : normal oral mucosa [Neck Appearance] : the appearance of the neck was normal [Heart Rate And Rhythm] : heart rate was normal and rhythm regular [Heart Sounds] : normal S1 and S2 [Bowel Sounds] : normal bowel sounds [Abdomen Soft] : soft [Abdomen Tenderness] : non-tender [FreeTextEntry1] : obese [Abnormal Walk] : normal gait [No Focal Deficits] : no focal deficits [Oriented To Time, Place, And Person] : oriented to person, place, and time

## 2019-03-13 NOTE — HISTORY OF PRESENT ILLNESS
[FreeTextEntry1] : 63yoM with Stage IV NSCLC (dx 1/2019) presents for initial palliative care visit, referred by Onc team.\par PMH significant for COPD, current smoker, DM, HTN.\par Pt has L chest tube in place, wife is draining daily, ~150-200ccs/day. \par \par Main issues today are pain and gas/bloating. Is using simethicone PRN. \par \par Pain is localized to the L shoulder/scapula with radiation to the upper back over the course of the day.  His wife tries to do massages which soothe the pain a little.  Has been taking PRN Oxy IR 10mg \par \par He also has pain at the insertion site of his L chest tube. Lying down is painful for him and causes him to have trouble breathing. He sleeps sitting up on the couch.  \par \par ROS:\par +trouble sleeping 2/2 pain and SOB when lying down\par +constipation \par +anxious - this is new for him lately, he is jumpy at night.  Worried a lot. \par +loss of appetite\par \par Patient is , lives with his spouse and two children, ages 25 and 20.  Just prior to diagnosis pt was working as a , is currently on leave from work. \par \par Receiving [facility] home care services \par \par [employer]-Stop Ref#: [id number]

## 2019-03-13 NOTE — DATA REVIEWED
[FreeTextEntry1] : CT C/A/P (2/8/19) - \par LUNGS AND LARGE AIRWAYS: Redemonstrated left upper lobe lung mass with resultant obstructive atelectasis. \par PLEURA: Large loculated malignant left pleural effusion, increased in size.\par VESSELS: Within normal limits.\par HEART: Heart size is normal. No pericardial effusion. Coronary calcifications.\par MEDIASTINUM AND BHAVESH: Diffuse mediastinal lymphadenopathy.  For example, a lower right paratracheal node (series 2, image 23) measures 1.6 x 1.5 cm\par CHEST WALL AND LOWER NECK: Within normal limits.\par \par ABDOMEN AND PELVIS:\par \par LIVER: Within normal limits.\par BILE DUCTS: Normal caliber.\par GALLBLADDER: Within normal limits.\par SPLEEN: Within normal limits.\par PANCREAS: Within normal limits.\par ADRENALS: Within normal limits.\par KIDNEYS/URETERS: Within normal limits. No hydronephrosis.\par \par BLADDER: Within normal limits.\par REPRODUCTIVE ORGANS: Prostate within normal limits.\par \par BOWEL: No bowel obstruction. Appendix is normal.\par PERITONEUM: No ascites.\par VESSELS:  Atherosclerosis.\par RETROPERITONEUM: No lymphadenopathy.  \par ABDOMINAL WALL: Small bilateral fat-containing inguinal hernias.\par BONES: Degenerative changes of thoracic and lumbar spine including osteophyte formation, facet hypertrophy and loss of intervertebral disc space.\par \par IMPRESSION: \par Large malignant loculated left pleural effusion, increased in size.\par Redemonstrated left upper lobe lung mass and diffuse mediastinal lymphadenopathy.

## 2019-03-13 NOTE — ASSESSMENT
[FreeTextEntry1] : 63yoM with:\par \par 1. Stage IV NSCLC - Starting Keytruda today.  Med Onc follow up to \par \par 2. Pain 2/2 neoplasm + chest tube - Start Methadone 5mg BID. C/w Oxy IR 10mg Q4h. \par \par 3. Constipation 2/2 opioids - Recommend use of daily Senna, PRN Miralax. Reinforced good hydration. \par \par 4. Dyspnea/anxiety/insomnia - Lorazepam 0.5mg PRN\par \par 5. Encounter for Palliative Care - HCP already completed. Copy made and scanned into system. \par Emotional support provided. Wife experiencing significant caregiver distress. \par \par RTO 1 month, call sooner with issues [______] : HCP: [unfilled]

## 2019-03-15 NOTE — CONSULT LETTER
[Dear  ___] : Dear  [unfilled], [Courtesy Letter:] : I had the pleasure of seeing your patient, [unfilled], in my office today. [Please see my note below.] : Please see my note below. [Sincerely,] : Sincerely, [FreeTextEntry2] : Dr. Chucho Kern (Pulm)\par Dr. Carl Mao (HemOnc) [FreeTextEntry3] : \par \par \par Armando Moore MD, FACS \par Chief, Division of Thoracic Surgery \par Director, Minimally Invasive Thoracic Surgery \par Department of Cardiovascular and Thoracic Surgery \par [facility] \par , Cardiovascular and Thoracic Surgery

## 2019-03-15 NOTE — REVIEW OF SYSTEMS
[Feeling Poorly] : feeling poorly [Feeling Tired] : feeling tired [Shortness Of Breath] : shortness of breath [Negative] : Heme/Lymph [FreeTextEntry7] : abdominal discomfort, abdominal fullness [FreeTextEntry9] : generalized weakness

## 2019-03-15 NOTE — HISTORY OF PRESENT ILLNESS
[FreeTextEntry1] : Mr. Bañuelos is a 62 y/o male with history of left pleural effusion returns today for follow up. PMHX includes former smoker (quit January 2019), COPD, HTN, HLD, and Left lung adenocarcinoma. 1 L of effusion was drained on 1/14/19. He was hospitalized from 2/8/19 to 2/15/19 at [facility] for dyspnea. CXR revealed large loculated pleural effusion, s/p FB, uniportal left VATS, implantation of PleurX catheter on 2/11/19. Postop course was complicated with persistent air leak, the Pleurx was attached to a mini atrium and pt was d/c home with it. \par \par He denies any fever, chills, cough, shortness of breath, chest pain, hemoptysis, or recent illness. Of note, he reports beginning treatment with Keytruda, next infusion scheduled for 3/26.  Left pleurx mini-atrium drained daily, approximately 150cc daily over the past week.

## 2019-03-15 NOTE — ASSESSMENT
[FreeTextEntry1] : Mr. Bañuelos is a 62 y/o male with history of left pleural effusion returns today for follow up. PMHX includes former smoker (quit January 2019), COPD, HTN, HLD, and Left lung adenocarcinoma. 1 L of effusion was drained on 1/14/19. He was hospitalized from 2/8/19 to 2/15/19 at [facility] for dyspnea. CXR revealed large loculated pleural effusion, s/p FB, uniportal left VATS, implantation of PleurX catheter on 2/11/19. Postop course was complicated with persistent air leak, the Pleurx was attached to a mini atrium and pt was d/c home with it. \par \par Left Chest Tube air leak noted, however, continues to improve\par \par I have reviewed the patient's medical records and diagnostic images during the time of this office visit, and I have made the following recommendation:\par 1.  Return to office for follow up in 1-2 weeks, no scans required\par 2.  Follow up with oncology regarding results of PET/CT and abdominal discomfort\par \par \par Written by Prerna Daley NP, acting as a scribe for Armando Nicole MD.\par \par The documentation recorded by the scribe accurately reflects the service I personally performed and the decisions made by me. ARMANDO NICOLE MD

## 2019-03-20 ENCOUNTER — INPATIENT (INPATIENT)
Facility: HOSPITAL | Age: 64
LOS: 15 days | Discharge: HOSPICE | End: 2019-04-05
Attending: INTERNAL MEDICINE | Admitting: INTERNAL MEDICINE
Payer: MEDICAID

## 2019-03-20 VITALS
HEART RATE: 87 BPM | RESPIRATION RATE: 16 BRPM | DIASTOLIC BLOOD PRESSURE: 64 MMHG | TEMPERATURE: 99 F | OXYGEN SATURATION: 95 % | SYSTOLIC BLOOD PRESSURE: 141 MMHG

## 2019-03-20 DIAGNOSIS — R13.10 DYSPHAGIA, UNSPECIFIED: ICD-10-CM

## 2019-03-20 LAB
ALBUMIN SERPL ELPH-MCNC: 4 G/DL — SIGNIFICANT CHANGE UP (ref 3.3–5)
ALP SERPL-CCNC: 101 U/L — SIGNIFICANT CHANGE UP (ref 40–120)
ALT FLD-CCNC: 16 U/L — SIGNIFICANT CHANGE UP (ref 4–41)
ANION GAP SERPL CALC-SCNC: 14 MMO/L — SIGNIFICANT CHANGE UP (ref 7–14)
APTT BLD: 32.9 SEC — SIGNIFICANT CHANGE UP (ref 27.5–36.3)
AST SERPL-CCNC: 16 U/L — SIGNIFICANT CHANGE UP (ref 4–40)
BASE EXCESS BLDV CALC-SCNC: 10.4 MMOL/L — SIGNIFICANT CHANGE UP
BASE EXCESS BLDV CALC-SCNC: 8.7 MMOL/L — SIGNIFICANT CHANGE UP
BASOPHILS # BLD AUTO: 0.09 K/UL — SIGNIFICANT CHANGE UP (ref 0–0.2)
BASOPHILS NFR BLD AUTO: 0.7 % — SIGNIFICANT CHANGE UP (ref 0–2)
BILIRUB SERPL-MCNC: 0.5 MG/DL — SIGNIFICANT CHANGE UP (ref 0.2–1.2)
BLOOD GAS VENOUS - CREATININE: 0.76 MG/DL — SIGNIFICANT CHANGE UP (ref 0.5–1.3)
BLOOD GAS VENOUS - CREATININE: SIGNIFICANT CHANGE UP MG/DL (ref 0.5–1.3)
BUN SERPL-MCNC: 10 MG/DL — SIGNIFICANT CHANGE UP (ref 7–23)
CALCIUM SERPL-MCNC: 10.3 MG/DL — SIGNIFICANT CHANGE UP (ref 8.4–10.5)
CHLORIDE BLDV-SCNC: 93 MMOL/L — LOW (ref 96–108)
CHLORIDE BLDV-SCNC: 96 MMOL/L — SIGNIFICANT CHANGE UP (ref 96–108)
CHLORIDE SERPL-SCNC: 92 MMOL/L — LOW (ref 98–107)
CO2 SERPL-SCNC: 32 MMOL/L — HIGH (ref 22–31)
CREAT SERPL-MCNC: 0.8 MG/DL — SIGNIFICANT CHANGE UP (ref 0.5–1.3)
EOSINOPHIL # BLD AUTO: 0.2 K/UL — SIGNIFICANT CHANGE UP (ref 0–0.5)
EOSINOPHIL NFR BLD AUTO: 1.6 % — SIGNIFICANT CHANGE UP (ref 0–6)
GAS PNL BLDV: 136 MMOL/L — SIGNIFICANT CHANGE UP (ref 136–146)
GAS PNL BLDV: 138 MMOL/L — SIGNIFICANT CHANGE UP (ref 136–146)
GLUCOSE BLDV-MCNC: 105 — HIGH (ref 70–99)
GLUCOSE BLDV-MCNC: 109 — HIGH (ref 70–99)
GLUCOSE SERPL-MCNC: 101 MG/DL — HIGH (ref 70–99)
HCO3 BLDV-SCNC: 31 MMOL/L — HIGH (ref 20–27)
HCO3 BLDV-SCNC: 31 MMOL/L — HIGH (ref 20–27)
HCT VFR BLD CALC: 44.9 % — SIGNIFICANT CHANGE UP (ref 39–50)
HCT VFR BLDV CALC: 34 % — LOW (ref 39–51)
HCT VFR BLDV CALC: 42.8 % — SIGNIFICANT CHANGE UP (ref 39–51)
HGB BLD-MCNC: 13.7 G/DL — SIGNIFICANT CHANGE UP (ref 13–17)
HGB BLDV-MCNC: 11 G/DL — LOW (ref 13–17)
HGB BLDV-MCNC: 13.9 G/DL — SIGNIFICANT CHANGE UP (ref 13–17)
IMM GRANULOCYTES NFR BLD AUTO: 0.6 % — SIGNIFICANT CHANGE UP (ref 0–1.5)
INR BLD: 1.27 — HIGH (ref 0.88–1.17)
LACTATE BLDV-MCNC: 1.8 MMOL/L — SIGNIFICANT CHANGE UP (ref 0.5–2)
LACTATE BLDV-MCNC: 2.4 MMOL/L — HIGH (ref 0.5–2)
LYMPHOCYTES # BLD AUTO: 1.18 K/UL — SIGNIFICANT CHANGE UP (ref 1–3.3)
LYMPHOCYTES # BLD AUTO: 9.3 % — LOW (ref 13–44)
MCHC RBC-ENTMCNC: 25 PG — LOW (ref 27–34)
MCHC RBC-ENTMCNC: 30.5 % — LOW (ref 32–36)
MCV RBC AUTO: 81.9 FL — SIGNIFICANT CHANGE UP (ref 80–100)
MONOCYTES # BLD AUTO: 0.74 K/UL — SIGNIFICANT CHANGE UP (ref 0–0.9)
MONOCYTES NFR BLD AUTO: 5.9 % — SIGNIFICANT CHANGE UP (ref 2–14)
NEUTROPHILS # BLD AUTO: 10.35 K/UL — HIGH (ref 1.8–7.4)
NEUTROPHILS NFR BLD AUTO: 81.9 % — HIGH (ref 43–77)
NRBC # FLD: 0 K/UL — LOW (ref 25–125)
PCO2 BLDV: 55 MMHG — HIGH (ref 41–51)
PCO2 BLDV: 64 MMHG — HIGH (ref 41–51)
PH BLDV: 7.37 PH — SIGNIFICANT CHANGE UP (ref 7.32–7.43)
PH BLDV: 7.41 PH — SIGNIFICANT CHANGE UP (ref 7.32–7.43)
PLATELET # BLD AUTO: 492 K/UL — HIGH (ref 150–400)
PMV BLD: 9.1 FL — SIGNIFICANT CHANGE UP (ref 7–13)
PO2 BLDV: 31 MMHG — LOW (ref 35–40)
PO2 BLDV: < 24 MMHG — LOW (ref 35–40)
POTASSIUM BLDV-SCNC: 3.8 MMOL/L — SIGNIFICANT CHANGE UP (ref 3.4–4.5)
POTASSIUM BLDV-SCNC: 3.8 MMOL/L — SIGNIFICANT CHANGE UP (ref 3.4–4.5)
POTASSIUM SERPL-MCNC: 3.9 MMOL/L — SIGNIFICANT CHANGE UP (ref 3.5–5.3)
POTASSIUM SERPL-SCNC: 3.9 MMOL/L — SIGNIFICANT CHANGE UP (ref 3.5–5.3)
PROT SERPL-MCNC: 7.6 G/DL — SIGNIFICANT CHANGE UP (ref 6–8.3)
PROTHROM AB SERPL-ACNC: 14.2 SEC — HIGH (ref 9.8–13.1)
RBC # BLD: 5.48 M/UL — SIGNIFICANT CHANGE UP (ref 4.2–5.8)
RBC # FLD: 14.6 % — HIGH (ref 10.3–14.5)
SAO2 % BLDV: 37.2 % — LOW (ref 60–85)
SAO2 % BLDV: 59.1 % — LOW (ref 60–85)
SODIUM SERPL-SCNC: 138 MMOL/L — SIGNIFICANT CHANGE UP (ref 135–145)
TROPONIN T, HIGH SENSITIVITY: 25 NG/L — SIGNIFICANT CHANGE UP (ref ?–14)
TROPONIN T, HIGH SENSITIVITY: 29 NG/L — SIGNIFICANT CHANGE UP (ref ?–14)
WBC # BLD: 12.64 K/UL — HIGH (ref 3.8–10.5)
WBC # FLD AUTO: 12.64 K/UL — HIGH (ref 3.8–10.5)

## 2019-03-20 PROCEDURE — 71046 X-RAY EXAM CHEST 2 VIEWS: CPT | Mod: 26

## 2019-03-20 PROCEDURE — 99223 1ST HOSP IP/OBS HIGH 75: CPT

## 2019-03-20 PROCEDURE — 71045 X-RAY EXAM CHEST 1 VIEW: CPT | Mod: 26

## 2019-03-20 RX ORDER — DEXTROSE 50 % IN WATER 50 %
12.5 SYRINGE (ML) INTRAVENOUS ONCE
Qty: 0 | Refills: 0 | Status: DISCONTINUED | OUTPATIENT
Start: 2019-03-20 | End: 2019-04-05

## 2019-03-20 RX ORDER — DOCUSATE SODIUM 100 MG
100 CAPSULE ORAL THREE TIMES A DAY
Qty: 0 | Refills: 0 | Status: DISCONTINUED | OUTPATIENT
Start: 2019-03-20 | End: 2019-04-05

## 2019-03-20 RX ORDER — SODIUM CHLORIDE 9 MG/ML
1000 INJECTION, SOLUTION INTRAVENOUS ONCE
Qty: 0 | Refills: 0 | Status: COMPLETED | OUTPATIENT
Start: 2019-03-20 | End: 2019-03-20

## 2019-03-20 RX ORDER — SODIUM CHLORIDE 9 MG/ML
1000 INJECTION, SOLUTION INTRAVENOUS
Qty: 0 | Refills: 0 | Status: DISCONTINUED | OUTPATIENT
Start: 2019-03-20 | End: 2019-04-05

## 2019-03-20 RX ORDER — ONDANSETRON 8 MG/1
4 TABLET, FILM COATED ORAL EVERY 6 HOURS
Qty: 0 | Refills: 0 | Status: DISCONTINUED | OUTPATIENT
Start: 2019-03-20 | End: 2019-04-05

## 2019-03-20 RX ORDER — INSULIN LISPRO 100/ML
VIAL (ML) SUBCUTANEOUS
Qty: 0 | Refills: 0 | Status: DISCONTINUED | OUTPATIENT
Start: 2019-03-20 | End: 2019-04-05

## 2019-03-20 RX ORDER — SENNA PLUS 8.6 MG/1
2 TABLET ORAL AT BEDTIME
Qty: 0 | Refills: 0 | Status: DISCONTINUED | OUTPATIENT
Start: 2019-03-20 | End: 2019-04-05

## 2019-03-20 RX ORDER — LANOLIN ALCOHOL/MO/W.PET/CERES
3 CREAM (GRAM) TOPICAL AT BEDTIME
Qty: 0 | Refills: 0 | Status: DISCONTINUED | OUTPATIENT
Start: 2019-03-20 | End: 2019-04-05

## 2019-03-20 RX ORDER — GLUCAGON INJECTION, SOLUTION 0.5 MG/.1ML
1 INJECTION, SOLUTION SUBCUTANEOUS ONCE
Qty: 0 | Refills: 0 | Status: DISCONTINUED | OUTPATIENT
Start: 2019-03-20 | End: 2019-04-05

## 2019-03-20 RX ORDER — SIMETHICONE 80 MG/1
160 TABLET, CHEWABLE ORAL DAILY
Qty: 0 | Refills: 0 | Status: DISCONTINUED | OUTPATIENT
Start: 2019-03-20 | End: 2019-04-05

## 2019-03-20 RX ORDER — SODIUM CHLORIDE 9 MG/ML
1000 INJECTION INTRAMUSCULAR; INTRAVENOUS; SUBCUTANEOUS
Qty: 0 | Refills: 0 | Status: DISCONTINUED | OUTPATIENT
Start: 2019-03-20 | End: 2019-03-24

## 2019-03-20 RX ORDER — DEXTROSE 50 % IN WATER 50 %
25 SYRINGE (ML) INTRAVENOUS ONCE
Qty: 0 | Refills: 0 | Status: DISCONTINUED | OUTPATIENT
Start: 2019-03-20 | End: 2019-04-05

## 2019-03-20 RX ORDER — POLYETHYLENE GLYCOL 3350 17 G/17G
17 POWDER, FOR SOLUTION ORAL DAILY
Qty: 0 | Refills: 0 | Status: DISCONTINUED | OUTPATIENT
Start: 2019-03-20 | End: 2019-03-28

## 2019-03-20 RX ORDER — SODIUM CHLORIDE 9 MG/ML
1000 INJECTION, SOLUTION INTRAVENOUS
Qty: 0 | Refills: 0 | Status: DISCONTINUED | OUTPATIENT
Start: 2019-03-20 | End: 2019-03-20

## 2019-03-20 RX ORDER — DEXTROSE 50 % IN WATER 50 %
15 SYRINGE (ML) INTRAVENOUS ONCE
Qty: 0 | Refills: 0 | Status: DISCONTINUED | OUTPATIENT
Start: 2019-03-20 | End: 2019-04-05

## 2019-03-20 RX ORDER — INSULIN LISPRO 100/ML
VIAL (ML) SUBCUTANEOUS AT BEDTIME
Qty: 0 | Refills: 0 | Status: DISCONTINUED | OUTPATIENT
Start: 2019-03-20 | End: 2019-04-05

## 2019-03-20 RX ADMIN — SODIUM CHLORIDE 150 MILLILITER(S): 9 INJECTION, SOLUTION INTRAVENOUS at 19:41

## 2019-03-20 RX ADMIN — SODIUM CHLORIDE 1000 MILLILITER(S): 9 INJECTION, SOLUTION INTRAVENOUS at 18:27

## 2019-03-20 NOTE — ED ADULT NURSE REASSESSMENT NOTE - NS ED NURSE REASSESS COMMENT FT1
Received report from daytime RN. pt chest tube draining serosanguineous fluid, as per pt wife "this is normal," sx site clean dry and intact, VSS, fluids infusing as per MD order, awaiting MRI, will monitor.

## 2019-03-20 NOTE — H&P ADULT - NSICDXPASTMEDICALHX_GEN_ALL_CORE_FT
PAST MEDICAL HISTORY:  COPD (chronic obstructive pulmonary disease)     Diabetes mellitus     HLD (hyperlipidemia)     Hypertension     Lung cancer PAST MEDICAL HISTORY:  Anxiety     COPD (chronic obstructive pulmonary disease)     Diabetes mellitus     HLD (hyperlipidemia)     Hypertension     Lung cancer     Pleural effusion, left

## 2019-03-20 NOTE — H&P ADULT - NSHPPHYSICALEXAM_GEN_ALL_CORE
Vital Signs Last 24 Hrs  T(C): 36.7 (20 Mar 2019 20:39), Max: 37.1 (20 Mar 2019 16:46)  T(F): 98.1 (20 Mar 2019 20:39), Max: 98.8 (20 Mar 2019 16:46)  HR: 75 (20 Mar 2019 20:39) (75 - 88)  BP: 125/60 (20 Mar 2019 20:39) (125/60 - 144/64)  BP(mean): --  RR: 20 (20 Mar 2019 20:39) (16 - 32)  SpO2: 96% (20 Mar 2019 20:39) (95% - 97%)    PHYSICAL EXAM:  General: Awake and alert.  No acute distress.  HEENT: Normocephalic, atraumatic.  PERRL.  EOMI.  No scleral icterus.  Moist MM.  No oropharyngeal exudates.    Neck: Supple.  Full range of motion.  No JVD.  No carotid bruits.  No thyromegaly.  Trachea midline.  No lymphadenopathy.   Heart: RRR.  Normal S1 and S2.  No murmurs, rubs, or gallops.  No LE edema b/l.   Lungs: Good inspiratory effort.  Nonlabored breathing.  CTAB.  No wheezes, crackles, or rhonchi.    Abdomen: BS+, soft, NT/ND.  No organomegaly.  Skin: Warm and dry.  No rashes.  Extremities: No clubbing or cyanosis.  2+ peripheral pulses b/l.  Musculoskeletal: No joint deformities.  No spinal or paraspinal tenderness.  Neuro: A&Ox3.  CN II-XII intact.  5/5 motor strength in UE and LE b/l.  Tactile sensation intact in UE and LE b/l.  Cerebellar function intact as assessed by finger-to-nose test. Vital Signs Last 24 Hrs  T(C): 36.7 (20 Mar 2019 20:39), Max: 37.1 (20 Mar 2019 16:46)  T(F): 98.1 (20 Mar 2019 20:39), Max: 98.8 (20 Mar 2019 16:46)  HR: 75 (20 Mar 2019 20:39) (75 - 88)  BP: 125/60 (20 Mar 2019 20:39) (125/60 - 144/64)  BP(mean): --  RR: 20 (20 Mar 2019 20:39) (16 - 32)  SpO2: 96% (20 Mar 2019 20:39) (95% - 97%)    PHYSICAL EXAM:  General: Awake and alert.  No acute distress.  Slightly anxious appearing.  Head: Normocephalic, atraumatic.    Eyes: PERRL.  EOMI.  No scleral icterus.  No conjunctival pallor.  Mouth: Oral thrus.  Dry MM.  No oropharyngeal exudates.    Neck: Supple.  Full range of motion.  No JVD.  No thyromegaly.  Trachea midline.  No lymphadenopathy.   Heart: RRR.  Normal S1 and S2.  No murmurs, rubs, or gallops.  2+ symmetric pitting LE edema b/l.    Lungs: Good inspiratory effort.  Nonlabored breathing.  No crackles or wheezes.  Diminished BS throughout, but more so at LLB.  L pleurX in place draining serosanguinous fluid, ~150 cc of output, dressing c/d/i.     Abdomen: BS+, soft, nontender, distended.  Skin: Warm and dry.  No rashes.  Extremities: No clubbing or cyanosis.  2+ peripheral pulses b/l.  Musculoskeletal: Kyphosis of upper back.  No spinal or paraspinal tenderness.  No joint deformities.    Neuro: A&Ox3.  CN II-XII intact.  5/5 motor strength in UE and LE b/l.  Tactile sensation intact in UE and LE b/l.  Cerebellar function intact as assessed by finger-to-nose test.  No focal deficits.

## 2019-03-20 NOTE — ED PROVIDER NOTE - OBJECTIVE STATEMENT
Matti: h/o stage 4 lung Ca and left pleural effusion with chronic left pleurex catheter in place (placed ~1 month ago) c/o chest/abdominal pain on swallowing, decreased appetite, for months but worsening over past 2 weeks.  No change in color/constistency In pleurex output, although output decreased.  Wife reports communicating this to  doctor, who referred him to the ER for "IV fluids"

## 2019-03-20 NOTE — ED PROVIDER NOTE - PHYSICAL EXAMINATION
Chest: localized area of redness ~3x3 cm to rt anterior chest wall, no central fluctuance.  Pluerex in place.  BS equal  Neck: no masses, no stridor, phonating well.

## 2019-03-20 NOTE — H&P ADULT - PROBLEM SELECTOR PLAN 3
Pt with recurrent left loculated pleural effusion Pt with recurrent left loculated pleural effusion, s/p left VATS and pleurX placement. PleurX still in place, draining serosanguinous fluid.  - C/w PleurX drainage; monitor output  - Monitor respiratory status  - Serial CXRs to assess for reaccumulation  - Thoracic surgery consult in AM, as pt follows with Dr. Moore and was supposed to see him this week

## 2019-03-20 NOTE — ED PROVIDER NOTE - CARE PLAN
Principal Discharge DX:	Dehydration  Secondary Diagnosis:	Failure to thrive in adult  Secondary Diagnosis:	Pleural effusion  Secondary Diagnosis:	Lung cancer Principal Discharge DX:	Odynophagia  Secondary Diagnosis:	Failure to thrive in adult  Secondary Diagnosis:	Pleural effusion  Secondary Diagnosis:	Lung cancer  Secondary Diagnosis:	Dehydration

## 2019-03-20 NOTE — ED ADULT NURSE NOTE - OBJECTIVE STATEMENT
64 y/o male, a&ox3, coming in from home for decreased appetite x 2 weeks , weakness and SOB. Pt is being treated with immuno therapy for stage 4 lung CA. Left chest tube inserted 1 month ago, which is draining serosanguinous blood. 4X5cm mass noted next to chest tube dressing. Pt states he cannot eat or drink bc he has constant feeling of bloating. when pt tries to take tums, he vomits. pt has 1x1cm sore on right clavicle. Pt reports not being able to lay down bc of distended stomach. redness and plus 1 pitting edema bilateral lower extremities. radial and pedal pulses present and equal. lungs clear but diminished on left side. pt very anxious. 20g iv placed in left AC.

## 2019-03-20 NOTE — ED ADULT NURSE REASSESSMENT NOTE - NS ED NURSE REASSESS COMMENT FT1
as per bedboard and receiving RN, unit unable to take pt due to chest tube, pt comfortable, will monitor in ED

## 2019-03-20 NOTE — H&P ADULT - PROBLEM SELECTOR PLAN 7
Pt on amlodipine, valsartan, metoprolol, and lasix at home.  - Will c/w metoprolol, but will hold amlodipine (given chronic b/l LE edema), valsartan, and lasix. Will restart additional home BP meds if needed.

## 2019-03-20 NOTE — H&P ADULT - ASSESSMENT
62 yo man, current smoker (~1 ppd), with history of stage 4 left lung adenocarcinoma, on immunotherapy with Keytruda (first session on 3/4/19), recurrent left pleural effusion s/p left VATS with pleurX placement (2/2019), COPD, DM2, HTN, HLD, and anxiety presents with 1 week of worsening throat pain with odynophagia and dysphagia.

## 2019-03-20 NOTE — ED ADULT TRIAGE NOTE - CHIEF COMPLAINT QUOTE
Lung CA patient with Chest Tube in place x 2 months with poor appetite and dysphagia x 2 weeks. CP x 1 day also SOB. Hx COPD, Lung CA with mets

## 2019-03-20 NOTE — ED PROVIDER NOTE - ATTENDING CONTRIBUTION TO CARE
DR. HOWELL, ATTENDING MD-  I performed a face to face bedside interview with patient regarding history of present illness, review of symptoms and past medical history. I completed an independent physical exam.  I have discussed patient's plan of care with the resident.   Documentation as above in the note.    Matti: above note written by me.  History/exam/plan as documented above.

## 2019-03-20 NOTE — H&P ADULT - PROBLEM SELECTOR PROBLEM 1
Reason for Disposition   [1] Pain lasts > 10 minutes AND [2] age > 40 AND [3] associated chest, arm, neck, upper back or jaw pain    Protocols used: ST ABDOMINAL PAIN - UPPER-[facility]    Patient called with her [facility] Nurse on the line and explained she has been having abdominal pain since her colonoscopy a few weeks ago and the pain goes up into her shoulders. Patient advised to go to the ED, unsure if she will because she states she doesn't think the pain is that pain. Reiterated the disposition for ED and she verbalized understanding.   
I spoke with Henrietta on the phone today and she was instructed to come to ER for evaluation.      
Odynophagia

## 2019-03-20 NOTE — ED ADULT NURSE REASSESSMENT NOTE - NS ED NURSE REASSESS COMMENT FT1
report faxed to receiving unit, verification fax sheet received and placed in chart. verbal report given to RN Latoya, pt placed for transport, will monitor

## 2019-03-20 NOTE — H&P ADULT - PROBLEM SELECTOR PLAN 1
Also associated with dysphagia. Worsening over past 1 week. Likely 2/2 progression of metastatic disease, especially in mediastinal area. Also associated with dysphagia. Worsening over past 1 week. Likely 2/2 progression of metastatic disease in mediastinal area. Recent PET/CT showing hypermetabolic right supraclavicular, bilateral mediastinal, and left perihilar lymphadenopathy, increased in size from a month ago. Can also, however, be 2/2 motility issue vs. esophagitis (given presence of thrush on exam).  - Will trial pt on pureed diet for now; c/w IVF with NS at 75 cc/hr for 12 hrs and reassess need for additional IVF  - S&S eval ordered and pending  - Modified barium swallow pending results of S&S eval  - Pt unlikely able to get EGD, as pt has difficulty lying flat  - Aspiration precautions  - Will start fluconazole for treatment of thrush

## 2019-03-20 NOTE — ED ADULT NURSE NOTE - NSIMPLEMENTINTERV_GEN_ALL_ED
Implemented All Fall Risk Interventions:  [place] to call system. Call bell, personal items and telephone within reach. Instruct patient to call for assistance. Room bathroom lighting operational. Non-slip footwear when patient is off stretcher. Physically safe environment: no spills, clutter or unnecessary equipment. Stretcher in lowest position, wheels locked, appropriate side rails in place. Provide visual cue, wrist band, yellow gown, etc. Monitor gait and stability. Monitor for mental status changes and reorient to person, place, and time. Review medications for side effects contributing to fall risk. Reinforce activity limits and safety measures with patient and family.

## 2019-03-20 NOTE — H&P ADULT - NSHPSOCIALHISTORY_GEN_ALL_CORE
Tobacco: denies  EtOH: denies  Illicit drugs: denies  Lives with Tobacco: current smoker, now down to ~1 ppd, used to smoke 2 ppd x 40 yrs  EtOH: occasional use  Illicit drugs: denies  Lives with wife. Has 2 daughters. Used to work as a  (prior to lung cancer diagnosis).

## 2019-03-20 NOTE — H&P ADULT - NSHPLABSRESULTS_GEN_ALL_CORE
EKG personally reviewed.  NSR at 81 bpm. No acute ischemic changes. QTc 436 ms.    CXR personally reviewed.  Xray Chest 2 Views PA/Lat (03.20.19 @ 19:10)     INTERPRETATION:  unchanged loculated left pleural effusion

## 2019-03-20 NOTE — H&P ADULT - NSHPREVIEWOFSYSTEMS_GEN_ALL_CORE
Constitutional: No weakness, fevers, chills, or weight loss  Eyes: No visual changes, double vision, or eye pain  Ears, Nose, Mouth, Throat: No runny nose, sinus pain, ear pain, tinnitus, sore throat, dysphagia, or odynophagia  Cardiovascular: No chest pain, palpitations, or LE edema  Respiratory: No cough, wheezing, hemoptysis, or shortness of breath  Gastrointestinal: No abdominal pain, dysphagia, anorexia, nausea/vomiting, diarrhea/constipation, hematemesis, BRBPR, or melena  Genitourinary: No dysuria, frequency, urgency, or hematuria  Musculoskeletal: No joint pain, joint swelling, or decreased ROM  Skin: No pruritus, rashes, lesions, or wounds  Neurologic:  No seizures, headache, paresthesias, numbness, or limb weakness  Psychiatric: No depression, anxiety, difficulty concentrating, anhedonia, or lack of energy  Endocrine: No heat/cold intolerance, mood swings, sweats, polydipsia, or polyuria  Hematologic/lymphatic: No purpura, petechia, or prolonged or excessive bleeding after dental extraction / injury  Allergic/Immunologic: No anaphylaxis or allergic response to materials, foods, animals    Positives and pertinent negatives noted and all other systems negative. Constitutional: +Weakness. +Fatigue. +Weight loss. No fevers or chills.  Eyes: No visual changes, double vision, or eye pain  Ears, Nose, Mouth, Throat: +Sore throat. +Dysphagia. +Odynophagia. No runny nose, sinus pain, ear pain, tinnitus,   Cardiovascular: No chest pain, palpitations, or LE edema  Respiratory: No cough, wheezing, hemoptysis, or shortness of breath  Gastrointestinal: No abdominal pain, dysphagia, anorexia, nausea/vomiting, diarrhea/constipation, hematemesis, BRBPR, or melena  Genitourinary: No dysuria, frequency, urgency, or hematuria  Musculoskeletal: No joint pain, joint swelling, or decreased ROM  Skin: No pruritus, rashes, lesions, or wounds  Neurologic:  No seizures, headache, paresthesias, numbness, or limb weakness  Psychiatric: No depression, anxiety, difficulty concentrating, anhedonia, or lack of energy  Endocrine: No heat/cold intolerance, mood swings, sweats, polydipsia, or polyuria  Hematologic/lymphatic: No purpura, petechia, or prolonged or excessive bleeding after dental extraction / injury  Allergic/Immunologic: No anaphylaxis or allergic response to materials, foods, animals    Positives and pertinent negatives noted and all other systems negative. Constitutional: +Weakness. +Fatigue. +Weight loss. No fevers or chills.  Eyes: No visual changes, double vision, or eye pain  Ears, Nose, Mouth, Throat: +Sore throat. +Dysphagia. +Odynophagia. No runny nose, sinus pain, ear pain, or tinnitus.  Cardiovascular: +Chronic LE edema b/l. No chest pain or palpitations.  Respiratory: +SOB when lying flat. No cough, wheezing, or hemoptysis.  Gastrointestinal: No abdominal pain, nausea/vomiting, diarrhea/constipation, hematemesis, BRBPR, or melena  Genitourinary: No dysuria, frequency, urgency, or hematuria  Musculoskeletal: No joint pain, joint swelling, or decreased ROM  Skin: No pruritus or rashes  Neurologic: No seizures, headache, paresthesias, or numbness  Psychiatric: +Anxiety. No depression or agitation.  Endocrine: No heat/cold intolerance, mood swings, sweats, polydipsia, or polyuria  Hematologic/lymphatic: No purpura, petechia, or prolonged or excessive bleeding after dental extraction / injury    Positives and pertinent negatives noted and all other systems negative.

## 2019-03-20 NOTE — H&P ADULT - PROBLEM SELECTOR PLAN 2
Pt with poor nutrition, decreased appetite, and weight loss in setting of metastatic lung cancer.   - Plan as above for Odynophagia  - Palliative consult for symptomatic management  - Nutrition consult  - PT eval

## 2019-03-20 NOTE — ED PROVIDER NOTE - CLINICAL SUMMARY MEDICAL DECISION MAKING FREE TEXT BOX
Matti: subacute weakness, dysphagia failure to thrive and dehydration in patient with stage 4 lung ca.  Benign exam.  Plan for labs, CXR, IV hydration.  likely need for admission

## 2019-03-20 NOTE — ED ADULT NURSE REASSESSMENT NOTE - NS ED NURSE REASSESS COMMENT FT1
called unit to give report, as per UR "nurse will call you back in a few minute she is busy with a pt right now"

## 2019-03-20 NOTE — H&P ADULT - PROBLEM SELECTOR PLAN 4
Pt with stage 4 left lung adenocarcinoma, started on immunotherapy on 3/4/19 with Keytruda.  - Oncology consult in AM  - Palliative consult for symptomatic management  - C/w methadone (I-Stop Reference #: [id number]) and oxycodone PRN  - MRI head w/wo contrast to evaluate for brain mets  - B/l LE dopplers to r/o DVT given LE edema in setting of malignancy Pt with stage 4 left lung adenocarcinoma, started on immunotherapy on 3/4/19 with Keytruda.  - Oncology consult in AM  - Palliative consult for symptomatic management  - C/w methadone (I-Stop Reference #: [id number]) and oxycodone PRN  - MRI head w/wo contrast to evaluate for brain mets  - B/l LE dopplers to r/o DVT given LE edema in setting of active malignancy

## 2019-03-20 NOTE — H&P ADULT - PROBLEM SELECTOR PLAN 9
[Fatigue] : fatigue [Abdominal Pain] : abdominal pain [Diarrhea] : diarrhea [Muscle Weakness] : muscle weakness [Anxiety] : anxiety [Negative] : Heme/Lymph [de-identified] : skin cancer surgery [de-identified] : burning in right arm into neck - DVT ppx: Will hold pharmacologic DVT ppx for now pending MRI head to evaluate for brain mets and given serosanguinous output from Pleurx. OOB and ambulation as tolerated.  - Diet: Pureed pending S&S eval

## 2019-03-20 NOTE — H&P ADULT - HISTORY OF PRESENT ILLNESS
62 yo man, current smoker (~1 ppd), with history of stage 4 left lung adenocarcinoma, on immunotherapy with Keytruda (first session on 3/4/19), recurrent left pleural effusion s/p left VATS and pleurX placement (2/2019), COPD, DM2, HTN, HLD, and anxiety presents with 1 week of worsening throat pain with odynophagia and dysphagia. Pt states that for the past 1 week, he has been feeling overwhelming dryness in his throat, causing pain and difficulty with swallowing. Pt notes that whenever he tries to swallow, he experiences the sensation of something getting stuck in his throat and upper chest area. Pt has this sensation with both liquids and solids. As a result, pt has been having very little to eat and drink, avoiding water entirely the last 2 days. Pt also reports poor appetite and ongoing pain in his left shoulder/scapula and near the pleurX insertion site. Pt saw Dr. Avitia (Palliative) earlier this month and was started on methadone for pain control. Pt denies any recent F/C, CP, palpitations, cough, abdominal pain, N/V, diarrhea, constipation, or urinary symptoms. Pt continues to have SOB when lying flat, and therefore, currently sleeps upright in a chair.     In the ED,  T 98.1-98.8, HR 75-88, -144/60-64, RR 16-32, O2 sats 95-97% RA.   CXR with unchanged left loculated pleural effusion. 62 yo man, current smoker (~1 ppd), with history of stage 4 left lung adenocarcinoma, on immunotherapy with Keytruda (first session on 3/4/19), recurrent left pleural effusion s/p left VATS with pleurX placement (2/2019), COPD, DM2, HTN, HLD, and anxiety presents with 1 week of worsening throat pain with odynophagia and dysphagia. Pt states that for the past 1 week, he has been feeling overwhelming dryness/scratchiness in his throat, causing pain and difficulty with swallowing. Pt notes that whenever he tries to swallow, he experiences the sensation of something getting stuck in his throat and upper chest area. Pt has this sensation with both liquids and solids. As a result, pt has been having very little to eat and drink, avoiding water entirely the last 2 days. Pt also reports poor appetite and ongoing pain in his left shoulder/scapula and near his pleurX insertion site. Pt saw Dr. Avitia (Palliative) earlier this month and was started on methadone for pain control. Pt denies any recent F/C, CP, palpitations, cough, abdominal pain, N/V, diarrhea, constipation, or urinary symptoms. Pt continues to have SOB when lying flat, and therefore, currently sleeps upright in a chair.     In the ED,  T 98.1-98.8, HR 75-88, -144/60-64, RR 16-32, O2 sats 95-97% RA.   CXR with unchanged left loculated pleural effusion.

## 2019-03-20 NOTE — H&P ADULT - PROBLEM SELECTOR PLAN 5
No S/S of exacerbation.  - C/w albuterol PRN  - Anoro Ellipta not available through pharmacy. Will do Symbicort and Spiriva for now and have pt's family bring in medication from home.

## 2019-03-21 ENCOUNTER — APPOINTMENT (OUTPATIENT)
Dept: MRI IMAGING | Facility: IMAGING CENTER | Age: 64
End: 2019-03-21

## 2019-03-21 DIAGNOSIS — J90 PLEURAL EFFUSION, NOT ELSEWHERE CLASSIFIED: ICD-10-CM

## 2019-03-21 DIAGNOSIS — J93.82 OTHER AIR LEAK: ICD-10-CM

## 2019-03-21 DIAGNOSIS — R13.10 DYSPHAGIA, UNSPECIFIED: ICD-10-CM

## 2019-03-21 DIAGNOSIS — C34.90 MALIGNANT NEOPLASM OF UNSPECIFIED PART OF UNSPECIFIED BRONCHUS OR LUNG: ICD-10-CM

## 2019-03-21 DIAGNOSIS — J44.9 CHRONIC OBSTRUCTIVE PULMONARY DISEASE, UNSPECIFIED: ICD-10-CM

## 2019-03-21 DIAGNOSIS — R62.7 ADULT FAILURE TO THRIVE: ICD-10-CM

## 2019-03-21 DIAGNOSIS — I10 ESSENTIAL (PRIMARY) HYPERTENSION: ICD-10-CM

## 2019-03-21 DIAGNOSIS — E11.9 TYPE 2 DIABETES MELLITUS WITHOUT COMPLICATIONS: ICD-10-CM

## 2019-03-21 DIAGNOSIS — F41.9 ANXIETY DISORDER, UNSPECIFIED: ICD-10-CM

## 2019-03-21 DIAGNOSIS — Z29.9 ENCOUNTER FOR PROPHYLACTIC MEASURES, UNSPECIFIED: ICD-10-CM

## 2019-03-21 LAB
ALBUMIN SERPL ELPH-MCNC: 3.3 G/DL — SIGNIFICANT CHANGE UP (ref 3.3–5)
ALP SERPL-CCNC: 87 U/L — SIGNIFICANT CHANGE UP (ref 40–120)
ALT FLD-CCNC: 14 U/L — SIGNIFICANT CHANGE UP (ref 4–41)
ANION GAP SERPL CALC-SCNC: 16 MMO/L — HIGH (ref 7–14)
AST SERPL-CCNC: 16 U/L — SIGNIFICANT CHANGE UP (ref 4–40)
BASOPHILS # BLD AUTO: 0.08 K/UL — SIGNIFICANT CHANGE UP (ref 0–0.2)
BASOPHILS NFR BLD AUTO: 0.6 % — SIGNIFICANT CHANGE UP (ref 0–2)
BILIRUB SERPL-MCNC: 0.4 MG/DL — SIGNIFICANT CHANGE UP (ref 0.2–1.2)
BUN SERPL-MCNC: 10 MG/DL — SIGNIFICANT CHANGE UP (ref 7–23)
CALCIUM SERPL-MCNC: 9.7 MG/DL — SIGNIFICANT CHANGE UP (ref 8.4–10.5)
CHLORIDE SERPL-SCNC: 95 MMOL/L — LOW (ref 98–107)
CO2 SERPL-SCNC: 28 MMOL/L — SIGNIFICANT CHANGE UP (ref 22–31)
CREAT SERPL-MCNC: 0.72 MG/DL — SIGNIFICANT CHANGE UP (ref 0.5–1.3)
EOSINOPHIL # BLD AUTO: 0.25 K/UL — SIGNIFICANT CHANGE UP (ref 0–0.5)
EOSINOPHIL NFR BLD AUTO: 2 % — SIGNIFICANT CHANGE UP (ref 0–6)
GLUCOSE SERPL-MCNC: 100 MG/DL — HIGH (ref 70–99)
HBA1C BLD-MCNC: 7.2 % — HIGH (ref 4–5.6)
HCT VFR BLD CALC: 38.1 % — LOW (ref 39–50)
HGB BLD-MCNC: 11.7 G/DL — LOW (ref 13–17)
IMM GRANULOCYTES NFR BLD AUTO: 0.6 % — SIGNIFICANT CHANGE UP (ref 0–1.5)
LYMPHOCYTES # BLD AUTO: 1.08 K/UL — SIGNIFICANT CHANGE UP (ref 1–3.3)
LYMPHOCYTES # BLD AUTO: 8.4 % — LOW (ref 13–44)
MAGNESIUM SERPL-MCNC: 1.9 MG/DL — SIGNIFICANT CHANGE UP (ref 1.6–2.6)
MCHC RBC-ENTMCNC: 24.6 PG — LOW (ref 27–34)
MCHC RBC-ENTMCNC: 30.7 % — LOW (ref 32–36)
MCV RBC AUTO: 80 FL — SIGNIFICANT CHANGE UP (ref 80–100)
MONOCYTES # BLD AUTO: 0.91 K/UL — HIGH (ref 0–0.9)
MONOCYTES NFR BLD AUTO: 7.1 % — SIGNIFICANT CHANGE UP (ref 2–14)
NEUTROPHILS # BLD AUTO: 10.41 K/UL — HIGH (ref 1.8–7.4)
NEUTROPHILS NFR BLD AUTO: 81.3 % — HIGH (ref 43–77)
NRBC # FLD: 0 K/UL — LOW (ref 25–125)
PHOSPHATE SERPL-MCNC: 3.3 MG/DL — SIGNIFICANT CHANGE UP (ref 2.5–4.5)
PLATELET # BLD AUTO: 448 K/UL — HIGH (ref 150–400)
PMV BLD: 9.3 FL — SIGNIFICANT CHANGE UP (ref 7–13)
POTASSIUM SERPL-MCNC: 3.9 MMOL/L — SIGNIFICANT CHANGE UP (ref 3.5–5.3)
POTASSIUM SERPL-SCNC: 3.9 MMOL/L — SIGNIFICANT CHANGE UP (ref 3.5–5.3)
PROT SERPL-MCNC: 6.5 G/DL — SIGNIFICANT CHANGE UP (ref 6–8.3)
RBC # BLD: 4.76 M/UL — SIGNIFICANT CHANGE UP (ref 4.2–5.8)
RBC # FLD: 14.7 % — HIGH (ref 10.3–14.5)
SODIUM SERPL-SCNC: 139 MMOL/L — SIGNIFICANT CHANGE UP (ref 135–145)
WBC # BLD: 12.81 K/UL — HIGH (ref 3.8–10.5)
WBC # FLD AUTO: 12.81 K/UL — HIGH (ref 3.8–10.5)

## 2019-03-21 PROCEDURE — 99223 1ST HOSP IP/OBS HIGH 75: CPT

## 2019-03-21 PROCEDURE — 93970 EXTREMITY STUDY: CPT | Mod: 26

## 2019-03-21 PROCEDURE — 99222 1ST HOSP IP/OBS MODERATE 55: CPT | Mod: GC

## 2019-03-21 PROCEDURE — 99233 SBSQ HOSP IP/OBS HIGH 50: CPT

## 2019-03-21 PROCEDURE — 12345: CPT | Mod: NC

## 2019-03-21 RX ORDER — ATORVASTATIN CALCIUM 80 MG/1
40 TABLET, FILM COATED ORAL AT BEDTIME
Qty: 0 | Refills: 0 | Status: DISCONTINUED | OUTPATIENT
Start: 2019-03-21 | End: 2019-04-05

## 2019-03-21 RX ORDER — FLUCONAZOLE 150 MG/1
200 TABLET ORAL ONCE
Qty: 0 | Refills: 0 | Status: COMPLETED | OUTPATIENT
Start: 2019-03-21 | End: 2019-03-21

## 2019-03-21 RX ORDER — PYRIDOXINE HCL (VITAMIN B6) 100 MG
100 TABLET ORAL DAILY
Qty: 0 | Refills: 0 | Status: DISCONTINUED | OUTPATIENT
Start: 2019-03-21 | End: 2019-04-05

## 2019-03-21 RX ORDER — ATORVASTATIN CALCIUM 80 MG/1
1 TABLET, FILM COATED ORAL
Qty: 0 | Refills: 0 | COMMUNITY

## 2019-03-21 RX ORDER — ALBUTEROL 90 UG/1
2 AEROSOL, METERED ORAL EVERY 6 HOURS
Qty: 0 | Refills: 0 | Status: DISCONTINUED | OUTPATIENT
Start: 2019-03-21 | End: 2019-04-05

## 2019-03-21 RX ORDER — UMECLIDINIUM BROMIDE AND VILANTEROL TRIFENATATE 62.5; 25 UG/1; UG/1
1 POWDER RESPIRATORY (INHALATION)
Qty: 0 | Refills: 0 | COMMUNITY

## 2019-03-21 RX ORDER — FLUCONAZOLE 150 MG/1
400 TABLET ORAL ONCE
Qty: 0 | Refills: 0 | Status: COMPLETED | OUTPATIENT
Start: 2019-03-21 | End: 2019-03-21

## 2019-03-21 RX ORDER — TIOTROPIUM BROMIDE 18 UG/1
1 CAPSULE ORAL; RESPIRATORY (INHALATION) DAILY
Qty: 0 | Refills: 0 | Status: DISCONTINUED | OUTPATIENT
Start: 2019-03-21 | End: 2019-04-05

## 2019-03-21 RX ORDER — METOPROLOL TARTRATE 50 MG
25 TABLET ORAL
Qty: 0 | Refills: 0 | Status: DISCONTINUED | OUTPATIENT
Start: 2019-03-21 | End: 2019-04-05

## 2019-03-21 RX ORDER — BUDESONIDE AND FORMOTEROL FUMARATE DIHYDRATE 160; 4.5 UG/1; UG/1
2 AEROSOL RESPIRATORY (INHALATION)
Qty: 0 | Refills: 0 | Status: DISCONTINUED | OUTPATIENT
Start: 2019-03-21 | End: 2019-04-05

## 2019-03-21 RX ORDER — OXYCODONE HYDROCHLORIDE 5 MG/1
10 TABLET ORAL EVERY 6 HOURS
Qty: 0 | Refills: 0 | Status: DISCONTINUED | OUTPATIENT
Start: 2019-03-21 | End: 2019-03-21

## 2019-03-21 RX ORDER — ASPIRIN/CALCIUM CARB/MAGNESIUM 324 MG
1 TABLET ORAL
Qty: 0 | Refills: 0 | COMMUNITY

## 2019-03-21 RX ORDER — OXYCODONE HYDROCHLORIDE 5 MG/1
5 TABLET ORAL EVERY 6 HOURS
Qty: 0 | Refills: 0 | Status: DISCONTINUED | OUTPATIENT
Start: 2019-03-21 | End: 2019-03-21

## 2019-03-21 RX ORDER — METHADONE HYDROCHLORIDE 40 MG/1
5 TABLET ORAL
Qty: 0 | Refills: 0 | Status: DISCONTINUED | OUTPATIENT
Start: 2019-03-21 | End: 2019-03-26

## 2019-03-21 RX ORDER — METHADONE HYDROCHLORIDE 40 MG/1
1 TABLET ORAL
Qty: 0 | Refills: 0 | COMMUNITY

## 2019-03-21 RX ORDER — FLUCONAZOLE 150 MG/1
TABLET ORAL
Qty: 0 | Refills: 0 | Status: DISCONTINUED | OUTPATIENT
Start: 2019-03-21 | End: 2019-04-05

## 2019-03-21 RX ORDER — FLUCONAZOLE 150 MG/1
400 TABLET ORAL EVERY 24 HOURS
Qty: 0 | Refills: 0 | Status: DISCONTINUED | OUTPATIENT
Start: 2019-03-22 | End: 2019-04-05

## 2019-03-21 RX ORDER — SENNA PLUS 8.6 MG/1
1 TABLET ORAL
Qty: 0 | Refills: 0 | COMMUNITY

## 2019-03-21 RX ORDER — ASPIRIN/CALCIUM CARB/MAGNESIUM 324 MG
81 TABLET ORAL DAILY
Qty: 0 | Refills: 0 | Status: DISCONTINUED | OUTPATIENT
Start: 2019-03-21 | End: 2019-04-05

## 2019-03-21 RX ORDER — PYRIDOXINE HCL (VITAMIN B6) 100 MG
1 TABLET ORAL
Qty: 0 | Refills: 0 | COMMUNITY

## 2019-03-21 RX ORDER — METHADONE HYDROCHLORIDE 40 MG/1
5 TABLET ORAL
Qty: 0 | Refills: 0 | Status: DISCONTINUED | OUTPATIENT
Start: 2019-03-21 | End: 2019-03-21

## 2019-03-21 RX ADMIN — POLYETHYLENE GLYCOL 3350 17 GRAM(S): 17 POWDER, FOR SOLUTION ORAL at 12:43

## 2019-03-21 RX ADMIN — OXYCODONE HYDROCHLORIDE 10 MILLIGRAM(S): 5 TABLET ORAL at 01:23

## 2019-03-21 RX ADMIN — METHADONE HYDROCHLORIDE 5 MILLIGRAM(S): 40 TABLET ORAL at 05:58

## 2019-03-21 RX ADMIN — Medication 25 MILLIGRAM(S): at 05:58

## 2019-03-21 RX ADMIN — Medication 100 MILLIGRAM(S): at 12:42

## 2019-03-21 RX ADMIN — SODIUM CHLORIDE 75 MILLILITER(S): 9 INJECTION INTRAMUSCULAR; INTRAVENOUS; SUBCUTANEOUS at 01:15

## 2019-03-21 RX ADMIN — FLUCONAZOLE 100 MILLIGRAM(S): 150 TABLET ORAL at 18:16

## 2019-03-21 RX ADMIN — Medication 81 MILLIGRAM(S): at 12:43

## 2019-03-21 RX ADMIN — BUDESONIDE AND FORMOTEROL FUMARATE DIHYDRATE 2 PUFF(S): 160; 4.5 AEROSOL RESPIRATORY (INHALATION) at 09:06

## 2019-03-21 RX ADMIN — FLUCONAZOLE 200 MILLIGRAM(S): 150 TABLET ORAL at 08:02

## 2019-03-21 RX ADMIN — Medication 3 MILLIGRAM(S): at 22:51

## 2019-03-21 RX ADMIN — METHADONE HYDROCHLORIDE 5 MILLIGRAM(S): 40 TABLET ORAL at 18:16

## 2019-03-21 RX ADMIN — Medication 25 MILLIGRAM(S): at 18:16

## 2019-03-21 RX ADMIN — OXYCODONE HYDROCHLORIDE 10 MILLIGRAM(S): 5 TABLET ORAL at 01:53

## 2019-03-21 RX ADMIN — ATORVASTATIN CALCIUM 40 MILLIGRAM(S): 80 TABLET, FILM COATED ORAL at 22:51

## 2019-03-21 RX ADMIN — BUDESONIDE AND FORMOTEROL FUMARATE DIHYDRATE 2 PUFF(S): 160; 4.5 AEROSOL RESPIRATORY (INHALATION) at 20:54

## 2019-03-21 RX ADMIN — TIOTROPIUM BROMIDE 1 CAPSULE(S): 18 CAPSULE ORAL; RESPIRATORY (INHALATION) at 12:43

## 2019-03-21 RX ADMIN — Medication 100 MILLIGRAM(S): at 22:51

## 2019-03-21 NOTE — CONSULT NOTE ADULT - PROBLEM SELECTOR RECOMMENDATION 9
- Patient with Pleurex already in place.  - Continue to drain.   - CTS to reevaluate.   - Continue with prn opiates for dyspnea.

## 2019-03-21 NOTE — DIETITIAN INITIAL EVALUATION ADULT. - OTHER INFO
Pt. lethargic.  Wife @ bedside.  RDN observed 0% consumption of breakfast & Glucerna.  Wife/family have tried to provide "protein" & Glucerna supplements at home, yet Pt. not drinking it.  Yesterday PTA, wife states Pt. had food stuck and coughed up the food along with "foam" coming from mouth.  Pt. noted with thrush, per chart.  Swallow evaluation pending.  Medications reportedly crushed and given with applesauce.  NKFA & no nausea/vomiting/diarrhea/constipation at this time.  Per prior hospitalization chart review, & as reported by wife.... persistent & significant weight decrease:     203lbs (Jan 2019)---> 193lbs (Feb 2019)---> 179.6lbs (currently March 20, 2019).

## 2019-03-21 NOTE — PROGRESS NOTE ADULT - PROBLEM SELECTOR PLAN 9
- DVT ppx: pt with hypercoagulable state, however will hold pharmacologic DVT ppx for now pending MRI head to evaluate for brain mets and given serosanguinous output from Pleurx. OOB and ambulation as tolerated.  - Diet: Pureed pending S&S eval

## 2019-03-21 NOTE — CONSULT NOTE ADULT - PROBLEM SELECTOR RECOMMENDATION 4
- Patient with stage IV disease.  - Follows with Dr. Akhtar.  - Previous on Keytruda.  - Unclear whether patient will receive further disease modifying interventions.

## 2019-03-21 NOTE — PROGRESS NOTE ADULT - PROBLEM SELECTOR PLAN 1
Also associated with dysphagia. Worsening over past 1 week. Likely 2/2 progression of metastatic disease in mediastinal area. Recent PET/CT showing hypermetabolic right supraclavicular, bilateral mediastinal, and left perihilar lymphadenopathy, increased in size from a month ago. Can also, however, be 2/2 motility issue vs. esophagitis (given presence of thrush on exam).  - Will trial pt on pureed diet for now; c/w IVF with NS at 75 cc/hr for 12 hrs and --   -f/u S/S eval, will get cinesophagram, GI consult (emailed), though pt may not tolerate EGD as he has difficulty lying flat  - Aspiration precautions  -c/w fluconazole for treatment of thrush

## 2019-03-21 NOTE — DIETITIAN INITIAL EVALUATION ADULT. - PERTINENT MEDS FT
MEDICATIONS  (STANDING):  aspirin enteric coated 81 milliGRAM(s) Oral daily  atorvastatin 40 milliGRAM(s) Oral at bedtime  buDESOnide 160 MICROgram(s)/formoterol 4.5 MICROgram(s) Inhaler 2 Puff(s) Inhalation two times a day  dextrose 5%. 1000 milliLiter(s) (50 mL/Hr) IV Continuous <Continuous>  dextrose 50% Injectable 12.5 Gram(s) IV Push once  dextrose 50% Injectable 25 Gram(s) IV Push once  dextrose 50% Injectable 25 Gram(s) IV Push once  docusate sodium 100 milliGRAM(s) Oral three times a day  insulin lispro (HumaLOG) corrective regimen sliding scale   SubCutaneous three times a day before meals  insulin lispro (HumaLOG) corrective regimen sliding scale   SubCutaneous at bedtime  melatonin 3 milliGRAM(s) Oral at bedtime  methadone    Tablet 5 milliGRAM(s) Oral two times a day  metoprolol tartrate 25 milliGRAM(s) Oral two times a day  polyethylene glycol 3350 17 Gram(s) Oral daily  pyridoxine 100 milliGRAM(s) Oral daily  sodium chloride 0.9%. 1000 milliLiter(s) (75 mL/Hr) IV Continuous <Continuous>  tiotropium 18 MICROgram(s) Capsule 1 Capsule(s) Inhalation daily    MEDICATIONS  (PRN):  ALBUTerol    90 MICROgram(s) HFA Inhaler 2 Puff(s) Inhalation every 6 hours PRN Shortness of Breath and/or Wheezing  dextrose 40% Gel 15 Gram(s) Oral once PRN Blood Glucose LESS THAN 70 milliGRAM(s)/deciliter  glucagon  Injectable 1 milliGRAM(s) IntraMuscular once PRN Glucose LESS THAN 70 milligrams/deciliter  LORazepam     Tablet 0.5 milliGRAM(s) Oral three times a day PRN Anxiety  ondansetron Injectable 4 milliGRAM(s) IV Push every 6 hours PRN Nausea and/or Vomiting  oxyCODONE    IR 5 milliGRAM(s) Oral every 6 hours PRN Moderate Pain (4 - 6)  oxyCODONE    IR 10 milliGRAM(s) Oral every 6 hours PRN Severe Pain (7 - 10)  senna 2 Tablet(s) Oral at bedtime PRN Constipation  simethicone 160 milliGRAM(s) Chew daily PRN Gas  ------------------------------------------------------------------------------------------  CAPILLARY BLOOD GLUCOSE  POCT Blood Glucose.: 102 mg/dL (21 Mar 2019 08:24)  POCT Blood Glucose.: 110 mg/dL (21 Mar 2019 00:53)

## 2019-03-21 NOTE — DIETITIAN INITIAL EVALUATION ADULT. - PROBLEM SELECTOR PLAN 1
Also associated with dysphagia. Worsening over past 1 week. Likely 2/2 progression of metastatic disease in mediastinal area. Recent PET/CT showing hypermetabolic right supraclavicular, bilateral mediastinal, and left perihilar lymphadenopathy, increased in size from a month ago. Can also, however, be 2/2 motility issue vs. esophagitis (given presence of thrush on exam).  - Will trial pt on pureed diet for now; c/w IVF with NS at 75 cc/hr for 12 hrs and reassess need for additional IVF  - S&S eval ordered and pending  - Modified barium swallow pending results of S&S eval  - Pt unlikely able to get EGD, as pt has difficulty lying flat  - Aspiration precautions  - Will start fluconazole for treatment of thrush

## 2019-03-21 NOTE — PHYSICAL THERAPY INITIAL EVALUATION ADULT - ADDITIONAL COMMENTS
Pt reports that he lives in an apartment with his wife and children with 1STE; and a flight of stairs to negotiate to bedroom; (+)1 handrail. Prior to hospital admission pt was completely independent and used no assistive device with ambulation. Pt denies any recent falls.    Pt left comfortable in chair, NAD, all lines intact, all precautions maintained, with call bell in reach, wife @bedside, and RN aware of PT evaluation.

## 2019-03-21 NOTE — PHYSICAL THERAPY INITIAL EVALUATION ADULT - GENERAL OBSERVATIONS, REHAB EVAL
Pt encountered in seated in chair, no distress, AxOX4, forgetful, with +IV, +chest tube, and wife @bedside.

## 2019-03-21 NOTE — CONSULT NOTE ADULT - ASSESSMENT
Impression:  1) Odynophagia/Dysphagia - with noted oral thrush on exam.  Likely do to candidal esophagitis.  DDx also includes CMV or HSV esophagitis in an immunocompromised patient.  Stand of care for suspected candidal esophagitis is for empiric therapy and if does not improve on therapy then for endoscopic evaluation     Recommendations:   - start 400 mg IV or PO fluconazole for empiric therapy   - symptoms management per primary team   - if no improvement then will plan for upper endoscopy early next week if patient agreeable    Lorena Franco, PGY-4  Gastroenterology Fellow  Pager x [phone] or [phone]  (After 5 pm or on weekends please page GI on call) Impression:  1) Odynophagia/Dysphagia - with noted oral thrush on exam.  Likely do to candidal esophagitis.  DDx also includes CMV or HSV esophagitis in an immunocompromised patient.  Stand of care for suspected candidal esophagitis is for empiric therapy and if does not improve on therapy then for endoscopic evaluation.    Recommendations:   - start 200-400 mg IV or PO fluconazole for empiric therapy for 10 to 14 days   - symptoms management per primary team   - if no improvement then will plan for upper endoscopy early next week if patient agreeable    Lorena Franco, PGY-4  Gastroenterology Fellow  Pager x [phone] or [phone]  (After 5 pm or on weekends please page GI on call)

## 2019-03-21 NOTE — DIETITIAN INITIAL EVALUATION ADULT. - PROBLEM SELECTOR PLAN 9
- DVT ppx: Will hold pharmacologic DVT ppx for now pending MRI head to evaluate for brain mets and given serosanguinous output from Pleurx. OOB and ambulation as tolerated.  - Diet: Pureed pending S&S eval

## 2019-03-21 NOTE — DIETITIAN INITIAL EVALUATION ADULT. - NS AS NUTRI INTERV COLLABORAT
1)Diet deferred at this time.  F/u with swallow evaluation findings/recommendations, upon completion                       2)Obtain weekly weights                   3)Consider Multivitamin for micronutrient coverage.                  4)RDN remains available.  Cara Michel RDN, CDN  pager [phone]

## 2019-03-21 NOTE — PHYSICAL THERAPY INITIAL EVALUATION ADULT - DISCHARGE DISPOSITION, PT EVAL
Anticipating home with Home PT to optimize safety and return to prior level of function; please continue to follow PT notes

## 2019-03-21 NOTE — PROGRESS NOTE ADULT - PROBLEM SELECTOR PLAN 2
Pt with poor nutrition, decreased appetite, and weight loss in setting of metastatic lung cancer.   - Plan as above for Odynophagia  - Palliative consult for symptomatic management  - Nutrition consult  - PT/OT eval Pt with poor nutrition, decreased appetite, and weight loss in setting of metastatic lung cancer.   -pt with severe protein calorie malnutrition per nutrition eval  - Plan as above for Odynophagia  - Palliative consult for symptomatic management  - Nutrition consult  - PT/OT eval

## 2019-03-21 NOTE — PROGRESS NOTE ADULT - PROBLEM SELECTOR PLAN 3
Pt with recurrent left loculated pleural effusion, s/p left VATS and pleurX placement. PleurX still in place, draining serosanguinous fluid.  -CXR stable L pleural effusion  - C/w PleurX drainage; monitor output;   - Monitor respiratory status  - Serial CXRs to assess for reaccumulation  - f/u CTS consult,  pt follows with Dr. Moore and was supposed to see him this week Pt with recurrent left loculated pleural effusion, s/p left VATS and pleurX placement. PleurX still in place, draining serosanguinous fluid likely due to malignancy  -CXR stable L pleural effusion  - C/w PleurX drainage; monitor output;   - Monitor respiratory status  - Serial CXRs to assess for reaccumulation  - f/u CTS consult,  pt follows with Dr. Moore and was supposed to see him this week

## 2019-03-21 NOTE — PROGRESS NOTE ADULT - PROBLEM SELECTOR PLAN 6
Pt on Januvia and Metformin at home.   - Start ISS and FSG qAC and qhs  -a1C 7.2%, Glycemic control acceptable at this time, hold off on endocrine consult for now

## 2019-03-21 NOTE — CONSULT NOTE ADULT - SUBJECTIVE AND OBJECTIVE BOX
HPI:  62 yo man, current smoker (~1 ppd), with history of stage 4 left lung adenocarcinoma, on immunotherapy with Keytruda (first session on 3/4/19), recurrent left pleural effusion s/p left VATS with pleurX placement (2/2019), COPD, DM2, HTN, HLD, and anxiety presents with 1 week of worsening throat pain with odynophagia and dysphagia. Pt states that for the past 1 week, he has been feeling overwhelming dryness/scratchiness in his throat, causing pain and difficulty with swallowing. Pt notes that whenever he tries to swallow, he experiences the sensation of something getting stuck in his throat and upper chest area. Pt has this sensation with both liquids and solids. As a result, pt has been having very little to eat and drink, avoiding water entirely the last 2 days. Pt also reports poor appetite and ongoing pain in his left shoulder/scapula and near his pleurX insertion site. Pt saw Dr. Avitia (Palliative) earlier this month and was started on methadone for pain control. Pt denies any recent F/C, CP, palpitations, cough, abdominal pain, N/V, diarrhea, constipation, or urinary symptoms. Pt continues to have SOB when lying flat, and therefore, currently sleeps upright in a chair.   Pt known to our service and Dr. Moore. Pt s/p left Pleurx in Feb 2019. Post op had continued air leak so mini atrium was placed to Pleurx cath and pt was d/c'd to home w VNS. Pt has been coming weekly to office to have tube and air leak re-evaluated. Now admitted with above complaints. Wife expressing concerns for inc in bloody quality to fluid. Upon assessment pt sitting in chair in hallway. No distress. Worsening dysphagia, wt loss, fatigue.     PAST MEDICAL & SURGICAL HISTORY:  Anxiety  Pleural effusion, left  Lung cancer  COPD (chronic obstructive pulmonary disease)  HLD (hyperlipidemia)  Diabetes mellitus  Hypertension  No significant past surgical history      ROS: negative except for what's noted in HPI    MEDICATIONS  (STANDING):  aspirin enteric coated 81 milliGRAM(s) Oral daily  atorvastatin 40 milliGRAM(s) Oral at bedtime  buDESOnide 160 MICROgram(s)/formoterol 4.5 MICROgram(s) Inhaler 2 Puff(s) Inhalation two times a day  dextrose 5%. 1000 milliLiter(s) (50 mL/Hr) IV Continuous <Continuous>  dextrose 50% Injectable 12.5 Gram(s) IV Push once  dextrose 50% Injectable 25 Gram(s) IV Push once  dextrose 50% Injectable 25 Gram(s) IV Push once  docusate sodium 100 milliGRAM(s) Oral three times a day  fluconAZOLE IVPB      fluconAZOLE IVPB 400 milliGRAM(s) IV Intermittent once  insulin lispro (HumaLOG) corrective regimen sliding scale   SubCutaneous three times a day before meals  insulin lispro (HumaLOG) corrective regimen sliding scale   SubCutaneous at bedtime  melatonin 3 milliGRAM(s) Oral at bedtime  methadone    Tablet 5 milliGRAM(s) Oral two times a day  metoprolol tartrate 25 milliGRAM(s) Oral two times a day  polyethylene glycol 3350 17 Gram(s) Oral daily  pyridoxine 100 milliGRAM(s) Oral daily  sodium chloride 0.9%. 1000 milliLiter(s) (75 mL/Hr) IV Continuous <Continuous>  tiotropium 18 MICROgram(s) Capsule 1 Capsule(s) Inhalation daily    MEDICATIONS  (PRN):  ALBUTerol    90 MICROgram(s) HFA Inhaler 2 Puff(s) Inhalation every 6 hours PRN Shortness of Breath and/or Wheezing  dextrose 40% Gel 15 Gram(s) Oral once PRN Blood Glucose LESS THAN 70 milliGRAM(s)/deciliter  glucagon  Injectable 1 milliGRAM(s) IntraMuscular once PRN Glucose LESS THAN 70 milligrams/deciliter  LORazepam     Tablet 0.5 milliGRAM(s) Oral three times a day PRN Anxiety  ondansetron Injectable 4 milliGRAM(s) IV Push every 6 hours PRN Nausea and/or Vomiting  oxyCODONE    IR 5 milliGRAM(s) Oral every 6 hours PRN Moderate Pain (4 - 6)  oxyCODONE    IR 10 milliGRAM(s) Oral every 6 hours PRN Severe Pain (7 - 10)  senna 2 Tablet(s) Oral at bedtime PRN Constipation  simethicone 160 milliGRAM(s) Chew daily PRN Gas      Allergies    No Known Allergies    Intolerances        SOCIAL HISTORY:    FAMILY HISTORY:  Family history of diabetes mellitus: Parents      Vital Signs Last 24 Hrs  T(C): 36.9 (21 Mar 2019 12:15), Max: 37.1 (21 Mar 2019 05:57)  T(F): 98.4 (21 Mar 2019 12:15), Max: 98.8 (21 Mar 2019 05:57)  HR: 85 (21 Mar 2019 12:15) (75 - 88)  BP: 132/63 (21 Mar 2019 12:15) (125/60 - 144/64)  BP(mean): --  RR: 18 (21 Mar 2019 12:15) (18 - 32)  SpO2: 96% (21 Mar 2019 12:15) (94% - 97%)    PHYSICAL EXAM:      Constitutional:    Eyes:    ENMT:    Neck:    Breasts:    Back:    Respiratory:    Cardiovascular:    Gastrointestinal:    Genitourinary:    Rectal:    Extremities:    Vascular:    Neurological:    Skin:    Lymph Nodes:    Musculoskeletal:    Psychiatric:        LABS:                        11.7   12.81 )-----------( 448      ( 21 Mar 2019 06:35 )             38.1     03-21    139  |  95<L>  |  10  ----------------------------<  100<H>  3.9   |  28  |  0.72    Ca    9.7      21 Mar 2019 06:35  Phos  3.3     03-21  Mg     1.9     03-21    TPro  6.5  /  Alb  3.3  /  TBili  0.4  /  DBili  x   /  AST  16  /  ALT  14  /  AlkPhos  87  03-21    PT/INR - ( 20 Mar 2019 17:40 )   PT: 14.2 SEC;   INR: 1.27          PTT - ( 20 Mar 2019 17:40 )  PTT:32.9 SEC      RADIOLOGY & ADDITIONAL STUDIES: HPI:  62 yo man, current smoker (~1 ppd), with history of stage 4 left lung adenocarcinoma, on immunotherapy with Keytruda (first session on 3/4/19), recurrent left pleural effusion s/p left VATS with pleurX placement (2/2019), COPD, DM2, HTN, HLD, and anxiety presents with 1 week of worsening throat pain with odynophagia and dysphagia. Pt states that for the past 1 week, he has been feeling overwhelming dryness/scratchiness in his throat, causing pain and difficulty with swallowing. Pt notes that whenever he tries to swallow, he experiences the sensation of something getting stuck in his throat and upper chest area. Pt has this sensation with both liquids and solids. As a result, pt has been having very little to eat and drink, avoiding water entirely the last 2 days. Pt also reports poor appetite and ongoing pain in his left shoulder/scapula and near his pleurX insertion site. Pt saw Dr. Avitia (Palliative) earlier this month and was started on methadone for pain control. Pt denies any recent F/C, CP, palpitations, cough, abdominal pain, N/V, diarrhea, constipation, or urinary symptoms. Pt continues to have SOB when lying flat, and therefore, currently sleeps upright in a chair.   Pt known to our service and Dr. Moore. Pt s/p left Pleurx in Feb 2019. Post op had continued air leak so mini atrium was placed to Pleurx cath and pt was d/c'd to home w VNS. Pt has been coming weekly to office to have tube and air leak re-evaluated. Now admitted with above complaints. Wife expressing concerns for inc in bloody quality to fluid. Upon assessment pt sitting in chair in hallway. No distress. Worsening dysphagia, wt loss, fatigue.     PAST MEDICAL & SURGICAL HISTORY:  Anxiety  Pleural effusion, left  Lung cancer  COPD (chronic obstructive pulmonary disease)  HLD (hyperlipidemia)  Diabetes mellitus  Hypertension  No significant past surgical history      ROS: negative except for what's noted in HPI    MEDICATIONS  (STANDING):  aspirin enteric coated 81 milliGRAM(s) Oral daily  atorvastatin 40 milliGRAM(s) Oral at bedtime  buDESOnide 160 MICROgram(s)/formoterol 4.5 MICROgram(s) Inhaler 2 Puff(s) Inhalation two times a day  dextrose 5%. 1000 milliLiter(s) (50 mL/Hr) IV Continuous <Continuous>  dextrose 50% Injectable 12.5 Gram(s) IV Push once  dextrose 50% Injectable 25 Gram(s) IV Push once  dextrose 50% Injectable 25 Gram(s) IV Push once  docusate sodium 100 milliGRAM(s) Oral three times a day  fluconAZOLE IVPB      fluconAZOLE IVPB 400 milliGRAM(s) IV Intermittent once  insulin lispro (HumaLOG) corrective regimen sliding scale   SubCutaneous three times a day before meals  insulin lispro (HumaLOG) corrective regimen sliding scale   SubCutaneous at bedtime  melatonin 3 milliGRAM(s) Oral at bedtime  methadone    Tablet 5 milliGRAM(s) Oral two times a day  metoprolol tartrate 25 milliGRAM(s) Oral two times a day  polyethylene glycol 3350 17 Gram(s) Oral daily  pyridoxine 100 milliGRAM(s) Oral daily  sodium chloride 0.9%. 1000 milliLiter(s) (75 mL/Hr) IV Continuous <Continuous>  tiotropium 18 MICROgram(s) Capsule 1 Capsule(s) Inhalation daily    MEDICATIONS  (PRN):  ALBUTerol    90 MICROgram(s) HFA Inhaler 2 Puff(s) Inhalation every 6 hours PRN Shortness of Breath and/or Wheezing  dextrose 40% Gel 15 Gram(s) Oral once PRN Blood Glucose LESS THAN 70 milliGRAM(s)/deciliter  glucagon  Injectable 1 milliGRAM(s) IntraMuscular once PRN Glucose LESS THAN 70 milligrams/deciliter  LORazepam     Tablet 0.5 milliGRAM(s) Oral three times a day PRN Anxiety  ondansetron Injectable 4 milliGRAM(s) IV Push every 6 hours PRN Nausea and/or Vomiting  oxyCODONE    IR 5 milliGRAM(s) Oral every 6 hours PRN Moderate Pain (4 - 6)  oxyCODONE    IR 10 milliGRAM(s) Oral every 6 hours PRN Severe Pain (7 - 10)  senna 2 Tablet(s) Oral at bedtime PRN Constipation  simethicone 160 milliGRAM(s) Chew daily PRN Gas      Allergies    No Known Allergies    Intolerances        SOCIAL HISTORY: , lives w wife  Former smoker  No ETOH    FAMILY HISTORY:  Family history of diabetes mellitus: Parents      Vital Signs Last 24 Hrs  T(C): 36.9 (21 Mar 2019 12:15), Max: 37.1 (21 Mar 2019 05:57)  T(F): 98.4 (21 Mar 2019 12:15), Max: 98.8 (21 Mar 2019 05:57)  HR: 85 (21 Mar 2019 12:15) (75 - 88)  BP: 132/63 (21 Mar 2019 12:15) (125/60 - 144/64)  BP(mean): --  RR: 18 (21 Mar 2019 12:15) (18 - 32)  SpO2: 96% (21 Mar 2019 12:15) (94% - 97%)    General: WN/WD NAD. lethargic, sleepy but arousable  Neurology: A&Ox3, nonfocal, JOYNER x 4  Eyes: PERRLA/ EOMI, Gross vision intact  ENT/Neck: Neck supple, trachea midline, No JVD, Gross hearing intact  Respiratory: decreased BS left mid to base  CV: RRR, S1S2, no murmurs, rubs or gallops  Abdominal: Soft, NT, ND +BS,   Extremities: No edema, + peripheral pulses  Skin: No Rashes, Hematoma, Ecchymosis  Incisions: Old left VATS site healed.   Tubes: Pleurx site c/d/i, dressing removed, new one placed.   Mini atrium w serosang fluid, more sanguinous per pt's wife however not marcial blood.   + air leak seen in mini atrium.           LABS:                        11.7   12.81 )-----------( 448      ( 21 Mar 2019 06:35 )             38.1     03-21    139  |  95<L>  |  10  ----------------------------<  100<H>  3.9   |  28  |  0.72    Ca    9.7      21 Mar 2019 06:35  Phos  3.3     03-21  Mg     1.9     03-21    TPro  6.5  /  Alb  3.3  /  TBili  0.4  /  DBili  x   /  AST  16  /  ALT  14  /  AlkPhos  87  03-21    PT/INR - ( 20 Mar 2019 17:40 )   PT: 14.2 SEC;   INR: 1.27          PTT - ( 20 Mar 2019 17:40 )  PTT:32.9 SEC      RADIOLOGY & ADDITIONAL STUDIES:  EXAM:  XR CHEST PA LAT 2V        PROCEDURE DATE:  Mar 20 2019         INTERPRETATION:  CLINICAL INDICATION: Shortness of Breath    TECHNIQUE: Single portable view of the chest was obtained.    COMPARISON: Chest radiograph 3/5/2019.    FINDINGS:   Left chest tube is in place.    Trace right pleural effusion.  Unchanged loculated pneumothorax projecting over the lateral inferior   left lower chest.  Unchanged left mid and lower lung atelectasis.  No pleural effusion or pneumothorax.   Cardiac size is enlarged. Degenerative changes of the spine    IMPRESSION:   Unchanged loculated pneumothorax projecting over the lateral inferior   left lower chest  Unchanged left mid and lower lung atelectasis.

## 2019-03-21 NOTE — CONSULT NOTE ADULT - ASSESSMENT
62 yo man, current smoker (~1 ppd), with history of stage 4 left lung adenocarcinoma (PDL-1 70% positive) diagnosed January 2019, on immunotherapy with Keytruda (first session on 3/4/19), recurrent left loculated pleural effusion s/p left VATS with pleurX placement (2/2019), COPD, DM2, HTN, HLD, and anxiety presents with 1 week of worsening throat pain, odynophagia, and dysphagia; exam c/w oral thrush, possible esophagitis given symptoms. Doubt relation to single treatment with Keytruda. PET/CT obtained for staging c/w known metastatic disease - he has just started therapy.   -please obtain MRI Brain with contrast to complete staging  -GI consult for possible EGD, continue treatment  -agree with CT surgery eval of pleurX  -consider endocrinology consult for poorly controlled DMII  -PT/OT, nutrition consult  -palliative care consult; patient follows outpatient with Dr. Rafael Fernandez for pain and symptom management

## 2019-03-21 NOTE — PHYSICAL THERAPY INITIAL EVALUATION ADULT - PERTINENT HX OF CURRENT PROBLEM, REHAB EVAL
64 yo man, current smoker (~1 ppd), with history of stage 4 left lung adenocarcinoma, on immunotherapy with Keytruda (first session on 3/4/19), recurrent left pleural effusion s/p left VATS with pleurX placement (2/2019), COPD, DM2, HTN, HLD, and anxiety presents with 1 week of worsening throat pain with odynophagia and dysphagia.

## 2019-03-21 NOTE — CHART NOTE - NSCHARTNOTEFT_GEN_A_CORE
NUTRITION SERVICES     Upon Nutritional Assessment by the Registered Dietitian your patient was determined to meet criteria/ has evidence of the following diagnosis/diagnoses:  [ ] Mild Protein Calorie Malnutrition   [ ] Moderate Protein Calorie Malnutrition   [ X ] Severe Protein Calorie Malnutrition   [ ] Unspecified Protein Calorie Malnutrition   [ ] Underweight / BMI <19  [ ] Morbid Obesity / BMI >40    Findings as based on:  •  Comprehensive nutritional assessment and consultation    Please refer to Initial Dietitian Evaluation via documents section of [facility] EMR for further recommendations.    Cara Michel RDN, CDN   pager: [phone]

## 2019-03-21 NOTE — PROGRESS NOTE ADULT - PROBLEM SELECTOR PLAN 4
Pt with stage 4 left lung adenocarcinoma, started on immunotherapy on 3/4/19 with Keytruda.  - Oncology consult appreciated,  will get MRI brain with contrast for staging and consult GI for possible EGD  - Palliative care consult for symptomatic management  - C/w methadone ([employer] Reference #: [id number]) and oxycodone PRN  -Leg doppler negative for DVT

## 2019-03-21 NOTE — PHYSICAL THERAPY INITIAL EVALUATION ADULT - PATIENT PROFILE REVIEW, REHAB EVAL
ACTIVITY: OOB with Assistance; spoke with JANKI Bhatia prior to PT evaluation--> Pt OK for PT consult/OOB activity/yes

## 2019-03-21 NOTE — PROGRESS NOTE ADULT - SUBJECTIVE AND OBJECTIVE BOX
Brielle Mcnamara MD  Pager [phone]    CHIEF COMPLAINT: Patient is a 63y old  male who presents with a chief complaint of Odynophagia/dysphagia, minimal PO intake (21 Mar 2019 13:29)      SUBJECTIVE / OVERNIGHT EVENTS:  pt c/o dysphagia with food and liquid, weight loss, wife also notes left pleurx drainage is slightly bloody; pt can't lie flat due to sob, CXR stable left pleural effusion    MEDICATIONS  (STANDING):  aspirin enteric coated 81 milliGRAM(s) Oral daily  atorvastatin 40 milliGRAM(s) Oral at bedtime  buDESOnide 160 MICROgram(s)/formoterol 4.5 MICROgram(s) Inhaler 2 Puff(s) Inhalation two times a day  dextrose 5%. 1000 milliLiter(s) (50 mL/Hr) IV Continuous <Continuous>  dextrose 50% Injectable 12.5 Gram(s) IV Push once  dextrose 50% Injectable 25 Gram(s) IV Push once  dextrose 50% Injectable 25 Gram(s) IV Push once  docusate sodium 100 milliGRAM(s) Oral three times a day  insulin lispro (HumaLOG) corrective regimen sliding scale   SubCutaneous three times a day before meals  insulin lispro (HumaLOG) corrective regimen sliding scale   SubCutaneous at bedtime  melatonin 3 milliGRAM(s) Oral at bedtime  methadone    Tablet 5 milliGRAM(s) Oral two times a day  metoprolol tartrate 25 milliGRAM(s) Oral two times a day  polyethylene glycol 3350 17 Gram(s) Oral daily  pyridoxine 100 milliGRAM(s) Oral daily  sodium chloride 0.9%. 1000 milliLiter(s) (75 mL/Hr) IV Continuous <Continuous>  tiotropium 18 MICROgram(s) Capsule 1 Capsule(s) Inhalation daily    MEDICATIONS  (PRN):  ALBUTerol    90 MICROgram(s) HFA Inhaler 2 Puff(s) Inhalation every 6 hours PRN Shortness of Breath and/or Wheezing  dextrose 40% Gel 15 Gram(s) Oral once PRN Blood Glucose LESS THAN 70 milliGRAM(s)/deciliter  glucagon  Injectable 1 milliGRAM(s) IntraMuscular once PRN Glucose LESS THAN 70 milligrams/deciliter  LORazepam     Tablet 0.5 milliGRAM(s) Oral three times a day PRN Anxiety  ondansetron Injectable 4 milliGRAM(s) IV Push every 6 hours PRN Nausea and/or Vomiting  oxyCODONE    IR 5 milliGRAM(s) Oral every 6 hours PRN Moderate Pain (4 - 6)  oxyCODONE    IR 10 milliGRAM(s) Oral every 6 hours PRN Severe Pain (7 - 10)  senna 2 Tablet(s) Oral at bedtime PRN Constipation  simethicone 160 milliGRAM(s) Chew daily PRN Gas      VITALS:  T(F): 98.8 (19 @ 05:57), Max: 98.8 (19 @ 16:46)  HR: 82 (19 @ 05:57) (75 - 88)  BP: 138/69 (19 @ 05:57) (125/60 - 144/64)  RR: 18 (19 @ 05:57) (16 - 32)  SpO2: 95% (19 @ 05:57)  Height (cm): 165.1 (23:39)  Weight (kg): 81.5 (23:39)  BMI (kg/m2): 29.9 (23:39)    CAPILLARY BLOOD GLUCOSE    Output   Height (cm): 165.1 (23:39)  Weight (kg): 81.5 (23:39)  BMI (kg/m2): 29.9 (23:39)  I&O's Summary  T(F): 98.8 (19 @ 05:57), Max: 98.8 (19 @ 16:46)  HR: 82 (19 @ 05:57) (75 - 88)  BP: 138/69 (19 @ 05:57) (125/60 - 144/64)  RR: 18 (19 @ 05:57) (16 - 32)  SpO2: 95% (19 @ 05:57)    PHYSICAL EXAM:  GENERAL: NAD, sitting up in chair, hunched over  Heent; oral thrush with white coating of tongue  NECK: Supple, No JVD  CHEST/LUNG: Clear to auscultation bilaterally; No wheeze  HEART: Regular rate and rhythm; No murmurs, rubs, or gallops  ABDOMEN: Soft, Nontender, Nondistended; Bowel sounds present  EXTREMITIES:  2+ Peripheral Pulses, No clubbing, cyanosis, 2+ pitting edema b/l legs   PSYCH: AAOx2  NEUROLOGY: non-focal  SKIN: No rashes or lesions    LABS:              11.7                 139  | 28   | 10           12.81 >-----------< 448     ------------------------< 100                   38.1                 3.9  | 95   | 0.72                                         Ca 9.7   Mg 1.9   Ph 3.3         TPro  6.5  /  Alb  3.3      TBili  0.4  /  DBili  x         AST  16  /  ALT  14            AlkPhos  87      INR: 1.27<H>;    PT: 14.2 SEC<H>;    PTT: 32.9 SEC      VB-20 @ 19:28  7.41/55/31/31/59.1/8.7    VB-20 @ 17:40  7.37/64/< 24/31/37.2/10.4    MICROBIOLOGY:    RADIOLOGY & ADDITIONAL TESTS:    Imaging Personally Reviewed:  < from: US Duplex Venous Lower Ext Complete, Bilateral (19 @ 10:13) >  IMPRESSION:     No evidence of bilateral lower extremity deep venous thrombosis.    < from: Xray Chest 2 Views PA/Lat (19 @ 19:10) >  IMPRESSION:   Unchanged loculated pneumothorax projecting over the lateral inferior   left lower chest  Unchanged left mid and lower lung atelectasis.    < from: NM PET/CT Onc FDG Skull to Thigh, Inital (19 @ 13:14) >  IMPRESSION: Abnormal FDG-PET/CT scan.    1. Left upper lobe consolidation, as seen on CT dated 2019, likely   corresponds to patient's primary lung neoplasm.    2. Hypermetabolic right supraclavicular, bilateral mediastinal, left   perihilar, right internal mammary, retrocrural, and upper abdominal   lymphadenopathy is compatible with metastatic disease.    3. Diffuse hypermetabolic left pleural thickening, compatible with   metastatic disease.    4. Small loculated left anterior pleural effusion, not significantly   changed since 2019, status post interval drainage of larger posterior   component of left pleural effusion seen on prior CT. New small loculated   left lower pneumothorax with Pleurx catheter in place, as seen on chest   radiograph dated 3/5/2018.    5. Hypermetabolic subcutaneous soft tissue density in lateral aspect of   left chest wall, new since2019, and difficult to delineate   intramuscular focus in left upper back, adjacent to sixth rib, are   indeterminate. Differential diagnosis includes neoplasm, and infection.   Please correlate clinically and with ultrasound, as indicated.    6.Few hypermetabolic intramuscular foci, as described above, also are   nonspecific. Correlate clinically for recent injections.      [ ] Consultant(s) Notes Reviewed:  [x ] Care Discussed with Consultants/Other Providers: ads np Ashley, f/u oncology consult recs, GI consult ,MRI Brain, leg doppler , CTS eval for L pleurx management Brielle Mcnamara MD  Pager [phone]    CHIEF COMPLAINT: Patient is a 63y old  male who presents with a chief complaint of Odynophagia/dysphagia, minimal PO intake (21 Mar 2019 13:29)      SUBJECTIVE / OVERNIGHT EVENTS:  pt c/o dysphagia with food and liquid, weight loss, wife also notes left pleurx drainage is slightly bloody; pt can't lie flat due to sob, CXR stable left pleural effusion    MEDICATIONS  (STANDING):  aspirin enteric coated 81 milliGRAM(s) Oral daily  atorvastatin 40 milliGRAM(s) Oral at bedtime  buDESOnide 160 MICROgram(s)/formoterol 4.5 MICROgram(s) Inhaler 2 Puff(s) Inhalation two times a day  dextrose 5%. 1000 milliLiter(s) (50 mL/Hr) IV Continuous <Continuous>  dextrose 50% Injectable 12.5 Gram(s) IV Push once  dextrose 50% Injectable 25 Gram(s) IV Push once  dextrose 50% Injectable 25 Gram(s) IV Push once  docusate sodium 100 milliGRAM(s) Oral three times a day  insulin lispro (HumaLOG) corrective regimen sliding scale   SubCutaneous three times a day before meals  insulin lispro (HumaLOG) corrective regimen sliding scale   SubCutaneous at bedtime  melatonin 3 milliGRAM(s) Oral at bedtime  methadone    Tablet 5 milliGRAM(s) Oral two times a day  metoprolol tartrate 25 milliGRAM(s) Oral two times a day  polyethylene glycol 3350 17 Gram(s) Oral daily  pyridoxine 100 milliGRAM(s) Oral daily  sodium chloride 0.9%. 1000 milliLiter(s) (75 mL/Hr) IV Continuous <Continuous>  tiotropium 18 MICROgram(s) Capsule 1 Capsule(s) Inhalation daily    MEDICATIONS  (PRN):  ALBUTerol    90 MICROgram(s) HFA Inhaler 2 Puff(s) Inhalation every 6 hours PRN Shortness of Breath and/or Wheezing  dextrose 40% Gel 15 Gram(s) Oral once PRN Blood Glucose LESS THAN 70 milliGRAM(s)/deciliter  glucagon  Injectable 1 milliGRAM(s) IntraMuscular once PRN Glucose LESS THAN 70 milligrams/deciliter  LORazepam     Tablet 0.5 milliGRAM(s) Oral three times a day PRN Anxiety  ondansetron Injectable 4 milliGRAM(s) IV Push every 6 hours PRN Nausea and/or Vomiting  oxyCODONE    IR 5 milliGRAM(s) Oral every 6 hours PRN Moderate Pain (4 - 6)  oxyCODONE    IR 10 milliGRAM(s) Oral every 6 hours PRN Severe Pain (7 - 10)  senna 2 Tablet(s) Oral at bedtime PRN Constipation  simethicone 160 milliGRAM(s) Chew daily PRN Gas      VITALS:  T(F): 98.8 (19 @ 05:57), Max: 98.8 (19 @ 16:46)  HR: 82 (19 @ 05:57) (75 - 88)  BP: 138/69 (19 @ 05:57) (125/60 - 144/64)  RR: 18 (19 @ 05:57) (16 - 32)  SpO2: 95% (19 @ 05:57)  Height (cm): 165.1 (23:39)  Weight (kg): 81.5 (23:39)  BMI (kg/m2): 29.9 (23:39)    CAPILLARY BLOOD GLUCOSE    Output   Height (cm): 165.1 (23:39)  Weight (kg): 81.5 (23:39)  BMI (kg/m2): 29.9 (23:39)  I&O's Summary  T(F): 98.8 (19 @ 05:57), Max: 98.8 (19 @ 16:46)  HR: 82 (19 @ 05:57) (75 - 88)  BP: 138/69 (19 @ 05:57) (125/60 - 144/64)  RR: 18 (19 @ 05:57) (16 - 32)  SpO2: 95% (19 @ 05:57)    PHYSICAL EXAM:  GENERAL: NAD, sitting up in chair, hunched over  Heent; oral thrush with white coating of tongue  NECK: Supple, No JVD  CHEST/LUNG: Clear to auscultation bilaterally; No wheeze; left pleurx with serosanguinous drainage  HEART: Regular rate and rhythm; No murmurs, rubs, or gallops  ABDOMEN: Soft, Nontender, Nondistended; Bowel sounds present  EXTREMITIES:  2+ Peripheral Pulses, No clubbing, cyanosis, 2+ pitting edema b/l legs   PSYCH: AAOx2  NEUROLOGY: non-focal  SKIN: No rashes or lesions    LABS:              11.7                 139  | 28   | 10           12.81 >-----------< 448     ------------------------< 100                   38.1                 3.9  | 95   | 0.72                                         Ca 9.7   Mg 1.9   Ph 3.3         TPro  6.5  /  Alb  3.3      TBili  0.4  /  DBili  x         AST  16  /  ALT  14            AlkPhos  87      INR: 1.27<H>;    PT: 14.2 SEC<H>;    PTT: 32.9 SEC      VB-20 @ 19:28  7.41/55/31/31/59.1/8.7    VB-20 @ 17:40  7.37/64/< 24/31/37.2/10.4    MICROBIOLOGY:    RADIOLOGY & ADDITIONAL TESTS:    Imaging Personally Reviewed:  < from: US Duplex Venous Lower Ext Complete, Bilateral (19 @ 10:13) >  IMPRESSION:     No evidence of bilateral lower extremity deep venous thrombosis.    < from: Xray Chest 2 Views PA/Lat (19 @ 19:10) >  IMPRESSION:   Unchanged loculated pneumothorax projecting over the lateral inferior   left lower chest  Unchanged left mid and lower lung atelectasis.    < from: NM PET/CT Onc FDG Skull to Thigh, Inital (19 @ 13:14) >  IMPRESSION: Abnormal FDG-PET/CT scan.    1. Left upper lobe consolidation, as seen on CT dated 2019, likely   corresponds to patient's primary lung neoplasm.    2. Hypermetabolic right supraclavicular, bilateral mediastinal, left   perihilar, right internal mammary, retrocrural, and upper abdominal   lymphadenopathy is compatible with metastatic disease.    3. Diffuse hypermetabolic left pleural thickening, compatible with   metastatic disease.    4. Small loculated left anterior pleural effusion, not significantly   changed since 2019, status post interval drainage of larger posterior   component of left pleural effusion seen on prior CT. New small loculated   left lower pneumothorax with Pleurx catheter in place, as seen on chest   radiograph dated 3/5/2018.    5. Hypermetabolic subcutaneous soft tissue density in lateral aspect of   left chest wall, new since2019, and difficult to delineate   intramuscular focus in left upper back, adjacent to sixth rib, are   indeterminate. Differential diagnosis includes neoplasm, and infection.   Please correlate clinically and with ultrasound, as indicated.    6.Few hypermetabolic intramuscular foci, as described above, also are   nonspecific. Correlate clinically for recent injections.      [ ] Consultant(s) Notes Reviewed:  [x ] Care Discussed with Consultants/Other Providers: ads santa Ramirez, f/u oncology consult recs, GI consult ,MRI Brain, leg doppler , CTS eval for L pleurx management

## 2019-03-21 NOTE — CONSULT NOTE ADULT - SUBJECTIVE AND OBJECTIVE BOX
Chief Complaint:  Patient is a 63y old  Male who presents with a chief complaint of Odynophagia/dysphagia, minimal PO intake (21 Mar 2019 13:29)      HPI:  The patient is a 62 yo man, current smoker (~1 ppd), with history of stage 4 left lung adenocarcinoma (PDL-1 70% positive) diagnosed 2019, on immunotherapy with Keytruda (first session on 3/4/19), recurrent left loculated pleural effusion s/p left VATS with pleurX placement (2019), COPD, DM2, HTN, HLD, and anxiety who presented on 3/20 with 1 week of worsening throat pain, odynophagia, and dysphagia for which GI was consulted.    On exam the patient was noted to have oral thrush started on 100 mg of Fluconazole. Speech and swallow    evaluation attempted but refused by patient due to pain.    Allergies:  No Known Allergies      Home Medications:    Hospital Medications:  ALBUTerol    90 MICROgram(s) HFA Inhaler 2 Puff(s) Inhalation every 6 hours PRN  aspirin enteric coated 81 milliGRAM(s) Oral daily  atorvastatin 40 milliGRAM(s) Oral at bedtime  buDESOnide 160 MICROgram(s)/formoterol 4.5 MICROgram(s) Inhaler 2 Puff(s) Inhalation two times a day  dextrose 40% Gel 15 Gram(s) Oral once PRN  dextrose 5%. 1000 milliLiter(s) IV Continuous <Continuous>  dextrose 50% Injectable 12.5 Gram(s) IV Push once  dextrose 50% Injectable 25 Gram(s) IV Push once  dextrose 50% Injectable 25 Gram(s) IV Push once  docusate sodium 100 milliGRAM(s) Oral three times a day  glucagon  Injectable 1 milliGRAM(s) IntraMuscular once PRN  insulin lispro (HumaLOG) corrective regimen sliding scale   SubCutaneous three times a day before meals  insulin lispro (HumaLOG) corrective regimen sliding scale   SubCutaneous at bedtime  LORazepam     Tablet 0.5 milliGRAM(s) Oral three times a day PRN  melatonin 3 milliGRAM(s) Oral at bedtime  methadone    Tablet 5 milliGRAM(s) Oral two times a day  metoprolol tartrate 25 milliGRAM(s) Oral two times a day  ondansetron Injectable 4 milliGRAM(s) IV Push every 6 hours PRN  oxyCODONE    IR 5 milliGRAM(s) Oral every 6 hours PRN  oxyCODONE    IR 10 milliGRAM(s) Oral every 6 hours PRN  polyethylene glycol 3350 17 Gram(s) Oral daily  pyridoxine 100 milliGRAM(s) Oral daily  senna 2 Tablet(s) Oral at bedtime PRN  simethicone 160 milliGRAM(s) Chew daily PRN  sodium chloride 0.9%. 1000 milliLiter(s) IV Continuous <Continuous>  tiotropium 18 MICROgram(s) Capsule 1 Capsule(s) Inhalation daily      PMHX/PSHX:  Anxiety  Pleural effusion, left  Lung cancer  COPD (chronic obstructive pulmonary disease)  HLD (hyperlipidemia)  Diabetes mellitus  Hypertension  No significant past surgical history      Family history:  Family history of diabetes mellitus      Social History:     ROS:     General:  No wt loss, fevers, chills, night sweats, fatigue,   Eyes:  Good vision, no reported pain  ENT:  No sore throat, pain, runny nose, dysphagia  CV:  No pain, palpitations, hypo/hypertension  Resp:  No dyspnea, cough, tachypnea, wheezing  GI:  See HPI  :  No pain, bleeding, incontinence, nocturia  Muscle:  No pain, weakness  Neuro:  No weakness, tingling, memory problems  Psych:  No fatigue, insomnia, mood problems, depression  Endocrine:  No polyuria, polydipsia, cold/heat intolerance  Heme:  No petechiae, ecchymosis, easy bruisability  Skin:  No rash, edema      PHYSICAL EXAM:   GENERAL:  Appears stated age, well-groomed, well-nourished, NAD  CHEST:  Full & symmetric excursion  HEART:  Regular rhythm, no abdominal bruit, no edema  ABDOMEN:  Soft, non-tender, non-distended, normoactive bowel sounds,  no masses , no hepatosplenomegaly  EXTREMITIES:  no cyanosis,clubbing or edema  SKIN:  No rash/erythema/ecchymoses/petechiae/wounds/abscess/warm/dry  NEURO:  Alert, oriented    Vital Signs:  Vital Signs Last 24 Hrs  T(C): 37.1 (21 Mar 2019 05:57), Max: 37.1 (20 Mar 2019 16:46)  T(F): 98.8 (21 Mar 2019 05:57), Max: 98.8 (20 Mar 2019 16:46)  HR: 82 (21 Mar 2019 05:57) (75 - 88)  BP: 138/69 (21 Mar 2019 05:57) (125/60 - 144/64)  BP(mean): --  RR: 18 (21 Mar 2019 05:57) (16 - 32)  SpO2: 95% (21 Mar 2019 05:57) (94% - 97%)  Daily Height in cm: 165.1 (20 Mar 2019 23:39)    Daily Weight in k.5 (21 Mar 2019 09:38)    LABS:                        11.7   12.81 )-----------( 448      ( 21 Mar 2019 06:35 )             38.1         139  |  95<L>  |  10  ----------------------------<  100<H>  3.9   |  28  |  0.72    Ca    9.7      21 Mar 2019 06:35  Phos  3.3     -21  Mg     1.9     21    TPro  6.5  /  Alb  3.3  /  TBili  0.4  /  DBili  x   /  AST  16  /  ALT  14  /  AlkPhos  87  03-21    LIVER FUNCTIONS - ( 21 Mar 2019 06:35 )  Alb: 3.3 g/dL / Pro: 6.5 g/dL / ALK PHOS: 87 u/L / ALT: 14 u/L / AST: 16 u/L / GGT: x           PT/INR - ( 20 Mar 2019 17:40 )   PT: 14.2 SEC;   INR: 1.27          PTT - ( 20 Mar 2019 17:40 )  PTT:32.9 SEC        Imaging: Chief Complaint:  Patient is a 63y old  Male who presents with a chief complaint of Odynophagia/dysphagia, minimal PO intake (21 Mar 2019 13:29)      HPI:  The patient is a 62 yo man, current smoker (~1 ppd), with history of stage 4 left lung adenocarcinoma (PDL-1 70% positive) diagnosed 2019, on immunotherapy with Keytruda (first session on 3/4/19), recurrent left loculated pleural effusion s/p left VATS with pleurX placement (2019), COPD, DM2, HTN, HLD, and anxiety who presented on 3/20 with 1 week of worsening throat pain, odynophagia, and dysphagia for which GI was consulted.  Patient statesa    On exam the patient was noted to have oral thrush started on 100 mg of Fluconazole. Speech and swallow    evaluation attempted but refused by patient due to pain.    Allergies:  No Known Allergies      Home Medications:    Hospital Medications:  ALBUTerol    90 MICROgram(s) HFA Inhaler 2 Puff(s) Inhalation every 6 hours PRN  aspirin enteric coated 81 milliGRAM(s) Oral daily  atorvastatin 40 milliGRAM(s) Oral at bedtime  buDESOnide 160 MICROgram(s)/formoterol 4.5 MICROgram(s) Inhaler 2 Puff(s) Inhalation two times a day  dextrose 40% Gel 15 Gram(s) Oral once PRN  dextrose 5%. 1000 milliLiter(s) IV Continuous <Continuous>  dextrose 50% Injectable 12.5 Gram(s) IV Push once  dextrose 50% Injectable 25 Gram(s) IV Push once  dextrose 50% Injectable 25 Gram(s) IV Push once  docusate sodium 100 milliGRAM(s) Oral three times a day  glucagon  Injectable 1 milliGRAM(s) IntraMuscular once PRN  insulin lispro (HumaLOG) corrective regimen sliding scale   SubCutaneous three times a day before meals  insulin lispro (HumaLOG) corrective regimen sliding scale   SubCutaneous at bedtime  LORazepam     Tablet 0.5 milliGRAM(s) Oral three times a day PRN  melatonin 3 milliGRAM(s) Oral at bedtime  methadone    Tablet 5 milliGRAM(s) Oral two times a day  metoprolol tartrate 25 milliGRAM(s) Oral two times a day  ondansetron Injectable 4 milliGRAM(s) IV Push every 6 hours PRN  oxyCODONE    IR 5 milliGRAM(s) Oral every 6 hours PRN  oxyCODONE    IR 10 milliGRAM(s) Oral every 6 hours PRN  polyethylene glycol 3350 17 Gram(s) Oral daily  pyridoxine 100 milliGRAM(s) Oral daily  senna 2 Tablet(s) Oral at bedtime PRN  simethicone 160 milliGRAM(s) Chew daily PRN  sodium chloride 0.9%. 1000 milliLiter(s) IV Continuous <Continuous>  tiotropium 18 MICROgram(s) Capsule 1 Capsule(s) Inhalation daily      PMHX/PSHX:  Anxiety  Pleural effusion, left  Lung cancer  COPD (chronic obstructive pulmonary disease)  HLD (hyperlipidemia)  Diabetes mellitus  Hypertension  No significant past surgical history      Family history:  Family history of diabetes mellitus      Social History:     ROS:     General:  No wt loss, fevers, chills, night sweats, fatigue,   Eyes:  Good vision, no reported pain  ENT:  No sore throat, pain, runny nose, dysphagia  CV:  No pain, palpitations, hypo/hypertension  Resp:  No dyspnea, cough, tachypnea, wheezing  GI:  See HPI  :  No pain, bleeding, incontinence, nocturia  Muscle:  No pain, weakness  Neuro:  No weakness, tingling, memory problems  Psych:  No fatigue, insomnia, mood problems, depression  Endocrine:  No polyuria, polydipsia, cold/heat intolerance  Heme:  No petechiae, ecchymosis, easy bruisability  Skin:  No rash, edema      PHYSICAL EXAM:   GENERAL:  Appears stated age, well-groomed, well-nourished, NAD  CHEST:  Full & symmetric excursion  HEART:  Regular rhythm, no abdominal bruit, no edema  ABDOMEN:  Soft, non-tender, non-distended, normoactive bowel sounds,  no masses , no hepatosplenomegaly  EXTREMITIES:  no cyanosis,clubbing or edema  SKIN:  No rash/erythema/ecchymoses/petechiae/wounds/abscess/warm/dry  NEURO:  Alert, oriented    Vital Signs:  Vital Signs Last 24 Hrs  T(C): 37.1 (21 Mar 2019 05:57), Max: 37.1 (20 Mar 2019 16:46)  T(F): 98.8 (21 Mar 2019 05:57), Max: 98.8 (20 Mar 2019 16:46)  HR: 82 (21 Mar 2019 05:57) (75 - 88)  BP: 138/69 (21 Mar 2019 05:57) (125/60 - 144/64)  BP(mean): --  RR: 18 (21 Mar 2019 05:57) (16 - 32)  SpO2: 95% (21 Mar 2019 05:57) (94% - 97%)  Daily Height in cm: 165.1 (20 Mar 2019 23:39)    Daily Weight in k.5 (21 Mar 2019 09:38)    LABS:                        11.7   12.81 )-----------( 448      ( 21 Mar 2019 06:35 )             38.1         139  |  95<L>  |  10  ----------------------------<  100<H>  3.9   |  28  |  0.72    Ca    9.7      21 Mar 2019 06:35  Phos  3.3     -21  Mg     1.9     21    TPro  6.5  /  Alb  3.3  /  TBili  0.4  /  DBili  x   /  AST  16  /  ALT  14  /  AlkPhos  87  21    LIVER FUNCTIONS - ( 21 Mar 2019 06:35 )  Alb: 3.3 g/dL / Pro: 6.5 g/dL / ALK PHOS: 87 u/L / ALT: 14 u/L / AST: 16 u/L / GGT: x           PT/INR - ( 20 Mar 2019 17:40 )   PT: 14.2 SEC;   INR: 1.27          PTT - ( 20 Mar 2019 17:40 )  PTT:32.9 SEC        Imaging: Chief Complaint:  Patient is a 63y old  Male who presents with a chief complaint of Odynophagia/dysphagia, minimal PO intake (21 Mar 2019 13:29)      HPI:  The patient is a 64 yo man, current smoker (~1 ppd), with history of stage 4 left lung adenocarcinoma (PDL-1 70% positive) diagnosed 2019, on immunotherapy with Keytruda (first session on 3/4/19), recurrent left loculated pleural effusion s/p left VATS with pleurX placement (2019), COPD, DM2, HTN, HLD, and anxiety who presented on 3/20 with 1 week of worsening throat pain, odynophagia, and dysphagia for which GI was consulted.  Patient states that he has never had issues with dysphagia before but over the last week this has started abruptly.  He denies food or water getting stuck in his esophagus but states that it is very painful to swallow in particular pounts to his upper throat.  This also has been associated with change in voice.    On exam the patient was noted to have oral thrush started on 100 mg of Fluconazole. Speech and swallow  evaluation attempted but refused by patient due to pain.    Allergies:  No Known Allergies      Home Medications:    Hospital Medications:  ALBUTerol    90 MICROgram(s) HFA Inhaler 2 Puff(s) Inhalation every 6 hours PRN  aspirin enteric coated 81 milliGRAM(s) Oral daily  atorvastatin 40 milliGRAM(s) Oral at bedtime  buDESOnide 160 MICROgram(s)/formoterol 4.5 MICROgram(s) Inhaler 2 Puff(s) Inhalation two times a day  dextrose 40% Gel 15 Gram(s) Oral once PRN  dextrose 5%. 1000 milliLiter(s) IV Continuous <Continuous>  dextrose 50% Injectable 12.5 Gram(s) IV Push once  dextrose 50% Injectable 25 Gram(s) IV Push once  dextrose 50% Injectable 25 Gram(s) IV Push once  docusate sodium 100 milliGRAM(s) Oral three times a day  glucagon  Injectable 1 milliGRAM(s) IntraMuscular once PRN  insulin lispro (HumaLOG) corrective regimen sliding scale   SubCutaneous three times a day before meals  insulin lispro (HumaLOG) corrective regimen sliding scale   SubCutaneous at bedtime  LORazepam     Tablet 0.5 milliGRAM(s) Oral three times a day PRN  melatonin 3 milliGRAM(s) Oral at bedtime  methadone    Tablet 5 milliGRAM(s) Oral two times a day  metoprolol tartrate 25 milliGRAM(s) Oral two times a day  ondansetron Injectable 4 milliGRAM(s) IV Push every 6 hours PRN  oxyCODONE    IR 5 milliGRAM(s) Oral every 6 hours PRN  oxyCODONE    IR 10 milliGRAM(s) Oral every 6 hours PRN  polyethylene glycol 3350 17 Gram(s) Oral daily  pyridoxine 100 milliGRAM(s) Oral daily  senna 2 Tablet(s) Oral at bedtime PRN  simethicone 160 milliGRAM(s) Chew daily PRN  sodium chloride 0.9%. 1000 milliLiter(s) IV Continuous <Continuous>  tiotropium 18 MICROgram(s) Capsule 1 Capsule(s) Inhalation daily      PMHX/PSHX:  Anxiety  Pleural effusion, left  Lung cancer  COPD (chronic obstructive pulmonary disease)  HLD (hyperlipidemia)  Diabetes mellitus  Hypertension  No significant past surgical history      Family history:  Family history of diabetes mellitus      Social History:     ROS:     General:  No wt loss, fevers, chills, night sweats, fatigue,   Eyes:  Good vision, no reported pain  ENT:  No sore throat, pain, runny nose, dysphagia  CV:  No pain, palpitations, hypo/hypertension  Resp:  No dyspnea, cough, tachypnea, wheezing  GI:  See HPI  :  No pain, bleeding, incontinence, nocturia  Muscle:  No pain, weakness  Neuro:  No weakness, tingling, memory problems  Psych:  No fatigue, insomnia, mood problems, depression  Endocrine:  No polyuria, polydipsia, cold/heat intolerance  Heme:  No petechiae, ecchymosis, easy bruisability  Skin:  No rash, edema      PHYSICAL EXAM:   GENERAL:  Appears stated age, well-groomed, well-nourished, NAD  CHEST:  Full & symmetric excursion  HEART:  Regular rhythm, no abdominal bruit, no edema  ABDOMEN:  Soft, non-tender, non-distended, normoactive bowel sounds,  no masses , no hepatosplenomegaly  EXTREMITIES:  no cyanosis,clubbing or edema  SKIN:  No rash/erythema/ecchymoses/petechiae/wounds/abscess/warm/dry  NEURO:  Alert, oriented    Vital Signs:  Vital Signs Last 24 Hrs  T(C): 37.1 (21 Mar 2019 05:57), Max: 37.1 (20 Mar 2019 16:46)  T(F): 98.8 (21 Mar 2019 05:57), Max: 98.8 (20 Mar 2019 16:46)  HR: 82 (21 Mar 2019 05:57) (75 - 88)  BP: 138/69 (21 Mar 2019 05:57) (125/60 - 144/64)  BP(mean): --  RR: 18 (21 Mar 2019 05:57) (16 - 32)  SpO2: 95% (21 Mar 2019 05:57) (94% - 97%)  Daily Height in cm: 165.1 (20 Mar 2019 23:39)    Daily Weight in k.5 (21 Mar 2019 09:38)    LABS:                        11.7   12.81 )-----------( 448      ( 21 Mar 2019 06:35 )             38.1     -21    139  |  95<L>  |  10  ----------------------------<  100<H>  3.9   |  28  |  0.72    Ca    9.7      21 Mar 2019 06:35  Phos  3.3     03-21  Mg     1.9     21    TPro  6.5  /  Alb  3.3  /  TBili  0.4  /  DBili  x   /  AST  16  /  ALT  14  /  AlkPhos  87  03-21    LIVER FUNCTIONS - ( 21 Mar 2019 06:35 )  Alb: 3.3 g/dL / Pro: 6.5 g/dL / ALK PHOS: 87 u/L / ALT: 14 u/L / AST: 16 u/L / GGT: x           PT/INR - ( 20 Mar 2019 17:40 )   PT: 14.2 SEC;   INR: 1.27          PTT - ( 20 Mar 2019 17:40 )  PTT:32.9 SEC        Imaging:

## 2019-03-21 NOTE — SWALLOW BEDSIDE ASSESSMENT ADULT - COMMENTS
Upon arrival, pt sitting in hallway with lunch tray; however, reported unable to eat due to significant throat pain. SLP discussed role and rationale for swallow evaluation; however, pt declined at this time due to significant throat pain despite max cues of encouragement. Pt localized pain on right side of throat and described pain as "burning sensation, like strep throat"; onset began x1 week ago, prior to onset pt denied h/o dysphagia. Additionally, pt reported sensation of food getting stuck in his throat and lower esophagus with both solids and liquids. Discussed option of instrumental assessment to further assess swallow function given present complaint and comorbidities, but pt unsure if he will be able to fully participate at this time due to aforementioned pain. Discussed session with ADS; informed team our service will follow up with pt to determine readiness for instrumental assessment. Pt would benefit from ENT consult given c/o localized throat pain.

## 2019-03-21 NOTE — DIETITIAN INITIAL EVALUATION ADULT. - PERTINENT LABORATORY DATA
03-21 Na139 mmol/L Glu 100 mg/dL<H> K+ 3.9 mmol/L Cr  0.72 mg/dL BUN 10 mg/dL 03-21 Phos 3.3 mg/dL 03-21 Alb 3.3 g/dL 03-21 [id number] 7.2 %<H>

## 2019-03-21 NOTE — CONSULT NOTE ADULT - ASSESSMENT
63 M with shortness of breath, neoplasm related pain, odynophagia, stage IV lung cancer, encounter for palliative care

## 2019-03-21 NOTE — DIETITIAN INITIAL EVALUATION ADULT. - PROBLEM SELECTOR PLAN 3
Pt with recurrent left loculated pleural effusion, s/p left VATS and pleurX placement. PleurX still in place, draining serosanguinous fluid.  - C/w PleurX drainage; monitor output  - Monitor respiratory status  - Serial CXRs to assess for reaccumulation  - Thoracic surgery consult in AM, as pt follows with Dr. Moore and was supposed to see him this week

## 2019-03-21 NOTE — CONSULT NOTE ADULT - SUBJECTIVE AND OBJECTIVE BOX
HPI:  64 yo man, current smoker (~1 ppd), with history of stage 4 left lung adenocarcinoma (PDL-1 70% positive) diagnosed January 2019, on immunotherapy with Keytruda (first session on 3/4/19), recurrent left loculated pleural effusion s/p left VATS with pleurX placement (2/2019), COPD, DM2, HTN, HLD, and anxiety presents with 1 week of worsening throat pain, odynophagia, and dysphagia. Patient's wife notes his PleurX has been draining fluid and it has appeared more bloody for the last few days. She notes for the last week he has had trouble swallowing foods, and even water. She said he has lost 20 pounds in the last week and has been very fatigued. Patient has been very fatigued, on my exam is sleeping in chair but arouses to voice and notes his throat is painful with speaking/swallowing, breathing is comfortable, some pain at PleurX insertion site persists. In ER CXR with stable left pleural effusion; thoracic oncology consulted.     PAST MEDICAL & SURGICAL HISTORY:  Anxiety  Pleural effusion, left  Lung cancer  COPD (chronic obstructive pulmonary disease)  HLD (hyperlipidemia)  Diabetes mellitus  Hypertension  No significant past surgical history    Allergies  No Known Allergies    MEDICATIONS  (STANDING):  aspirin enteric coated 81 milliGRAM(s) Oral daily  atorvastatin 40 milliGRAM(s) Oral at bedtime  buDESOnide 160 MICROgram(s)/formoterol 4.5 MICROgram(s) Inhaler 2 Puff(s) Inhalation two times a day  dextrose 5%. 1000 milliLiter(s) (50 mL/Hr) IV Continuous <Continuous>  dextrose 50% Injectable 12.5 Gram(s) IV Push once  dextrose 50% Injectable 25 Gram(s) IV Push once  dextrose 50% Injectable 25 Gram(s) IV Push once  docusate sodium 100 milliGRAM(s) Oral three times a day  insulin lispro (HumaLOG) corrective regimen sliding scale   SubCutaneous three times a day before meals  insulin lispro (HumaLOG) corrective regimen sliding scale   SubCutaneous at bedtime  melatonin 3 milliGRAM(s) Oral at bedtime  methadone    Tablet 5 milliGRAM(s) Oral two times a day  metoprolol tartrate 25 milliGRAM(s) Oral two times a day  polyethylene glycol 3350 17 Gram(s) Oral daily  pyridoxine 100 milliGRAM(s) Oral daily  sodium chloride 0.9%. 1000 milliLiter(s) (75 mL/Hr) IV Continuous <Continuous>  tiotropium 18 MICROgram(s) Capsule 1 Capsule(s) Inhalation daily    MEDICATIONS  (PRN):  ALBUTerol    90 MICROgram(s) HFA Inhaler 2 Puff(s) Inhalation every 6 hours PRN Shortness of Breath and/or Wheezing  dextrose 40% Gel 15 Gram(s) Oral once PRN Blood Glucose LESS THAN 70 milliGRAM(s)/deciliter  glucagon  Injectable 1 milliGRAM(s) IntraMuscular once PRN Glucose LESS THAN 70 milligrams/deciliter  LORazepam     Tablet 0.5 milliGRAM(s) Oral three times a day PRN Anxiety  ondansetron Injectable 4 milliGRAM(s) IV Push every 6 hours PRN Nausea and/or Vomiting  oxyCODONE    IR 5 milliGRAM(s) Oral every 6 hours PRN Moderate Pain (4 - 6)  oxyCODONE    IR 10 milliGRAM(s) Oral every 6 hours PRN Severe Pain (7 - 10)  senna 2 Tablet(s) Oral at bedtime PRN Constipation  simethicone 160 milliGRAM(s) Chew daily PRN Gas      FAMILY HISTORY:  Family history of diabetes mellitus: Parents      SOCIAL HISTORY: No EtOH, no tobacco    REVIEW OF SYSTEMS  CONSTITUTIONAL: +weakness, no fevers or chills  EYES/ENT: No visual changes;  No vertigo, +throat pain   NECK: No pain or stiffness  RESPIRATORY: No wheezing, hemoptysis; No shortness of breath. +cough  CARDIOVASCULAR: No chest pain or palpitations  GASTROINTESTINAL: No abdominal or epigastric pain. No nausea, vomiting, or hematemesis; No diarrhea or constipation. No melena or hematochezia.  GENITOURINARY: No dysuria, frequency or hematuria  NEUROLOGICAL: No numbness or weakness  SKIN: No itching, burning, rashes, or lesions   All other review of systems is negative unless indicated above.    Height (cm): 165.1 (03-20 @ 23:39)  Weight (kg): 81.5 (03-20 @ 23:39)  BMI (kg/m2): 29.9 (03-20 @ 23:39)  BSA (m2): 1.89 (03-20 @ 23:39)    T(F): 98.8 (03-21-19 @ 05:57), Max: 98.8 (03-20-19 @ 16:46)  HR: 82 (03-21-19 @ 05:57)  BP: 138/69 (03-21-19 @ 05:57)  RR: 18 (03-21-19 @ 05:57)  SpO2: 95% (03-21-19 @ 05:57)  Wt(kg): --    GENERAL: NAD, sitting up in chair hunched over  HEAD:  Atraumatic, Normocephalic  EYES: EOMI, PERRLA, conjunctiva and sclera clear  MOUTH: thick white secretions and coating of tongue/pharynx, dry  NECK: Supple, No JVD  CHEST/LUNG: Clear to auscultation bilaterally; No wheeze  HEART: Regular rate and rhythm; No murmurs, rubs, or gallops  ABDOMEN: Soft, Nontender, Nondistended; Bowel sounds present  EXTREMITIES:  2+ Peripheral Pulses, No clubbing, cyanosis, 2+ pitting edema b/l to mid shin  NEUROLOGY: non-focal  SKIN: No rashes or lesions  Chest tube with serosanguinous drainage               11.7   12.81 )-----------( 448      ( 21 Mar 2019 06:35 )             38.1       03-21    139  |  95<L>  |  10  ----------------------------<  100<H>  3.9   |  28  |  0.72    Ca    9.7      21 Mar 2019 06:35  Phos  3.3     03-21  Mg     1.9     03-21    TPro  6.5  /  Alb  3.3  /  TBili  0.4  /  DBili  x   /  AST  16  /  ALT  14  /  AlkPhos  87  03-21    Magnesium, Serum: 1.9 mg/dL (03-21 @ 06:35)  Phosphorus Level, Serum: 3.3 mg/dL (03-21 @ 06:35)    PT/INR - ( 20 Mar 2019 17:40 )   PT: 14.2 SEC;   INR: 1.27      PTT - ( 20 Mar 2019 17:40 )  PTT:32.9 SEC      < from: Xray Chest 2 Views PA/Lat (03.20.19 @ 19:10) >  XAM:  XR CHEST PA LAT 2V      PROCEDURE DATE:  Mar 20 2019     INTERPRETATION:  CLINICAL INDICATION: Shortness of Breath    TECHNIQUE: Single portable view of the chest was obtained.    COMPARISON: Chest radiograph 3/5/2019.    FINDINGS:   Left chest tube is in place.    Trace right pleural effusion.  Unchanged loculated pneumothorax projecting over the lateral inferior   left lower chest.  Unchanged left mid and lower lung atelectasis.  No pleural effusion or pneumothorax.   Cardiac size is enlarged. Degenerative changes of the spine    IMPRESSION:   Unchanged loculated pneumothorax projecting over the lateral inferior   left lower chest  Unchanged left mid and lower lung atelectasis.

## 2019-03-21 NOTE — CONSULT NOTE ADULT - SUBJECTIVE AND OBJECTIVE BOX
HPI:  62 yo man, current smoker (~1 ppd), with history of stage 4 left lung adenocarcinoma, on immunotherapy with Keytruda (first session on 3/4/19), recurrent left pleural effusion s/p left VATS with pleurX placement (2/2019), COPD, DM2, HTN, HLD, and anxiety presents with 1 week of worsening throat pain with odynophagia and dysphagia. Pt states that for the past 1 week, he has been feeling overwhelming dryness/scratchiness in his throat, causing pain and difficulty with swallowing. Pt notes that whenever he tries to swallow, he experiences the sensation of something getting stuck in his throat and upper chest area. Pt has this sensation with both liquids and solids. As a result, pt has been having very little to eat and drink, avoiding water entirely the last 2 days. Pt also reports poor appetite and ongoing pain in his left shoulder/scapula and near his pleurX insertion site. Pt saw Dr. Avitia (Palliative) earlier this month and was started on methadone for pain control. Pt denies any recent F/C, CP, palpitations, cough, abdominal pain, N/V, diarrhea, constipation, or urinary symptoms. Pt continues to have SOB when lying flat, and therefore, currently sleeps upright in a chair.     In the ED,  T 98.1-98.8, HR 75-88, -144/60-64, RR 16-32, O2 sats 95-97% RA.   CXR with unchanged left loculated pleural effusion. (20 Mar 2019 23:08)    PERTINENT PM/SXH:   Anxiety  Pleural effusion, left  Lung cancer  COPD (chronic obstructive pulmonary disease)  HLD (hyperlipidemia)  Diabetes mellitus  Hypertension    No significant past surgical history    FAMILY HISTORY:  Family history of diabetes mellitus: Parents    ITEMS NOT CHECKED ARE NOT PRESENT    SOCIAL HISTORY:   Significant other/partner:  [ ]  Children:  [ ]  Spiritism/Spirituality:  Substance hx:  [ ]   Tobacco hx:  [ ]   Alcohol hx: [ ]   Home Opioid hx:  [ ] I-Stop Reference No:  Living Situation: [ ]Home  [ ]Long term care  [ ]Rehab [ ]Other    ADVANCE DIRECTIVES:    DNR  MOLST  [ ]  Living Will  [ ]   DECISION MAKER(s):  [ ] Health Care Proxy(s)  [ ] Surrogate(s)  [ ] Guardian           Name(s): Phone Number(s):    BASELINE (I)ADL(s) (prior to admission):  [place]: [ ]Total  [ ] Moderate [ ]Dependent    Allergies    No Known Allergies    Intolerances    MEDICATIONS  (STANDING):  aspirin enteric coated 81 milliGRAM(s) Oral daily  atorvastatin 40 milliGRAM(s) Oral at bedtime  buDESOnide 160 MICROgram(s)/formoterol 4.5 MICROgram(s) Inhaler 2 Puff(s) Inhalation two times a day  dextrose 5%. 1000 milliLiter(s) (50 mL/Hr) IV Continuous <Continuous>  dextrose 50% Injectable 12.5 Gram(s) IV Push once  dextrose 50% Injectable 25 Gram(s) IV Push once  dextrose 50% Injectable 25 Gram(s) IV Push once  docusate sodium 100 milliGRAM(s) Oral three times a day  fluconAZOLE IVPB      fluconAZOLE IVPB 400 milliGRAM(s) IV Intermittent every 24 hours  insulin lispro (HumaLOG) corrective regimen sliding scale   SubCutaneous three times a day before meals  insulin lispro (HumaLOG) corrective regimen sliding scale   SubCutaneous at bedtime  melatonin 3 milliGRAM(s) Oral at bedtime  methadone    Tablet 5 milliGRAM(s) Oral two times a day  metoprolol tartrate 25 milliGRAM(s) Oral two times a day  polyethylene glycol 3350 17 Gram(s) Oral daily  pyridoxine 100 milliGRAM(s) Oral daily  sodium chloride 0.9%. 1000 milliLiter(s) (75 mL/Hr) IV Continuous <Continuous>  tiotropium 18 MICROgram(s) Capsule 1 Capsule(s) Inhalation daily    MEDICATIONS  (PRN):  ALBUTerol    90 MICROgram(s) HFA Inhaler 2 Puff(s) Inhalation every 6 hours PRN Shortness of Breath and/or Wheezing  dextrose 40% Gel 15 Gram(s) Oral once PRN Blood Glucose LESS THAN 70 milliGRAM(s)/deciliter  glucagon  Injectable 1 milliGRAM(s) IntraMuscular once PRN Glucose LESS THAN 70 milligrams/deciliter  LORazepam     Tablet 0.5 milliGRAM(s) Oral three times a day PRN Anxiety  ondansetron Injectable 4 milliGRAM(s) IV Push every 6 hours PRN Nausea and/or Vomiting  oxyCODONE    IR 5 milliGRAM(s) Oral every 6 hours PRN Moderate Pain (4 - 6)  oxyCODONE    IR 10 milliGRAM(s) Oral every 6 hours PRN Severe Pain (7 - 10)  senna 2 Tablet(s) Oral at bedtime PRN Constipation  simethicone 160 milliGRAM(s) Chew daily PRN Gas    PRESENT SYMPTOMS: [ ]Unable to obtain due to poor mentation   Source if other than patient:  [ ]Family   [ ]Team     Pain (Impact on QOL):    Location -         Minimal acceptable level (0-10 scale):                    Aggravating factors -  Quality -  Radiation -  Severity (0-10 scale) -    Timing -    PAIN AD Score:     [email or web address]/cog/painad.pdf (press ctrl +  left click to view)    Dyspnea:                           [ ]Mild [ ]Moderate [ ]Severe  Anxiety:                             [ ]Mild [ ]Moderate [ ]Severe  Fatigue:                             [ ]Mild [ ]Moderate [ ]Severe  Nausea:                             [ ]Mild [ ]Moderate [ ]Severe  Loss of appetite:              [ ]Mild [ ]Moderate [ ]Severe  Constipation:                    [ ]Mild [ ]Moderate [ ]Severe  Grief Present                    [ ] Yes   [ ] No   Other Symptoms:  [ ]All other review of systems negative     Karnofsky Performance Score/Palliative Performance Status Version 2:         %    http://palliative.info/resource_material/[email or web address].pdf  PHYSICAL EXAM:  Vital Signs Last 24 Hrs  T(C): 37.3 (22 Mar 2019 05:14), Max: 37.3 (22 Mar 2019 05:14)  T(F): 99.2 (22 Mar 2019 05:14), Max: 99.2 (22 Mar 2019 05:14)  HR: 80 (22 Mar 2019 05:14) (76 - 85)  BP: 139/55 (22 Mar 2019 05:14) (124/79 - 141/59)  BP(mean): --  RR: 17 (22 Mar 2019 05:14) (17 - 18)  SpO2: 93% (22 Mar 2019 05:14) (93% - 96%) I&O's Summary    21 Mar 2019 07:01  -  22 Mar 2019 07:00  --------------------------------------------------------  IN: 0 mL / OUT: 60 mL / NET: -60 mL    GENERAL:  [ ]Alert  [ ]Oriented x   [ ]Lethargic  [ ]Cachexia  [ ]Unarousable  [ ]Verbal  [ ]Non-Verbal  Behavioral:   [ ] Anxiety  [ ] Delirium [ ] Agitation [ ] Other  HEENT:  [ ]Normal   [ ]Dry mouth   [ ]ET Tube/Trach  [ ]Oral lesions  PULMONARY:   [ ]Clear [ ]Tachypnea  [ ]Audible excessive secretions   [ ]Rhonchi        [ ]Right [ ]Left [ ]Bilateral  [ ]Crackles        [ ]Right [ ]Left [ ]Bilateral  [ ]Wheezing     [ ]Right [ ]Left [ ]Bilateral  CARDIOVASCULAR:    [ ]Regular [ ]Irregular [ ]Tachy  [ ]Jarrod [ ]Murmur [ ]Other  GASTROINTESTINAL:  [ ]Soft  [ ]Distended   [ ]+BS  [ ]Non tender [ ]Tender  [ ]PEG [ ]OGT/ NGT  Last BM:   GENITOURINARY:  [ ]Normal [ ] Incontinent   [ ]Oliguria/Anuria   [ ]Dueñas  MUSCULOSKELETAL:   [ ]Normal   [ ]Weakness  [ ]Bed/Wheelchair bound [ ]Edema  NEUROLOGIC:   [ ]No focal deficits  [ ] Cognitive impairment  [ ] Dysphagia [ ]Dysarthria [ ] Paresis [ ]Other   SKIN:   [ ]Normal   [ ]Pressure ulcer(s)  [ ]Rash    CRITICAL CARE:  [ ] Shock Present  [ ]Septic [ ]Cardiogenic [ ]Neurologic [ ]Hypovolemic  [ ]  Vasopressors [ ]  Inotropes   [ ] Respiratory failure present  [ ] Acute  [ ] Chronic [ ] Hypoxic  [ ] Hypercarbic [ ] Other  [ ] Other organ failure     LABS:                        12.0   15.46 )-----------( 468      ( 22 Mar 2019 06:46 )             39.3   03-22    140  |  96<L>  |  12  ----------------------------<  92  4.2   |  27  |  0.71    Ca    9.6      22 Mar 2019 06:46  Phos  3.2     03-22  Mg     2.0     03-22    TPro  6.5  /  Alb  3.3  /  TBili  0.4  /  DBili  x   /  AST  16  /  ALT  14  /  AlkPhos  87  03-21  PT/INR - ( 20 Mar 2019 17:40 )   PT: 14.2 SEC;   INR: 1.27          PTT - ( 20 Mar 2019 17:40 )  PTT:32.9 SEC      RADIOLOGY & ADDITIONAL STUDIES:    PROTEIN CALORIE MALNUTRITION PRESENT: [ ] Yes [ ] No  [ ] PPSV2 < or = to 30% [ ] significant weight loss  [ ] poor nutritional intake [ ] catabolic state [ ] anasarca     Albumin, Serum: 3.3 g/dL (03-21-19 @ 06:35)  Artificial Nutrition [ ]     REFERRALS:   [ ]Chaplaincy  [ ] Hospice  [ ]Child Life  [ ]Social Work  [ ]Case management [ ]Holistic Therapy   Goals of Care Discussion Document: HPI:  64 yo man, current smoker (~1 ppd), with history of stage 4 left lung adenocarcinoma, on immunotherapy with Keytruda (first session on 3/4/19), recurrent left pleural effusion s/p left VATS with pleurX placement (2/2019), COPD, DM2, HTN, HLD, and anxiety presents with 1 week of worsening throat pain with odynophagia and dysphagia. Pt states that for the past 1 week, he has been feeling overwhelming dryness/scratchiness in his throat, causing pain and difficulty with swallowing. Pt notes that whenever he tries to swallow, he experiences the sensation of something getting stuck in his throat and upper chest area. Pt has this sensation with both liquids and solids. As a result, pt has been having very little to eat and drink, avoiding water entirely the last 2 days. Pt also reports poor appetite and ongoing pain in his left shoulder/scapula and near his pleurX insertion site. Pt saw Dr. Avitia (Palliative) earlier this month and was started on methadone for pain control. Pt denies any recent F/C, CP, palpitations, cough, abdominal pain, N/V, diarrhea, constipation, or urinary symptoms. Pt continues to have SOB when lying flat, and therefore, currently sleeps upright in a chair.     In the ED,  T 98.1-98.8, HR 75-88, -144/60-64, RR 16-32, O2 sats 95-97% RA.   CXR with unchanged left loculated pleural effusion. (20 Mar 2019 23:08)    PERTINENT PM/SXH:   Anxiety  Pleural effusion, left  Lung cancer  COPD (chronic obstructive pulmonary disease)  HLD (hyperlipidemia)  Diabetes mellitus  Hypertension    No significant past surgical history    FAMILY HISTORY:  Family history of diabetes mellitus: Parents    ITEMS NOT CHECKED ARE NOT PRESENT    SOCIAL HISTORY:   Significant other/partner:  [x ]  Children:  [ x]  Episcopal/Spirituality: [employer]  Substance hx:  [ ]   Tobacco hx:  [ x]   Alcohol hx: [ ]   Home Opioid hx:  [ x] I-Stop Reference No:  Living Situation: [x ]Home  [ ]Long term care  [ ]Rehab [ ]Other    ADVANCE DIRECTIVES:    DNR  MOLST  [ ]  Living Will  [ ]   DECISION MAKER(s):  [x ] Health Care Proxy(s)  [ ] Surrogate(s)  [ ] Guardian           Name(s): Phone Number(s): Greta Bañuelos (spouse)    BASELINE (I)ADL(s) (prior to admission):  [place]: [ ]Total  [ z] Moderate [ ]Dependent    Allergies    No Known Allergies    Intolerances    MEDICATIONS  (STANDING):  aspirin enteric coated 81 milliGRAM(s) Oral daily  atorvastatin 40 milliGRAM(s) Oral at bedtime  buDESOnide 160 MICROgram(s)/formoterol 4.5 MICROgram(s) Inhaler 2 Puff(s) Inhalation two times a day  dextrose 5%. 1000 milliLiter(s) (50 mL/Hr) IV Continuous <Continuous>  dextrose 50% Injectable 12.5 Gram(s) IV Push once  dextrose 50% Injectable 25 Gram(s) IV Push once  dextrose 50% Injectable 25 Gram(s) IV Push once  docusate sodium 100 milliGRAM(s) Oral three times a day  fluconAZOLE IVPB      fluconAZOLE IVPB 400 milliGRAM(s) IV Intermittent every 24 hours  insulin lispro (HumaLOG) corrective regimen sliding scale   SubCutaneous three times a day before meals  insulin lispro (HumaLOG) corrective regimen sliding scale   SubCutaneous at bedtime  melatonin 3 milliGRAM(s) Oral at bedtime  methadone    Tablet 5 milliGRAM(s) Oral two times a day  metoprolol tartrate 25 milliGRAM(s) Oral two times a day  polyethylene glycol 3350 17 Gram(s) Oral daily  pyridoxine 100 milliGRAM(s) Oral daily  sodium chloride 0.9%. 1000 milliLiter(s) (75 mL/Hr) IV Continuous <Continuous>  tiotropium 18 MICROgram(s) Capsule 1 Capsule(s) Inhalation daily    MEDICATIONS  (PRN):  ALBUTerol    90 MICROgram(s) HFA Inhaler 2 Puff(s) Inhalation every 6 hours PRN Shortness of Breath and/or Wheezing  dextrose 40% Gel 15 Gram(s) Oral once PRN Blood Glucose LESS THAN 70 milliGRAM(s)/deciliter  glucagon  Injectable 1 milliGRAM(s) IntraMuscular once PRN Glucose LESS THAN 70 milligrams/deciliter  LORazepam     Tablet 0.5 milliGRAM(s) Oral three times a day PRN Anxiety  ondansetron Injectable 4 milliGRAM(s) IV Push every 6 hours PRN Nausea and/or Vomiting  oxyCODONE    IR 5 milliGRAM(s) Oral every 6 hours PRN Moderate Pain (4 - 6)  oxyCODONE    IR 10 milliGRAM(s) Oral every 6 hours PRN Severe Pain (7 - 10)  senna 2 Tablet(s) Oral at bedtime PRN Constipation  simethicone 160 milliGRAM(s) Chew daily PRN Gas    PRESENT SYMPTOMS: [ ]Unable to obtain due to poor mentation   Source if other than patient:  [ ]Family   [ ]Team     Pain (Impact on QOL):  None at this time  Location -         Minimal acceptable level (0-10 scale):   Aggravating factors -  Quality -  Radiation -  Severity (0-10 scale) -    Timing -    PAIN AD Score:     [email or web address].[email or web address]/cog/painad.pdf (press ctrl +  left click to view)    Dyspnea:                           [x ]Mild [ ]Moderate [ ]Severe  Anxiety:                             [ ]Mild [x ]Moderate [ ]Severe  Fatigue:                             [ ]Mild [ ]Moderate [ x]Severe  Nausea:                             [x ]Mild [ ]Moderate [ ]Severe  Loss of appetite:              [ ]Mild [ ]Moderate [x ]Severe  Constipation:                    [x ]Mild [ ]Moderate [ ]Severe  Grief Present                    [x ] Yes   [ ] No   Other Symptoms:  [x ]All other review of systems negative     Karnofsky Performance Score/Palliative Performance Status Version 2:   40      %    http://palliative.info/resource_material/[email or web address].pdf  PHYSICAL EXAM:  Vital Signs Last 24 Hrs  T(C): 37.3 (22 Mar 2019 05:14), Max: 37.3 (22 Mar 2019 05:14)  T(F): 99.2 (22 Mar 2019 05:14), Max: 99.2 (22 Mar 2019 05:14)  HR: 80 (22 Mar 2019 05:14) (76 - 85)  BP: 139/55 (22 Mar 2019 05:14) (124/79 - 141/59)  BP(mean): --  RR: 17 (22 Mar 2019 05:14) (17 - 18)  SpO2: 93% (22 Mar 2019 05:14) (93% - 96%) I&O's Summary    21 Mar 2019 07:01  -  22 Mar 2019 07:00  --------------------------------------------------------  IN: 0 mL / OUT: 60 mL / NET: -60 mL    GENERAL:  [x ]Alert  [x ]Oriented x3 [ ]Lethargic  [ ]Cachexia  [ ]Unarousable  [ ]Verbal  [ ]Non-Verbal  Behavioral:   [ ] Anxiety  [ ] Delirium [ ] Agitation [x ] Other  HEENT:  [ ]Normal   [x ]Dry mouth   [ ]ET Tube/Trach  [ ]Oral lesions  PULMONARY:   [ ]Clear [ ]Tachypnea  [ ]Audible excessive secretions   [x ]Rhonchi        [ ]Right [ ]Left [ x]Bilateral  [ ]Crackles        [ ]Right [ ]Left [ ]Bilateral  [ ]Wheezing     [ ]Right [ ]Left [ ]Bilateral  CARDIOVASCULAR:    [x ]Regular [ ]Irregular [ ]Tachy  [ ]Jarrod [ ]Murmur [ ]Other  GASTROINTESTINAL:  [x ]Soft  [ ]Distended   [x ]+BS  [x ]Non tender [ ]Tender  [ ]PEG [ ]OGT/ NGT  Last BM:   GENITOURINARY:  [x ]Normal [ ] Incontinent   [ ]Oliguria/Anuria   [ ]Dueñas  MUSCULOSKELETAL:   [ ]Normal   [ x]Weakness  [ ]Bed/Wheelchair bound [ ]Edema  NEUROLOGIC:   [x]No focal deficits  [ ] Cognitive impairment  [ ] Dysphagia [ ]Dysarthria [ ] Paresis [ ]Other   SKIN:   [ x]Normal   [ ]Pressure ulcer(s)  [ ]Rash    CRITICAL CARE:  [ ] Shock Present  [ ]Septic [ ]Cardiogenic [ ]Neurologic [ ]Hypovolemic  [ ]  Vasopressors [ ]  Inotropes   [ ] Respiratory failure present  [ ] Acute  [ ] Chronic [ ] Hypoxic  [ ] Hypercarbic [ ] Other  [ ] Other organ failure     LABS:                        12.0   15.46 )-----------( 468      ( 22 Mar 2019 06:46 )             39.3   03-22    140  |  96<L>  |  12  ----------------------------<  92  4.2   |  27  |  0.71    Ca    9.6      22 Mar 2019 06:46  Phos  3.2     03-22  Mg     2.0     03-22    TPro  6.5  /  Alb  3.3  /  TBili  0.4  /  DBili  x   /  AST  16  /  ALT  14  /  AlkPhos  87  03-21  PT/INR - ( 20 Mar 2019 17:40 )   PT: 14.2 SEC;   INR: 1.27          PTT - ( 20 Mar 2019 17:40 )  PTT:32.9 SEC      RADIOLOGY & ADDITIONAL STUDIES:    PROTEIN CALORIE MALNUTRITION PRESENT: [ ] Yes [ ] No  [ ] PPSV2 < or = to 30% [ ] significant weight loss  [ ] poor nutritional intake [ ] catabolic state [ ] anasarca     Albumin, Serum: 3.3 g/dL (03-21-19 @ 06:35)  Artificial Nutrition [ ]     REFERRALS:   [ ]Chaplaincy  [ ] Hospice  [ ]Child Life  [ ]Social Work  [ ]Case management [ ]Holistic Therapy   Goals of Care Discussion Document:

## 2019-03-21 NOTE — CONSULT NOTE ADULT - PROBLEM SELECTOR RECOMMENDATION 5
- Patient with poor overall prognosis.  - Stage IV malignancy.  - Defers all decisions to wife.  - She understands severity of his malignancy and wishes to wait until MRI brain is done before making any decisions about hospice services.

## 2019-03-21 NOTE — CONSULT NOTE ADULT - PROBLEM SELECTOR RECOMMENDATION 9
-Continue Pleurx to mini atrium.   -Change site dressing every 2 days.   -Empty mini atrium when it's full  -CXR no significant change-  -Output serosanguinous, no concerns for quality of fluid. Common in malignancy.   No other Thoracic surgical intervention indicated  Dr. Moore aware of pt's admission

## 2019-03-22 DIAGNOSIS — Z51.5 ENCOUNTER FOR PALLIATIVE CARE: ICD-10-CM

## 2019-03-22 DIAGNOSIS — G89.3 NEOPLASM RELATED PAIN (ACUTE) (CHRONIC): ICD-10-CM

## 2019-03-22 DIAGNOSIS — C34.90 MALIGNANT NEOPLASM OF UNSPECIFIED PART OF UNSPECIFIED BRONCHUS OR LUNG: ICD-10-CM

## 2019-03-22 DIAGNOSIS — R06.02 SHORTNESS OF BREATH: ICD-10-CM

## 2019-03-22 LAB
ANION GAP SERPL CALC-SCNC: 17 MMO/L — HIGH (ref 7–14)
BUN SERPL-MCNC: 12 MG/DL — SIGNIFICANT CHANGE UP (ref 7–23)
CALCIUM SERPL-MCNC: 9.6 MG/DL — SIGNIFICANT CHANGE UP (ref 8.4–10.5)
CHLORIDE SERPL-SCNC: 96 MMOL/L — LOW (ref 98–107)
CO2 SERPL-SCNC: 27 MMOL/L — SIGNIFICANT CHANGE UP (ref 22–31)
CREAT SERPL-MCNC: 0.71 MG/DL — SIGNIFICANT CHANGE UP (ref 0.5–1.3)
GLUCOSE SERPL-MCNC: 92 MG/DL — SIGNIFICANT CHANGE UP (ref 70–99)
HCT VFR BLD CALC: 39.3 % — SIGNIFICANT CHANGE UP (ref 39–50)
HGB BLD-MCNC: 12 G/DL — LOW (ref 13–17)
MAGNESIUM SERPL-MCNC: 2 MG/DL — SIGNIFICANT CHANGE UP (ref 1.6–2.6)
MCHC RBC-ENTMCNC: 24.9 PG — LOW (ref 27–34)
MCHC RBC-ENTMCNC: 30.5 % — LOW (ref 32–36)
MCV RBC AUTO: 81.7 FL — SIGNIFICANT CHANGE UP (ref 80–100)
NRBC # FLD: 0 K/UL — LOW (ref 25–125)
PHOSPHATE SERPL-MCNC: 3.2 MG/DL — SIGNIFICANT CHANGE UP (ref 2.5–4.5)
PLATELET # BLD AUTO: 468 K/UL — HIGH (ref 150–400)
PMV BLD: 9.5 FL — SIGNIFICANT CHANGE UP (ref 7–13)
POTASSIUM SERPL-MCNC: 4.2 MMOL/L — SIGNIFICANT CHANGE UP (ref 3.5–5.3)
POTASSIUM SERPL-SCNC: 4.2 MMOL/L — SIGNIFICANT CHANGE UP (ref 3.5–5.3)
RBC # BLD: 4.81 M/UL — SIGNIFICANT CHANGE UP (ref 4.2–5.8)
RBC # FLD: 15.1 % — HIGH (ref 10.3–14.5)
SODIUM SERPL-SCNC: 140 MMOL/L — SIGNIFICANT CHANGE UP (ref 135–145)
WBC # BLD: 15.46 K/UL — HIGH (ref 3.8–10.5)
WBC # FLD AUTO: 15.46 K/UL — HIGH (ref 3.8–10.5)

## 2019-03-22 PROCEDURE — 74230 X-RAY XM SWLNG FUNCJ C+: CPT | Mod: 26

## 2019-03-22 PROCEDURE — 99232 SBSQ HOSP IP/OBS MODERATE 35: CPT | Mod: GC

## 2019-03-22 PROCEDURE — 70552 MRI BRAIN STEM W/DYE: CPT | Mod: 26

## 2019-03-22 PROCEDURE — 99233 SBSQ HOSP IP/OBS HIGH 50: CPT

## 2019-03-22 PROCEDURE — 99232 SBSQ HOSP IP/OBS MODERATE 35: CPT

## 2019-03-22 PROCEDURE — 92611 MOTION FLUOROSCOPY/SWALLOW: CPT

## 2019-03-22 RX ADMIN — Medication 100 MILLIGRAM(S): at 05:31

## 2019-03-22 RX ADMIN — BUDESONIDE AND FORMOTEROL FUMARATE DIHYDRATE 2 PUFF(S): 160; 4.5 AEROSOL RESPIRATORY (INHALATION) at 21:15

## 2019-03-22 RX ADMIN — METHADONE HYDROCHLORIDE 5 MILLIGRAM(S): 40 TABLET ORAL at 17:24

## 2019-03-22 RX ADMIN — ATORVASTATIN CALCIUM 40 MILLIGRAM(S): 80 TABLET, FILM COATED ORAL at 21:15

## 2019-03-22 RX ADMIN — Medication 100 MILLIGRAM(S): at 21:15

## 2019-03-22 RX ADMIN — FLUCONAZOLE 100 MILLIGRAM(S): 150 TABLET ORAL at 17:27

## 2019-03-22 RX ADMIN — Medication 81 MILLIGRAM(S): at 12:02

## 2019-03-22 RX ADMIN — Medication 25 MILLIGRAM(S): at 17:24

## 2019-03-22 RX ADMIN — BUDESONIDE AND FORMOTEROL FUMARATE DIHYDRATE 2 PUFF(S): 160; 4.5 AEROSOL RESPIRATORY (INHALATION) at 12:02

## 2019-03-22 RX ADMIN — POLYETHYLENE GLYCOL 3350 17 GRAM(S): 17 POWDER, FOR SOLUTION ORAL at 12:03

## 2019-03-22 RX ADMIN — Medication 25 MILLIGRAM(S): at 05:31

## 2019-03-22 RX ADMIN — Medication 100 MILLIGRAM(S): at 12:02

## 2019-03-22 RX ADMIN — Medication 3 MILLIGRAM(S): at 21:15

## 2019-03-22 RX ADMIN — TIOTROPIUM BROMIDE 1 CAPSULE(S): 18 CAPSULE ORAL; RESPIRATORY (INHALATION) at 12:09

## 2019-03-22 RX ADMIN — METHADONE HYDROCHLORIDE 5 MILLIGRAM(S): 40 TABLET ORAL at 05:30

## 2019-03-22 RX ADMIN — Medication 0.5 MILLIGRAM(S): at 14:58

## 2019-03-22 RX ADMIN — SIMETHICONE 160 MILLIGRAM(S): 80 TABLET, CHEWABLE ORAL at 05:34

## 2019-03-22 NOTE — PROGRESS NOTE ADULT - SUBJECTIVE AND OBJECTIVE BOX
Chief Complaint:  Patient is a 63y old  Male who presents with a chief complaint of Odynophagia/dysphagia, minimal PO intake (21 Mar 2019 17:18)      Interval Events:   Patient started on high does fluconazole yesterday.    Allergies:  No Known Allergies      Hospital Medications:  ALBUTerol    90 MICROgram(s) HFA Inhaler 2 Puff(s) Inhalation every 6 hours PRN  aspirin enteric coated 81 milliGRAM(s) Oral daily  atorvastatin 40 milliGRAM(s) Oral at bedtime  buDESOnide 160 MICROgram(s)/formoterol 4.5 MICROgram(s) Inhaler 2 Puff(s) Inhalation two times a day  dextrose 40% Gel 15 Gram(s) Oral once PRN  dextrose 5%. 1000 milliLiter(s) IV Continuous <Continuous>  dextrose 50% Injectable 12.5 Gram(s) IV Push once  dextrose 50% Injectable 25 Gram(s) IV Push once  dextrose 50% Injectable 25 Gram(s) IV Push once  docusate sodium 100 milliGRAM(s) Oral three times a day  fluconAZOLE IVPB      fluconAZOLE IVPB 400 milliGRAM(s) IV Intermittent every 24 hours  glucagon  Injectable 1 milliGRAM(s) IntraMuscular once PRN  insulin lispro (HumaLOG) corrective regimen sliding scale   SubCutaneous three times a day before meals  insulin lispro (HumaLOG) corrective regimen sliding scale   SubCutaneous at bedtime  LORazepam     Tablet 0.5 milliGRAM(s) Oral three times a day PRN  melatonin 3 milliGRAM(s) Oral at bedtime  methadone    Tablet 5 milliGRAM(s) Oral two times a day  metoprolol tartrate 25 milliGRAM(s) Oral two times a day  ondansetron Injectable 4 milliGRAM(s) IV Push every 6 hours PRN  oxyCODONE    IR 5 milliGRAM(s) Oral every 6 hours PRN  oxyCODONE    IR 10 milliGRAM(s) Oral every 6 hours PRN  polyethylene glycol 3350 17 Gram(s) Oral daily  pyridoxine 100 milliGRAM(s) Oral daily  senna 2 Tablet(s) Oral at bedtime PRN  simethicone 160 milliGRAM(s) Chew daily PRN  sodium chloride 0.9%. 1000 milliLiter(s) IV Continuous <Continuous>  tiotropium 18 MICROgram(s) Capsule 1 Capsule(s) Inhalation daily      PMHX/PSHX:  Anxiety  Pleural effusion, left  Lung cancer  COPD (chronic obstructive pulmonary disease)  HLD (hyperlipidemia)  Diabetes mellitus  Hypertension  No significant past surgical history      Family history:  Family history of diabetes mellitus          PHYSICAL EXAM:   GENERAL:  Appears stated age, well-groomed, well-nourished, no distress  HEENT:  NC/AT,  conjunctivae clear, sclera -anicteric  CHEST:  Full & symmetric excursion, no increased  HEART:  Regular rhythm  ABDOMEN:  Soft, non-tender, non-distended, normoactive bowel sounds,  no masses ,no hepato-splenomegaly,   EXTREMITIES:  no cyanosis,clubbing or edema  SKIN:  No rash/erythema/ecchymoses/petechiae/wounds/abscess/warm/dry  NEURO:  Alert, oriented    Vital Signs:  Vital Signs Last 24 Hrs  T(C): 37.3 (22 Mar 2019 05:14), Max: 37.3 (22 Mar 2019 05:14)  T(F): 99.2 (22 Mar 2019 05:14), Max: 99.2 (22 Mar 2019 05:14)  HR: 80 (22 Mar 2019 05:14) (76 - 85)  BP: 139/55 (22 Mar 2019 05:14) (124/79 - 141/59)  BP(mean): --  RR: 17 (22 Mar 2019 05:14) (17 - 18)  SpO2: 93% (22 Mar 2019 05:14) (93% - 96%)  Daily     Daily Weight in k.5 (21 Mar 2019 09:38)    LABS:                        11.7   12.81 )-----------( 448      ( 21 Mar 2019 06:35 )             38.1     -    139  |  95<L>  |  10  ----------------------------<  100<H>  3.9   |  28  |  0.72    Ca    9.7      21 Mar 2019 06:35  Phos  3.3     03-  Mg     1.9     -    TPro  6.5  /  Alb  3.3  /  TBili  0.4  /  DBili  x   /  AST  16  /  ALT  14  /  AlkPhos  87  03-21    LIVER FUNCTIONS - ( 21 Mar 2019 06:35 )  Alb: 3.3 g/dL / Pro: 6.5 g/dL / ALK PHOS: 87 u/L / ALT: 14 u/L / AST: 16 u/L / GGT: x           PT/INR - ( 20 Mar 2019 17:40 )   PT: 14.2 SEC;   INR: 1.27          PTT - ( 20 Mar 2019 17:40 )  PTT:32.9 SEC        Imaging:

## 2019-03-22 NOTE — PROGRESS NOTE ADULT - SUBJECTIVE AND OBJECTIVE BOX
Brielle Mcnamara MD  Pager [phone]    CHIEF COMPLAINT: Patient is a 63y old  male who presents with a chief complaint of Odynophagia/dysphagia, minimal PO intake (22 Mar 2019 07:38)      SUBJECTIVE / OVERNIGHT EVENTS:  pt reports po intake a little better, breathing ok, still pending MRI brain    MEDICATIONS  (STANDING):  aspirin enteric coated 81 milliGRAM(s) Oral daily  atorvastatin 40 milliGRAM(s) Oral at bedtime  buDESOnide 160 MICROgram(s)/formoterol 4.5 MICROgram(s) Inhaler 2 Puff(s) Inhalation two times a day  dextrose 5%. 1000 milliLiter(s) (50 mL/Hr) IV Continuous <Continuous>  dextrose 50% Injectable 12.5 Gram(s) IV Push once  dextrose 50% Injectable 25 Gram(s) IV Push once  dextrose 50% Injectable 25 Gram(s) IV Push once  docusate sodium 100 milliGRAM(s) Oral three times a day  fluconAZOLE IVPB      fluconAZOLE IVPB 400 milliGRAM(s) IV Intermittent every 24 hours  insulin lispro (HumaLOG) corrective regimen sliding scale   SubCutaneous three times a day before meals  insulin lispro (HumaLOG) corrective regimen sliding scale   SubCutaneous at bedtime  melatonin 3 milliGRAM(s) Oral at bedtime  methadone    Tablet 5 milliGRAM(s) Oral two times a day  metoprolol tartrate 25 milliGRAM(s) Oral two times a day  polyethylene glycol 3350 17 Gram(s) Oral daily  pyridoxine 100 milliGRAM(s) Oral daily  sodium chloride 0.9%. 1000 milliLiter(s) (75 mL/Hr) IV Continuous <Continuous>  tiotropium 18 MICROgram(s) Capsule 1 Capsule(s) Inhalation daily    MEDICATIONS  (PRN):  ALBUTerol    90 MICROgram(s) HFA Inhaler 2 Puff(s) Inhalation every 6 hours PRN Shortness of Breath and/or Wheezing  dextrose 40% Gel 15 Gram(s) Oral once PRN Blood Glucose LESS THAN 70 milliGRAM(s)/deciliter  glucagon  Injectable 1 milliGRAM(s) IntraMuscular once PRN Glucose LESS THAN 70 milligrams/deciliter  LORazepam     Tablet 0.5 milliGRAM(s) Oral three times a day PRN Anxiety  ondansetron Injectable 4 milliGRAM(s) IV Push every 6 hours PRN Nausea and/or Vomiting  oxyCODONE    IR 5 milliGRAM(s) Oral every 6 hours PRN Moderate Pain (4 - 6)  oxyCODONE    IR 10 milliGRAM(s) Oral every 6 hours PRN Severe Pain (7 - 10)  senna 2 Tablet(s) Oral at bedtime PRN Constipation  simethicone 160 milliGRAM(s) Chew daily PRN Gas      VITALS:  T(F): 99.2 (19 @ 05:14), Max: 99.2 (19 @ 05:14)  HR: 80 (19 @ 05:14) (76 - 80)  BP: 139/55 (19 @ 05:14) (124/79 - 141/59)  RR: 17 (19 @ 05:14) (17 - 17)  SpO2: 93% (19 @ 05:14)      CAPILLARY BLOOD GLUCOSE    Output     I&O's Summary  T(F): 99.2 (19 @ 05:14), Max: 99.2 (19 @ 05:14)  HR: 80 (19 @ 05:14) (76 - 80)  BP: 139/55 (19 @ 05:14) (124/79 - 141/59)  RR: 17 (19 @ 05:14) (17 - 17)  SpO2: 93% (19 @ 05:14)    PHYSICAL EXAM:  GENERAL: NAD, sitting up in chair, hunched over  Heent; oral thrush noted  NECK: Supple, No JVD  CHEST/LUNG: Clear to auscultation bilaterally; No wheeze; left pleurx with serosanguinous drainage  HEART: Regular rate and rhythm; No murmurs, rubs, or gallops  ABDOMEN: Soft, Nontender, Nondistended; Bowel sounds present  EXTREMITIES:  2+ Peripheral Pulses, No clubbing, cyanosis, 2+ pitting edema b/l legs   PSYCH: AAOx2  NEUROLOGY: non-focal  SKIN: No rashes or lesions    LABS:              12.0                 140  | 27   | 12           15.46 >-----------< 468     ------------------------< 92                    39.3                 4.2  | 96   | 0.71                                         Ca 9.6   Mg 2.0   Ph 3.2         TPro  6.5  /  Alb  3.3      TBili  0.4  /  DBili  x         AST  16  /  ALT  14            AlkPhos  87      INR: 1.27<H>;    PT: 14.2 SEC<H>;    PTT: 32.9 SEC            VB-20 @ 19:28  7.41/55/31/31/59.1/8.7    VB-20 @ 17:40  7.37/64/< 24/31/37.2/10.4        MICROBIOLOGY:    RADIOLOGY & ADDITIONAL TESTS:    Imaging Personally Reviewed:  < from: US Duplex Venous Lower Ext Complete, Bilateral (19 @ 10:13) >  No evidence of bilateral lower extremity deep venous thrombosis.    [ ] Consultant(s) Notes Reviewed:  [x ] Care Discussed with Consultants/Other Providers: acp np Luli, expedite MRI brain, c/w fluconazole

## 2019-03-22 NOTE — PROGRESS NOTE ADULT - PROBLEM SELECTOR PLAN 1
Also associated with dysphagia. Worsening over past 1 week. Likely 2/2 progression of metastatic disease in mediastinal area. Recent PET/CT showing hypermetabolic right supraclavicular, bilateral mediastinal, and left perihilar lymphadenopathy, increased in size from a month ago. Can also, however, be 2/2 motility issue vs. esophagitis (given presence of thrush on exam).  - Will trial pt on pureed diet for now; c/w IVF with NS at 75 cc/hr for 12 hrs and --   -S/S eval appreciated, pharyngeal dysphagia, no aspiration, f/u cinesophagram read  -GI consult appreciated, c/w fluconazole for 10-14 days, if no significant improvement then plan for EGD next week, f/u GI recs  -diet changed to dysphagia 2 with nectar thick liquids per s/s rec    - Aspiration precautions  -c/w fluconazole for treatment of thrush

## 2019-03-22 NOTE — SWALLOW VFSS/MBS ASSESSMENT ADULT - COMMENTS
Pt was received in the radiology suite alert and cooperative this am. Per charting, pt is a 64 y/o male, current smoker (~1 ppd), with history of stage 4 left lung adenocarcinoma, on immunotherapy with Keytruda (first session on 3/4/19), recurrent left pleural effusion s/p left VATS with pleurX placement (2/2019), COPD, DM2, HTN, HLD, and anxiety who presented to [facility] with c/o 1 week of worsening throat pain with odynophagia and dysphagia. Recent CXR revealed "Unchanged loculated pneumothorax projecting over the lateral inferior left lower chest Unchanged left mid and lower lung atelectasis".    At the time of today's assessment, the patient reports feeling pain in his pharyngeal segment (R>L) during meals as well as feelings of "food getting stuck" while pointing to his midsternal region.

## 2019-03-22 NOTE — SWALLOW VFSS/MBS ASSESSMENT ADULT - NS SWALLOW VFSS REC ASPIR MON
fever/pneumonia/throat clearing/upper respiratory infection/change of breathing pattern/cough/gurgly voice/oral hygiene/position upright (89Y)

## 2019-03-22 NOTE — SWALLOW VFSS/MBS ASSESSMENT ADULT - DIAGNOSTIC IMPRESSIONS
1- functional oral management given solid, puree, honey thick liquid, nectar thick liquid and thin liquid textures marked by adequate bolus collection, transfer and transport. 2- mild pharyngeal dysphagia given solid and puree textures marked by delayed swallow trigger, reduced tongue base propulsion and reduced hyolaryngeal excursion resulting in trace stasis at the base of tongue and valleculae. no penetration/aspiration noted. however, pt does endorse mild odynophagia with solid textures. 3- moderate pharyngeal dysphagia given honey thick and nectar thick liquid textures marked by delayed swallow trigger, reduced tongue base propulsion, reduced epiglottic deflection and reduced hyolaryngeal excursion resulting in mild stasis at the base of tongue and valleculae post swallow. when consuming smaller sips, trace penetration of both honey thick and nectar thick liquid textures was noted. however, penetration was eliminated when pt advised to consume single, small cups sips. no aspiration viewed. 3- moderate-severe pharyngeal dysphagia given thin liquids marked by delayed swallow trigger, reduced tongue base propulsion, reduced epiglottic deflection and reduced hyolaryngeal excursion resulting in deep, silent penetration without retrieval during the swallow. pt is not sensitive to penetrated material as marked by absent cough response. mild stasis noted to remain at the base of tongue, valleculae, laryngeal surface of the epiglottis and laryngeal vestibule. no aspiration viewed. 4- pt was able to eliminate penetration of thin liquids with use of chin tuck posture, however, pt unable to consistently incorporate maneuver in absence of clinician verbal cue. 5- at the culmination of today's assessment, pt reports "food being stuck" at midsternal region. pt positioned in AP view by radiologist to assess for stasis. please see radiologist report for details. 1- functional oral management given solid, puree, honey thick liquid, nectar thick liquid and thin liquid textures marked by adequate bolus collection, transfer and transport. 2- mild pharyngeal dysphagia given solid and puree textures marked by delayed swallow trigger, reduced tongue base propulsion and reduced hyolaryngeal excursion resulting in trace stasis at the base of tongue and valleculae. no penetration/aspiration noted. however, pt does endorse mild odynophagia with solid textures. 3- moderate pharyngeal dysphagia given honey thick and nectar thick liquid textures marked by delayed swallow trigger, reduced tongue base propulsion, reduced epiglottic deflection and reduced hyolaryngeal excursion resulting in mild stasis at the base of tongue and valleculae post swallow. when consuming large sips, trace penetration of both honey thick and nectar thick liquid textures was noted. however, penetration was eliminated when pt advised to consume single, small cups sips. no aspiration viewed. 3- moderate-severe pharyngeal dysphagia given thin liquids marked by delayed swallow trigger, reduced tongue base propulsion, reduced epiglottic deflection and reduced hyolaryngeal excursion resulting in deep, silent penetration without retrieval during the swallow. pt is not sensitive to penetrated material as marked by absent cough response suggestive of reduced laryngopharyngeal sensation. mild stasis noted to remain at the base of tongue, valleculae, laryngeal surface of the epiglottis and laryngeal vestibule post swallow, placing pt at risk for secondary penetration/aspiration. 4- pt was able to eliminate penetration of thin liquids with use of chin tuck posture, however, pt unable to consistently incorporate maneuver in absence of clinician verbal cue. 5- at the culmination of today's assessment, pt reports "food being stuck" at midsternal region. pt positioned in AP view by radiologist to assess for stasis. please see radiologist report for details.

## 2019-03-22 NOTE — PROGRESS NOTE ADULT - PROBLEM SELECTOR PLAN 4
Pt with stage 4 left lung adenocarcinoma, started on immunotherapy on 3/4/19 with Keytruda.  - Oncology consult appreciated,  expedite MRI brain with contrast for staging   -f/u palliative care recs  - C/w methadone (I-Stop Reference #: [id number]) and oxycodone PRN  -Leg doppler negative for DVT

## 2019-03-22 NOTE — PROGRESS NOTE ADULT - PROBLEM SELECTOR PLAN 3
Pt with recurrent left loculated pleural effusion, s/p left VATS and pleurX placement. PleurX still in place, draining serosanguinous fluid likely due to malignancy  -CXR stable L pleural effusion  - C/w PleurX drainage; monitor output;   - Monitor respiratory status  -serial CXRs prn  - f/u CTS recs, pt follows with Dr. Moore

## 2019-03-22 NOTE — PROGRESS NOTE ADULT - PROBLEM SELECTOR PLAN 2
Pt with poor nutrition, decreased appetite, and weight loss in setting of metastatic lung cancer.   -pt with severe protein calorie malnutrition per nutrition eval  - Plan as above for Odynophagia  - f/u palliative care for symptomatic management  -anticipate home with home PT per physical therapy recs

## 2019-03-22 NOTE — PROGRESS NOTE ADULT - SUBJECTIVE AND OBJECTIVE BOX
64 yo man, current smoker (~1 ppd), with history of stage 4 left lung adenocarcinoma, on immunotherapy with Keytruda (first session on 3/4/19), recurrent left pleural effusion s/p left VATS with pleurX placement (2/2019), COPD, DM2, HTN, HLD, and anxiety presents with 1 week of worsening throat pain with odynophagia and dysphagia. Pt with known air leak, Pleurx to mini atrium.         Vital Signs:  Vital Signs Last 24 Hrs  T(C): 37.3 (03-22-19 @ 05:14), Max: 37.3 (03-22-19 @ 05:14)  T(F): 99.2 (03-22-19 @ 05:14), Max: 99.2 (03-22-19 @ 05:14)  HR: 80 (03-22-19 @ 05:14) (76 - 80)  BP: 139/55 (03-22-19 @ 05:14) (124/79 - 141/59)  RR: 17 (03-22-19 @ 05:14) (17 - 17)  SpO2: 93% (03-22-19 @ 05:14) (93% - 94%) on (O2)      I&O's Summary    21 Mar 2019 07:01  -  22 Mar 2019 07:00  --------------------------------------------------------  IN: 0 mL / OUT: 60 mL / NET: -60 mL        Relevant labs, radiology and Medications reviewed                        12.0   15.46 )-----------( 468      ( 22 Mar 2019 06:46 )             39.3     03-22    140  |  96<L>  |  12  ----------------------------<  92  4.2   |  27  |  0.71    Ca    9.6      22 Mar 2019 06:46  Phos  3.2     03-22  Mg     2.0     03-22    TPro  6.5  /  Alb  3.3  /  TBili  0.4  /  DBili  x   /  AST  16  /  ALT  14  /  AlkPhos  87  03-21    PT/INR - ( 20 Mar 2019 17:40 )   PT: 14.2 SEC;   INR: 1.27            PLAN  - PleurX with Air leak; Will attach PleurX to PleurEvac while inpatient  - Record drainage Qshift  - Continue care per primary team  - No other Thoracic surgical intervention indicated  - Plan discussed with Dr. Moore

## 2019-03-22 NOTE — PROGRESS NOTE ADULT - ASSESSMENT
Impression:  1) Odynophagia/Dysphagia - with noted oral thrush on exam.  Likely do to candidal esophagitis.  DDx also includes CMV or HSV esophagitis in an immunocompromised patient.  Stand of care for suspected candidal esophagitis is for empiric therapy and if does not improve on therapy then for endoscopic evaluation.    Recommendations:   - c/w 200-400 mg IV or PO fluconazole for empiric therapy for 10 to 14 days   - symptoms management per primary team   - if no improvement then will plan for upper endoscopy early next week if patient agreeable    Lorena Franco, PGY-4  Gastroenterology Fellow  Pager x [phone] or [phone]  (After 5 pm or on weekends please page GI on call)

## 2019-03-22 NOTE — SWALLOW VFSS/MBS ASSESSMENT ADULT - ROSENBEK'S PENETRATION ASPIRATION SCALE
(1) no aspiration, contrast does not enter airway 2; large sips. 1; single, small cup sips (5) contrast contacts vocal cords, visible residue remains (penetration)

## 2019-03-22 NOTE — SWALLOW VFSS/MBS ASSESSMENT ADULT - ADDITIONAL RECOMMENDATIONS
Pt would benefit from swallow therapy. This dept to continue to f/u as schedule permits while pt is in-house. Would suggest continued therapy upon discharge from [facility]. Outpatient swallow therapy can be scheduled at [facility] at [phone].

## 2019-03-22 NOTE — SWALLOW VFSS/MBS ASSESSMENT ADULT - RECOMMENDED FEEDING/EATING TECHNIQUES
no straws/oral hygiene/provide rest periods between swallows/allow for swallow between intakes/small sips/bites/crush medication (when feasible)/position upright (90 degrees)/alternate food with liquid

## 2019-03-22 NOTE — SWALLOW VFSS/MBS ASSESSMENT ADULT - DEMONSTRATES NEED FOR REFERRAL TO ANOTHER SERVICE
RD; to ensure adequate caloric intake. ENT; to assess etiology of pt localized throat pain. GI; to assess pt report of "food getting stuck" at midsternal region./ENT/Registered Dietitian/GI

## 2019-03-23 ENCOUNTER — TRANSCRIPTION ENCOUNTER (OUTPATIENT)
Age: 64
End: 2019-03-23

## 2019-03-23 PROCEDURE — 99233 SBSQ HOSP IP/OBS HIGH 50: CPT

## 2019-03-23 RX ORDER — ENOXAPARIN SODIUM 100 MG/ML
40 INJECTION SUBCUTANEOUS DAILY
Qty: 0 | Refills: 0 | Status: DISCONTINUED | OUTPATIENT
Start: 2019-03-23 | End: 2019-04-05

## 2019-03-23 RX ADMIN — Medication 25 MILLIGRAM(S): at 05:30

## 2019-03-23 RX ADMIN — ATORVASTATIN CALCIUM 40 MILLIGRAM(S): 80 TABLET, FILM COATED ORAL at 22:30

## 2019-03-23 RX ADMIN — BUDESONIDE AND FORMOTEROL FUMARATE DIHYDRATE 2 PUFF(S): 160; 4.5 AEROSOL RESPIRATORY (INHALATION) at 22:30

## 2019-03-23 RX ADMIN — Medication 25 MILLIGRAM(S): at 18:31

## 2019-03-23 RX ADMIN — TIOTROPIUM BROMIDE 1 CAPSULE(S): 18 CAPSULE ORAL; RESPIRATORY (INHALATION) at 10:37

## 2019-03-23 RX ADMIN — BUDESONIDE AND FORMOTEROL FUMARATE DIHYDRATE 2 PUFF(S): 160; 4.5 AEROSOL RESPIRATORY (INHALATION) at 10:36

## 2019-03-23 RX ADMIN — Medication 100 MILLIGRAM(S): at 13:05

## 2019-03-23 RX ADMIN — SIMETHICONE 160 MILLIGRAM(S): 80 TABLET, CHEWABLE ORAL at 13:05

## 2019-03-23 RX ADMIN — METHADONE HYDROCHLORIDE 5 MILLIGRAM(S): 40 TABLET ORAL at 18:31

## 2019-03-23 RX ADMIN — Medication 100 MILLIGRAM(S): at 12:41

## 2019-03-23 RX ADMIN — POLYETHYLENE GLYCOL 3350 17 GRAM(S): 17 POWDER, FOR SOLUTION ORAL at 12:41

## 2019-03-23 RX ADMIN — FLUCONAZOLE 100 MILLIGRAM(S): 150 TABLET ORAL at 15:46

## 2019-03-23 RX ADMIN — Medication 100 MILLIGRAM(S): at 22:30

## 2019-03-23 RX ADMIN — Medication 3 MILLIGRAM(S): at 22:30

## 2019-03-23 RX ADMIN — METHADONE HYDROCHLORIDE 5 MILLIGRAM(S): 40 TABLET ORAL at 05:30

## 2019-03-23 RX ADMIN — Medication 81 MILLIGRAM(S): at 12:41

## 2019-03-23 RX ADMIN — Medication 100 MILLIGRAM(S): at 05:30

## 2019-03-23 RX ADMIN — ENOXAPARIN SODIUM 40 MILLIGRAM(S): 100 INJECTION SUBCUTANEOUS at 15:46

## 2019-03-23 NOTE — PROGRESS NOTE ADULT - SUBJECTIVE AND OBJECTIVE BOX
Brielle Mcnamara MD  Pager [phone]    CHIEF COMPLAINT: Patient is a 63y old  male who presents with a chief complaint of Odynophagia/dysphagia, minimal PO intake (23 Mar 2019 07:53)      SUBJECTIVE / OVERNIGHT EVENTS:  pt reports he ate a little solid food and po intake slightly improved, no chest pain/sob, couldn't tolerate MRI yesterday    MEDICATIONS  (STANDING):  aspirin enteric coated 81 milliGRAM(s) Oral daily  atorvastatin 40 milliGRAM(s) Oral at bedtime  buDESOnide 160 MICROgram(s)/formoterol 4.5 MICROgram(s) Inhaler 2 Puff(s) Inhalation two times a day  dextrose 5%. 1000 milliLiter(s) (50 mL/Hr) IV Continuous <Continuous>  dextrose 50% Injectable 12.5 Gram(s) IV Push once  dextrose 50% Injectable 25 Gram(s) IV Push once  dextrose 50% Injectable 25 Gram(s) IV Push once  docusate sodium 100 milliGRAM(s) Oral three times a day  fluconAZOLE IVPB      fluconAZOLE IVPB 400 milliGRAM(s) IV Intermittent every 24 hours  insulin lispro (HumaLOG) corrective regimen sliding scale   SubCutaneous three times a day before meals  insulin lispro (HumaLOG) corrective regimen sliding scale   SubCutaneous at bedtime  melatonin 3 milliGRAM(s) Oral at bedtime  methadone    Tablet 5 milliGRAM(s) Oral two times a day  metoprolol tartrate 25 milliGRAM(s) Oral two times a day  polyethylene glycol 3350 17 Gram(s) Oral daily  pyridoxine 100 milliGRAM(s) Oral daily  sodium chloride 0.9%. 1000 milliLiter(s) (75 mL/Hr) IV Continuous <Continuous>  tiotropium 18 MICROgram(s) Capsule 1 Capsule(s) Inhalation daily    MEDICATIONS  (PRN):  ALBUTerol    90 MICROgram(s) HFA Inhaler 2 Puff(s) Inhalation every 6 hours PRN Shortness of Breath and/or Wheezing  dextrose 40% Gel 15 Gram(s) Oral once PRN Blood Glucose LESS THAN 70 milliGRAM(s)/deciliter  glucagon  Injectable 1 milliGRAM(s) IntraMuscular once PRN Glucose LESS THAN 70 milligrams/deciliter  LORazepam     Tablet 0.5 milliGRAM(s) Oral three times a day PRN Anxiety  ondansetron Injectable 4 milliGRAM(s) IV Push every 6 hours PRN Nausea and/or Vomiting  oxyCODONE    IR 5 milliGRAM(s) Oral every 6 hours PRN Moderate Pain (4 - 6)  oxyCODONE    IR 10 milliGRAM(s) Oral every 6 hours PRN Severe Pain (7 - 10)  senna 2 Tablet(s) Oral at bedtime PRN Constipation  simethicone 160 milliGRAM(s) Chew daily PRN Gas      VITALS:  T(F): 98.5 (03-23-19 @ 05:29), Max: 98.7 (03-22-19 @ 17:22)  HR: 79 (03-23-19 @ 05:29) (74 - 79)  BP: 140/61 (03-23-19 @ 05:29) (135/71 - 144/65)  RR: 18 (03-23-19 @ 05:29) (18 - 18)  SpO2: 94% (03-23-19 @ 05:29)      CAPILLARY BLOOD GLUCOSE    Output     I&O's Summary  T(F): 98.5 (03-23-19 @ 05:29), Max: 98.7 (03-22-19 @ 17:22)  HR: 79 (03-23-19 @ 05:29) (74 - 79)  BP: 140/61 (03-23-19 @ 05:29) (135/71 - 144/65)  RR: 18 (03-23-19 @ 05:29) (18 - 18)  SpO2: 94% (03-23-19 @ 05:29)    PHYSICAL EXAM:  GENERAL: NAD, sitting up in chair, hunched over  Heent; oral thrush improved  NECK: Supple, No JVD  CHEST/LUNG: Clear to auscultation bilaterally; No wheeze; left pleurx with serosanguinous drainage  HEART: Regular rate and rhythm; No murmurs, rubs, or gallops  ABDOMEN: Soft, Nontender, Nondistended; Bowel sounds present  EXTREMITIES:  2+ Peripheral Pulses, No clubbing, cyanosis, 2+ pitting edema b/l legs   PSYCH: AAOx2  NEUROLOGY: non-focal  SKIN: No rashes or lesions      LABS:              12.0                 140  | 27   | 12           15.46 >-----------< 468     ------------------------< 92                    39.3                 4.2  | 96   | 0.71                                         Ca 9.6   Mg 2.0   Ph 3.2          MICROBIOLOGY:    RADIOLOGY & ADDITIONAL TESTS:    Imaging Personally Reviewed:  < from: MR Head w/ IV Cont (03.22.19 @ 17:07) >  MRI of the brain was attempted. Only sagittal T1, axial T1 and fast and   echo T2-weighted sequence was performed. The patient became short of   breath. The exam was discontinued.    Limited exam demonstrates parenchymal volume loss and chronic   microvascular ischemic changes    There is abnormal T1 prolongation seen involving the visualized portion   cervical spine. The possibility of osseous metastasis in this region   cannot be entirely excluded. Complete contrast enhanced MRI of the brain   is recommended when patient can tolerate it.    Impression: Limited nondiagnostic MRI as described above.    [ ] Consultant(s) Notes Reviewed:  [ ] Care Discussed with Consultants/Other Providers:

## 2019-03-23 NOTE — DISCHARGE NOTE PROVIDER - NSDCACTIVITY_GEN_ALL_CORE
Showering allowed/Walking - Indoors allowed Walking - Indoors allowed/Showering allowed/Walking - Outdoors allowed

## 2019-03-23 NOTE — DISCHARGE NOTE PROVIDER - PROVIDER TOKENS
PROVIDER:[TOKEN:[[id number]]] PROVIDER:[TOKEN:[[id number]]],PROVIDER:[TOKEN:[[id number]]],PROVIDER:[TOKEN:[[id number]]],PROVIDER:[TOKEN:[[id number]]],PROVIDER:[TOKEN:[[id number]]]

## 2019-03-23 NOTE — DISCHARGE NOTE PROVIDER - INSTRUCTIONS
Dysphagia 2 with nectar thick liquids. liquids to be consumed via small, single cup sip ONLY.  allow for swallow between intakes; alternate food with liquid; crush medication (when feasible); no straws; oral hygiene; position upright (90 degrees); provide rest periods between swallows; small sips/bites Dysphagia 2 with nectar thick liquids. Liquids to be consumed via small, single cup sip ONLY. Speech and swallow reviewed with wife and patient how to consume thin liquids in order to eliminate aspiration/penetration. If patient wishes to consume thin liquids, then should use a chin tuck when swallowing. Allow for swallow between intakes; alternate food with liquid; crush medication (when feasible); no straws; oral hygiene; position upright (90 degrees).

## 2019-03-23 NOTE — DISCHARGE NOTE PROVIDER - NSDCFUADDAPPT_GEN_ALL_CORE_FT
Follow up with PCP within 1-2 weeks of discharge.   Follow up with cardiothoracic surgeon within 1-2 weeks of discharge.   Follow up with gastroenterology within 1-2 weeks of discharge.   Follow up with cardiology within 1-2 weeks of discharge.   Follow up with hematologist/oncologist within 1-2 weeks of discharge. (Dr Vieira)  Follow up with infectious disease team within 1-2 weeks of discharge.

## 2019-03-23 NOTE — DISCHARGE NOTE PROVIDER - NSDCCPCAREPLAN_GEN_ALL_CORE_FT
PRINCIPAL DISCHARGE DIAGNOSIS  Diagnosis: Odynophagia  Assessment and Plan of Treatment: You presented with odynophagia, which is thought to be due to progression of metastatic disease. Underwent EGD on 4/1/19 which showed desquamation mucosa in esophagus, LA grade A esophagitis and gastritis with biopsies taken during EGD. To continue Fluconazole for candida esophagitis. Seen by speech and swallow who recommended dysphagia 2 diet with nectar thick liquids. If to consume thin liquids, it should be with chin tuck. Caloric needs not being met due to severe odynophagia, however, patient and wife have refused G tube feedings.      SECONDARY DISCHARGE DIAGNOSES  Diagnosis: Diabetes mellitus  Assessment and Plan of Treatment: Follow up with primary care physician and endocrinologist for routine Hemoglobin A1C checks and management.  Follow up with your ophthalmologist for routine yearly vision exams.    Diagnosis: COPD (chronic obstructive pulmonary disease)  Assessment and Plan of Treatment: Smoking cessation, continue current medications as prescribed. Follow up with your routine physician's appointments.    Diagnosis: Lung cancer  Assessment and Plan of Treatment:     Diagnosis: Paroxysmal atrial fibrillation  Assessment and Plan of Treatment: Paroxysmal atrial fibrillation    Diagnosis: Herpes zoster  Assessment and Plan of Treatment: Herpes zoster    Diagnosis: Pleural effusion  Assessment and Plan of Treatment: PRINCIPAL DISCHARGE DIAGNOSIS  Diagnosis: Odynophagia  Assessment and Plan of Treatment: You presented with odynophagia, which is thought to be due to progression of metastatic disease. Underwent EGD on 4/1/19 which showed desquamation mucosa in esophagus, LA grade A esophagitis and gastritis with biopsies taken during EGD. To continue Fluconazole for candida esophagitis. Seen by speech and swallow who recommended dysphagia 2 diet with nectar thick liquids. If to consume thin liquids, it should be with chin tuck. Caloric needs not being met due to severe odynophagia, however, patient and wife have refused G tube feedings.      SECONDARY DISCHARGE DIAGNOSES  Diagnosis: Diabetes mellitus  Assessment and Plan of Treatment: Follow up with primary care physician and endocrinologist for routine Hemoglobin A1C checks and management.  Follow up with your ophthalmologist for routine yearly vision exams.    Diagnosis: COPD (chronic obstructive pulmonary disease)  Assessment and Plan of Treatment: Smoking cessation, continue current medications as prescribed. Follow up with your routine physician's appointments.    Diagnosis: Lung cancer  Assessment and Plan of Treatment: Pt with stage 4 left lung adenocarcinoma, started on immunotherapy on 3/4/19 with Keytruda. Recent PET/CT showing hypermetabolic right supraclavicular, bilateral mediastinal, and left perihilar lymphadenopathy, increased in size from a month ago. Underwent MRI of brain, ruled out for stroke. No further work up as per neurology. At this time, family and patient have decided for home hospice. To continue with methadone and oxycodone as needed.    Diagnosis: Paroxysmal atrial fibrillation  Assessment and Plan of Treatment: Please take your medications as prescribed.  Low fat diet, reduce caffeine intake, and exercise at least 30 minutes daily.    Diagnosis: Herpes zoster  Assessment and Plan of Treatment: Was on IV acyclovir 10 mg/kg q8h while in the hospital. Transitioned to oral upon discharge.    Diagnosis: Pleural effusion  Assessment and Plan of Treatment: Patient with recurrent left loculated pleural effusion, s/p left VATS and pleurX placement. PleurX still in place, draining serosanguinous fluid likely due to malignancy. To continue catheter care with home hospice nurse. Follow up as outpt with Dr Moore.

## 2019-03-23 NOTE — DISCHARGE NOTE PROVIDER - CARE PROVIDERS DIRECT ADDRESSES
[email or web address],DirectAddress_Unknown,DirectAddress_Unknown

## 2019-03-23 NOTE — PROGRESS NOTE ADULT - PROBLEM SELECTOR PLAN 4
Pt with stage 4 left lung adenocarcinoma, started on immunotherapy on 3/4/19 with Keytruda.  - Oncology consult appreciated,  MRI brain nondiagnostic, pt couldn't tolerate it   -f/u palliative care recs  - C/w methadone (I-Stop Reference #: [id number]) and oxycodone PRN  -Leg doppler negative for DVT

## 2019-03-23 NOTE — DISCHARGE NOTE PROVIDER - CARE PROVIDER_API CALL
Armando Moore)  Surgery; Thoracic Surgery  [address] [place] [address], [place] [address]  Phone: [phone]  Fax: [phone]  Follow Up Time: Armando Moore)  Surgery; Thoracic Surgery  [address] [place] [address], [place] [address]  Phone: [phone]  Fax: [phone]  Follow Up Time:     William Benson)  Gastroenterology; Internal Medicine  [address] [place] [address] [address]  [place] [address]  Phone: [phone]  Fax: [phone]  Follow Up Time:     Robinson Hathaway)  Cardiovascular Disease; Internal Medicine  [address] [place] [address]  Phone: [phone]  Fax: [phone]  Follow Up Time:     Shannon Vieira)  Internal Medicine  [address] [place] [address]  Phone: [phone]  Fax: [phone]  Follow Up Time:     Kelli Hernandez)  Infectious Disease; Internal Medicine  [address] [place] [address]  Phone: [phone]  Fax: [phone]  Follow Up Time:

## 2019-03-23 NOTE — PROGRESS NOTE ADULT - PROBLEM SELECTOR PLAN 9
- DVT ppx: pt with hypercoagulable state, however will hold pharmacologic DVT ppx for now pending MRI head to evaluate for brain mets and given serosanguinous output from Pleurx. OOB and ambulation as tolerated.  - Diet: Pureed pending S&S eval - DVT ppx: pt with hypercoagulable state, start lovenox sc   OOB and ambulation as tolerated.  - Diet: soft diet

## 2019-03-23 NOTE — DISCHARGE NOTE PROVIDER - HOSPITAL COURSE
64 yo man, current smoker (~1 ppd), with history of stage 4 left lung adenocarcinoma, on immunotherapy with Keytruda (first session on 3/4/19), recurrent left pleural effusion s/p left VATS with pleurX placement (2/2019), COPD, DM2, HTN, HLD, and anxiety presents with 1 week of worsening throat pain with odynophagia and dysphagia.        Recent PET/CT showing hypermetabolic right supraclavicular, bilateral mediastinal, and left perihilar lymphadenopathy, increased in size from a month ago.s/p cinesophagram -rec Dysphagia 2 w/ nectar thick. Pt has been seen by GI consult recommend  to complete fluconazole for 10-14 days, if no significant improvement plan for EGD    Brain MRI showed__ 64 yo man, current smoker (~1 ppd), with history of stage 4 left lung adenocarcinoma, on immunotherapy with Keytruda (first session on 3/4/19), recurrent left pleural effusion s/p left VATS with pleurX placement (2/2019), COPD, DM2, HTN, HLD, and anxiety presents with 1 week of worsening throat pain with odynophagia and dysphagia.        Recent PET/CT showing hypermetabolic right supraclavicular, bilateral mediastinal, and left perihilar lymphadenopathy, increased in size from a month ago.s/p cinesophagram -rec Dysphagia 2 w/ nectar thick. Pt has been seen by GI consult recommend  to complete fluconazole for 10-14 days, if no significant improvement plan for EGD    Brain MRI showed subacute infarcts. Tele transfer, MRA head and dopplers recommended by neuro 62 y/o man, current smoker (~1 ppd), with history of stage 4 left lung adenocarcinoma, on immunotherapy with Keytruda (first session on 3/4/19), recurrent left pleural effusion s/p left VATS with pleurX placement (2/2019), COPD, DM2, HTN, HLD, and anxiety who presents with 1 week of worsening throat pain with odynophagia and dysphagia, started on fluconazole for thrush and possible esophageal candidiasis, also with herpes zoster.        1. Afib with RVR - Patient was noted to be in rapid afib during admission. Seen by cardiology and medications adjusted.     2. Dysphagia/odynophagia: Thought to be due to progression of metastatic disease. Underwent EGD on 4/1/19 which showed desquamation mucosa in esophagus, LA grade A esophagitis and gastritis with biopsies taken during EGD. To continue Fluconazole for candida esophagitis. Seen by speech and swallow who recommended dysphagia 2 diet with nectar thick liquids. If to consume thin liquids, it should be with chin tuck. Caloric needs not being met due to severe odynophagia, however, patient and wife have refused G tube feedings.     3. Lung Cancer: Pt with stage 4 left lung adenocarcinoma, started on immunotherapy on 3/4/19 with Keytruda. Recent PET/CT showing hypermetabolic right supraclavicular, bilateral mediastinal, and left perihilar lymphadenopathy, increased in size from a month ago. Underwent MRI of brain, ruled out for stroke. No further work up as per neurology. At this time, family and patient have decided for home hospice. To continue with methadone and oxycodone as needed.     4. Pleural effusion: Patient with recurrent left loculated pleural effusion, s/p left VATS and pleurX placement. PleurX still in place, draining serosanguinous fluid likely due to malignancy. To continue catheter care with home hospice nurse. Follow up as outpt with Dr Moore.     5. Herpes zoster: Was on IV acyclovir 10 mg/kg q8h while in the hospital.     6. COPD: Continue nebulizer treatments/pumps.      appreciate ID rec     7. DM2: Continue with oral diabetic medications     8. HTN: Continue with adjusted medication regimen.         Given patient's overall poor prognosis, decision was made to send home on hospice. All arrangements have been set up by .     Patient seen and evaluated, no acute somatic complaints. Medically cleared/stable for discharge as per Dr. Hogan with close outpatient follow up within 1-2 weeks of discharge. Patient understands and agrees with plan of care.

## 2019-03-24 DIAGNOSIS — B02.9 ZOSTER WITHOUT COMPLICATIONS: ICD-10-CM

## 2019-03-24 LAB
ANION GAP SERPL CALC-SCNC: 13 MMO/L — SIGNIFICANT CHANGE UP (ref 7–14)
BUN SERPL-MCNC: 13 MG/DL — SIGNIFICANT CHANGE UP (ref 7–23)
CALCIUM SERPL-MCNC: 10 MG/DL — SIGNIFICANT CHANGE UP (ref 8.4–10.5)
CHLORIDE SERPL-SCNC: 95 MMOL/L — LOW (ref 98–107)
CO2 SERPL-SCNC: 28 MMOL/L — SIGNIFICANT CHANGE UP (ref 22–31)
CREAT SERPL-MCNC: 0.75 MG/DL — SIGNIFICANT CHANGE UP (ref 0.5–1.3)
GLUCOSE SERPL-MCNC: 102 MG/DL — HIGH (ref 70–99)
HCT VFR BLD CALC: 38.1 % — LOW (ref 39–50)
HGB BLD-MCNC: 11.5 G/DL — LOW (ref 13–17)
MCHC RBC-ENTMCNC: 24.7 PG — LOW (ref 27–34)
MCHC RBC-ENTMCNC: 30.2 % — LOW (ref 32–36)
MCV RBC AUTO: 81.9 FL — SIGNIFICANT CHANGE UP (ref 80–100)
NRBC # FLD: 0 K/UL — LOW (ref 25–125)
PLATELET # BLD AUTO: 441 K/UL — HIGH (ref 150–400)
PMV BLD: 9.3 FL — SIGNIFICANT CHANGE UP (ref 7–13)
POTASSIUM SERPL-MCNC: 4.4 MMOL/L — SIGNIFICANT CHANGE UP (ref 3.5–5.3)
POTASSIUM SERPL-SCNC: 4.4 MMOL/L — SIGNIFICANT CHANGE UP (ref 3.5–5.3)
RBC # BLD: 4.65 M/UL — SIGNIFICANT CHANGE UP (ref 4.2–5.8)
RBC # FLD: 15.2 % — HIGH (ref 10.3–14.5)
SODIUM SERPL-SCNC: 136 MMOL/L — SIGNIFICANT CHANGE UP (ref 135–145)
WBC # BLD: 13.12 K/UL — HIGH (ref 3.8–10.5)
WBC # FLD AUTO: 13.12 K/UL — HIGH (ref 3.8–10.5)

## 2019-03-24 PROCEDURE — 99233 SBSQ HOSP IP/OBS HIGH 50: CPT

## 2019-03-24 RX ORDER — ACYCLOVIR SODIUM 500 MG
800 VIAL (EA) INTRAVENOUS ONCE
Qty: 0 | Refills: 0 | Status: COMPLETED | OUTPATIENT
Start: 2019-03-24 | End: 2019-03-24

## 2019-03-24 RX ORDER — ACYCLOVIR SODIUM 500 MG
800 VIAL (EA) INTRAVENOUS EVERY 8 HOURS
Qty: 0 | Refills: 0 | Status: DISCONTINUED | OUTPATIENT
Start: 2019-03-24 | End: 2019-04-04

## 2019-03-24 RX ORDER — ACYCLOVIR SODIUM 500 MG
VIAL (EA) INTRAVENOUS
Qty: 0 | Refills: 0 | Status: DISCONTINUED | OUTPATIENT
Start: 2019-03-24 | End: 2019-04-04

## 2019-03-24 RX ORDER — SODIUM CHLORIDE 9 MG/ML
1000 INJECTION, SOLUTION INTRAVENOUS
Qty: 0 | Refills: 0 | Status: DISCONTINUED | OUTPATIENT
Start: 2019-03-24 | End: 2019-04-04

## 2019-03-24 RX ADMIN — Medication 100 MILLIGRAM(S): at 06:00

## 2019-03-24 RX ADMIN — ENOXAPARIN SODIUM 40 MILLIGRAM(S): 100 INJECTION SUBCUTANEOUS at 12:27

## 2019-03-24 RX ADMIN — ATORVASTATIN CALCIUM 40 MILLIGRAM(S): 80 TABLET, FILM COATED ORAL at 21:51

## 2019-03-24 RX ADMIN — Medication 266 MILLIGRAM(S): at 21:51

## 2019-03-24 RX ADMIN — BUDESONIDE AND FORMOTEROL FUMARATE DIHYDRATE 2 PUFF(S): 160; 4.5 AEROSOL RESPIRATORY (INHALATION) at 20:14

## 2019-03-24 RX ADMIN — TIOTROPIUM BROMIDE 1 CAPSULE(S): 18 CAPSULE ORAL; RESPIRATORY (INHALATION) at 11:45

## 2019-03-24 RX ADMIN — Medication 3 MILLIGRAM(S): at 21:51

## 2019-03-24 RX ADMIN — Medication 266 MILLIGRAM(S): at 11:46

## 2019-03-24 RX ADMIN — Medication 25 MILLIGRAM(S): at 06:00

## 2019-03-24 RX ADMIN — BUDESONIDE AND FORMOTEROL FUMARATE DIHYDRATE 2 PUFF(S): 160; 4.5 AEROSOL RESPIRATORY (INHALATION) at 11:45

## 2019-03-24 RX ADMIN — FLUCONAZOLE 100 MILLIGRAM(S): 150 TABLET ORAL at 14:59

## 2019-03-24 RX ADMIN — METHADONE HYDROCHLORIDE 5 MILLIGRAM(S): 40 TABLET ORAL at 06:00

## 2019-03-24 RX ADMIN — SIMETHICONE 160 MILLIGRAM(S): 80 TABLET, CHEWABLE ORAL at 06:00

## 2019-03-24 RX ADMIN — Medication 100 MILLIGRAM(S): at 14:59

## 2019-03-24 RX ADMIN — Medication 0.5 MILLIGRAM(S): at 21:51

## 2019-03-24 RX ADMIN — SODIUM CHLORIDE 50 MILLILITER(S): 9 INJECTION, SOLUTION INTRAVENOUS at 10:20

## 2019-03-24 RX ADMIN — Medication 100 MILLIGRAM(S): at 17:53

## 2019-03-24 RX ADMIN — Medication 81 MILLIGRAM(S): at 12:26

## 2019-03-24 RX ADMIN — Medication 0.5 MILLIGRAM(S): at 10:20

## 2019-03-24 RX ADMIN — METHADONE HYDROCHLORIDE 5 MILLIGRAM(S): 40 TABLET ORAL at 17:53

## 2019-03-24 RX ADMIN — Medication 25 MILLIGRAM(S): at 17:53

## 2019-03-24 RX ADMIN — Medication 100 MILLIGRAM(S): at 21:51

## 2019-03-24 RX ADMIN — ONDANSETRON 4 MILLIGRAM(S): 8 TABLET, FILM COATED ORAL at 17:53

## 2019-03-24 NOTE — PROGRESS NOTE ADULT - ASSESSMENT
62 yo man, current smoker (~1 ppd), with history of stage 4 left lung adenocarcinoma, on immunotherapy with Keytruda (first session on 3/4/19), recurrent left pleural effusion s/p left VATS with pleurX placement (2/2019), COPD, DM2, HTN, HLD, and anxiety presents with 1 week of worsening throat pain with odynophagia and dysphagia, started on fluconazole for thrush and possible esophageal candidiasis, with minimal improvement, now with herpes zoster

## 2019-03-24 NOTE — PROGRESS NOTE ADULT - PROBLEM SELECTOR PLAN 2
-dysphagia and odynophagia, likely 2/2 progression of metastatic disease in mediastinal area. Recent PET/CT showing hypermetabolic right supraclavicular, bilateral mediastinal, and left perihilar lymphadenopathy, increased in size from a month ago. Can also, however, be 2/2 motility issue vs. esophagitis (given presence of thrush on exam)  -no significant improvement on fluconazole, will need EGD next wk, f/u GI  -S/S eval appreciated, pharyngeal dysphagia, no aspiration  -diet changed to dysphagia 2 with nectar thick liquids per s/s rec    - Aspiration precautions  -c/w fluconazole for treatment of thrush

## 2019-03-24 NOTE — PROGRESS NOTE ADULT - PROBLEM SELECTOR PLAN 9
- DVT ppx: pt with hypercoagulable state, start lovenox sc   OOB and ambulation as tolerated.  - Diet: soft diet

## 2019-03-24 NOTE — PROGRESS NOTE ADULT - ASSESSMENT
63M active smoker with h/o stage 4 left lung adenocarcinoma, on immunotherapy with Keytruda (first session on 3/4/19), recurrent left pleural effusion s/p left VATS with PleurX placement (2/19)    - ambulating on room air without difficulty. PleurX currently to Pleurovac with minimal drainage and continued air leak  - continue recording drainage per shift  - care per primary team  - no further thoracic surgical intervention indicated    Jayant Tillman PGY3  Thoracic Surgery  [id number]

## 2019-03-24 NOTE — PROGRESS NOTE ADULT - SUBJECTIVE AND OBJECTIVE BOX
Brielle Mcnamara MD  Pager [phone]    CHIEF COMPLAINT: Patient is a 63y old  male who presents with a chief complaint of Odynophagia/dysphagia, minimal PO intake (24 Mar 2019 09:34)      SUBJECTIVE / OVERNIGHT EVENTS:  pt still with minimal intake, also c/o rash on his back across to his abdomen associated with pain    MEDICATIONS  (STANDING):  acyclovir IVPB      acyclovir IVPB 800 milliGRAM(s) IV Intermittent every 8 hours  aspirin enteric coated 81 milliGRAM(s) Oral daily  atorvastatin 40 milliGRAM(s) Oral at bedtime  buDESOnide 160 MICROgram(s)/formoterol 4.5 MICROgram(s) Inhaler 2 Puff(s) Inhalation two times a day  dextrose 5%. 1000 milliLiter(s) (50 mL/Hr) IV Continuous <Continuous>  dextrose 50% Injectable 12.5 Gram(s) IV Push once  dextrose 50% Injectable 25 Gram(s) IV Push once  dextrose 50% Injectable 25 Gram(s) IV Push once  docusate sodium 100 milliGRAM(s) Oral three times a day  enoxaparin Injectable 40 milliGRAM(s) SubCutaneous daily  fluconAZOLE IVPB      fluconAZOLE IVPB 400 milliGRAM(s) IV Intermittent every 24 hours  insulin lispro (HumaLOG) corrective regimen sliding scale   SubCutaneous three times a day before meals  insulin lispro (HumaLOG) corrective regimen sliding scale   SubCutaneous at bedtime  lactated ringers. 1000 milliLiter(s) (50 mL/Hr) IV Continuous <Continuous>  melatonin 3 milliGRAM(s) Oral at bedtime  methadone    Tablet 5 milliGRAM(s) Oral two times a day  metoprolol tartrate 25 milliGRAM(s) Oral two times a day  polyethylene glycol 3350 17 Gram(s) Oral daily  pyridoxine 100 milliGRAM(s) Oral daily  tiotropium 18 MICROgram(s) Capsule 1 Capsule(s) Inhalation daily    MEDICATIONS  (PRN):  ALBUTerol    90 MICROgram(s) HFA Inhaler 2 Puff(s) Inhalation every 6 hours PRN Shortness of Breath and/or Wheezing  dextrose 40% Gel 15 Gram(s) Oral once PRN Blood Glucose LESS THAN 70 milliGRAM(s)/deciliter  glucagon  Injectable 1 milliGRAM(s) IntraMuscular once PRN Glucose LESS THAN 70 milligrams/deciliter  LORazepam     Tablet 0.5 milliGRAM(s) Oral three times a day PRN Anxiety  ondansetron Injectable 4 milliGRAM(s) IV Push every 6 hours PRN Nausea and/or Vomiting  oxyCODONE    IR 5 milliGRAM(s) Oral every 6 hours PRN Moderate Pain (4 - 6)  oxyCODONE    IR 10 milliGRAM(s) Oral every 6 hours PRN Severe Pain (7 - 10)  senna 2 Tablet(s) Oral at bedtime PRN Constipation  simethicone 160 milliGRAM(s) Chew daily PRN Gas      VITALS:  T(F): 98.3 (03-24-19 @ 05:56), Max: 98.3 (03-24-19 @ 05:56)  HR: 80 (03-24-19 @ 05:56) (66 - 80)  BP: 143/64 (03-24-19 @ 05:56) (126/66 - 143/64)  RR: 17 (03-24-19 @ 05:56) (17 - 18)  SpO2: 96% (03-24-19 @ 05:56)      CAPILLARY BLOOD GLUCOSE    Output     I&O's Summary  T(F): 98.3 (03-24-19 @ 05:56), Max: 98.3 (03-24-19 @ 05:56)  HR: 80 (03-24-19 @ 05:56) (66 - 80)  BP: 143/64 (03-24-19 @ 05:56) (126/66 - 143/64)  RR: 17 (03-24-19 @ 05:56) (17 - 18)  SpO2: 96% (03-24-19 @ 05:56)    PHYSICAL EXAM:  GENERAL: NAD, well-developed  HEAD:  Atraumatic, Normocephalic  Heent: oral thrush improved  NECK: Supple, No JVD  CHEST/LUNG: Clear to auscultation bilaterally; No wheeze  HEART: Regular rate and rhythm; No murmurs, rubs, or gallops  ABDOMEN: Soft, Nontender, Nondistended; Bowel sounds present, L pleurx with serosanguinous drainage   EXTREMITIES:  2+ Peripheral Pulses, No clubbing, cyanosis, or edema  PSYCH: AAOx3  NEUROLOGY: non-focal  SKIN: erythematous vesicular rash across his right mid back to abdomen c/w herpes zoster,     LABS:              11.5                 136  | 28   | 13           13.12 >-----------< 441     ------------------------< 102                   38.1                 4.4  | 95   | 0.75                                         Ca 10.0  Mg x     Ph x          MICROBIOLOGY:    RADIOLOGY & ADDITIONAL TESTS:    Imaging Personally Reviewed:    [ ] Consultant(s) Notes Reviewed:  [x ] Care Discussed with Consultants/Other Providers: acp NP and charge RN: herpes zoster, acyclovir and isolation , f/u GI, needs EGD next wk as dysphagia not improving with fluconazole

## 2019-03-24 NOTE — PROGRESS NOTE ADULT - PROBLEM SELECTOR PLAN 1
-pt is immunocompromised and c/o new rash on his right mid back crossing to his abdomen, rash is erythematous vesicular c/w herpes zoster, not crusted  -start iv acyclovir 10 mg/kg q8h, monitor lesion, IVF while on iv acyclovir  -contact and airborne precautions +standard precaution, d/w charge nurse and ads np

## 2019-03-24 NOTE — PROGRESS NOTE ADULT - SUBJECTIVE AND OBJECTIVE BOX
Thoracic Surgery  CC: left PleurX catheter w/prolonged air leak  HPI: 63M active smoker with h/o stage 4 left lung adenocarcinoma, on immunotherapy with Keytruda (first session on 3/4/19), recurrent left pleural effusion s/p left VATS with pleurX placement (2/2019), COPD, DM2, HTN, HLD, and anxiety p/w with 1 week of worsening throat pain with odynophagia and dysphagia.    24/Overnight events: Patient seen and examined, doing well. Currently ambulating in echeverria on room air without difficulty. PleurX currently to Pleurovac with minimal drainage and continued air leak. Denies fevers / chills, chest pain / worsening shortness of breath.     Objective:  Vital Signs Last 24 Hrs  T(C): 36.8 (24 Mar 2019 05:56), Max: 36.8 (24 Mar 2019 05:56)  T(F): 98.3 (24 Mar 2019 05:56), Max: 98.3 (24 Mar 2019 05:56)  HR: 80 (24 Mar 2019 05:56) (66 - 80)  BP: 143/64 (24 Mar 2019 05:56) (126/66 - 143/64)  BP(mean): --  RR: 17 (24 Mar 2019 05:56) (17 - 18)  SpO2: 96% (24 Mar 2019 05:56) (95% - 96%)    Physical Exam:  General: NAD, ambulating in echeverria on room air without difficulty  Respiratory: non-labored on room air while ambulating; no wheezing or rhonci  Abdomen: softly distended, mild tenderness to deep palpation; no guarding or rebound       I&O's Detail    23 Mar 2019 07:01  -  24 Mar 2019 07:00  --------------------------------------------------------  IN:  Total IN: 0 mL    OUT:    Chest Tube: 53 mL  Total OUT: 53 mL    Total NET: -53 mL        Daily     Daily     LABS:                        11.5   13.12 )-----------( 441      ( 24 Mar 2019 06:12 )             38.1     03-24    136  |  95<L>  |  13  ----------------------------<  102<H>  4.4   |  28  |  0.75    Ca    10.0      24 Mar 2019 06:12

## 2019-03-25 PROCEDURE — 99233 SBSQ HOSP IP/OBS HIGH 50: CPT

## 2019-03-25 PROCEDURE — 99232 SBSQ HOSP IP/OBS MODERATE 35: CPT

## 2019-03-25 PROCEDURE — 99232 SBSQ HOSP IP/OBS MODERATE 35: CPT | Mod: GC

## 2019-03-25 RX ADMIN — ATORVASTATIN CALCIUM 40 MILLIGRAM(S): 80 TABLET, FILM COATED ORAL at 21:26

## 2019-03-25 RX ADMIN — TIOTROPIUM BROMIDE 1 CAPSULE(S): 18 CAPSULE ORAL; RESPIRATORY (INHALATION) at 10:00

## 2019-03-25 RX ADMIN — METHADONE HYDROCHLORIDE 5 MILLIGRAM(S): 40 TABLET ORAL at 05:31

## 2019-03-25 RX ADMIN — Medication 266 MILLIGRAM(S): at 21:26

## 2019-03-25 RX ADMIN — METHADONE HYDROCHLORIDE 5 MILLIGRAM(S): 40 TABLET ORAL at 18:04

## 2019-03-25 RX ADMIN — Medication 25 MILLIGRAM(S): at 18:30

## 2019-03-25 RX ADMIN — BUDESONIDE AND FORMOTEROL FUMARATE DIHYDRATE 2 PUFF(S): 160; 4.5 AEROSOL RESPIRATORY (INHALATION) at 10:00

## 2019-03-25 RX ADMIN — Medication 81 MILLIGRAM(S): at 13:11

## 2019-03-25 RX ADMIN — Medication 100 MILLIGRAM(S): at 13:11

## 2019-03-25 RX ADMIN — BUDESONIDE AND FORMOTEROL FUMARATE DIHYDRATE 2 PUFF(S): 160; 4.5 AEROSOL RESPIRATORY (INHALATION) at 21:26

## 2019-03-25 RX ADMIN — Medication 25 MILLIGRAM(S): at 05:31

## 2019-03-25 RX ADMIN — Medication 100 MILLIGRAM(S): at 21:26

## 2019-03-25 RX ADMIN — Medication 100 MILLIGRAM(S): at 05:31

## 2019-03-25 RX ADMIN — Medication 3 MILLIGRAM(S): at 21:27

## 2019-03-25 RX ADMIN — Medication 266 MILLIGRAM(S): at 05:31

## 2019-03-25 RX ADMIN — FLUCONAZOLE 100 MILLIGRAM(S): 150 TABLET ORAL at 14:22

## 2019-03-25 RX ADMIN — ENOXAPARIN SODIUM 40 MILLIGRAM(S): 100 INJECTION SUBCUTANEOUS at 13:11

## 2019-03-25 RX ADMIN — Medication 266 MILLIGRAM(S): at 13:12

## 2019-03-25 RX ADMIN — POLYETHYLENE GLYCOL 3350 17 GRAM(S): 17 POWDER, FOR SOLUTION ORAL at 13:12

## 2019-03-25 NOTE — PROGRESS NOTE ADULT - PROBLEM SELECTOR PLAN 4
-spoke w/ Dr. Mao. believes pt is still candidate for further tx as pt has high PDL1 expression and only 1 treatment of keytruda   -PET 3/7 w/ evidence of metastatic disease in LN + pulm pleura         -however, scan is baseline scan, no evidence of true progression  -pt w/ goal of going home. however, per Dr. Mao, if pt can be stabilized and medically optimized, would like pt to get further treatments to see if able to truly respond to Keytruda. -spoke w/ Dr. Mao. believes pt is still candidate for further tx as pt has high PDL1 expression and only 1 treatment of keytruda   -PET 3/7 w/ evidence of metastatic disease in LN + pulm pleura         -however, scan is baseline scan, no evidence of true progression  -pt w/ goal of going home. however, per Dr. Mao, if pt can be stabilized and medically optimized, would like pt to get further treatments to see if able to truly respond to Keytruda.  -per pt and family, both wish to pursue further Keytruda

## 2019-03-25 NOTE — PROGRESS NOTE ADULT - ASSESSMENT
62 yo man, current smoker (~1 ppd), with history of stage 4 left lung adenocarcinoma, on immunotherapy with Keytruda (first session on 3/4/19), recurrent left pleural effusion s/p left VATS with pleurX placement (2/2019), COPD, DM2, HTN, HLD, and anxiety presents with 1 week of worsening throat pain with odynophagia and dysphagia, started on fluconazole for thrush and possible esophageal candidiasis, with minimal improvement, now with herpes zoster    Problem/Plan - 1:  ·  Problem: Herpes zoster.  Plan: -pt is immunocompromised and c/o new rash on his right mid back crossing to his abdomen, rash is erythematous vesicular c/w herpes zoster, not crusted  -cont iv acyclovir 10 mg/kg q8h, monitor lesion, IVF while on iv acyclovir  -contact and airborne precautions +standard precaution, d/w charge nurse and ads np.     Problem/Plan - 2:  ·  Problem: Odynophagia.  Plan: -dysphagia and odynophagia, likely 2/2 progression of metastatic disease in mediastinal area. Recent PET/CT showing hypermetabolic right supraclavicular, bilateral mediastinal, and left perihilar lymphadenopathy, increased in size from a month ago. Can also, however, be 2/2 motility issue vs. esophagitis (given presence of thrush on exam)  -no significant improvement on fluconazole  -per GI, patient refused EGD  -continue fluconazole 400 mg IV  -S/S eval appreciated, pharyngeal dysphagia, no aspiration  -diet changed to dysphagia 2 with nectar thick liquids per s/s rec    - Aspiration precautions    Problem/Plan - 3:  ·  Problem: Failure to thrive in adult.  Plan: Pt with poor nutrition, decreased appetite, and weight loss in setting of metastatic lung cancer.   -pt with severe protein calorie malnutrition per nutrition eval  - Plan as above for Odynophagia  - f/u palliative care for symptomatic management  -anticipate home with home PT per physical therapy recs.     Problem/Plan - 4:  ·  Problem: Pleural effusion, left.  Plan: Pt with recurrent left loculated pleural effusion, s/p left VATS and pleurX placement. PleurX still in place, draining serosanguinous fluid likely due to malignancy  - CXR stable L pleural effusion  - C/w PleurX drainage; monitor output;   - Monitor respiratory status  -serial CXRs prn  - f/u CTS recs, pt follows with Dr. Moore.     Problem/Plan - 5:  ·  Problem: Lung cancer.  Plan: Pt with stage 4 left lung adenocarcinoma, started on immunotherapy on 3/4/19 with Keytruda.  - Oncology consult appreciated,  MRI brain nondiagnostic, pt couldn't tolerate it   - appreciate palliative care recs  - per Dr. Mao, patient should f/u for additional Keytruda treatment  - C/w methadone (I-Stop Reference #: [id number]) and oxycodone PRN  -Leg doppler negative for DVT.     Problem/Plan - 6:  Problem: COPD (chronic obstructive pulmonary disease). Plan: No S/S of exacerbation.  - C/w albuterol PRN  - Anoro Ellipta not available through pharmacy. Will do Symbicort and Spiriva for now and have pt's family bring in medication from home.    Problem/Plan - 7:  ·  Problem: Diabetes mellitus.  Plan: Pt on Januvia and Metformin at home.   - Start ISS and FSG qAC and qhs  -a1C 7.2%, Glycemic control acceptable at this time, hold off on endocrine consult for now.     Problem/Plan - 8:  ·  Problem: Hypertension.  Plan: Pt on amlodipine, valsartan, metoprolol, and lasix at home.  - Will c/w metoprolol, but will hold amlodipine (given chronic b/l LE edema), valsartan, and lasix. Will restart additional home BP meds if needed.     Problem/Plan - 9:  ·  Problem: Need for prophylactic measure.  Plan: - DVT ppx: pt with hypercoagulable state, start lovenox sc   OOB and ambulation as tolerated.  - Diet: soft diet.

## 2019-03-25 NOTE — PROGRESS NOTE ADULT - SUBJECTIVE AND OBJECTIVE BOX
CHIEF COMPLAINT: Patient is a 63y old  male who presents with a chief complaint of Odynophagia/dysphagia, minimal PO intake (25 Mar 2019 10:45)      SUBJECTIVE / OVERNIGHT EVENTS:    Seen with wife at the bedside. Per wife/patient, someone had discussed results of PET scan, and they are processing the information. Discussed diagnosis of zoster. Odynophagia still present, stable. No other complaints at time of visit.    MEDICATIONS  (STANDING):  acyclovir IVPB      acyclovir IVPB 800 milliGRAM(s) IV Intermittent every 8 hours  aspirin enteric coated 81 milliGRAM(s) Oral daily  atorvastatin 40 milliGRAM(s) Oral at bedtime  buDESOnide 160 MICROgram(s)/formoterol 4.5 MICROgram(s) Inhaler 2 Puff(s) Inhalation two times a day  dextrose 5%. 1000 milliLiter(s) (50 mL/Hr) IV Continuous <Continuous>  dextrose 50% Injectable 12.5 Gram(s) IV Push once  dextrose 50% Injectable 25 Gram(s) IV Push once  dextrose 50% Injectable 25 Gram(s) IV Push once  docusate sodium 100 milliGRAM(s) Oral three times a day  enoxaparin Injectable 40 milliGRAM(s) SubCutaneous daily  fluconAZOLE IVPB      fluconAZOLE IVPB 400 milliGRAM(s) IV Intermittent every 24 hours  insulin lispro (HumaLOG) corrective regimen sliding scale   SubCutaneous three times a day before meals  insulin lispro (HumaLOG) corrective regimen sliding scale   SubCutaneous at bedtime  lactated ringers. 1000 milliLiter(s) (50 mL/Hr) IV Continuous <Continuous>  melatonin 3 milliGRAM(s) Oral at bedtime  methadone    Tablet 5 milliGRAM(s) Oral two times a day  metoprolol tartrate 25 milliGRAM(s) Oral two times a day  polyethylene glycol 3350 17 Gram(s) Oral daily  pyridoxine 100 milliGRAM(s) Oral daily  tiotropium 18 MICROgram(s) Capsule 1 Capsule(s) Inhalation daily    MEDICATIONS  (PRN):  ALBUTerol    90 MICROgram(s) HFA Inhaler 2 Puff(s) Inhalation every 6 hours PRN Shortness of Breath and/or Wheezing  dextrose 40% Gel 15 Gram(s) Oral once PRN Blood Glucose LESS THAN 70 milliGRAM(s)/deciliter  glucagon  Injectable 1 milliGRAM(s) IntraMuscular once PRN Glucose LESS THAN 70 milligrams/deciliter  LORazepam     Tablet 0.5 milliGRAM(s) Oral three times a day PRN Anxiety  ondansetron Injectable 4 milliGRAM(s) IV Push every 6 hours PRN Nausea and/or Vomiting  oxyCODONE    IR 5 milliGRAM(s) Oral every 6 hours PRN Moderate Pain (4 - 6)  oxyCODONE    IR 10 milliGRAM(s) Oral every 6 hours PRN Severe Pain (7 - 10)  senna 2 Tablet(s) Oral at bedtime PRN Constipation  simethicone 160 milliGRAM(s) Chew daily PRN Gas      VITALS:  T(F): 98.3 (03-25-19 @ 05:29), Max: 98.8 (03-24-19 @ 21:47)  HR: 77 (03-25-19 @ 05:29) (74 - 77)  BP: 127/73 (03-25-19 @ 05:29) (127/73 - 148/70)  RR: 18 (03-25-19 @ 05:29) (18 - 18)  SpO2: 95% (03-25-19 @ 05:29)      CAPILLARY BLOOD GLUCOSE    Output     I&O's Summary  T(F): 98.3 (03-25-19 @ 05:29), Max: 98.8 (03-24-19 @ 21:47)  HR: 77 (03-25-19 @ 05:29) (74 - 77)  BP: 127/73 (03-25-19 @ 05:29) (127/73 - 148/70)  RR: 18 (03-25-19 @ 05:29) (18 - 18)  SpO2: 95% (03-25-19 @ 05:29)    PHYSICAL EXAM:  GENERAL: NAD, well-developed  HEAD:  Atraumatic, Normocephalic  EYES: EOMI  NECK: Supple, No JVD  CHEST/LUNG: nonlabored breathing  HEART: nl S1/S2  ABDOMEN: nondistended, soft  EXTREMITIES:  no LE edema  PSYCH: alert, answering questions appropriately  SKIN: dermatomal vesicular rash over R torso    LABS:              11.5                 136  | 28   | 13           13.12 >-----------< 441     ------------------------< 102                   38.1                 4.4  | 95   | 0.75                                         Ca 10.0  Mg x     Ph x                          MICROBIOLOGY:        RADIOLOGY & ADDITIONAL TESTS:    Imaging Personally Reviewed:    < from: MR Head w/ IV Cont (03.22.19 @ 17:07) >  There is abnormal T1 prolongation seen involving the visualized portion   cervical spine. The possibility of osseous metastasis in this region   cannot be entirely excluded. Complete contrast enhanced MRI of the brain   is recommended when patient can tolerate it.    Impression: Limited nondiagnostic MRI as described above.    < end of copied text >        [x] Care Discussed with Consultants/Other Providers:      All Gleason M.D.  Hospitalist  Pager [phone]

## 2019-03-25 NOTE — PROGRESS NOTE ADULT - ASSESSMENT
62 yo man, current smoker (~1 ppd), with history of stage 4 left lung adenocarcinoma (PDL-1 70% positive) diagnosed January 2019, on immunotherapy with Keytruda (first session on 3/4/19), recurrent left loculated pleural effusion s/p left VATS with pleurX placement (2/2019), COPD, DM2, HTN, HLD, and anxiety presents with 1 week of worsening throat pain, odynophagia, and dysphagia; exam c/w oral thrush, possible esophagitis given symptoms. Doubt relation to single treatment with Keytruda. PET/CT obtained for staging c/w known metastatic disease - he has just started therapy.     Pt has only had one dose of keytruda, it is too early to  response to treatment.     -please obtain MRI Brain with contrast to complete staging when feasible  -GI and thoracic surgery input appreciated  -Supportive care, pain control, PT/OT, nutrition, HSQ  -Outpt oncology follow up. After discussing risks and benefits and speaking to primary oncologist, patient has decided to continue with immunotherapy for at least one more cycle. Infections need to resolve before treatment is restarted.       Will follow. Please do not hesitate to call back with questions.     Shannon Vieira MD  Medical Oncology Attending

## 2019-03-25 NOTE — PROGRESS NOTE ADULT - ASSESSMENT
63 M with shortness of breath, neoplasm related pain, odynophagia, stage IV lung cancer, encounter for palliative care.

## 2019-03-25 NOTE — PROGRESS NOTE ADULT - SUBJECTIVE AND OBJECTIVE BOX
Chief Complaint:  Patient is a 63y old  Male who presents with a chief complaint of Odynophagia/dysphagia, minimal PO intake (24 Mar 2019 12:01)      Interval Events:   Patient still with significant dysphagia but refusing EGD, drinking coffee with milk this morning.    Allergies:  No Known Allergies      Hospital Medications:  acyclovir IVPB      acyclovir IVPB 800 milliGRAM(s) IV Intermittent every 8 hours  ALBUTerol    90 MICROgram(s) HFA Inhaler 2 Puff(s) Inhalation every 6 hours PRN  aspirin enteric coated 81 milliGRAM(s) Oral daily  atorvastatin 40 milliGRAM(s) Oral at bedtime  buDESOnide 160 MICROgram(s)/formoterol 4.5 MICROgram(s) Inhaler 2 Puff(s) Inhalation two times a day  dextrose 40% Gel 15 Gram(s) Oral once PRN  dextrose 5%. 1000 milliLiter(s) IV Continuous <Continuous>  dextrose 50% Injectable 12.5 Gram(s) IV Push once  dextrose 50% Injectable 25 Gram(s) IV Push once  dextrose 50% Injectable 25 Gram(s) IV Push once  docusate sodium 100 milliGRAM(s) Oral three times a day  enoxaparin Injectable 40 milliGRAM(s) SubCutaneous daily  fluconAZOLE IVPB      fluconAZOLE IVPB 400 milliGRAM(s) IV Intermittent every 24 hours  glucagon  Injectable 1 milliGRAM(s) IntraMuscular once PRN  insulin lispro (HumaLOG) corrective regimen sliding scale   SubCutaneous three times a day before meals  insulin lispro (HumaLOG) corrective regimen sliding scale   SubCutaneous at bedtime  lactated ringers. 1000 milliLiter(s) IV Continuous <Continuous>  LORazepam     Tablet 0.5 milliGRAM(s) Oral three times a day PRN  melatonin 3 milliGRAM(s) Oral at bedtime  methadone    Tablet 5 milliGRAM(s) Oral two times a day  metoprolol tartrate 25 milliGRAM(s) Oral two times a day  ondansetron Injectable 4 milliGRAM(s) IV Push every 6 hours PRN  oxyCODONE    IR 5 milliGRAM(s) Oral every 6 hours PRN  oxyCODONE    IR 10 milliGRAM(s) Oral every 6 hours PRN  polyethylene glycol 3350 17 Gram(s) Oral daily  pyridoxine 100 milliGRAM(s) Oral daily  senna 2 Tablet(s) Oral at bedtime PRN  simethicone 160 milliGRAM(s) Chew daily PRN  tiotropium 18 MICROgram(s) Capsule 1 Capsule(s) Inhalation daily      PMHX/PSHX:  Anxiety  Pleural effusion, left  Lung cancer  COPD (chronic obstructive pulmonary disease)  HLD (hyperlipidemia)  Diabetes mellitus  Hypertension  No significant past surgical history      Family history:  Family history of diabetes mellitus          PHYSICAL EXAM:     GENERAL:  Appears stated age, well-groomed, well-nourished, no distress  HEENT:  NC/AT,  conjunctivae clear, sclera -anicteric  CHEST:  Full & symmetric excursion, no increased  HEART:  Regular rhythm  ABDOMEN:  Soft, non-tender, non-distended, normoactive bowel sounds,  no masses ,no hepato-splenomegaly,   EXTREMITIES:  no cyanosis,clubbing or edema  SKIN:  No rash/erythema/ecchymoses/petechiae/wounds/abscess/warm/dry  NEURO:  Alert, oriented    Vital Signs:  Vital Signs Last 24 Hrs  T(C): 36.8 (25 Mar 2019 05:29), Max: 37.1 (24 Mar 2019 21:47)  T(F): 98.3 (25 Mar 2019 05:29), Max: 98.8 (24 Mar 2019 21:47)  HR: 77 (25 Mar 2019 05:29) (74 - 77)  BP: 127/73 (25 Mar 2019 05:29) (127/73 - 148/70)  BP(mean): --  RR: 18 (25 Mar 2019 05:29) (18 - 18)  SpO2: 95% (25 Mar 2019 05:29) (95% - 97%)  Daily     Daily     LABS:                        11.5   13.12 )-----------( 441      ( 24 Mar 2019 06:12 )             38.1     03-24    136  |  95<L>  |  13  ----------------------------<  102<H>  4.4   |  28  |  0.75    Ca    10.0      24 Mar 2019 06:12                Imaging:

## 2019-03-25 NOTE — PROGRESS NOTE ADULT - PROBLEM SELECTOR PLAN 1
-Pleurex in place, draining well.  -CTS following.  -PRN opiates for dyspnea. -overall improving, only intermittent SOB, worse w/ exertion.  -Pleurex in place, draining well.  -CTS following.  -PRN opiates for dyspnea.

## 2019-03-25 NOTE — PROGRESS NOTE ADULT - ASSESSMENT
Impression:  1) Odynophagia/Dysphagia - with noted oral thrush on exam.  Likely do to candidal esophagitis.  DDx also includes CMV or HSV esophagitis in an immunocompromised patient.  Patient currently refusing EGD,    Recommendations:   - c/w 200-400 mg IV or PO fluconazole for empiric therapy for 10 to 14 days   - symptoms management per primary team   - please call GI back with any further questions    Lorena Franco, PGY-4  Gastroenterology Fellow  Pager x [phone] or [phone]  (After 5 pm or on weekends please page GI on call)

## 2019-03-25 NOTE — PROGRESS NOTE ADULT - SUBJECTIVE AND OBJECTIVE BOX
INTERVAL HPI/OVERNIGHT EVENTS:  Patient seen at bedside. Remains confused. Has spoke to Dr. Mao, his primary oncologist, and plan is to continue keytruda.      VITAL SIGNS:  T(F): 97.5 (03-25-19 @ 15:47)  HR: 73 (03-25-19 @ 15:47)  BP: 125/66 (03-25-19 @ 15:47)  RR: 18 (03-25-19 @ 15:47)  SpO2: 93% (03-25-19 @ 15:47)  Wt(kg): --    PHYSICAL EXAM:    Constitutional: NAD, resting in bed comfortably  Eyes: EOMI, sclera non-icteric  Neck: supple, no LAP  Respiratory: rhonchi and crackels  Cardiovascular: RRR, normal S1S2, no M/R/G  Gastrointestinal: soft, NTND  Extremities: no edema  Neurological: AAOx3, non focal, confused and slow to respond.  Skin: Normal temperature    MEDICATIONS  (STANDING):  acyclovir IVPB      acyclovir IVPB 800 milliGRAM(s) IV Intermittent every 8 hours  aspirin enteric coated 81 milliGRAM(s) Oral daily  atorvastatin 40 milliGRAM(s) Oral at bedtime  buDESOnide 160 MICROgram(s)/formoterol 4.5 MICROgram(s) Inhaler 2 Puff(s) Inhalation two times a day  dextrose 5%. 1000 milliLiter(s) (50 mL/Hr) IV Continuous <Continuous>  dextrose 50% Injectable 12.5 Gram(s) IV Push once  dextrose 50% Injectable 25 Gram(s) IV Push once  dextrose 50% Injectable 25 Gram(s) IV Push once  docusate sodium 100 milliGRAM(s) Oral three times a day  enoxaparin Injectable 40 milliGRAM(s) SubCutaneous daily  fluconAZOLE IVPB      fluconAZOLE IVPB 400 milliGRAM(s) IV Intermittent every 24 hours  insulin lispro (HumaLOG) corrective regimen sliding scale   SubCutaneous three times a day before meals  insulin lispro (HumaLOG) corrective regimen sliding scale   SubCutaneous at bedtime  lactated ringers. 1000 milliLiter(s) (50 mL/Hr) IV Continuous <Continuous>  melatonin 3 milliGRAM(s) Oral at bedtime  methadone    Tablet 5 milliGRAM(s) Oral two times a day  metoprolol tartrate 25 milliGRAM(s) Oral two times a day  polyethylene glycol 3350 17 Gram(s) Oral daily  pyridoxine 100 milliGRAM(s) Oral daily  tiotropium 18 MICROgram(s) Capsule 1 Capsule(s) Inhalation daily    MEDICATIONS  (PRN):  ALBUTerol    90 MICROgram(s) HFA Inhaler 2 Puff(s) Inhalation every 6 hours PRN Shortness of Breath and/or Wheezing  dextrose 40% Gel 15 Gram(s) Oral once PRN Blood Glucose LESS THAN 70 milliGRAM(s)/deciliter  glucagon  Injectable 1 milliGRAM(s) IntraMuscular once PRN Glucose LESS THAN 70 milligrams/deciliter  LORazepam     Tablet 0.5 milliGRAM(s) Oral three times a day PRN Anxiety  ondansetron Injectable 4 milliGRAM(s) IV Push every 6 hours PRN Nausea and/or Vomiting  oxyCODONE    IR 5 milliGRAM(s) Oral every 6 hours PRN Moderate Pain (4 - 6)  oxyCODONE    IR 10 milliGRAM(s) Oral every 6 hours PRN Severe Pain (7 - 10)  senna 2 Tablet(s) Oral at bedtime PRN Constipation  simethicone 160 milliGRAM(s) Chew daily PRN Gas      Allergies    No Known Allergies    Intolerances        LABS:                        11.5   13.12 )-----------( 441      ( 24 Mar 2019 06:12 )             38.1     03-24    136  |  95<L>  |  13  ----------------------------<  102<H>  4.4   |  28  |  0.75    Ca    10.0      24 Mar 2019 06:12            RADIOLOGY & ADDITIONAL TESTS:  Studies reviewed.

## 2019-03-25 NOTE — PROGRESS NOTE ADULT - PROBLEM SELECTOR PLAN 3
-per pt improving w/ throat spray + diflucan  -refusing EGD today. GI following  -can consider viscous lidocaine/magic mouthwash for sx control -per pt improving w/ throat spray + diflucan  -refusing EGD today. GI following         -however after talking to pt, was worried he would be awake and remember EGD. pt amenable to re-attempting EGD later this week after discussing that pt would receive sedation prior to procedure and not remember the events during EGD.  -can consider viscous lidocaine/magic mouthwash for sx control

## 2019-03-25 NOTE — PROGRESS NOTE ADULT - PROBLEM SELECTOR PLAN 2
-Continue methadone 5mg BID w/ oxy 5 + 10 mg PRN   -encouraged pt to take PRNs       -pt w/ concern opioids will cause "my mind to go".

## 2019-03-25 NOTE — PROGRESS NOTE ADULT - SUBJECTIVE AND OBJECTIVE BOX
SUBJECTIVE AND OBJECTIVE:  INTERVAL HPI/OVERNIGHT EVENTS:    DNR on chart:   Allergies    No Known Allergies    Intolerances    MEDICATIONS  (STANDING):  acyclovir IVPB      acyclovir IVPB 800 milliGRAM(s) IV Intermittent every 8 hours  aspirin enteric coated 81 milliGRAM(s) Oral daily  atorvastatin 40 milliGRAM(s) Oral at bedtime  buDESOnide 160 MICROgram(s)/formoterol 4.5 MICROgram(s) Inhaler 2 Puff(s) Inhalation two times a day  dextrose 5%. 1000 milliLiter(s) (50 mL/Hr) IV Continuous <Continuous>  dextrose 50% Injectable 12.5 Gram(s) IV Push once  dextrose 50% Injectable 25 Gram(s) IV Push once  dextrose 50% Injectable 25 Gram(s) IV Push once  docusate sodium 100 milliGRAM(s) Oral three times a day  enoxaparin Injectable 40 milliGRAM(s) SubCutaneous daily  fluconAZOLE IVPB      fluconAZOLE IVPB 400 milliGRAM(s) IV Intermittent every 24 hours  insulin lispro (HumaLOG) corrective regimen sliding scale   SubCutaneous three times a day before meals  insulin lispro (HumaLOG) corrective regimen sliding scale   SubCutaneous at bedtime  lactated ringers. 1000 milliLiter(s) (50 mL/Hr) IV Continuous <Continuous>  melatonin 3 milliGRAM(s) Oral at bedtime  methadone    Tablet 5 milliGRAM(s) Oral two times a day  metoprolol tartrate 25 milliGRAM(s) Oral two times a day  polyethylene glycol 3350 17 Gram(s) Oral daily  pyridoxine 100 milliGRAM(s) Oral daily  tiotropium 18 MICROgram(s) Capsule 1 Capsule(s) Inhalation daily    MEDICATIONS  (PRN):  ALBUTerol    90 MICROgram(s) HFA Inhaler 2 Puff(s) Inhalation every 6 hours PRN Shortness of Breath and/or Wheezing  dextrose 40% Gel 15 Gram(s) Oral once PRN Blood Glucose LESS THAN 70 milliGRAM(s)/deciliter  glucagon  Injectable 1 milliGRAM(s) IntraMuscular once PRN Glucose LESS THAN 70 milligrams/deciliter  LORazepam     Tablet 0.5 milliGRAM(s) Oral three times a day PRN Anxiety  ondansetron Injectable 4 milliGRAM(s) IV Push every 6 hours PRN Nausea and/or Vomiting  oxyCODONE    IR 5 milliGRAM(s) Oral every 6 hours PRN Moderate Pain (4 - 6)  oxyCODONE    IR 10 milliGRAM(s) Oral every 6 hours PRN Severe Pain (7 - 10)  senna 2 Tablet(s) Oral at bedtime PRN Constipation  simethicone 160 milliGRAM(s) Chew daily PRN Gas      ITEMS UNCHECKED ARE NOT PRESENT    PRESENT SYMPTOMS: [ ]Unable to obtain due to poor mentation   Source if other than patient:  [ ]Family   [ ]Team     Pain (Impact on QOL):    Location:  Minimal acceptable level (0-10 scale):            Aggravating factors:  Quality:  Radiation:  Severity (0-10 scale):    Timing:    Dyspnea:                           [ ]Mild [ ]Moderate [ ]Severe  Anxiety:                             [ ]Mild [ ]Moderate [ ]Severe  Fatigue:                             [ ]Mild [ ]Moderate [ ]Severe  Nausea:                             [ ]Mild [ ]Moderate [ ]Severe  Loss of appetite:              [ ]Mild [ ]Moderate [ ]Severe  Constipation:                    [ ]Mild [ ]Moderate [ ]Severe    PAIN AD Score:	  [email or web address].pdf (Ctrl + left click to view)    Other Symptoms:  [ ]All other review of systems negative     Karnofsky Performance Score/Palliative Performance Status Version 2:         %    http://palliative.info/resource_material/PPSv2.pdf  PHYSICAL EXAM:  Vital Signs Last 24 Hrs  T(C): 36.8 (25 Mar 2019 05:29), Max: 37.1 (24 Mar 2019 21:47)  T(F): 98.3 (25 Mar 2019 05:29), Max: 98.8 (24 Mar 2019 21:47)  HR: 77 (25 Mar 2019 05:29) (74 - 77)  BP: 127/73 (25 Mar 2019 05:29) (127/73 - 148/70)  BP(mean): --  RR: 18 (25 Mar 2019 05:29) (18 - 18)  SpO2: 95% (25 Mar 2019 05:29) (95% - 97%) I&O's Summary    24 Mar 2019 07:01  -  25 Mar 2019 07:00  --------------------------------------------------------  IN: 0 mL / OUT: 55 mL / NET: -55 mL     GENERAL:  [ ]Alert  [ ]Oriented x   [ ]Lethargic  [ ]Cachexia  [ ]Unarousable  [ ]Verbal  [ ]Non-Verbal    Behavioral:   [ ] Anxiety  [ ] Delirium [ ] Agitation [ ] Other    HEENT:  [ ]Normal   [ ]Dry mouth   [ ]ET Tube/Trach  [ ]Oral lesions    PULMONARY:   [ ]Clear [ ]Tachypnea  [ ]Audible excessive secretions   [ ]Rhonchi        [ ]Right [ ]Left [ ]Bilateral  [ ]Crackles        [ ]Right [ ]Left [ ]Bilateral  [ ]Wheezing     [ ]Right [ ]Left [ ]Bilateral    CARDIOVASCULAR:    [ ]Regular [ ]Irregular [ ]Tachy  [ ]Jarrod [ ]Murmur [ ]Other    GASTROINTESTINAL:  [ ]Soft  [ ]Distended   [ ]+BS  [ ]Non tender [ ]Tender  [ ]PEG [ ]OGT/ NGT   Last BM:    GENITOURINARY:  [ ]Normal [ ] Incontinent   [ ]Oliguria/Anuria   [ ]Dueñas    MUSCULOSKELETAL:   [ ]Normal   [ ]Weakness  [ ]Bed/Wheelchair bound [ ]Edema    NEUROLOGIC:   [ ]No focal deficits  [ ] Cognitive impairment  [ ] Dysphagia [ ]Dysarthria [ ] Paresis [ ]Other     SKIN:   [ ]Normal   [ ]Pressure ulcer(s)  [ ]Rash    CRITICAL CARE:  [ ] Shock Present  [ ]Septic [ ]Cardiogenic [ ]Neurologic [ ]Hypovolemic  [ ]  Vasopressors [ ]  Inotropes   [ ] Respiratory failure present  [ ] Acute  [ ] Chronic [ ] Hypoxic  [ ] Hypercarbic [ ] Other  [ ] Other organ failure     LABS:                        11.5   13.12 )-----------( 441      ( 24 Mar 2019 06:12 )             38.1   03-24    136  |  95<L>  |  13  ----------------------------<  102<H>  4.4   |  28  |  0.75    Ca    10.0      24 Mar 2019 06:12          RADIOLOGY & ADDITIONAL STUDIES:    Protein Calorie Malnutrition Present: [ ] yes [ ] no  [ ] PPSV2 < or = 30%  [ ] significant weight loss [ ] poor nutritional intake [ ] anasarca [ ] catabolic state Albumin, Serum: 3.3 g/dL (03-21-19 @ 06:35)  Artificial Nutrition [ ]     REFERRALS:   [ ]Chaplaincy  [ ] Hospice  [ ]Child Life  [ ]Social Work  [ ]Case management [ ]Holistic Therapy   Goals of Care Document: SUBJECTIVE AND OBJECTIVE:  INTERVAL HPI/OVERNIGHT EVENTS:  Developed R mid-back red rash spreading anteriorly, concern for possible shingles. Pt denies any itching, burning, or pain of rash but wife states pt has complained to her about rash pain. Pt unable to tolerate MRI and did not wish to undergo EGD. Currently denies any pain. Has been taking PO ativan + methadone, required no PRN oxys.     Had extensive discussion of understanding of current prognosis and ultimate goals. Pt's priority is to go home. Family has been discussing hospice but has not made any discussions. Spoke w/ Onc via phone today about possibility of further Keytruda treatments. B/c pt has high PDL1 expression, hopeful that pt would show response after 3-4 doses of Keytruda (pt so far only received one prior to hospitalization).     DNR on chart:   Allergies    No Known Allergies    Intolerances    MEDICATIONS  (STANDING):  acyclovir IVPB      acyclovir IVPB 800 milliGRAM(s) IV Intermittent every 8 hours  aspirin enteric coated 81 milliGRAM(s) Oral daily  atorvastatin 40 milliGRAM(s) Oral at bedtime  buDESOnide 160 MICROgram(s)/formoterol 4.5 MICROgram(s) Inhaler 2 Puff(s) Inhalation two times a day  dextrose 5%. 1000 milliLiter(s) (50 mL/Hr) IV Continuous <Continuous>  dextrose 50% Injectable 12.5 Gram(s) IV Push once  dextrose 50% Injectable 25 Gram(s) IV Push once  dextrose 50% Injectable 25 Gram(s) IV Push once  docusate sodium 100 milliGRAM(s) Oral three times a day  enoxaparin Injectable 40 milliGRAM(s) SubCutaneous daily  fluconAZOLE IVPB      fluconAZOLE IVPB 400 milliGRAM(s) IV Intermittent every 24 hours  insulin lispro (HumaLOG) corrective regimen sliding scale   SubCutaneous three times a day before meals  insulin lispro (HumaLOG) corrective regimen sliding scale   SubCutaneous at bedtime  lactated ringers. 1000 milliLiter(s) (50 mL/Hr) IV Continuous <Continuous>  melatonin 3 milliGRAM(s) Oral at bedtime  methadone    Tablet 5 milliGRAM(s) Oral two times a day  metoprolol tartrate 25 milliGRAM(s) Oral two times a day  polyethylene glycol 3350 17 Gram(s) Oral daily  pyridoxine 100 milliGRAM(s) Oral daily  tiotropium 18 MICROgram(s) Capsule 1 Capsule(s) Inhalation daily    MEDICATIONS  (PRN):  ALBUTerol    90 MICROgram(s) HFA Inhaler 2 Puff(s) Inhalation every 6 hours PRN Shortness of Breath and/or Wheezing  dextrose 40% Gel 15 Gram(s) Oral once PRN Blood Glucose LESS THAN 70 milliGRAM(s)/deciliter  glucagon  Injectable 1 milliGRAM(s) IntraMuscular once PRN Glucose LESS THAN 70 milligrams/deciliter  LORazepam     Tablet 0.5 milliGRAM(s) Oral three times a day PRN Anxiety  ondansetron Injectable 4 milliGRAM(s) IV Push every 6 hours PRN Nausea and/or Vomiting  oxyCODONE    IR 5 milliGRAM(s) Oral every 6 hours PRN Moderate Pain (4 - 6)  oxyCODONE    IR 10 milliGRAM(s) Oral every 6 hours PRN Severe Pain (7 - 10)  senna 2 Tablet(s) Oral at bedtime PRN Constipation  simethicone 160 milliGRAM(s) Chew daily PRN Gas      ITEMS UNCHECKED ARE NOT PRESENT    PRESENT SYMPTOMS: [ ]Unable to obtain due to poor mentation   Source if other than patient:  [ ]Family   [ ]Team     Pain (Impact on QOL):  Denies pain currently.   Location:  Minimal acceptable level (0-10 scale):            Aggravating factors:  Quality:  Radiation:  Severity (0-10 scale):    Timing:    Dyspnea:                           [X ]Mild [ ]Moderate [ ]Severe  Anxiety:                             [ ]Mild [x]Moderate [ ]Severe  Fatigue:                             [ ]Mild [x]Moderate [ ]Severe  Nausea:                             [x]Mild [ ]Moderate [ ]Severe  Loss of appetite:              [ ]Mild [ ]Moderate [x]Severe (unable to eat/drink anything b/c "stuck in chest")  Constipation:                    [x]Mild [ ]Moderate [ ]Severe    PAIN AD Score:	  [email or web address]/[email or web address] (Ctrl + left click to view)    Other Symptoms:  [x]All other review of systems negative     Karnofsky Performance Score/Palliative Performance Status Version 2:         %    http://palliative.info/resource_material/[email or web address].pdf  PHYSICAL EXAM:  Vital Signs Last 24 Hrs  T(C): 36.8 (25 Mar 2019 05:29), Max: 37.1 (24 Mar 2019 21:47)  T(F): 98.3 (25 Mar 2019 05:29), Max: 98.8 (24 Mar 2019 21:47)  HR: 77 (25 Mar 2019 05:29) (74 - 77)  BP: 127/73 (25 Mar 2019 05:29) (127/73 - 148/70)  BP(mean): --  RR: 18 (25 Mar 2019 05:29) (18 - 18)  SpO2: 95% (25 Mar 2019 05:29) (95% - 97%) I&O's Summary    24 Mar 2019 07:01  -  25 Mar 2019 07:00  --------------------------------------------------------  IN: 0 mL / OUT: 55 mL / NET: -55 mL     GENERAL:  [x]Alert  [x]Oriented x 3  [ ]Lethargic  [ ]Cachexia  [ ]Unarousable  [x]Verbal  [ ]Non-Verbal    Behavioral:   [x] Anxiety  [ ] Delirium [ ] Agitation [ ] Other    HEENT:  [ ]Normal   [x]Dry mouth   [ ]ET Tube/Trach  [ ]Oral lesions  Erythematous pharynx.     PULMONARY:   [x]Clear [ ]Tachypnea  [ ]Audible excessive secretions   [ ]Rhonchi        [ ]Right [ ]Left [ ]Bilateral  [ ]Crackles        [ ]Right [ ]Left [ ]Bilateral  [ ]Wheezing     [ ]Right [ ]Left [ ]Bilateral  Pleurx in place.  CARDIOVASCULAR:    [x]Regular [ ]Irregular [ ]Tachy  [ ]Jarrod [ ]Murmur [ ]Other    GASTROINTESTINAL:  [x]Soft  [ ]Distended   [ ]+BS  [ ]Non tender [ ]Tender  [ ]PEG [ ]OGT/ NGT   Last BM:    GENITOURINARY:  [ ]Normal [ ] Incontinent   [ ]Oliguria/Anuria   [ ]Dueñas      NEUROLOGIC:   [ ]No focal deficits  [ ] Cognitive impairment  [x] Dysphagia [ ]Dysarthria [ ] Paresis [ ]Other       LABS:                        11.5   13.12 )-----------( 441      ( 24 Mar 2019 06:12 )             38.1   03-24    136  |  95<L>  |  13  ----------------------------<  102<H>  4.4   |  28  |  0.75    Ca    10.0      24 Mar 2019 06:12          RADIOLOGY & ADDITIONAL STUDIES:    Protein Calorie Malnutrition Present: [ ] yes [x] n    REFERRALS:   [ ]Chaplaincy  [ ] Hospice  [ ]Child Life  [ ]Social Work  [ ]Case management [ ]Holistic Therapy   Goals of Care Document:

## 2019-03-26 ENCOUNTER — APPOINTMENT (OUTPATIENT)
Dept: HEMATOLOGY ONCOLOGY | Facility: CLINIC | Age: 64
End: 2019-03-26

## 2019-03-26 ENCOUNTER — APPOINTMENT (OUTPATIENT)
Dept: INFUSION THERAPY | Facility: HOSPITAL | Age: 64
End: 2019-03-26

## 2019-03-26 LAB
ANION GAP SERPL CALC-SCNC: 14 MMO/L — SIGNIFICANT CHANGE UP (ref 7–14)
BUN SERPL-MCNC: 8 MG/DL — SIGNIFICANT CHANGE UP (ref 7–23)
CALCIUM SERPL-MCNC: 9.4 MG/DL — SIGNIFICANT CHANGE UP (ref 8.4–10.5)
CHLORIDE SERPL-SCNC: 95 MMOL/L — LOW (ref 98–107)
CO2 SERPL-SCNC: 25 MMOL/L — SIGNIFICANT CHANGE UP (ref 22–31)
CREAT SERPL-MCNC: 0.66 MG/DL — SIGNIFICANT CHANGE UP (ref 0.5–1.3)
GLUCOSE SERPL-MCNC: 110 MG/DL — HIGH (ref 70–99)
HCT VFR BLD CALC: 36 % — LOW (ref 39–50)
HGB BLD-MCNC: 11 G/DL — LOW (ref 13–17)
MCHC RBC-ENTMCNC: 24.3 PG — LOW (ref 27–34)
MCHC RBC-ENTMCNC: 30.6 % — LOW (ref 32–36)
MCV RBC AUTO: 79.5 FL — LOW (ref 80–100)
NRBC # FLD: 0 K/UL — SIGNIFICANT CHANGE UP (ref 0–0)
PLATELET # BLD AUTO: 323 K/UL — SIGNIFICANT CHANGE UP (ref 150–400)
PMV BLD: 10.3 FL — SIGNIFICANT CHANGE UP (ref 7–13)
POTASSIUM SERPL-MCNC: 4.4 MMOL/L — SIGNIFICANT CHANGE UP (ref 3.5–5.3)
POTASSIUM SERPL-SCNC: 4.4 MMOL/L — SIGNIFICANT CHANGE UP (ref 3.5–5.3)
RBC # BLD: 4.53 M/UL — SIGNIFICANT CHANGE UP (ref 4.2–5.8)
RBC # FLD: 14.9 % — HIGH (ref 10.3–14.5)
SODIUM SERPL-SCNC: 134 MMOL/L — LOW (ref 135–145)
WBC # BLD: 12.14 K/UL — HIGH (ref 3.8–10.5)
WBC # FLD AUTO: 12.14 K/UL — HIGH (ref 3.8–10.5)

## 2019-03-26 PROCEDURE — 99233 SBSQ HOSP IP/OBS HIGH 50: CPT

## 2019-03-26 PROCEDURE — 99232 SBSQ HOSP IP/OBS MODERATE 35: CPT | Mod: GC

## 2019-03-26 RX ORDER — METHADONE HYDROCHLORIDE 40 MG/1
2.5 TABLET ORAL
Qty: 0 | Refills: 0 | Status: DISCONTINUED | OUTPATIENT
Start: 2019-03-26 | End: 2019-04-01

## 2019-03-26 RX ADMIN — TIOTROPIUM BROMIDE 1 CAPSULE(S): 18 CAPSULE ORAL; RESPIRATORY (INHALATION) at 10:56

## 2019-03-26 RX ADMIN — Medication 81 MILLIGRAM(S): at 13:26

## 2019-03-26 RX ADMIN — ATORVASTATIN CALCIUM 40 MILLIGRAM(S): 80 TABLET, FILM COATED ORAL at 22:28

## 2019-03-26 RX ADMIN — Medication 266 MILLIGRAM(S): at 22:27

## 2019-03-26 RX ADMIN — METHADONE HYDROCHLORIDE 2.5 MILLIGRAM(S): 40 TABLET ORAL at 17:25

## 2019-03-26 RX ADMIN — Medication 266 MILLIGRAM(S): at 05:54

## 2019-03-26 RX ADMIN — Medication 100 MILLIGRAM(S): at 13:26

## 2019-03-26 RX ADMIN — SODIUM CHLORIDE 50 MILLILITER(S): 9 INJECTION, SOLUTION INTRAVENOUS at 10:56

## 2019-03-26 RX ADMIN — POLYETHYLENE GLYCOL 3350 17 GRAM(S): 17 POWDER, FOR SOLUTION ORAL at 13:26

## 2019-03-26 RX ADMIN — SODIUM CHLORIDE 50 MILLILITER(S): 9 INJECTION, SOLUTION INTRAVENOUS at 05:54

## 2019-03-26 RX ADMIN — Medication 266 MILLIGRAM(S): at 15:56

## 2019-03-26 RX ADMIN — BUDESONIDE AND FORMOTEROL FUMARATE DIHYDRATE 2 PUFF(S): 160; 4.5 AEROSOL RESPIRATORY (INHALATION) at 09:05

## 2019-03-26 RX ADMIN — Medication 100 MILLIGRAM(S): at 05:54

## 2019-03-26 RX ADMIN — Medication 100 MILLIGRAM(S): at 13:27

## 2019-03-26 RX ADMIN — BUDESONIDE AND FORMOTEROL FUMARATE DIHYDRATE 2 PUFF(S): 160; 4.5 AEROSOL RESPIRATORY (INHALATION) at 22:27

## 2019-03-26 RX ADMIN — ENOXAPARIN SODIUM 40 MILLIGRAM(S): 100 INJECTION SUBCUTANEOUS at 13:26

## 2019-03-26 RX ADMIN — Medication 25 MILLIGRAM(S): at 05:54

## 2019-03-26 RX ADMIN — FLUCONAZOLE 100 MILLIGRAM(S): 150 TABLET ORAL at 16:26

## 2019-03-26 RX ADMIN — METHADONE HYDROCHLORIDE 5 MILLIGRAM(S): 40 TABLET ORAL at 05:54

## 2019-03-26 RX ADMIN — Medication 100 MILLIGRAM(S): at 22:28

## 2019-03-26 RX ADMIN — Medication 25 MILLIGRAM(S): at 17:25

## 2019-03-26 RX ADMIN — Medication 3 MILLIGRAM(S): at 22:28

## 2019-03-26 NOTE — PROGRESS NOTE ADULT - SUBJECTIVE AND OBJECTIVE BOX
CHIEF COMPLAINT: Patient is a 63y old  male who presents with a chief complaint of Odynophagia/dysphagia, minimal PO intake (26 Mar 2019 07:10)      SUBJECTIVE / OVERNIGHT EVENTS:    Seen with wife a the bedside. Per patient/wife, still having trouble eating -     MEDICATIONS  (STANDING):  acyclovir IVPB      acyclovir IVPB 800 milliGRAM(s) IV Intermittent every 8 hours  aspirin enteric coated 81 milliGRAM(s) Oral daily  atorvastatin 40 milliGRAM(s) Oral at bedtime  buDESOnide 160 MICROgram(s)/formoterol 4.5 MICROgram(s) Inhaler 2 Puff(s) Inhalation two times a day  dextrose 5%. 1000 milliLiter(s) (50 mL/Hr) IV Continuous <Continuous>  dextrose 50% Injectable 12.5 Gram(s) IV Push once  dextrose 50% Injectable 25 Gram(s) IV Push once  dextrose 50% Injectable 25 Gram(s) IV Push once  docusate sodium 100 milliGRAM(s) Oral three times a day  enoxaparin Injectable 40 milliGRAM(s) SubCutaneous daily  fluconAZOLE IVPB      fluconAZOLE IVPB 400 milliGRAM(s) IV Intermittent every 24 hours  insulin lispro (HumaLOG) corrective regimen sliding scale   SubCutaneous three times a day before meals  insulin lispro (HumaLOG) corrective regimen sliding scale   SubCutaneous at bedtime  lactated ringers. 1000 milliLiter(s) (50 mL/Hr) IV Continuous <Continuous>  LORazepam     Tablet 1 milliGRAM(s) Oral once  melatonin 3 milliGRAM(s) Oral at bedtime  methadone    Tablet 2.5 milliGRAM(s) Oral two times a day  metoprolol tartrate 25 milliGRAM(s) Oral two times a day  polyethylene glycol 3350 17 Gram(s) Oral daily  pyridoxine 100 milliGRAM(s) Oral daily  tiotropium 18 MICROgram(s) Capsule 1 Capsule(s) Inhalation daily    MEDICATIONS  (PRN):  ALBUTerol    90 MICROgram(s) HFA Inhaler 2 Puff(s) Inhalation every 6 hours PRN Shortness of Breath and/or Wheezing  dextrose 40% Gel 15 Gram(s) Oral once PRN Blood Glucose LESS THAN 70 milliGRAM(s)/deciliter  glucagon  Injectable 1 milliGRAM(s) IntraMuscular once PRN Glucose LESS THAN 70 milligrams/deciliter  LORazepam     Tablet 0.5 milliGRAM(s) Oral three times a day PRN Anxiety  ondansetron Injectable 4 milliGRAM(s) IV Push every 6 hours PRN Nausea and/or Vomiting  oxyCODONE    IR 5 milliGRAM(s) Oral every 6 hours PRN Moderate Pain (4 - 6)  oxyCODONE    IR 10 milliGRAM(s) Oral every 6 hours PRN Severe Pain (7 - 10)  senna 2 Tablet(s) Oral at bedtime PRN Constipation  simethicone 160 milliGRAM(s) Chew daily PRN Gas      VITALS:  T(F): 97.6 (03-26-19 @ 15:27), Max: 99 (03-26-19 @ 15:19)  HR: 72 (03-26-19 @ 15:27) (72 - 76)  BP: 121/73 (03-26-19 @ 15:27) (121/73 - 139/77)  RR: 17 (03-26-19 @ 15:27) (17 - 18)  SpO2: 96% (03-26-19 @ 15:27)      CAPILLARY BLOOD GLUCOSE    Output     I&O's Summary  T(F): 97.6 (03-26-19 @ 15:27), Max: 99 (03-26-19 @ 15:19)  HR: 72 (03-26-19 @ 15:27) (72 - 76)  BP: 121/73 (03-26-19 @ 15:27) (121/73 - 139/77)  RR: 17 (03-26-19 @ 15:27) (17 - 18)  SpO2: 96% (03-26-19 @ 15:27)    PHYSICAL EXAM:  GENERAL: NAD, well-developed  HEAD:  Atraumatic, Normocephalic  EYES: EOMI  NECK: Supple, No JVD  CHEST/LUNG: nonlabored breathing  HEART: nl S1/S2  ABDOMEN: nondistended, soft  EXTREMITIES:  no LE edema  PSYCH: alert, answering questions appropriately  NEUROLOGY: non-focal  SKIN: No rashes noted    LABS:              11.0                 134  | 25   | 8            12.14 >-----------< 323     ------------------------< 110                   36.0                 4.4  | 95   | 0.66                                         Ca 9.4   Mg x     Ph x                          MICROBIOLOGY:        RADIOLOGY & ADDITIONAL TESTS:    Imaging Personally Reviewed:          [x] Care Discussed with Consultants/Other Providers:      All Gleason M.D.  Hospitalist  Pager [phone] CHIEF COMPLAINT: Patient is a 63y old  male who presents with a chief complaint of Odynophagia/dysphagia, minimal PO intake (26 Mar 2019 07:10)      SUBJECTIVE / OVERNIGHT EVENTS:    Seen with wife a the bedside. Per patient/wife, still having trouble eating - having trouble swallowing some foods. Odynophagia improving. Denies other complaints. Per wife, patient would like to receive another round of immunotherapy.    MEDICATIONS  (STANDING):  acyclovir IVPB      acyclovir IVPB 800 milliGRAM(s) IV Intermittent every 8 hours  aspirin enteric coated 81 milliGRAM(s) Oral daily  atorvastatin 40 milliGRAM(s) Oral at bedtime  buDESOnide 160 MICROgram(s)/formoterol 4.5 MICROgram(s) Inhaler 2 Puff(s) Inhalation two times a day  dextrose 5%. 1000 milliLiter(s) (50 mL/Hr) IV Continuous <Continuous>  dextrose 50% Injectable 12.5 Gram(s) IV Push once  dextrose 50% Injectable 25 Gram(s) IV Push once  dextrose 50% Injectable 25 Gram(s) IV Push once  docusate sodium 100 milliGRAM(s) Oral three times a day  enoxaparin Injectable 40 milliGRAM(s) SubCutaneous daily  fluconAZOLE IVPB      fluconAZOLE IVPB 400 milliGRAM(s) IV Intermittent every 24 hours  insulin lispro (HumaLOG) corrective regimen sliding scale   SubCutaneous three times a day before meals  insulin lispro (HumaLOG) corrective regimen sliding scale   SubCutaneous at bedtime  lactated ringers. 1000 milliLiter(s) (50 mL/Hr) IV Continuous <Continuous>  LORazepam     Tablet 1 milliGRAM(s) Oral once  melatonin 3 milliGRAM(s) Oral at bedtime  methadone    Tablet 2.5 milliGRAM(s) Oral two times a day  metoprolol tartrate 25 milliGRAM(s) Oral two times a day  polyethylene glycol 3350 17 Gram(s) Oral daily  pyridoxine 100 milliGRAM(s) Oral daily  tiotropium 18 MICROgram(s) Capsule 1 Capsule(s) Inhalation daily    MEDICATIONS  (PRN):  ALBUTerol    90 MICROgram(s) HFA Inhaler 2 Puff(s) Inhalation every 6 hours PRN Shortness of Breath and/or Wheezing  dextrose 40% Gel 15 Gram(s) Oral once PRN Blood Glucose LESS THAN 70 milliGRAM(s)/deciliter  glucagon  Injectable 1 milliGRAM(s) IntraMuscular once PRN Glucose LESS THAN 70 milligrams/deciliter  LORazepam     Tablet 0.5 milliGRAM(s) Oral three times a day PRN Anxiety  ondansetron Injectable 4 milliGRAM(s) IV Push every 6 hours PRN Nausea and/or Vomiting  oxyCODONE    IR 5 milliGRAM(s) Oral every 6 hours PRN Moderate Pain (4 - 6)  oxyCODONE    IR 10 milliGRAM(s) Oral every 6 hours PRN Severe Pain (7 - 10)  senna 2 Tablet(s) Oral at bedtime PRN Constipation  simethicone 160 milliGRAM(s) Chew daily PRN Gas      VITALS:  T(F): 97.6 (03-26-19 @ 15:27), Max: 99 (03-26-19 @ 15:19)  HR: 72 (03-26-19 @ 15:27) (72 - 76)  BP: 121/73 (03-26-19 @ 15:27) (121/73 - 139/77)  RR: 17 (03-26-19 @ 15:27) (17 - 18)  SpO2: 96% (03-26-19 @ 15:27)      CAPILLARY BLOOD GLUCOSE    Output     I&O's Summary  T(F): 97.6 (03-26-19 @ 15:27), Max: 99 (03-26-19 @ 15:19)  HR: 72 (03-26-19 @ 15:27) (72 - 76)  BP: 121/73 (03-26-19 @ 15:27) (121/73 - 139/77)  RR: 17 (03-26-19 @ 15:27) (17 - 18)  SpO2: 96% (03-26-19 @ 15:27)    PHYSICAL EXAM:  GENERAL: NAD, well-developed  HEAD:  Atraumatic, Normocephalic  EYES: EOMI  NECK: Supple, No JVD  CHEST/LUNG: nonlabored breathing  HEART: nl S1/S2  ABDOMEN: nondistended, soft  EXTREMITIES:  no LE edema  PSYCH: alert, answering questions appropriately  NEUROLOGY: non-focal  SKIN: L torso rash improving, lesions smaller, healing    LABS:              11.0                 134  | 25   | 8            12.14 >-----------< 323     ------------------------< 110                   36.0                 4.4  | 95   | 0.66                                         Ca 9.4   Mg x     Ph x                          MICROBIOLOGY:        RADIOLOGY & ADDITIONAL TESTS:    Imaging Personally Reviewed:    < from: US Duplex Venous Lower Ext Complete, Bilateral (03.21.19 @ 10:13) >  IMPRESSION:     No evidence of bilateral lower extremity deep venous thrombosis.    < end of copied text >        [x] Care Discussed with Consultants/Other Providers:      All Gleason M.D.  Hospitalist  Pager [phone]

## 2019-03-26 NOTE — PROGRESS NOTE ADULT - SUBJECTIVE AND OBJECTIVE BOX
Chief Complaint:  Patient is a 63y old  Male who presents with a chief complaint of Odynophagia/dysphagia, minimal PO intake (25 Mar 2019 16:43)      Interval Events:   Patient is refusing further GI work up.    Allergies:  No Known Allergies      Hospital Medications:  acyclovir IVPB      acyclovir IVPB 800 milliGRAM(s) IV Intermittent every 8 hours  ALBUTerol    90 MICROgram(s) HFA Inhaler 2 Puff(s) Inhalation every 6 hours PRN  aspirin enteric coated 81 milliGRAM(s) Oral daily  atorvastatin 40 milliGRAM(s) Oral at bedtime  buDESOnide 160 MICROgram(s)/formoterol 4.5 MICROgram(s) Inhaler 2 Puff(s) Inhalation two times a day  dextrose 40% Gel 15 Gram(s) Oral once PRN  dextrose 5%. 1000 milliLiter(s) IV Continuous <Continuous>  dextrose 50% Injectable 12.5 Gram(s) IV Push once  dextrose 50% Injectable 25 Gram(s) IV Push once  dextrose 50% Injectable 25 Gram(s) IV Push once  docusate sodium 100 milliGRAM(s) Oral three times a day  enoxaparin Injectable 40 milliGRAM(s) SubCutaneous daily  fluconAZOLE IVPB      fluconAZOLE IVPB 400 milliGRAM(s) IV Intermittent every 24 hours  glucagon  Injectable 1 milliGRAM(s) IntraMuscular once PRN  insulin lispro (HumaLOG) corrective regimen sliding scale   SubCutaneous three times a day before meals  insulin lispro (HumaLOG) corrective regimen sliding scale   SubCutaneous at bedtime  lactated ringers. 1000 milliLiter(s) IV Continuous <Continuous>  LORazepam     Tablet 0.5 milliGRAM(s) Oral three times a day PRN  melatonin 3 milliGRAM(s) Oral at bedtime  methadone    Tablet 5 milliGRAM(s) Oral two times a day  metoprolol tartrate 25 milliGRAM(s) Oral two times a day  ondansetron Injectable 4 milliGRAM(s) IV Push every 6 hours PRN  oxyCODONE    IR 5 milliGRAM(s) Oral every 6 hours PRN  oxyCODONE    IR 10 milliGRAM(s) Oral every 6 hours PRN  polyethylene glycol 3350 17 Gram(s) Oral daily  pyridoxine 100 milliGRAM(s) Oral daily  senna 2 Tablet(s) Oral at bedtime PRN  simethicone 160 milliGRAM(s) Chew daily PRN  tiotropium 18 MICROgram(s) Capsule 1 Capsule(s) Inhalation daily      PMHX/PSHX:  Anxiety  Pleural effusion, left  Lung cancer  COPD (chronic obstructive pulmonary disease)  HLD (hyperlipidemia)  Diabetes mellitus  Hypertension  No significant past surgical history      Family history:  Family history of diabetes mellitus          PHYSICAL EXAM:     GENERAL:  Appears stated age, well-groomed, well-nourished, no distress  HEENT:  NC/AT,  conjunctivae clear, sclera -anicteric  CHEST:  Full & symmetric excursion, no increased  HEART:  Regular rhythm  ABDOMEN:  Soft, non-tender, non-distended, normoactive bowel sounds,  no masses ,no hepato-splenomegaly,   EXTREMITIES:  no cyanosis,clubbing or edema  SKIN:  No rash/erythema/ecchymoses/petechiae/wounds/abscess/warm/dry  NEURO:  Alert, oriented    Vital Signs:  Vital Signs Last 24 Hrs  T(C): 37.1 (26 Mar 2019 05:48), Max: 37.1 (26 Mar 2019 05:48)  T(F): 98.8 (26 Mar 2019 05:48), Max: 98.8 (26 Mar 2019 05:48)  HR: 75 (26 Mar 2019 05:48) (72 - 76)  BP: 135/57 (26 Mar 2019 05:48) (125/66 - 139/77)  BP(mean): --  RR: 18 (26 Mar 2019 05:48) (18 - 18)  SpO2: 94% (26 Mar 2019 05:48) (93% - 94%)  Daily     Daily     LABS:                    Imaging:

## 2019-03-26 NOTE — PROGRESS NOTE ADULT - SUBJECTIVE AND OBJECTIVE BOX
SUBJECTIVE AND OBJECTIVE:  No acute events overnight.  As per wife, patient is slightly more confused today.  INTERVAL HPI/OVERNIGHT EVENTS:  No acute events overnight.   Patient with poor PO intake.    DNR on chart:   Allergies    No Known Allergies    Intolerances    MEDICATIONS  (STANDING):  acyclovir IVPB      acyclovir IVPB 800 milliGRAM(s) IV Intermittent every 8 hours  aspirin enteric coated 81 milliGRAM(s) Oral daily  atorvastatin 40 milliGRAM(s) Oral at bedtime  buDESOnide 160 MICROgram(s)/formoterol 4.5 MICROgram(s) Inhaler 2 Puff(s) Inhalation two times a day  dextrose 5%. 1000 milliLiter(s) (50 mL/Hr) IV Continuous <Continuous>  dextrose 50% Injectable 12.5 Gram(s) IV Push once  dextrose 50% Injectable 25 Gram(s) IV Push once  dextrose 50% Injectable 25 Gram(s) IV Push once  docusate sodium 100 milliGRAM(s) Oral three times a day  enoxaparin Injectable 40 milliGRAM(s) SubCutaneous daily  fluconAZOLE IVPB      fluconAZOLE IVPB 400 milliGRAM(s) IV Intermittent every 24 hours  insulin lispro (HumaLOG) corrective regimen sliding scale   SubCutaneous three times a day before meals  insulin lispro (HumaLOG) corrective regimen sliding scale   SubCutaneous at bedtime  lactated ringers. 1000 milliLiter(s) (50 mL/Hr) IV Continuous <Continuous>  LORazepam     Tablet 1 milliGRAM(s) Oral once  melatonin 3 milliGRAM(s) Oral at bedtime  methadone    Tablet 2.5 milliGRAM(s) Oral two times a day  metoprolol tartrate 25 milliGRAM(s) Oral two times a day  polyethylene glycol 3350 17 Gram(s) Oral daily  pyridoxine 100 milliGRAM(s) Oral daily  tiotropium 18 MICROgram(s) Capsule 1 Capsule(s) Inhalation daily    MEDICATIONS  (PRN):  ALBUTerol    90 MICROgram(s) HFA Inhaler 2 Puff(s) Inhalation every 6 hours PRN Shortness of Breath and/or Wheezing  dextrose 40% Gel 15 Gram(s) Oral once PRN Blood Glucose LESS THAN 70 milliGRAM(s)/deciliter  glucagon  Injectable 1 milliGRAM(s) IntraMuscular once PRN Glucose LESS THAN 70 milligrams/deciliter  LORazepam     Tablet 0.5 milliGRAM(s) Oral three times a day PRN Anxiety  ondansetron Injectable 4 milliGRAM(s) IV Push every 6 hours PRN Nausea and/or Vomiting  oxyCODONE    IR 5 milliGRAM(s) Oral every 6 hours PRN Moderate Pain (4 - 6)  oxyCODONE    IR 10 milliGRAM(s) Oral every 6 hours PRN Severe Pain (7 - 10)  senna 2 Tablet(s) Oral at bedtime PRN Constipation  simethicone 160 milliGRAM(s) Chew daily PRN Gas      ITEMS UNCHECKED ARE NOT PRESENT    PRESENT SYMPTOMS: [ ]Unable to obtain due to poor mentation   Source if other than patient:  [ ]Family   [ ]Team     Pain (Impact on QOL):  None at this point.  Location:  Minimal acceptable level (0-10 scale):            Aggravating factors:  Quality:  Radiation:  Severity (0-10 scale):    Timing:    Dyspnea:                           [x ]Mild [ ]Moderate [ ]Severe  Anxiety:                             [ ]Mild [ x]Moderate [ ]Severe  Fatigue:                             [ ]Mild [ ]Moderate [x ]Severe  Nausea:                             [x ]Mild [ ]Moderate [ ]Severe  Loss of appetite:               [ ]Mild [ ]Moderate [ x]Severe  Constipation:                    [ x]Mild [ ]Moderate [ ]Severe  Grief Present                    [ x] Yes  [ ] No   PAIN AD Score:	  [email or web address]/painad.pdf (Ctrl + left click to view)    Other Symptoms:  [x ]All other review of systems negative     Karnofsky Performance Score/Palliative Performance Status Version 2:   40      %    http://palliative.info/resource_material/[email or web address].pdf  PHYSICAL EXAM:  Vital Signs Last 24 Hrs  T(C): 36.9 (28 Mar 2019 06:21), Max: 37.1 (27 Mar 2019 20:17)  T(F): 98.5 (28 Mar 2019 06:21), Max: 98.7 (27 Mar 2019 20:17)  HR: 79 (28 Mar 2019 06:21) (79 - 83)  BP: 145/75 (28 Mar 2019 06:21) (133/56 - 145/75)  BP(mean): --  RR: 20 (28 Mar 2019 06:21) (18 - 20)  SpO2: 95% (28 Mar 2019 06:21) (95% - 97%) I&O's Summary    27 Mar 2019 07:01  -  28 Mar 2019 07:00  --------------------------------------------------------  IN: 0 mL / OUT: 70 mL / NET: -70 mL     GENERAL:  [x ]Alert  [x ]Oriented x 3  [ ]Lethargic  [ ]Cachexia  [ ]Unarousable  [ ]Verbal  [ ]Non-Verbal  Behavioral:   [ ] Anxiety  [ ] Delirium [ ] Agitation [x ] Other  HEENT:  [ ]Normal   [ x]Dry mouth   [ ]ET Tube/Trach  [ ]Oral lesions  PULMONARY:   [x ]Clear [ ]Tachypnea  [ ]Audible excessive secretions   [ ]Rhonchi        [ ]Right [ ]Left [ ]Bilateral  [ ]Crackles        [ ]Right [ ]Left [ ]Bilateral  [ ]Wheezing     [ ]Right [ ]Left [ ]Bilateral  CARDIOVASCULAR:    [x ]Regular [ ]Irregular [ ]Tachy  [ ]Jarrod [ ]Murmur [ ]Other  GASTROINTESTINAL:  [ x]Soft  [ ]Distended   [x ]+BS  [x ]Non tender [ ]Tender  [ ]PEG [ ]OGT/ NGT   Last BM:    GENITOURINARY:  [x ]Normal [ ] Incontinent   [ ]Oliguria/Anuria   [ ]Dueñas  MUSCULOSKELETAL:   [ ]Normal   [x ]Weakness  [ ]Bed/Wheelchair bound [ ]Edema  NEUROLOGIC:   [x ]No focal deficits  [ ] Cognitive impairment  [ ] Dysphagia [ ]Dysarthria [ ] Paresis [ ]Other   SKIN:   [ x]Normal   [ ]Pressure ulcer(s)  [ ]Rash    CRITICAL CARE:  [ ] Shock Present  [ ]Septic [ ]Cardiogenic [ ]Neurologic [ ]Hypovolemic  [ ]  Vasopressors [ ]  Inotropes   [ ] Respiratory failure present  [ ] Acute  [ ] Chronic [ ] Hypoxic  [ ] Hypercarbic [ ] Other  [ ] Other organ failure     LABS:                        10.7   12.88 )-----------( 419      ( 27 Mar 2019 06:30 )             34.5   03-27    136  |  94<L>  |  6<L>  ----------------------------<  88  3.8   |  28  |  0.65    Ca    9.3      27 Mar 2019 06:30  Phos  3.2     03-27  Mg     1.6     03-27          RADIOLOGY & ADDITIONAL STUDIES:    Protein Calorie Malnutrition Present: [ ] yes [ ] no  [ ] PPSV2 < or = 30%  [ ] significant weight loss [ ] poor nutritional intake [ ] anasarca [ ] catabolic state Albumin, Serum: 3.3 g/dL (03-21-19 @ 06:35)  Artificial Nutrition [ ]     REFERRALS:   [ ]Chaplaincy  [ ] Hospice  [ ]Child Life  [ ]Social Work  [ ]Case management [ ]Holistic Therapy   Goals of Care Document:

## 2019-03-26 NOTE — PROGRESS NOTE ADULT - PROBLEM SELECTOR PLAN 3
-per pt improving w/ throat spray + diflucan  -refusing EGD today. GI following         -however after talking to pt, was worried he would be awake and remember EGD. pt amenable to re-attempting EGD later this week after discussing that pt would receive sedation prior to procedure and not remember the events during EGD.  -can consider viscous lidocaine/magic mouthwash for sx control

## 2019-03-26 NOTE — PROGRESS NOTE ADULT - ASSESSMENT
62 yo man, current smoker (~1 ppd), with history of stage 4 left lung adenocarcinoma, on immunotherapy with Keytruda (first session on 3/4/19), recurrent left pleural effusion s/p left VATS with pleurX placement (2/2019), COPD, DM2, HTN, HLD, and anxiety presents with 1 week of worsening throat pain with odynophagia and dysphagia, started on fluconazole for thrush and possible esophageal candidiasis, also with herpes zoster.    Problem/Plan - 1:  ·  Problem: Herpes zoster.  Plan: -pt is immunocompromised and c/o new rash on his right mid back crossing to his abdomen, rash is erythematous vesicular c/w herpes zoster, not crusted  - cont iv acyclovir 10 mg/kg q8h, monitor lesion, IVF while on iv acyclovir  - plan to change to oral valacyclovir 1000 mg q8h on DC to home - finish total 14 days of therapy  - contact and airborne precautions    Problem/Plan - 2:  ·  Problem: Odynophagia.  Plan: -dysphagia and odynophagia, likely 2/2 progression of metastatic disease in mediastinal area. Recent PET/CT showing hypermetabolic right supraclavicular, bilateral mediastinal, and left perihilar lymphadenopathy, increased in size from a month ago. Can also, however, be 2/2 motility issue vs. esophagitis (given presence of thrush on exam)  - per GI, patient refused EGD  - continue fluconazole 400 mg IV  - per patient odynophagia has improved, monitor symptoms of dysphagia  - S/S eval appreciated, pharyngeal dysphagia, no aspiration  - diet changed to dysphagia 2 with nectar thick liquids per s/s rec  - Aspiration precautions    Problem/Plan - 3:  ·  Problem: Failure to thrive in adult.  Plan: Pt with poor nutrition, decreased appetite, and weight loss in setting of metastatic lung cancer.   - pt with severe protein calorie malnutrition per nutrition eval  - Plan as above for Odynophagia  - f/u palliative care for symptomatic management  - anticipate home with home PT per physical therapy recs.     Problem/Plan - 4:  ·  Problem: Pleural effusion, left.  Plan: Pt with recurrent left loculated pleural effusion, s/p left VATS and pleurX placement. PleurX still in place, draining serosanguinous fluid likely due to malignancy  - CXR stable L pleural effusion  - C/w PleurX drainage; monitor output;   - Monitor respiratory status  - f/u CTS recs, pt follows with Dr. Moore    Problem/Plan - 5:  ·  Problem: Lung cancer.  Plan: Pt with stage 4 left lung adenocarcinoma, started on immunotherapy on 3/4/19 with Keytruda.  - Oncology consult appreciated  - re-attempt MRI brain with premedication - Ativan 1 mg, can give additional 1 mg as needed for study  - appreciate palliative care recs  - per Dr. Mao, patient should f/u for additional Keytruda treatment  - C/w methadone ([facility] Reference #: [id number]) and oxycodone PRN    Problem/Plan - 6:  Problem: COPD (chronic obstructive pulmonary disease). Plan: No S/S of exacerbation.  - C/w albuterol PRN  - Anoro Ellipta not available through pharmacy. Will do Symbicort and Spiriva for now and have pt's family bring in medication from home.    Problem/Plan - 7:  ·  Problem: Diabetes mellitus.  Plan: Pt on Januvia and Metformin at home.   - Start ISS and FSG qAC and qhs  -a1C 7.2%, Glycemic control acceptable at this time, hold off on endocrine consult for now.     Problem/Plan - 8:  ·  Problem: Hypertension.  Plan: Pt on amlodipine, valsartan, metoprolol, and lasix at home.  - Will c/w metoprolol, but will hold amlodipine (given chronic b/l LE edema), valsartan, and lasix  - Will restart additional home BP meds if needed.     Problem/Plan - 9:  ·  Problem: Need for prophylactic measure.  Plan: - DVT ppx: pt with hypercoagulable state, start lovenox sc   OOB and ambulation as tolerated.  - Diet: soft diet.     Dispo: pending ability to tolerate food, completion of MRI brain  - will need close f/u with Dr. Mao for lung cancer treatment  - PT: home w home PT

## 2019-03-26 NOTE — PROGRESS NOTE ADULT - PROBLEM SELECTOR PLAN 1
-overall improving, only intermittent SOB, worse w/ exertion.  -Pleurex in place, draining well.  -CTS following.  -PRN opiates for dyspnea.

## 2019-03-26 NOTE — PROGRESS NOTE ADULT - ASSESSMENT
Impression:  1) Odynophagia/Dysphagia - with noted oral thrush on exam.  Likely do to candidal esophagitis.  DDx also includes CMV or HSV esophagitis in an immunocompromised patient.  Patient currently refusing EGD,    Recommendations:   - c/w 200-400 mg IV or PO fluconazole for empiric therapy for 10 to 14 days   - can consider empiric treatment for HSV esophagitis if no improvement given immunocompromised state   - symptoms management per primary team   - please call GI back with any further questions    Lorena Franco, PGY-4  Gastroenterology Fellow  Pager x [phone] or [phone]  (After 5 pm or on weekends please page GI on call) Impression:  1) Odynophagia/Dysphagia - with noted oral thrush on exam.  Likely do to candidal esophagitis.  DDx also includes CMV or HSV esophagitis in an immunocompromised patient.  Patient currently refusing EGD,    Recommendations:   - c/w 200-400 mg IV or PO fluconazole for empiric therapy for 10 to 14 days   - symptoms management per primary team   - please call GI back with any further questions    Lorena Franco, PGY-4  Gastroenterology Fellow  Pager x [phone] or [phone]  (After 5 pm or on weekends please page GI on call)

## 2019-03-27 LAB
ANION GAP SERPL CALC-SCNC: 14 MMO/L — SIGNIFICANT CHANGE UP (ref 7–14)
BUN SERPL-MCNC: 6 MG/DL — LOW (ref 7–23)
CALCIUM SERPL-MCNC: 9.3 MG/DL — SIGNIFICANT CHANGE UP (ref 8.4–10.5)
CHLORIDE SERPL-SCNC: 94 MMOL/L — LOW (ref 98–107)
CO2 SERPL-SCNC: 28 MMOL/L — SIGNIFICANT CHANGE UP (ref 22–31)
CREAT SERPL-MCNC: 0.65 MG/DL — SIGNIFICANT CHANGE UP (ref 0.5–1.3)
GLUCOSE SERPL-MCNC: 88 MG/DL — SIGNIFICANT CHANGE UP (ref 70–99)
HCT VFR BLD CALC: 34.5 % — LOW (ref 39–50)
HGB BLD-MCNC: 10.7 G/DL — LOW (ref 13–17)
MAGNESIUM SERPL-MCNC: 1.6 MG/DL — SIGNIFICANT CHANGE UP (ref 1.6–2.6)
MCHC RBC-ENTMCNC: 24.5 PG — LOW (ref 27–34)
MCHC RBC-ENTMCNC: 31 % — LOW (ref 32–36)
MCV RBC AUTO: 78.9 FL — LOW (ref 80–100)
NRBC # FLD: 0 K/UL — SIGNIFICANT CHANGE UP (ref 0–0)
PHOSPHATE SERPL-MCNC: 3.2 MG/DL — SIGNIFICANT CHANGE UP (ref 2.5–4.5)
PLATELET # BLD AUTO: 419 K/UL — HIGH (ref 150–400)
PMV BLD: 9.5 FL — SIGNIFICANT CHANGE UP (ref 7–13)
POTASSIUM SERPL-MCNC: 3.8 MMOL/L — SIGNIFICANT CHANGE UP (ref 3.5–5.3)
POTASSIUM SERPL-SCNC: 3.8 MMOL/L — SIGNIFICANT CHANGE UP (ref 3.5–5.3)
RBC # BLD: 4.37 M/UL — SIGNIFICANT CHANGE UP (ref 4.2–5.8)
RBC # FLD: 14.9 % — HIGH (ref 10.3–14.5)
SODIUM SERPL-SCNC: 136 MMOL/L — SIGNIFICANT CHANGE UP (ref 135–145)
WBC # BLD: 12.88 K/UL — HIGH (ref 3.8–10.5)
WBC # FLD AUTO: 12.88 K/UL — HIGH (ref 3.8–10.5)

## 2019-03-27 PROCEDURE — 99233 SBSQ HOSP IP/OBS HIGH 50: CPT

## 2019-03-27 PROCEDURE — 99232 SBSQ HOSP IP/OBS MODERATE 35: CPT | Mod: GC

## 2019-03-27 RX ORDER — DIPHENHYDRAMINE HCL 50 MG
25 CAPSULE ORAL ONCE
Qty: 0 | Refills: 0 | Status: COMPLETED | OUTPATIENT
Start: 2019-03-27 | End: 2019-03-27

## 2019-03-27 RX ADMIN — ENOXAPARIN SODIUM 40 MILLIGRAM(S): 100 INJECTION SUBCUTANEOUS at 12:49

## 2019-03-27 RX ADMIN — Medication 266 MILLIGRAM(S): at 21:41

## 2019-03-27 RX ADMIN — SODIUM CHLORIDE 50 MILLILITER(S): 9 INJECTION, SOLUTION INTRAVENOUS at 06:28

## 2019-03-27 RX ADMIN — Medication 100 MILLIGRAM(S): at 21:41

## 2019-03-27 RX ADMIN — Medication 100 MILLIGRAM(S): at 12:49

## 2019-03-27 RX ADMIN — BUDESONIDE AND FORMOTEROL FUMARATE DIHYDRATE 2 PUFF(S): 160; 4.5 AEROSOL RESPIRATORY (INHALATION) at 14:37

## 2019-03-27 RX ADMIN — Medication 3 MILLIGRAM(S): at 21:41

## 2019-03-27 RX ADMIN — BUDESONIDE AND FORMOTEROL FUMARATE DIHYDRATE 2 PUFF(S): 160; 4.5 AEROSOL RESPIRATORY (INHALATION) at 21:41

## 2019-03-27 RX ADMIN — POLYETHYLENE GLYCOL 3350 17 GRAM(S): 17 POWDER, FOR SOLUTION ORAL at 12:49

## 2019-03-27 RX ADMIN — Medication 25 MILLIGRAM(S): at 06:27

## 2019-03-27 RX ADMIN — ATORVASTATIN CALCIUM 40 MILLIGRAM(S): 80 TABLET, FILM COATED ORAL at 21:41

## 2019-03-27 RX ADMIN — Medication 266 MILLIGRAM(S): at 06:27

## 2019-03-27 RX ADMIN — Medication 25 MILLIGRAM(S): at 17:49

## 2019-03-27 RX ADMIN — Medication 100 MILLIGRAM(S): at 06:27

## 2019-03-27 RX ADMIN — METHADONE HYDROCHLORIDE 2.5 MILLIGRAM(S): 40 TABLET ORAL at 17:49

## 2019-03-27 RX ADMIN — FLUCONAZOLE 100 MILLIGRAM(S): 150 TABLET ORAL at 17:42

## 2019-03-27 RX ADMIN — Medication 100 MILLIGRAM(S): at 14:38

## 2019-03-27 RX ADMIN — Medication 25 MILLIGRAM(S): at 20:30

## 2019-03-27 RX ADMIN — Medication 81 MILLIGRAM(S): at 12:49

## 2019-03-27 RX ADMIN — Medication 266 MILLIGRAM(S): at 14:37

## 2019-03-27 RX ADMIN — SODIUM CHLORIDE 50 MILLILITER(S): 9 INJECTION, SOLUTION INTRAVENOUS at 20:30

## 2019-03-27 RX ADMIN — METHADONE HYDROCHLORIDE 2.5 MILLIGRAM(S): 40 TABLET ORAL at 06:40

## 2019-03-27 NOTE — PROGRESS NOTE ADULT - SUBJECTIVE AND OBJECTIVE BOX
Chief Complaint:  Patient is a 63y old  Male who presents with a chief complaint of Odynophagia/dysphagia, minimal PO intake (26 Mar 2019 15:38)      Interval Events:   Patient continues to have dysphagia and is now considering EGD.    Allergies:  No Known Allergies      Hospital Medications:  acyclovir IVPB      acyclovir IVPB 800 milliGRAM(s) IV Intermittent every 8 hours  ALBUTerol    90 MICROgram(s) HFA Inhaler 2 Puff(s) Inhalation every 6 hours PRN  aspirin enteric coated 81 milliGRAM(s) Oral daily  atorvastatin 40 milliGRAM(s) Oral at bedtime  buDESOnide 160 MICROgram(s)/formoterol 4.5 MICROgram(s) Inhaler 2 Puff(s) Inhalation two times a day  dextrose 40% Gel 15 Gram(s) Oral once PRN  dextrose 5%. 1000 milliLiter(s) IV Continuous <Continuous>  dextrose 50% Injectable 12.5 Gram(s) IV Push once  dextrose 50% Injectable 25 Gram(s) IV Push once  dextrose 50% Injectable 25 Gram(s) IV Push once  docusate sodium 100 milliGRAM(s) Oral three times a day  enoxaparin Injectable 40 milliGRAM(s) SubCutaneous daily  fluconAZOLE IVPB      fluconAZOLE IVPB 400 milliGRAM(s) IV Intermittent every 24 hours  glucagon  Injectable 1 milliGRAM(s) IntraMuscular once PRN  insulin lispro (HumaLOG) corrective regimen sliding scale   SubCutaneous three times a day before meals  insulin lispro (HumaLOG) corrective regimen sliding scale   SubCutaneous at bedtime  lactated ringers. 1000 milliLiter(s) IV Continuous <Continuous>  LORazepam     Tablet 1 milliGRAM(s) Oral once  LORazepam     Tablet 0.5 milliGRAM(s) Oral three times a day PRN  melatonin 3 milliGRAM(s) Oral at bedtime  methadone    Tablet 2.5 milliGRAM(s) Oral two times a day  metoprolol tartrate 25 milliGRAM(s) Oral two times a day  ondansetron Injectable 4 milliGRAM(s) IV Push every 6 hours PRN  oxyCODONE    IR 5 milliGRAM(s) Oral every 6 hours PRN  oxyCODONE    IR 10 milliGRAM(s) Oral every 6 hours PRN  polyethylene glycol 3350 17 Gram(s) Oral daily  pyridoxine 100 milliGRAM(s) Oral daily  senna 2 Tablet(s) Oral at bedtime PRN  simethicone 160 milliGRAM(s) Chew daily PRN  tiotropium 18 MICROgram(s) Capsule 1 Capsule(s) Inhalation daily      PMHX/PSHX:  Anxiety  Pleural effusion, left  Lung cancer  COPD (chronic obstructive pulmonary disease)  HLD (hyperlipidemia)  Diabetes mellitus  Hypertension  No significant past surgical history      Family history:  Family history of diabetes mellitus          PHYSICAL EXAM:     GENERAL:  Appears stated age, well-groomed, well-nourished, no distress  HEENT:  NC/AT,  conjunctivae clear, sclera -anicteric  CHEST:  Full & symmetric excursion, no increased  HEART:  Regular rhythm  ABDOMEN:  Soft, non-tender, non-distended, normoactive bowel sounds,  no masses ,no hepato-splenomegaly,   EXTREMITIES:  no cyanosis,clubbing or edema  SKIN:  No rash/erythema/ecchymoses/petechiae/wounds/abscess/warm/dry  NEURO:  Alert, oriented    Vital Signs:  Vital Signs Last 24 Hrs  T(C): 37.2 (27 Mar 2019 06:00), Max: 37.2 (26 Mar 2019 15:19)  T(F): 98.9 (27 Mar 2019 06:00), Max: 99 (26 Mar 2019 15:19)  HR: 78 (27 Mar 2019 06:00) (72 - 82)  BP: 132/61 (27 Mar 2019 06:00) (121/73 - 144/58)  BP(mean): --  RR: 17 (27 Mar 2019 06:00) (17 - 18)  SpO2: 94% (27 Mar 2019 06:00) (93% - 96%)  Daily     Daily     LABS:                        10.7   12.88 )-----------( 419      ( 27 Mar 2019 06:30 )             34.5     03-27    136  |  94<L>  |  6<L>  ----------------------------<  88  3.8   |  28  |  0.65    Ca    9.3      27 Mar 2019 06:30  Phos  3.2     03-27  Mg     1.6     03-27                Imaging:

## 2019-03-27 NOTE — PROGRESS NOTE ADULT - SUBJECTIVE AND OBJECTIVE BOX
SUBJECTIVE AND OBJECTIVE:  No acute events overnight.  Confusion improved.   INTERVAL HPI/OVERNIGHT EVENTS:  No acute events overnight.   Patient with poor PO intake.    DNR on chart:   Allergies    No Known Allergies    Intolerances    MEDICATIONS  (STANDING):  acyclovir IVPB      acyclovir IVPB 800 milliGRAM(s) IV Intermittent every 8 hours  aspirin enteric coated 81 milliGRAM(s) Oral daily  atorvastatin 40 milliGRAM(s) Oral at bedtime  buDESOnide 160 MICROgram(s)/formoterol 4.5 MICROgram(s) Inhaler 2 Puff(s) Inhalation two times a day  dextrose 5%. 1000 milliLiter(s) (50 mL/Hr) IV Continuous <Continuous>  dextrose 50% Injectable 12.5 Gram(s) IV Push once  dextrose 50% Injectable 25 Gram(s) IV Push once  dextrose 50% Injectable 25 Gram(s) IV Push once  docusate sodium 100 milliGRAM(s) Oral three times a day  enoxaparin Injectable 40 milliGRAM(s) SubCutaneous daily  fluconAZOLE IVPB      fluconAZOLE IVPB 400 milliGRAM(s) IV Intermittent every 24 hours  insulin lispro (HumaLOG) corrective regimen sliding scale   SubCutaneous three times a day before meals  insulin lispro (HumaLOG) corrective regimen sliding scale   SubCutaneous at bedtime  lactated ringers. 1000 milliLiter(s) (50 mL/Hr) IV Continuous <Continuous>  LORazepam     Tablet 1 milliGRAM(s) Oral once  melatonin 3 milliGRAM(s) Oral at bedtime  methadone    Tablet 2.5 milliGRAM(s) Oral two times a day  metoprolol tartrate 25 milliGRAM(s) Oral two times a day  polyethylene glycol 3350 17 Gram(s) Oral daily  pyridoxine 100 milliGRAM(s) Oral daily  tiotropium 18 MICROgram(s) Capsule 1 Capsule(s) Inhalation daily    MEDICATIONS  (PRN):  ALBUTerol    90 MICROgram(s) HFA Inhaler 2 Puff(s) Inhalation every 6 hours PRN Shortness of Breath and/or Wheezing  dextrose 40% Gel 15 Gram(s) Oral once PRN Blood Glucose LESS THAN 70 milliGRAM(s)/deciliter  glucagon  Injectable 1 milliGRAM(s) IntraMuscular once PRN Glucose LESS THAN 70 milligrams/deciliter  LORazepam     Tablet 0.5 milliGRAM(s) Oral three times a day PRN Anxiety  ondansetron Injectable 4 milliGRAM(s) IV Push every 6 hours PRN Nausea and/or Vomiting  oxyCODONE    IR 5 milliGRAM(s) Oral every 6 hours PRN Moderate Pain (4 - 6)  oxyCODONE    IR 10 milliGRAM(s) Oral every 6 hours PRN Severe Pain (7 - 10)  senna 2 Tablet(s) Oral at bedtime PRN Constipation  simethicone 160 milliGRAM(s) Chew daily PRN Gas      ITEMS UNCHECKED ARE NOT PRESENT    PRESENT SYMPTOMS: [ ]Unable to obtain due to poor mentation   Source if other than patient:  [ ]Family   [ ]Team     Pain (Impact on QOL):  None at this point.  Location:  Minimal acceptable level (0-10 scale):            Aggravating factors:  Quality:  Radiation:  Severity (0-10 scale):    Timing:    Dyspnea:                           [x ]Mild [ ]Moderate [ ]Severe  Anxiety:                             [ ]Mild [ x]Moderate [ ]Severe  Fatigue:                             [ ]Mild [ ]Moderate [x ]Severe  Nausea:                             [x ]Mild [ ]Moderate [ ]Severe  Loss of appetite:               [ ]Mild [ ]Moderate [ x]Severe  Constipation:                    [ x]Mild [ ]Moderate [ ]Severe  Grief Present                    [ x] Yes  [ ] No   PAIN AD Score:	  [email or web address]/painad.pdf (Ctrl + left click to view)    Other Symptoms:  [x ]All other review of systems negative     Karnofsky Performance Score/Palliative Performance Status Version 2:   40      %    http://palliative.info/resource_material/[email or web address].pdf  PHYSICAL EXAM:  Vital Signs Last 24 Hrs  T(C): 36.9 (28 Mar 2019 06:21), Max: 37.1 (27 Mar 2019 20:17)  T(F): 98.5 (28 Mar 2019 06:21), Max: 98.7 (27 Mar 2019 20:17)  HR: 79 (28 Mar 2019 06:21) (79 - 83)  BP: 145/75 (28 Mar 2019 06:21) (133/56 - 145/75)  BP(mean): --  RR: 20 (28 Mar 2019 06:21) (18 - 20)  SpO2: 95% (28 Mar 2019 06:21) (95% - 97%) I&O's Summary    27 Mar 2019 07:01  -  28 Mar 2019 07:00  --------------------------------------------------------  IN: 0 mL / OUT: 70 mL / NET: -70 mL     GENERAL:  [x ]Alert  [x ]Oriented x 3  [ ]Lethargic  [ ]Cachexia  [ ]Unarousable  [ ]Verbal  [ ]Non-Verbal  Behavioral:   [ ] Anxiety  [ ] Delirium [ ] Agitation [x ] Other  HEENT:  [ ]Normal   [ x]Dry mouth   [ ]ET Tube/Trach  [ ]Oral lesions  PULMONARY:   [x ]Clear [ ]Tachypnea  [ ]Audible excessive secretions   [ ]Rhonchi        [ ]Right [ ]Left [ ]Bilateral  [ ]Crackles        [ ]Right [ ]Left [ ]Bilateral  [ ]Wheezing     [ ]Right [ ]Left [ ]Bilateral  CARDIOVASCULAR:    [x ]Regular [ ]Irregular [ ]Tachy  [ ]Jarrod [ ]Murmur [ ]Other  GASTROINTESTINAL:  [ x]Soft  [ ]Distended   [x ]+BS  [x ]Non tender [ ]Tender  [ ]PEG [ ]OGT/ NGT   Last BM:    GENITOURINARY:  [x ]Normal [ ] Incontinent   [ ]Oliguria/Anuria   [ ]Dueñas  MUSCULOSKELETAL:   [ ]Normal   [x ]Weakness  [ ]Bed/Wheelchair bound [ ]Edema  NEUROLOGIC:   [x ]No focal deficits  [ ] Cognitive impairment  [ ] Dysphagia [ ]Dysarthria [ ] Paresis [ ]Other   SKIN:   [ x]Normal   [ ]Pressure ulcer(s)  [ ]Rash    CRITICAL CARE:  [ ] Shock Present  [ ]Septic [ ]Cardiogenic [ ]Neurologic [ ]Hypovolemic  [ ]  Vasopressors [ ]  Inotropes   [ ] Respiratory failure present  [ ] Acute  [ ] Chronic [ ] Hypoxic  [ ] Hypercarbic [ ] Other  [ ] Other organ failure     LABS:                        10.7   12.88 )-----------( 419      ( 27 Mar 2019 06:30 )             34.5   03-27    136  |  94<L>  |  6<L>  ----------------------------<  88  3.8   |  28  |  0.65    Ca    9.3      27 Mar 2019 06:30  Phos  3.2     03-27  Mg     1.6     03-27          RADIOLOGY & ADDITIONAL STUDIES:    Protein Calorie Malnutrition Present: [ ] yes [ ] no  [ ] PPSV2 < or = 30%  [ ] significant weight loss [ ] poor nutritional intake [ ] anasarca [ ] catabolic state Albumin, Serum: 3.3 g/dL (03-21-19 @ 06:35)  Artificial Nutrition [ ]     REFERRALS:   [ ]Chaplaincy  [ ] Hospice  [ ]Child Life  [ ]Social Work  [ ]Case management [ ]Holistic Therapy   Goals of Care Document:

## 2019-03-27 NOTE — PROGRESS NOTE ADULT - PROBLEM SELECTOR PLAN 4
-spoke w/ Dr. Mao. believes pt is still candidate for further tx as pt has high PDL1 expression and only 1 treatment of keytruda   -PET 3/7 w/ evidence of metastatic disease in LN + pulm pleura         -however, scan is baseline scan, no evidence of true progression  -pt w/ goal of going home. however, per Dr. Mao, if pt can be stabilized and medically optimized, would like pt to get further treatments to see if able to truly respond to Keytruda.  -per pt and family, both wish to pursue further Keytruda

## 2019-03-27 NOTE — PROGRESS NOTE ADULT - ASSESSMENT
62 yo man, current smoker (~1 ppd), with history of stage 4 left lung adenocarcinoma, on immunotherapy with Keytruda (first session on 3/4/19), recurrent left pleural effusion s/p left VATS with pleurX placement (2/2019), COPD, DM2, HTN, HLD, and anxiety presents with 1 week of worsening throat pain with odynophagia and dysphagia, started on fluconazole for thrush and possible esophageal candidiasis, also with herpes zoster.    Problem/Plan - 1:  ·  Problem: Herpes zoster.  Plan: -pt is immunocompromised and c/o new rash on his right mid back crossing to his abdomen, rash is erythematous vesicular c/w herpes zoster, not crusted  - cont iv acyclovir 10 mg/kg q8h, monitor lesion, IVF while on iv acyclovir  - plan to change to oral valacyclovir 1000 mg q8h on DC to home - finish total 14 days of therapy  - contact and airborne precautions    Problem/Plan - 2:  ·  Problem: Odynophagia.  Plan: -dysphagia and odynophagia, likely 2/2 progression of metastatic disease in mediastinal area. Recent PET/CT showing hypermetabolic right supraclavicular, bilateral mediastinal, and left perihilar lymphadenopathy, increased in size from a month ago. Can also, however, be 2/2 motility issue vs. esophagitis (given presence of thrush on exam)  - patient now agreeable to EGD  - continue fluconazole 400 mg IV  - per patient odynophagia has improved, monitor symptoms of dysphagia  - S/S eval appreciated, pharyngeal dysphagia, no aspiration  - diet changed to dysphagia 2 with nectar thick liquids per s/s rec  - Aspiration precautions    Problem/Plan - 3:  ·  Problem: Failure to thrive in adult.  Plan: Pt with poor nutrition, decreased appetite, and weight loss in setting of metastatic lung cancer.   - pt with severe protein calorie malnutrition per nutrition eval  - plan as above for odynophagia  - f/u palliative care for symptomatic management  - anticipate home with home PT per physical therapy recs.     Problem/Plan - 4:  ·  Problem: Pleural effusion, left.  Plan: Pt with recurrent left loculated pleural effusion, s/p left VATS and pleurX placement. PleurX still in place, draining serosanguinous fluid likely due to malignancy  - CXR stable L pleural effusion  - C/w PleurX drainage; monitor output  - Monitor respiratory status  - f/u CTS recs, pt follows with Dr. Moore    Problem/Plan - 5:  ·  Problem: Lung cancer.  Plan: Pt with stage 4 left lung adenocarcinoma, started on immunotherapy on 3/4/19 with Keytruda.  - Oncology consult appreciated  - re-attempt MRI brain with premedication - Ativan 1 mg, can give additional 1 mg as needed for study  - appreciate palliative care recs  - per Dr. Mao, patient should f/u for additional Keytruda treatment  - C/w methadone (I-Stop Reference #: [id number]) and oxycodone PRN    Problem/Plan - 6:  Problem: COPD (chronic obstructive pulmonary disease). Plan: No S/S of exacerbation.  - C/w albuterol PRN  - Anoro Ellipta not available through pharmacy. Will do Symbicort and Spiriva for now and have pt's family bring in medication from home.    Problem/Plan - 7:  ·  Problem: Diabetes mellitus.  Plan: Pt on Januvia and Metformin at home.   - Start ISS and FSG qAC and qhs  -a1C 7.2%, Glycemic control acceptable at this time, hold off on endocrine consult for now.     Problem/Plan - 8:  ·  Problem: Hypertension.  Plan: Pt on amlodipine, valsartan, metoprolol, and lasix at home.  - Will c/w metoprolol, but will hold amlodipine (given chronic b/l LE edema), valsartan, and lasix  - Will restart additional home BP meds if needed.     Problem/Plan - 9:  ·  Problem: Need for prophylactic measure.  Plan: - DVT ppx: pt with hypercoagulable state, start lovenox sc   OOB and ambulation as tolerated.  - Diet: soft diet.     Dispo: pending ability to tolerate food, completion of MRI brain  - will need close f/u with Dr. Mao for lung cancer treatment  - PT: home w home PT

## 2019-03-27 NOTE — PROGRESS NOTE ADULT - ASSESSMENT
Impression:  1) Odynophagia/Dysphagia - with noted oral thrush on exam.  Likely do to candidal esophagitis.  DDx also includes CMV or HSV esophagitis in an immunocompromised patient.  Patient previously now refusing endoscopy but now reconsidering    Recommendations:   - c/w 200-400 mg IV or PO fluconazole for empiric therapy for 10 to 14 days   - symptoms management per primary team   - NPO after midnight for possible EGD, will discuss further with anesthesia   - please call GI back with any further questions    Lorena Franco, PGY-4  Gastroenterology Fellow  Pager x [phone] or [phone]  (After 5 pm or on weekends please page GI on call)

## 2019-03-27 NOTE — PROGRESS NOTE ADULT - SUBJECTIVE AND OBJECTIVE BOX
CHIEF COMPLAINT: Patient is a 63y old  male who presents with a chief complaint of Odynophagia/dysphagia, minimal PO intake (27 Mar 2019 12:00)      SUBJECTIVE / OVERNIGHT EVENTS:    Seen with wife at bedside. Continues to have issues with eating and swallowing. Occasionally has pain at site of L pleurex catheter. Denies other complaints. Rash improving.    MEDICATIONS  (STANDING):  acyclovir IVPB      acyclovir IVPB 800 milliGRAM(s) IV Intermittent every 8 hours  aspirin enteric coated 81 milliGRAM(s) Oral daily  atorvastatin 40 milliGRAM(s) Oral at bedtime  buDESOnide 160 MICROgram(s)/formoterol 4.5 MICROgram(s) Inhaler 2 Puff(s) Inhalation two times a day  dextrose 5%. 1000 milliLiter(s) (50 mL/Hr) IV Continuous <Continuous>  dextrose 50% Injectable 12.5 Gram(s) IV Push once  dextrose 50% Injectable 25 Gram(s) IV Push once  dextrose 50% Injectable 25 Gram(s) IV Push once  docusate sodium 100 milliGRAM(s) Oral three times a day  enoxaparin Injectable 40 milliGRAM(s) SubCutaneous daily  fluconAZOLE IVPB      fluconAZOLE IVPB 400 milliGRAM(s) IV Intermittent every 24 hours  insulin lispro (HumaLOG) corrective regimen sliding scale   SubCutaneous three times a day before meals  insulin lispro (HumaLOG) corrective regimen sliding scale   SubCutaneous at bedtime  lactated ringers. 1000 milliLiter(s) (50 mL/Hr) IV Continuous <Continuous>  LORazepam     Tablet 1 milliGRAM(s) Oral once  melatonin 3 milliGRAM(s) Oral at bedtime  methadone    Tablet 2.5 milliGRAM(s) Oral two times a day  metoprolol tartrate 25 milliGRAM(s) Oral two times a day  polyethylene glycol 3350 17 Gram(s) Oral daily  pyridoxine 100 milliGRAM(s) Oral daily  tiotropium 18 MICROgram(s) Capsule 1 Capsule(s) Inhalation daily    MEDICATIONS  (PRN):  ALBUTerol    90 MICROgram(s) HFA Inhaler 2 Puff(s) Inhalation every 6 hours PRN Shortness of Breath and/or Wheezing  dextrose 40% Gel 15 Gram(s) Oral once PRN Blood Glucose LESS THAN 70 milliGRAM(s)/deciliter  glucagon  Injectable 1 milliGRAM(s) IntraMuscular once PRN Glucose LESS THAN 70 milligrams/deciliter  LORazepam     Tablet 0.5 milliGRAM(s) Oral three times a day PRN Anxiety  ondansetron Injectable 4 milliGRAM(s) IV Push every 6 hours PRN Nausea and/or Vomiting  oxyCODONE    IR 5 milliGRAM(s) Oral every 6 hours PRN Moderate Pain (4 - 6)  oxyCODONE    IR 10 milliGRAM(s) Oral every 6 hours PRN Severe Pain (7 - 10)  senna 2 Tablet(s) Oral at bedtime PRN Constipation  simethicone 160 milliGRAM(s) Chew daily PRN Gas      VITALS:  T(F): 98 (03-27-19 @ 15:02), Max: 98.9 (03-27-19 @ 06:00)  HR: 83 (03-27-19 @ 15:02) (78 - 83)  BP: 145/64 (03-27-19 @ 15:02) (132/61 - 145/64)  RR: 18 (03-27-19 @ 15:02) (17 - 18)  SpO2: 95% (03-27-19 @ 15:02)      CAPILLARY BLOOD GLUCOSE    Output     I&O's Summary  T(F): 98 (03-27-19 @ 15:02), Max: 98.9 (03-27-19 @ 06:00)  HR: 83 (03-27-19 @ 15:02) (78 - 83)  BP: 145/64 (03-27-19 @ 15:02) (132/61 - 145/64)  RR: 18 (03-27-19 @ 15:02) (17 - 18)  SpO2: 95% (03-27-19 @ 15:02)    PHYSICAL EXAM:  GENERAL: NAD, well-developed  HEAD:  Atraumatic, Normocephalic  EYES: EOMI  NECK: Supple, No JVD  CHEST/LUNG: nonlabored breathing  HEART: nl S1/S2  ABDOMEN: nondistended, soft  EXTREMITIES:  no LE edema  PSYCH: alert, answering questions appropriately  NEUROLOGY: non-focal  SKIN: R torso rash improving, lesions healing, decreasing in size    LABS:              10.7                 136  | 28   | 6            12.88 >-----------< 419     ------------------------< 88                    34.5                 3.8  | 94   | 0.65                                         Ca 9.3   Mg 1.6   Ph 3.2                        MICROBIOLOGY:        RADIOLOGY & ADDITIONAL TESTS:    Imaging Personally Reviewed:    < from: MR Head w/ IV Cont (03.22.19 @ 17:07) >  Impression: Limited nondiagnostic MRI as described above.    < end of copied text >        [x] Care Discussed with Consultants/Other Providers:      All Gleason M.D.  Hospitalist  Pager [phone]

## 2019-03-28 PROCEDURE — 99232 SBSQ HOSP IP/OBS MODERATE 35: CPT

## 2019-03-28 PROCEDURE — 99233 SBSQ HOSP IP/OBS HIGH 50: CPT

## 2019-03-28 RX ORDER — DIPHENHYDRAMINE HCL 50 MG
25 CAPSULE ORAL ONCE
Qty: 0 | Refills: 0 | Status: COMPLETED | OUTPATIENT
Start: 2019-03-28 | End: 2019-03-28

## 2019-03-28 RX ORDER — POLYETHYLENE GLYCOL 3350 17 G/17G
17 POWDER, FOR SOLUTION ORAL
Qty: 0 | Refills: 0 | Status: DISCONTINUED | OUTPATIENT
Start: 2019-03-28 | End: 2019-04-05

## 2019-03-28 RX ADMIN — Medication 81 MILLIGRAM(S): at 14:17

## 2019-03-28 RX ADMIN — ATORVASTATIN CALCIUM 40 MILLIGRAM(S): 80 TABLET, FILM COATED ORAL at 21:37

## 2019-03-28 RX ADMIN — TIOTROPIUM BROMIDE 1 CAPSULE(S): 18 CAPSULE ORAL; RESPIRATORY (INHALATION) at 09:16

## 2019-03-28 RX ADMIN — Medication 25 MILLIGRAM(S): at 06:22

## 2019-03-28 RX ADMIN — METHADONE HYDROCHLORIDE 2.5 MILLIGRAM(S): 40 TABLET ORAL at 06:22

## 2019-03-28 RX ADMIN — Medication 266 MILLIGRAM(S): at 06:22

## 2019-03-28 RX ADMIN — Medication 25 MILLIGRAM(S): at 18:23

## 2019-03-28 RX ADMIN — Medication 100 MILLIGRAM(S): at 21:37

## 2019-03-28 RX ADMIN — Medication 266 MILLIGRAM(S): at 22:37

## 2019-03-28 RX ADMIN — BUDESONIDE AND FORMOTEROL FUMARATE DIHYDRATE 2 PUFF(S): 160; 4.5 AEROSOL RESPIRATORY (INHALATION) at 09:16

## 2019-03-28 RX ADMIN — BUDESONIDE AND FORMOTEROL FUMARATE DIHYDRATE 2 PUFF(S): 160; 4.5 AEROSOL RESPIRATORY (INHALATION) at 21:37

## 2019-03-28 RX ADMIN — Medication 10 MILLIGRAM(S): at 18:36

## 2019-03-28 RX ADMIN — ENOXAPARIN SODIUM 40 MILLIGRAM(S): 100 INJECTION SUBCUTANEOUS at 14:17

## 2019-03-28 RX ADMIN — Medication 25 MILLIGRAM(S): at 22:38

## 2019-03-28 RX ADMIN — Medication 100 MILLIGRAM(S): at 14:17

## 2019-03-28 RX ADMIN — FLUCONAZOLE 100 MILLIGRAM(S): 150 TABLET ORAL at 15:51

## 2019-03-28 RX ADMIN — METHADONE HYDROCHLORIDE 2.5 MILLIGRAM(S): 40 TABLET ORAL at 18:23

## 2019-03-28 RX ADMIN — Medication 3 MILLIGRAM(S): at 21:37

## 2019-03-28 RX ADMIN — Medication 266 MILLIGRAM(S): at 14:17

## 2019-03-28 RX ADMIN — Medication 100 MILLIGRAM(S): at 06:22

## 2019-03-28 NOTE — PROGRESS NOTE ADULT - ASSESSMENT
62 yo man, current smoker (~1 ppd), with history of stage 4 left lung adenocarcinoma, on immunotherapy with Keytruda (first session on 3/4/19), recurrent left pleural effusion s/p left VATS with pleurX placement (2/2019), COPD, DM2, HTN, HLD, and anxiety presents with 1 week of worsening throat pain with odynophagia and dysphagia, started on fluconazole for thrush and possible esophageal candidiasis, also with herpes zoster.    Problem/Plan - 1:  ·  Problem: Herpes zoster.  Plan: -pt is immunocompromised and c/o new rash on his right mid back crossing to his abdomen, rash is erythematous vesicular c/w herpes zoster, not crusted  - cont iv acyclovir 10 mg/kg q8h, monitor lesion, IVF while on iv acyclovir  - plan to change to oral valacyclovir 1000 mg q8h on DC to home - finish total 14 days of therapy  - contact and airborne precautions    Problem/Plan - 2:  ·  Problem: Odynophagia.  Plan: -dysphagia and odynophagia, likely 2/2 progression of metastatic disease in mediastinal area. Recent PET/CT showing hypermetabolic right supraclavicular, bilateral mediastinal, and left perihilar lymphadenopathy, increased in size from a month ago. Can also, however, be 2/2 motility issue vs. esophagitis (given presence of thrush on exam)  - patient now agreeable to EGD - GI aware  - continue fluconazole 400 mg IV  - per patient odynophagia has improved, monitor symptoms of dysphagia  - S/S eval appreciated, pharyngeal dysphagia, no aspiration  - diet changed to dysphagia 2 with nectar thick liquids per s/s rec  - Aspiration precautions    Problem/Plan - 3:  ·  Problem: Failure to thrive in adult.  Plan: Pt with poor nutrition, decreased appetite, and weight loss in setting of metastatic lung cancer.   - pt with severe protein calorie malnutrition per nutrition eval  - plan as above for odynophagia  - f/u palliative care for symptomatic management  - anticipate home with home PT per physical therapy recs.     Problem/Plan - 4:  ·  Problem: Pleural effusion, left.  Plan: Pt with recurrent left loculated pleural effusion, s/p left VATS and pleurX placement. PleurX still in place, draining serosanguinous fluid likely due to malignancy  - CXR stable L pleural effusion  - C/w PleurX drainage; monitor output  - Monitor respiratory status  - f/u CTS recs, pt follows with Dr. Moore    Problem/Plan - 5:  ·  Problem: Lung cancer.  Plan: Pt with stage 4 left lung adenocarcinoma, started on immunotherapy on 3/4/19 with Keytruda.  - Oncology consult appreciated  - re-attempt MRI brain with premedication - Ativan 1 mg, can give additional 1 mg as needed for study  - appreciate palliative care recs  - per Dr. Mao, patient should f/u for additional Keytruda treatment  - C/w methadone (I-Stop Reference #: [id number]) and oxycodone PRN    Problem/Plan - 6:  Problem: COPD (chronic obstructive pulmonary disease). Plan: No S/S of exacerbation.  - C/w albuterol PRN  - Anoro Ellipta not available through pharmacy. Will do Symbicort and Spiriva for now and have pt's family bring in medication from home.    Problem/Plan - 7:  ·  Problem: Diabetes mellitus.  Plan: Pt on Januvia and Metformin at home.   - Start ISS and FSG qAC and qhs  -a1C 7.2%, Glycemic control acceptable at this time, hold off on endocrine consult for now.     Problem/Plan - 8:  ·  Problem: Hypertension.  Plan: Pt on amlodipine, valsartan, metoprolol, and lasix at home.  - Will c/w metoprolol, but will hold amlodipine (given chronic b/l LE edema), valsartan, and lasix  - Will restart additional home BP meds if needed.     Problem/Plan - 9:  ·  Problem: Need for prophylactic measure.  Plan: - DVT ppx: pt with hypercoagulable state, start lovenox sc   OOB and ambulation as tolerated.  - Diet: soft diet.     Dispo: pending ability to tolerate food, completion of MRI brain  - will need close f/u with Dr. Mao for lung cancer treatment  - PT: home w home PT

## 2019-03-28 NOTE — PROGRESS NOTE ADULT - SUBJECTIVE AND OBJECTIVE BOX
CHIEF COMPLAINT: Patient is a 63y old  male who presents with a chief complaint of Odynophagia/dysphagia, minimal PO intake (28 Mar 2019 15:30)      SUBJECTIVE / OVERNIGHT EVENTS:    Seen with wife at bedside. Awaiting EGD. Reports dry mouth. Denies other complaints. Rash still improving. Oncologist Attarian present during visit. Need for MRI brain reiterated.    MEDICATIONS  (STANDING):  acyclovir IVPB      acyclovir IVPB 800 milliGRAM(s) IV Intermittent every 8 hours  aspirin enteric coated 81 milliGRAM(s) Oral daily  atorvastatin 40 milliGRAM(s) Oral at bedtime  bisacodyl Suppository 10 milliGRAM(s) Rectal once  buDESOnide 160 MICROgram(s)/formoterol 4.5 MICROgram(s) Inhaler 2 Puff(s) Inhalation two times a day  dextrose 5%. 1000 milliLiter(s) (50 mL/Hr) IV Continuous <Continuous>  dextrose 50% Injectable 12.5 Gram(s) IV Push once  dextrose 50% Injectable 25 Gram(s) IV Push once  dextrose 50% Injectable 25 Gram(s) IV Push once  docusate sodium 100 milliGRAM(s) Oral three times a day  enoxaparin Injectable 40 milliGRAM(s) SubCutaneous daily  fluconAZOLE IVPB      fluconAZOLE IVPB 400 milliGRAM(s) IV Intermittent every 24 hours  insulin lispro (HumaLOG) corrective regimen sliding scale   SubCutaneous three times a day before meals  insulin lispro (HumaLOG) corrective regimen sliding scale   SubCutaneous at bedtime  lactated ringers. 1000 milliLiter(s) (50 mL/Hr) IV Continuous <Continuous>  LORazepam     Tablet 1 milliGRAM(s) Oral once  melatonin 3 milliGRAM(s) Oral at bedtime  methadone    Tablet 2.5 milliGRAM(s) Oral two times a day  metoprolol tartrate 25 milliGRAM(s) Oral two times a day  polyethylene glycol 3350 17 Gram(s) Oral two times a day  pyridoxine 100 milliGRAM(s) Oral daily  tiotropium 18 MICROgram(s) Capsule 1 Capsule(s) Inhalation daily    MEDICATIONS  (PRN):  ALBUTerol    90 MICROgram(s) HFA Inhaler 2 Puff(s) Inhalation every 6 hours PRN Shortness of Breath and/or Wheezing  dextrose 40% Gel 15 Gram(s) Oral once PRN Blood Glucose LESS THAN 70 milliGRAM(s)/deciliter  glucagon  Injectable 1 milliGRAM(s) IntraMuscular once PRN Glucose LESS THAN 70 milligrams/deciliter  LORazepam     Tablet 0.5 milliGRAM(s) Oral three times a day PRN Anxiety  ondansetron Injectable 4 milliGRAM(s) IV Push every 6 hours PRN Nausea and/or Vomiting  oxyCODONE    IR 5 milliGRAM(s) Oral every 6 hours PRN Moderate Pain (4 - 6)  oxyCODONE    IR 10 milliGRAM(s) Oral every 6 hours PRN Severe Pain (7 - 10)  senna 2 Tablet(s) Oral at bedtime PRN Constipation  simethicone 160 milliGRAM(s) Chew daily PRN Gas      VITALS:  T(F): 98.6 (03-28-19 @ 15:57), Max: 98.7 (03-27-19 @ 20:17)  HR: 76 (03-28-19 @ 15:57) (76 - 81)  BP: 130/59 (03-28-19 @ 15:57) (130/59 - 145/75)  RR: 20 (03-28-19 @ 15:57) (18 - 20)  SpO2: 97% (03-28-19 @ 15:57)      CAPILLARY BLOOD GLUCOSE    Output     I&O's Summary  T(F): 98.6 (03-28-19 @ 15:57), Max: 98.7 (03-27-19 @ 20:17)  HR: 76 (03-28-19 @ 15:57) (76 - 81)  BP: 130/59 (03-28-19 @ 15:57) (130/59 - 145/75)  RR: 20 (03-28-19 @ 15:57) (18 - 20)  SpO2: 97% (03-28-19 @ 15:57)    PHYSICAL EXAM:  GENERAL: NAD, well-developed  HEAD:  Atraumatic, Normocephalic  EYES: EOMI  NECK: Supple, No JVD  CHEST/LUNG: nonlabored breathing  HEART: nl S1/S2  ABDOMEN: nondistended, soft  EXTREMITIES:  no LE edema  PSYCH: alert, answering questions appropriately  NEUROLOGY: non-focal    LABS:              10.7                 136  | 28   | 6            12.88 >-----------< 419     ------------------------< 88                    34.5                 3.8  | 94   | 0.65                                         Ca 9.3   Mg 1.6   Ph 3.2                        MICROBIOLOGY:        RADIOLOGY & ADDITIONAL TESTS:    Imaging Personally Reviewed:    < from: MR Head w/ IV Cont (03.22.19 @ 17:07) >  Impression: Limited nondiagnostic MRI as described above.      < end of copied text >        [x] Care Discussed with Consultants/Other Providers:      All Gleason M.D.  Hospitalist  Pager [phone]

## 2019-03-28 NOTE — PROGRESS NOTE ADULT - SUBJECTIVE AND OBJECTIVE BOX
INTERVAL HPI/OVERNIGHT EVENTS:  Patient seen at bedside.  Has some hallucinations  HAs dysphagia an dodynophagia      VITAL SIGNS:  T(F): 98.5 (03-28-19 @ 06:21)  HR: 79 (03-28-19 @ 06:21)  BP: 145/75 (03-28-19 @ 06:21)  RR: 20 (03-28-19 @ 06:21)  SpO2: 95% (03-28-19 @ 06:21)  Wt(kg): --    PHYSICAL EXAM:    Constitutional: NAD, resting in bed comfortably  Eyes: EOMI, sclera non-icteric  Neck: supple, no LAP  Respiratory: CTA b/l, good air entry b/l, no wheezing, rhonchi or crackels  Cardiovascular: RRR, normal S1S2, no M/R/G  Gastrointestinal: soft, NTND  Extremities: no edema  Neurological: AAOx3, non focal  Skin: Normal temperature    MEDICATIONS  (STANDING):  acyclovir IVPB      acyclovir IVPB 800 milliGRAM(s) IV Intermittent every 8 hours  aspirin enteric coated 81 milliGRAM(s) Oral daily  atorvastatin 40 milliGRAM(s) Oral at bedtime  bisacodyl Suppository 10 milliGRAM(s) Rectal once  buDESOnide 160 MICROgram(s)/formoterol 4.5 MICROgram(s) Inhaler 2 Puff(s) Inhalation two times a day  dextrose 5%. 1000 milliLiter(s) (50 mL/Hr) IV Continuous <Continuous>  dextrose 50% Injectable 12.5 Gram(s) IV Push once  dextrose 50% Injectable 25 Gram(s) IV Push once  dextrose 50% Injectable 25 Gram(s) IV Push once  docusate sodium 100 milliGRAM(s) Oral three times a day  enoxaparin Injectable 40 milliGRAM(s) SubCutaneous daily  fluconAZOLE IVPB      fluconAZOLE IVPB 400 milliGRAM(s) IV Intermittent every 24 hours  insulin lispro (HumaLOG) corrective regimen sliding scale   SubCutaneous three times a day before meals  insulin lispro (HumaLOG) corrective regimen sliding scale   SubCutaneous at bedtime  lactated ringers. 1000 milliLiter(s) (50 mL/Hr) IV Continuous <Continuous>  LORazepam     Tablet 1 milliGRAM(s) Oral once  melatonin 3 milliGRAM(s) Oral at bedtime  methadone    Tablet 2.5 milliGRAM(s) Oral two times a day  metoprolol tartrate 25 milliGRAM(s) Oral two times a day  polyethylene glycol 3350 17 Gram(s) Oral two times a day  pyridoxine 100 milliGRAM(s) Oral daily  tiotropium 18 MICROgram(s) Capsule 1 Capsule(s) Inhalation daily    MEDICATIONS  (PRN):  ALBUTerol    90 MICROgram(s) HFA Inhaler 2 Puff(s) Inhalation every 6 hours PRN Shortness of Breath and/or Wheezing  dextrose 40% Gel 15 Gram(s) Oral once PRN Blood Glucose LESS THAN 70 milliGRAM(s)/deciliter  glucagon  Injectable 1 milliGRAM(s) IntraMuscular once PRN Glucose LESS THAN 70 milligrams/deciliter  LORazepam     Tablet 0.5 milliGRAM(s) Oral three times a day PRN Anxiety  ondansetron Injectable 4 milliGRAM(s) IV Push every 6 hours PRN Nausea and/or Vomiting  oxyCODONE    IR 5 milliGRAM(s) Oral every 6 hours PRN Moderate Pain (4 - 6)  oxyCODONE    IR 10 milliGRAM(s) Oral every 6 hours PRN Severe Pain (7 - 10)  senna 2 Tablet(s) Oral at bedtime PRN Constipation  simethicone 160 milliGRAM(s) Chew daily PRN Gas      Allergies    No Known Allergies    Intolerances        LABS:                        10.7   12.88 )-----------( 419      ( 27 Mar 2019 06:30 )             34.5     03-27    136  |  94<L>  |  6<L>  ----------------------------<  88  3.8   |  28  |  0.65    Ca    9.3      27 Mar 2019 06:30  Phos  3.2     03-27  Mg     1.6     03-27            RADIOLOGY & ADDITIONAL TESTS:  Studies reviewed.

## 2019-03-28 NOTE — CHART NOTE - NSCHARTNOTEFT_GEN_A_CORE
Gastroenterology Brief Note   - patient on airborne precautions   - not able to perform procedure in endoscopy until off airborne   - once off will before non-emergant EGD for dysphagia

## 2019-03-28 NOTE — PROGRESS NOTE ADULT - ASSESSMENT
64 yo man, current smoker (~1 ppd), with history of stage 4 left lung adenocarcinoma (PDL-1 70% positive) diagnosed January 2019, on immunotherapy with Keytruda (first session on 3/4/19), recurrent left loculated pleural effusion s/p left VATS with pleurX placement (2/2019), COPD, DM2, HTN, HLD, and anxiety presents with 1 week of worsening throat pain, odynophagia, and dysphagia; exam c/w oral thrush, possible esophagitis given symptoms. Doubt relation to single treatment with Keytruda. PET/CT obtained for staging c/w known metastatic disease - he has just started therapy.     Pt has only had one dose of keytruda, it is too early to  response to treatment.     -please obtain MRI Brain with contrast to complete staging when feasible. Patient will need sedation prior to mri, he is claustrophobic.   -EGD to eval dysphagia. Of note, Pembrolizumab can be rarely a/w immune related esophagitis.   -GI and thoracic surgery input appreciated  -Supportive care, pain control, PT/OT, nutrition, HSQ  -Outpt oncology follow up. After discussing risks and benefits and speaking to primary oncologist, patient has decided to continue with immunotherapy for at least one more cycle. Infections need to resolve before treatment is restarted.       Will follow. Please do not hesitate to call back with questions.     Shannon Vieira MD  Medical Oncology Attending

## 2019-03-29 PROBLEM — C34.92 ADENOCARCINOMA OF LEFT LUNG: Status: ACTIVE | Noted: 2019-02-21

## 2019-03-29 LAB
ANION GAP SERPL CALC-SCNC: 14 MMO/L — SIGNIFICANT CHANGE UP (ref 7–14)
BUN SERPL-MCNC: 6 MG/DL — LOW (ref 7–23)
CALCIUM SERPL-MCNC: 9.5 MG/DL — SIGNIFICANT CHANGE UP (ref 8.4–10.5)
CHLORIDE SERPL-SCNC: 95 MMOL/L — LOW (ref 98–107)
CO2 SERPL-SCNC: 29 MMOL/L — SIGNIFICANT CHANGE UP (ref 22–31)
CREAT SERPL-MCNC: 0.62 MG/DL — SIGNIFICANT CHANGE UP (ref 0.5–1.3)
GLUCOSE SERPL-MCNC: 83 MG/DL — SIGNIFICANT CHANGE UP (ref 70–99)
HCT VFR BLD CALC: 34.6 % — LOW (ref 39–50)
HGB BLD-MCNC: 10.7 G/DL — LOW (ref 13–17)
MAGNESIUM SERPL-MCNC: 1.7 MG/DL — SIGNIFICANT CHANGE UP (ref 1.6–2.6)
MCHC RBC-ENTMCNC: 24.2 PG — LOW (ref 27–34)
MCHC RBC-ENTMCNC: 30.9 % — LOW (ref 32–36)
MCV RBC AUTO: 78.3 FL — LOW (ref 80–100)
NRBC # FLD: 0 K/UL — SIGNIFICANT CHANGE UP (ref 0–0)
PHOSPHATE SERPL-MCNC: 3.4 MG/DL — SIGNIFICANT CHANGE UP (ref 2.5–4.5)
PLATELET # BLD AUTO: 415 K/UL — HIGH (ref 150–400)
PMV BLD: 9.5 FL — SIGNIFICANT CHANGE UP (ref 7–13)
POTASSIUM SERPL-MCNC: 3.7 MMOL/L — SIGNIFICANT CHANGE UP (ref 3.5–5.3)
POTASSIUM SERPL-SCNC: 3.7 MMOL/L — SIGNIFICANT CHANGE UP (ref 3.5–5.3)
RBC # BLD: 4.42 M/UL — SIGNIFICANT CHANGE UP (ref 4.2–5.8)
RBC # FLD: 15.2 % — HIGH (ref 10.3–14.5)
SODIUM SERPL-SCNC: 138 MMOL/L — SIGNIFICANT CHANGE UP (ref 135–145)
WBC # BLD: 11.59 K/UL — HIGH (ref 3.8–10.5)
WBC # FLD AUTO: 11.59 K/UL — HIGH (ref 3.8–10.5)

## 2019-03-29 PROCEDURE — 70552 MRI BRAIN STEM W/DYE: CPT | Mod: 26

## 2019-03-29 PROCEDURE — 99233 SBSQ HOSP IP/OBS HIGH 50: CPT

## 2019-03-29 PROCEDURE — 99232 SBSQ HOSP IP/OBS MODERATE 35: CPT | Mod: GC

## 2019-03-29 RX ORDER — LANOLIN ALCOHOL/MO/W.PET/CERES
3 CREAM (GRAM) TOPICAL ONCE
Qty: 0 | Refills: 0 | Status: DISCONTINUED | OUTPATIENT
Start: 2019-03-29 | End: 2019-03-29

## 2019-03-29 RX ADMIN — Medication 100 MILLIGRAM(S): at 13:57

## 2019-03-29 RX ADMIN — ENOXAPARIN SODIUM 40 MILLIGRAM(S): 100 INJECTION SUBCUTANEOUS at 13:57

## 2019-03-29 RX ADMIN — Medication 25 MILLIGRAM(S): at 18:09

## 2019-03-29 RX ADMIN — SODIUM CHLORIDE 50 MILLILITER(S): 9 INJECTION, SOLUTION INTRAVENOUS at 22:03

## 2019-03-29 RX ADMIN — METHADONE HYDROCHLORIDE 2.5 MILLIGRAM(S): 40 TABLET ORAL at 06:17

## 2019-03-29 RX ADMIN — Medication 266 MILLIGRAM(S): at 06:16

## 2019-03-29 RX ADMIN — Medication 100 MILLIGRAM(S): at 22:03

## 2019-03-29 RX ADMIN — Medication 3 MILLIGRAM(S): at 22:03

## 2019-03-29 RX ADMIN — METHADONE HYDROCHLORIDE 2.5 MILLIGRAM(S): 40 TABLET ORAL at 18:09

## 2019-03-29 RX ADMIN — TIOTROPIUM BROMIDE 1 CAPSULE(S): 18 CAPSULE ORAL; RESPIRATORY (INHALATION) at 10:36

## 2019-03-29 RX ADMIN — Medication 25 MILLIGRAM(S): at 06:17

## 2019-03-29 RX ADMIN — Medication 1 MILLIGRAM(S): at 16:27

## 2019-03-29 RX ADMIN — FLUCONAZOLE 100 MILLIGRAM(S): 150 TABLET ORAL at 15:11

## 2019-03-29 RX ADMIN — BUDESONIDE AND FORMOTEROL FUMARATE DIHYDRATE 2 PUFF(S): 160; 4.5 AEROSOL RESPIRATORY (INHALATION) at 10:35

## 2019-03-29 RX ADMIN — Medication 81 MILLIGRAM(S): at 13:56

## 2019-03-29 RX ADMIN — BUDESONIDE AND FORMOTEROL FUMARATE DIHYDRATE 2 PUFF(S): 160; 4.5 AEROSOL RESPIRATORY (INHALATION) at 22:02

## 2019-03-29 RX ADMIN — ATORVASTATIN CALCIUM 40 MILLIGRAM(S): 80 TABLET, FILM COATED ORAL at 22:03

## 2019-03-29 RX ADMIN — Medication 0.5 MILLIGRAM(S): at 23:39

## 2019-03-29 RX ADMIN — POLYETHYLENE GLYCOL 3350 17 GRAM(S): 17 POWDER, FOR SOLUTION ORAL at 06:17

## 2019-03-29 RX ADMIN — Medication 266 MILLIGRAM(S): at 13:58

## 2019-03-29 RX ADMIN — Medication 266 MILLIGRAM(S): at 22:14

## 2019-03-29 RX ADMIN — Medication 100 MILLIGRAM(S): at 06:17

## 2019-03-29 NOTE — PROGRESS NOTE ADULT - ASSESSMENT
Impression:  1) Odynophagia/Dysphagia - with noted oral thrush on exam.  Likely do to candidal esophagitis.  DDx also includes CMV or HSV esophagitis in an immunocompromised patient.  Patient previously now refusing endoscopy but now reconsidering    Recommendations:   - c/w  fluconazole for empiric therapy for 10 to 14 days   - symptoms management per primary team   - nonemergent EGD to be performed once patient is off airborne isolation   - please call GI back with any further questions    Lorena Franco, PGY-4  Gastroenterology Fellow  Pager x [phone] or [phone]  (After 5 pm or on weekends please page GI on call) Impression:  1) Odynophagia/Dysphagia - with noted oral thrush on exam.  Likely do to candidal esophagitis.  DDx also includes CMV or HSV esophagitis in an immunocompromised patient.  Patient previously now refusing endoscopy but now reconsidering    Recommendations:   - obtain barium esophogram   - c/w  fluconazole for empiric therapy for 10 to 14 days   - symptoms management per primary team   - nonemergent EGD to be performed once patient is off airborne isolation   - please call GI back with any further questions    Lorena Franco, PGY-4  Gastroenterology Fellow  Pager x [phone] or [phone]  (After 5 pm or on weekends please page GI on call)

## 2019-03-29 NOTE — PROGRESS NOTE ADULT - SUBJECTIVE AND OBJECTIVE BOX
Chief Complaint:  Patient is a 63y old  Male who presents with a chief complaint of Odynophagia/dysphagia, minimal PO intake (28 Mar 2019 16:00)      Interval Events:   Patient remains on airborne precautions      Allergies:  No Known Allergies      Hospital Medications:  acyclovir IVPB      acyclovir IVPB 800 milliGRAM(s) IV Intermittent every 8 hours  ALBUTerol    90 MICROgram(s) HFA Inhaler 2 Puff(s) Inhalation every 6 hours PRN  aspirin enteric coated 81 milliGRAM(s) Oral daily  atorvastatin 40 milliGRAM(s) Oral at bedtime  buDESOnide 160 MICROgram(s)/formoterol 4.5 MICROgram(s) Inhaler 2 Puff(s) Inhalation two times a day  dextrose 40% Gel 15 Gram(s) Oral once PRN  dextrose 5%. 1000 milliLiter(s) IV Continuous <Continuous>  dextrose 50% Injectable 12.5 Gram(s) IV Push once  dextrose 50% Injectable 25 Gram(s) IV Push once  dextrose 50% Injectable 25 Gram(s) IV Push once  docusate sodium 100 milliGRAM(s) Oral three times a day  enoxaparin Injectable 40 milliGRAM(s) SubCutaneous daily  fluconAZOLE IVPB      fluconAZOLE IVPB 400 milliGRAM(s) IV Intermittent every 24 hours  glucagon  Injectable 1 milliGRAM(s) IntraMuscular once PRN  insulin lispro (HumaLOG) corrective regimen sliding scale   SubCutaneous three times a day before meals  insulin lispro (HumaLOG) corrective regimen sliding scale   SubCutaneous at bedtime  lactated ringers. 1000 milliLiter(s) IV Continuous <Continuous>  LORazepam     Tablet 1 milliGRAM(s) Oral once  melatonin 3 milliGRAM(s) Oral at bedtime  methadone    Tablet 2.5 milliGRAM(s) Oral two times a day  metoprolol tartrate 25 milliGRAM(s) Oral two times a day  ondansetron Injectable 4 milliGRAM(s) IV Push every 6 hours PRN  polyethylene glycol 3350 17 Gram(s) Oral two times a day  pyridoxine 100 milliGRAM(s) Oral daily  senna 2 Tablet(s) Oral at bedtime PRN  simethicone 160 milliGRAM(s) Chew daily PRN  tiotropium 18 MICROgram(s) Capsule 1 Capsule(s) Inhalation daily      PMHX/PSHX:  Anxiety  Pleural effusion, left  Lung cancer  COPD (chronic obstructive pulmonary disease)  HLD (hyperlipidemia)  Diabetes mellitus  Hypertension  No significant past surgical history      Family history:  Family history of diabetes mellitus          PHYSICAL EXAM:     GENERAL:  Appears stated age, well-groomed, well-nourished, no distress  HEENT:  NC/AT,  conjunctivae clear, sclera -anicteric  CHEST:  Full & symmetric excursion, no increased  HEART:  Regular rhythm  ABDOMEN:  Soft, non-tender, non-distended, normoactive bowel sounds,  no masses ,no hepato-splenomegaly,   EXTREMITIES:  no cyanosis,clubbing or edema  SKIN:  No rash/erythema/ecchymoses/petechiae/wounds/abscess/warm/dry  NEURO:  Alert, oriented    Vital Signs:  Vital Signs Last 24 Hrs  T(C): 36.9 (29 Mar 2019 06:15), Max: 37.4 (28 Mar 2019 21:35)  T(F): 98.5 (29 Mar 2019 06:15), Max: 99.3 (28 Mar 2019 21:35)  HR: 76 (29 Mar 2019 06:15) (70 - 78)  BP: 130/61 (29 Mar 2019 06:15) (96/75 - 131/70)  BP(mean): --  RR: 20 (29 Mar 2019 06:15) (20 - 20)  SpO2: 95% (29 Mar 2019 06:15) (95% - 98%)  Daily     Daily     LABS:                        10.7   11.59 )-----------( 415      ( 29 Mar 2019 05:20 )             34.6     03-29    138  |  95<L>  |  6<L>  ----------------------------<  83  3.7   |  29  |  0.62    Ca    9.5      29 Mar 2019 05:20  Phos  3.4     03-29  Mg     1.7     03-29                Imaging:

## 2019-03-29 NOTE — HISTORY OF PRESENT ILLNESS
[FreeTextEntry1] : 62 y/o male with history of left pleural effusion returns today for follow up. PMHX includes former smoker (quit January 2019), COPD, HTN, HLD, and Left lung adenocarcinoma. 1 L of effusion was drained on 1/14/19. He was hospitalized from 2/8/19 to 2/15/19 at [facility] for dyspnea. CXR revealed large loculated pleural effusion, s/p FB, uniportal left VATS, implantation of PleurX catheter on 2/11/19. Postop course was complicated with persistent air leak, the Pleurx was attached to a mini atrium and pt was d/c home with it. \par \par Pt presents today for follow up.

## 2019-03-29 NOTE — CONSULT LETTER
[Dear  ___] : Dear  [unfilled], [Courtesy Letter:] : I had the pleasure of seeing your patient, [unfilled], in my office today. [Please see my note below.] : Please see my note below. [Sincerely,] : Sincerely, [FreeTextEntry2] : Dr. Chucho Kern (Pulm)\par Dr. Carl Mao (HemOnc)  [FreeTextEntry3] : Armando Moore MD, FACS \par Chief, Division of Thoracic Surgery \par Director, Minimally Invasive Thoracic Surgery \par Department of Cardiovascular and Thoracic Surgery \par [facility] \par , Cardiovascular and Thoracic Surgery \par [facility] [employer] [facility]\par

## 2019-03-29 NOTE — PROGRESS NOTE ADULT - ASSESSMENT
64 yo man, current smoker (~1 ppd), with history of stage 4 left lung adenocarcinoma, on immunotherapy with Keytruda (first session on 3/4/19), recurrent left pleural effusion s/p left VATS with pleurX placement (2/2019), COPD, DM2, HTN, HLD, and anxiety presents with 1 week of worsening throat pain with odynophagia and dysphagia, started on fluconazole for thrush and possible esophageal candidiasis, also with herpes zoster.    Problem/Plan - 1:  ·  Problem: Herpes zoster.  Plan: -pt is immunocompromised and c/o new rash on his right mid back crossing to his abdomen, rash is erythematous vesicular c/w herpes zoster, not crusted  - cont iv acyclovir 10 mg/kg q8h, monitor lesion, IVF while on iv acyclovir  - plan to change to oral valacyclovir 1000 mg q8h on DC to home - finish total 14 days of therapy  - contact and airborne precautions    Problem/Plan - 2:  ·  Problem: Odynophagia.  Plan: -dysphagia and odynophagia, likely 2/2 progression of metastatic disease in mediastinal area. Recent PET/CT showing hypermetabolic right supraclavicular, bilateral mediastinal, and left perihilar lymphadenopathy, increased in size from a month ago. Can also, however, be 2/2 motility issue vs. esophagitis (given presence of thrush on exam)  - patient now agreeable to EGD - GI aware - patient now off airborne isolation  - continue fluconazole 400 mg IV  - per patient odynophagia has improved, monitor symptoms of dysphagia  - S/S eval appreciated, pharyngeal dysphagia, no aspiration  - diet changed to dysphagia 2 with nectar thick liquids per s/s rec  - Aspiration precautions    Problem/Plan - 3:  ·  Problem: Failure to thrive in adult.  Plan: Pt with poor nutrition, decreased appetite, and weight loss in setting of metastatic lung cancer.   - pt with severe protein calorie malnutrition per nutrition eval  - plan as above for odynophagia  - f/u palliative care for symptomatic management  - anticipate home with home PT per physical therapy recs.     Problem/Plan - 4:  ·  Problem: Pleural effusion, left.  Plan: Pt with recurrent left loculated pleural effusion, s/p left VATS and pleurX placement. PleurX still in place, draining serosanguinous fluid likely due to malignancy  - CXR stable L pleural effusion  - C/w PleurX drainage; monitor output  - Monitor respiratory status  - f/u CTS recs, pt follows with Dr. Moore    Problem/Plan - 5:  ·  Problem: Lung cancer.  Plan: Pt with stage 4 left lung adenocarcinoma, started on immunotherapy on 3/4/19 with Keytruda.  - Oncology consult appreciated  - re-attempt MRI brain with premedication - Ativan 1 mg, can give additional 1 mg as needed for study  - appreciate palliative care recs  - per Dr. Mao, patient should f/u for additional Keytruda treatment  - C/w methadone (I-Stop Reference #: [id number]) and oxycodone PRN    Problem/Plan - 6:  Problem: COPD (chronic obstructive pulmonary disease). Plan: No S/S of exacerbation.  - C/w albuterol PRN  - Anoro Ellipta not available through pharmacy. Will do Symbicort and Spiriva for now and have pt's family bring in medication from home.    Problem/Plan - 7:  ·  Problem: Diabetes mellitus.  Plan: Pt on Januvia and Metformin at home.   - Start ISS and FSG qAC and qhs  -a1C 7.2%, Glycemic control acceptable at this time, hold off on endocrine consult for now.     Problem/Plan - 8:  ·  Problem: Hypertension.  Plan: Pt on amlodipine, valsartan, metoprolol, and lasix at home.  - Will c/w metoprolol, but will hold amlodipine (given chronic b/l LE edema), valsartan, and lasix  - Will restart additional home BP meds if needed.     Problem/Plan - 9:  ·  Problem: Need for prophylactic measure.  Plan: - DVT ppx: pt with hypercoagulable state, start lovenox sc   OOB and ambulation as tolerated.  - Diet: soft diet.     Dispo: pending ability to tolerate food, completion of MRI brain  - will need close f/u with Dr. Mao for lung cancer treatment  - PT: home w home PT

## 2019-03-29 NOTE — PROGRESS NOTE ADULT - SUBJECTIVE AND OBJECTIVE BOX
CHIEF COMPLAINT: Patient is a 63y old  male who presents with a chief complaint of Odynophagia/dysphagia, minimal PO intake (29 Mar 2019 07:46)      SUBJECTIVE / OVERNIGHT EVENTS:    No complaints.    MEDICATIONS  (STANDING):  acyclovir IVPB      acyclovir IVPB 800 milliGRAM(s) IV Intermittent every 8 hours  aspirin enteric coated 81 milliGRAM(s) Oral daily  atorvastatin 40 milliGRAM(s) Oral at bedtime  buDESOnide 160 MICROgram(s)/formoterol 4.5 MICROgram(s) Inhaler 2 Puff(s) Inhalation two times a day  dextrose 5%. 1000 milliLiter(s) (50 mL/Hr) IV Continuous <Continuous>  dextrose 50% Injectable 12.5 Gram(s) IV Push once  dextrose 50% Injectable 25 Gram(s) IV Push once  dextrose 50% Injectable 25 Gram(s) IV Push once  docusate sodium 100 milliGRAM(s) Oral three times a day  enoxaparin Injectable 40 milliGRAM(s) SubCutaneous daily  fluconAZOLE IVPB      fluconAZOLE IVPB 400 milliGRAM(s) IV Intermittent every 24 hours  insulin lispro (HumaLOG) corrective regimen sliding scale   SubCutaneous three times a day before meals  insulin lispro (HumaLOG) corrective regimen sliding scale   SubCutaneous at bedtime  lactated ringers. 1000 milliLiter(s) (50 mL/Hr) IV Continuous <Continuous>  melatonin 3 milliGRAM(s) Oral at bedtime  methadone    Tablet 2.5 milliGRAM(s) Oral two times a day  metoprolol tartrate 25 milliGRAM(s) Oral two times a day  polyethylene glycol 3350 17 Gram(s) Oral two times a day  pyridoxine 100 milliGRAM(s) Oral daily  tiotropium 18 MICROgram(s) Capsule 1 Capsule(s) Inhalation daily    MEDICATIONS  (PRN):  ALBUTerol    90 MICROgram(s) HFA Inhaler 2 Puff(s) Inhalation every 6 hours PRN Shortness of Breath and/or Wheezing  dextrose 40% Gel 15 Gram(s) Oral once PRN Blood Glucose LESS THAN 70 milliGRAM(s)/deciliter  glucagon  Injectable 1 milliGRAM(s) IntraMuscular once PRN Glucose LESS THAN 70 milligrams/deciliter  ondansetron Injectable 4 milliGRAM(s) IV Push every 6 hours PRN Nausea and/or Vomiting  senna 2 Tablet(s) Oral at bedtime PRN Constipation  simethicone 160 milliGRAM(s) Chew daily PRN Gas      VITALS:  T(F): 97.9 (03-29-19 @ 14:05), Max: 99.3 (03-28-19 @ 21:35)  HR: 73 (03-29-19 @ 14:05) (70 - 78)  BP: 135/55 (03-29-19 @ 14:05) (96/75 - 135/55)  RR: 20 (03-29-19 @ 14:05) (20 - 20)  SpO2: 95% (03-29-19 @ 14:05)      CAPILLARY BLOOD GLUCOSE    Output     I&O's Summary  T(F): 97.9 (03-29-19 @ 14:05), Max: 99.3 (03-28-19 @ 21:35)  HR: 73 (03-29-19 @ 14:05) (70 - 78)  BP: 135/55 (03-29-19 @ 14:05) (96/75 - 135/55)  RR: 20 (03-29-19 @ 14:05) (20 - 20)  SpO2: 95% (03-29-19 @ 14:05)    PHYSICAL EXAM:  GENERAL: NAD, well-developed  HEAD:  Atraumatic, Normocephalic  EYES: EOMI  NECK: Supple, No JVD  CHEST/LUNG: nonlabored breathing  HEART: nl S1/S2  ABDOMEN: nondistended, soft  EXTREMITIES:  no LE edema  PSYCH: alert, answering questions appropriately  NEUROLOGY: non-focal  SKIN: dry, no vesicles, lesions healing    LABS:              10.7                 138  | 29   | 6            11.59 >-----------< 415     ------------------------< 83                    34.6                 3.7  | 95   | 0.62                                         Ca 9.5   Mg 1.7   Ph 3.4                        MICROBIOLOGY:        RADIOLOGY & ADDITIONAL TESTS:    Imaging Personally Reviewed:    < from: Xray Cinesophagram (03.22.19 @ 08:44) >  IMPRESSION:       Deep silent laryngeal penetration without retrieval of thin liquids. Chin   tuck posture eliminate eliminates laryngeal penetration.    The patient reported food being stuck.     A screening frontal view of the esophagus demonstrates intermittent   tertiary contractions of the distal esophagus. No hiatal hernia or   extrinsic compression. A left chest tube is also partially visualized.    For further information and recommendations, please refer to the speech   pathologist final report which is available for review in the electronic   medical record.    < end of copied text >        [x] Care Discussed with Consultants/Other Providers:      All Gleason M.D.  Hospitalist  Pager [phone]

## 2019-03-30 LAB
ANION GAP SERPL CALC-SCNC: 15 MMO/L — HIGH (ref 7–14)
BUN SERPL-MCNC: 6 MG/DL — LOW (ref 7–23)
CALCIUM SERPL-MCNC: 9.2 MG/DL — SIGNIFICANT CHANGE UP (ref 8.4–10.5)
CHLORIDE SERPL-SCNC: 92 MMOL/L — LOW (ref 98–107)
CO2 SERPL-SCNC: 28 MMOL/L — SIGNIFICANT CHANGE UP (ref 22–31)
CREAT SERPL-MCNC: 0.63 MG/DL — SIGNIFICANT CHANGE UP (ref 0.5–1.3)
GLUCOSE SERPL-MCNC: 98 MG/DL — SIGNIFICANT CHANGE UP (ref 70–99)
HCT VFR BLD CALC: 34.2 % — LOW (ref 39–50)
HGB BLD-MCNC: 10.6 G/DL — LOW (ref 13–17)
MAGNESIUM SERPL-MCNC: 1.6 MG/DL — SIGNIFICANT CHANGE UP (ref 1.6–2.6)
MCHC RBC-ENTMCNC: 24.5 PG — LOW (ref 27–34)
MCHC RBC-ENTMCNC: 31 % — LOW (ref 32–36)
MCV RBC AUTO: 79 FL — LOW (ref 80–100)
NRBC # FLD: 0 K/UL — SIGNIFICANT CHANGE UP (ref 0–0)
PHOSPHATE SERPL-MCNC: 3.3 MG/DL — SIGNIFICANT CHANGE UP (ref 2.5–4.5)
PLATELET # BLD AUTO: 397 K/UL — SIGNIFICANT CHANGE UP (ref 150–400)
PMV BLD: 9.9 FL — SIGNIFICANT CHANGE UP (ref 7–13)
POTASSIUM SERPL-MCNC: 3.6 MMOL/L — SIGNIFICANT CHANGE UP (ref 3.5–5.3)
POTASSIUM SERPL-SCNC: 3.6 MMOL/L — SIGNIFICANT CHANGE UP (ref 3.5–5.3)
RBC # BLD: 4.33 M/UL — SIGNIFICANT CHANGE UP (ref 4.2–5.8)
RBC # FLD: 15.2 % — HIGH (ref 10.3–14.5)
SODIUM SERPL-SCNC: 135 MMOL/L — SIGNIFICANT CHANGE UP (ref 135–145)
WBC # BLD: 12.06 K/UL — HIGH (ref 3.8–10.5)
WBC # FLD AUTO: 12.06 K/UL — HIGH (ref 3.8–10.5)

## 2019-03-30 PROCEDURE — 99233 SBSQ HOSP IP/OBS HIGH 50: CPT

## 2019-03-30 RX ORDER — NICOTINE POLACRILEX 2 MG
1 GUM BUCCAL DAILY
Qty: 0 | Refills: 0 | Status: DISCONTINUED | OUTPATIENT
Start: 2019-03-30 | End: 2019-04-05

## 2019-03-30 RX ORDER — LANOLIN ALCOHOL/MO/W.PET/CERES
3 CREAM (GRAM) TOPICAL ONCE
Qty: 0 | Refills: 0 | Status: COMPLETED | OUTPATIENT
Start: 2019-03-30 | End: 2019-03-30

## 2019-03-30 RX ADMIN — SODIUM CHLORIDE 50 MILLILITER(S): 9 INJECTION, SOLUTION INTRAVENOUS at 09:17

## 2019-03-30 RX ADMIN — Medication 3 MILLIGRAM(S): at 01:50

## 2019-03-30 RX ADMIN — Medication 100 MILLIGRAM(S): at 21:33

## 2019-03-30 RX ADMIN — FLUCONAZOLE 100 MILLIGRAM(S): 150 TABLET ORAL at 17:30

## 2019-03-30 RX ADMIN — Medication 25 MILLIGRAM(S): at 17:30

## 2019-03-30 RX ADMIN — BUDESONIDE AND FORMOTEROL FUMARATE DIHYDRATE 2 PUFF(S): 160; 4.5 AEROSOL RESPIRATORY (INHALATION) at 22:10

## 2019-03-30 RX ADMIN — ENOXAPARIN SODIUM 40 MILLIGRAM(S): 100 INJECTION SUBCUTANEOUS at 13:55

## 2019-03-30 RX ADMIN — ATORVASTATIN CALCIUM 40 MILLIGRAM(S): 80 TABLET, FILM COATED ORAL at 21:33

## 2019-03-30 RX ADMIN — Medication 1 PATCH: at 21:30

## 2019-03-30 RX ADMIN — Medication 266 MILLIGRAM(S): at 05:22

## 2019-03-30 RX ADMIN — Medication 266 MILLIGRAM(S): at 13:55

## 2019-03-30 RX ADMIN — Medication 266 MILLIGRAM(S): at 22:11

## 2019-03-30 RX ADMIN — Medication 100 MILLIGRAM(S): at 05:23

## 2019-03-30 RX ADMIN — TIOTROPIUM BROMIDE 1 CAPSULE(S): 18 CAPSULE ORAL; RESPIRATORY (INHALATION) at 11:55

## 2019-03-30 RX ADMIN — Medication 25 MILLIGRAM(S): at 05:23

## 2019-03-30 RX ADMIN — BUDESONIDE AND FORMOTEROL FUMARATE DIHYDRATE 2 PUFF(S): 160; 4.5 AEROSOL RESPIRATORY (INHALATION) at 09:17

## 2019-03-30 RX ADMIN — Medication 1 PATCH: at 13:54

## 2019-03-30 RX ADMIN — Medication 3 MILLIGRAM(S): at 21:33

## 2019-03-30 RX ADMIN — Medication 81 MILLIGRAM(S): at 13:54

## 2019-03-30 RX ADMIN — Medication 100 MILLIGRAM(S): at 13:55

## 2019-03-30 RX ADMIN — Medication 100 MILLIGRAM(S): at 13:54

## 2019-03-30 RX ADMIN — SIMETHICONE 160 MILLIGRAM(S): 80 TABLET, CHEWABLE ORAL at 05:23

## 2019-03-30 NOTE — PROGRESS NOTE ADULT - SUBJECTIVE AND OBJECTIVE BOX
CHIEF COMPLAINT: Patient is a 63y old  male who presents with a chief complaint of Odynophagia/dysphagia, minimal PO intake (30 Mar 2019 15:25)      SUBJECTIVE / OVERNIGHT EVENTS:    Seen with son at the bedside. Patient reports poor appetite. Per NA has only been having coffee - patient reports he will try to eat later. Denies other complaints.    MEDICATIONS  (STANDING):  acyclovir IVPB      acyclovir IVPB 800 milliGRAM(s) IV Intermittent every 8 hours  aspirin enteric coated 81 milliGRAM(s) Oral daily  atorvastatin 40 milliGRAM(s) Oral at bedtime  buDESOnide 160 MICROgram(s)/formoterol 4.5 MICROgram(s) Inhaler 2 Puff(s) Inhalation two times a day  dextrose 5%. 1000 milliLiter(s) (50 mL/Hr) IV Continuous <Continuous>  dextrose 50% Injectable 12.5 Gram(s) IV Push once  dextrose 50% Injectable 25 Gram(s) IV Push once  dextrose 50% Injectable 25 Gram(s) IV Push once  docusate sodium 100 milliGRAM(s) Oral three times a day  enoxaparin Injectable 40 milliGRAM(s) SubCutaneous daily  fluconAZOLE IVPB      fluconAZOLE IVPB 400 milliGRAM(s) IV Intermittent every 24 hours  insulin lispro (HumaLOG) corrective regimen sliding scale   SubCutaneous three times a day before meals  insulin lispro (HumaLOG) corrective regimen sliding scale   SubCutaneous at bedtime  lactated ringers. 1000 milliLiter(s) (50 mL/Hr) IV Continuous <Continuous>  melatonin 3 milliGRAM(s) Oral at bedtime  methadone    Tablet 2.5 milliGRAM(s) Oral two times a day  metoprolol tartrate 25 milliGRAM(s) Oral two times a day  nicotine -  14 mG/24Hr(s) Patch 1 patch Transdermal daily  polyethylene glycol 3350 17 Gram(s) Oral two times a day  pyridoxine 100 milliGRAM(s) Oral daily  tiotropium 18 MICROgram(s) Capsule 1 Capsule(s) Inhalation daily    MEDICATIONS  (PRN):  ALBUTerol    90 MICROgram(s) HFA Inhaler 2 Puff(s) Inhalation every 6 hours PRN Shortness of Breath and/or Wheezing  dextrose 40% Gel 15 Gram(s) Oral once PRN Blood Glucose LESS THAN 70 milliGRAM(s)/deciliter  glucagon  Injectable 1 milliGRAM(s) IntraMuscular once PRN Glucose LESS THAN 70 milligrams/deciliter  ondansetron Injectable 4 milliGRAM(s) IV Push every 6 hours PRN Nausea and/or Vomiting  senna 2 Tablet(s) Oral at bedtime PRN Constipation  simethicone 160 milliGRAM(s) Chew daily PRN Gas      VITALS:  T(F): 98.9 (03-30-19 @ 11:01), Max: 98.9 (03-30-19 @ 11:01)  HR: 80 (03-30-19 @ 11:01) (77 - 94)  BP: 131/57 (03-30-19 @ 11:01) (131/57 - 149/62)  RR: 20 (03-30-19 @ 11:01) (20 - 80)  SpO2: 94% (03-30-19 @ 11:01)      CAPILLARY BLOOD GLUCOSE    Output     I&O's Summary  T(F): 98.9 (03-30-19 @ 11:01), Max: 98.9 (03-30-19 @ 11:01)  HR: 80 (03-30-19 @ 11:01) (77 - 94)  BP: 131/57 (03-30-19 @ 11:01) (131/57 - 149/62)  RR: 20 (03-30-19 @ 11:01) (20 - 80)  SpO2: 94% (03-30-19 @ 11:01)    PHYSICAL EXAM:  GENERAL: NAD, well-developed  HEAD:  Atraumatic, Normocephalic  EYES: EOMI  NECK: Supple, No JVD  CHEST/LUNG: nonlabored breathing  HEART: nl S1/S2  ABDOMEN: nondistended, soft  EXTREMITIES:  no LE edema  PSYCH: alert, answering questions appropriately  NEUROLOGY: non-focal  SKIN: No rashes noted    LABS:              10.6                 135  | 28   | 6            12.06 >-----------< 397     ------------------------< 98                    34.2                 3.6  | 92   | 0.63                                         Ca 9.2   Mg 1.6   Ph 3.3                        MICROBIOLOGY:        RADIOLOGY & ADDITIONAL TESTS:    Imaging Personally Reviewed:    < from: MR Head w/ IV Cont (03.29.19 @ 17:26) >  Impression: This exam is limited by motion.    subacute lacunar infarct suspected in the posterior left lenticular   nucleus region.    < end of copied text >        [x] Care Discussed with Consultants/Other Providers:      All Gleason M.D.  Hospitalist  Pager [phone]

## 2019-03-30 NOTE — CONSULT NOTE ADULT - ATTENDING COMMENTS
Patient seen and examined agree with above.  The new stroke found on MRI needs to have a full work.  Certainly cancer brings the patient into a hypercoagulable state      MRA brain and neck     aspirin and statin    PT

## 2019-03-30 NOTE — PROGRESS NOTE ADULT - ASSESSMENT
62 yo man, current smoker (~1 ppd), with history of stage 4 left lung adenocarcinoma, on immunotherapy with Keytruda (first session on 3/4/19), recurrent left pleural effusion s/p left VATS with pleurX placement (2/2019), COPD, DM2, HTN, HLD, and anxiety presents with 1 week of worsening throat pain with odynophagia and dysphagia, started on fluconazole for thrush and possible esophageal candidiasis, also with herpes zoster.    Problem/Plan - 1:  ·  Problem: Herpes zoster.  Plan: -pt is immunocompromised and c/o new rash on his right mid back crossing to his abdomen, rash is erythematous vesicular c/w herpes zoster, not crusted  - cont iv acyclovir 10 mg/kg q8h, monitor lesion, IVF while on iv acyclovir  - plan to change to oral valacyclovir 1000 mg q8h on DC to home - finish total 14 days of therapy  - contact and airborne precautions    Problem/Plan - 2:  ·  Problem: Odynophagia.  Plan: dysphagia and odynophagia, likely 2/2 progression of metastatic disease in mediastinal area. Recent PET/CT showing hypermetabolic right supraclavicular, bilateral mediastinal, and left perihilar lymphadenopathy, increased in size from a month ago. MRI brain with possible subacute lacunar infarct in posterior L lenticular nucleus region - may also have neurogenic dysphagia. Given oral thrush, possible candida esophagitis  - patient now agreeable to EGD - GI aware - patient now off airborne isolation  - continue fluconazole 400 mg IV  - per patient odynophagia has improved, monitor symptoms of dysphagia  - s/p MBS - oropharyngeal dysphagia - SLP recommending dysphagia 2 with nectar thick liquids - liquids to be consumed via small, single cup sip ONLY  - encourage oral intake  - awaiting EGD    Problem/Plan - 3:  ·  Problem: Failure to thrive in adult.  Plan: Pt with poor nutrition, decreased appetite, and weight loss in setting of metastatic lung cancer.   - pt with severe protein calorie malnutrition per nutrition eval  - plan as above for odynophagia, dysphagia  - f/u palliative care for symptomatic management  - anticipate home with home PT per physical therapy recs    Problem/Plan - 4:  ·  Problem: Pleural effusion, left.  Plan: Pt with recurrent left loculated pleural effusion, s/p left VATS and pleurX placement. PleurX still in place, draining serosanguinous fluid likely due to malignancy  - CXR stable L pleural effusion  - C/w PleurX drainage; monitor output  - Monitor respiratory status  - f/u CTS recs, pt follows with Dr. Moore    Problem/Plan - 5:  ·  Problem: Lung cancer.  Plan: Pt with stage 4 left lung adenocarcinoma, started on immunotherapy on 3/4/19 with Keytruda.  - Oncology consult appreciated  - MRI brain limited by motion, no gross evidence of metastatic disease  - appreciate palliative care recs  - per Dr. Mao, patient should f/u for additional Keytruda treatment  - C/w methadone (I-Stop Reference #: [id number]) and oxycodone PRN    Problem/Plan - 6:  Problem: COPD (chronic obstructive pulmonary disease). Plan: No S/S of exacerbation.  - C/w albuterol PRN  - Anoro Ellipta not available through pharmacy. Will do Symbicort and Spiriva for now and have pt's family bring in medication from home.    Problem/Plan - 7:  ·  Problem: Diabetes mellitus.  Plan: Pt on Januvia and Metformin at home  - fingersticks all 90's to 100's  - likely due to patient eating little  - monitor on correction scale for now    Problem/Plan - 8:  ·  Problem: Hypertension.  Plan: Pt on amlodipine, valsartan, metoprolol, and lasix at home.  - Will c/w metoprolol, but will hold amlodipine (given chronic b/l LE edema), valsartan, and lasix  - Will restart additional home BP meds if needed.     Problem/Plan - 9:  ·  Problem: Need for prophylactic measure.  Plan: - DVT ppx: pt with hypercoagulable state, start lovenox sc   OOB and ambulation as tolerated.  - Diet: soft diet.     Dispo: pending ability to tolerate food, EGD, neurology recs for possible subacute CVA  - will need close f/u with Dr. Mao for lung cancer treatment  - PT: home w home PT

## 2019-03-30 NOTE — CONSULT NOTE ADULT - SUBJECTIVE AND OBJECTIVE BOX
JACK [id number] is a 63y old  Male who presents with a chief complaint of Odynophagia/dysphagia, minimal PO intake (29 Mar 2019 16:30)      HPI:  62 yo man, current smoker (~1 ppd), with history of stage 4 left lung adenocarcinoma, on immunotherapy with Keytruda (first session on 3/4/19), recurrent left pleural effusion s/p left VATS with pleurX placement (2/2019), COPD, DM2, HTN, HLD, and anxiety presents with 1 week of worsening throat pain with odynophagia and dysphagia. Pt states that for the past 1 week, he has been feeling overwhelming dryness/scratchiness in his throat, causing pain and difficulty with swallowing. Pt notes that whenever he tries to swallow, he experiences the sensation of something getting stuck in his throat and upper chest area. Pt has this sensation with both liquids and solids. As a result, pt has been having very little to eat and drink, avoiding water entirely the last 2 days. Pt also reports poor appetite and ongoing pain in his left shoulder/scapula and near his pleurX insertion site. Pt saw Dr. Avitia (Palliative) earlier this month and was started on methadone for pain control. Pt denies any recent F/C, CP, palpitations, cough, abdominal pain, N/V, diarrhea, constipation, or urinary symptoms. Pt continues to have SOB when lying flat, and therefore, currently sleeps upright in a chair.     In the ED,  T 98.1-98.8, HR 75-88, -144/60-64, RR 16-32, O2 sats 95-97% RA.   CXR with unchanged left loculated pleural effusion. (20 Mar 2019 23:08)          MEDICATIONS  (STANDING):  acyclovir IVPB      acyclovir IVPB 800 milliGRAM(s) IV Intermittent every 8 hours  aspirin enteric coated 81 milliGRAM(s) Oral daily  atorvastatin 40 milliGRAM(s) Oral at bedtime  buDESOnide 160 MICROgram(s)/formoterol 4.5 MICROgram(s) Inhaler 2 Puff(s) Inhalation two times a day  dextrose 5%. 1000 milliLiter(s) (50 mL/Hr) IV Continuous <Continuous>  dextrose 50% Injectable 12.5 Gram(s) IV Push once  dextrose 50% Injectable 25 Gram(s) IV Push once  dextrose 50% Injectable 25 Gram(s) IV Push once  docusate sodium 100 milliGRAM(s) Oral three times a day  enoxaparin Injectable 40 milliGRAM(s) SubCutaneous daily  fluconAZOLE IVPB      fluconAZOLE IVPB 400 milliGRAM(s) IV Intermittent every 24 hours  insulin lispro (HumaLOG) corrective regimen sliding scale   SubCutaneous three times a day before meals  insulin lispro (HumaLOG) corrective regimen sliding scale   SubCutaneous at bedtime  lactated ringers. 1000 milliLiter(s) (50 mL/Hr) IV Continuous <Continuous>  melatonin 3 milliGRAM(s) Oral at bedtime  methadone    Tablet 2.5 milliGRAM(s) Oral two times a day  metoprolol tartrate 25 milliGRAM(s) Oral two times a day  nicotine -  14 mG/24Hr(s) Patch 1 patch Transdermal daily  polyethylene glycol 3350 17 Gram(s) Oral two times a day  pyridoxine 100 milliGRAM(s) Oral daily  tiotropium 18 MICROgram(s) Capsule 1 Capsule(s) Inhalation daily    MEDICATIONS  (PRN):  ALBUTerol    90 MICROgram(s) HFA Inhaler 2 Puff(s) Inhalation every 6 hours PRN Shortness of Breath and/or Wheezing  dextrose 40% Gel 15 Gram(s) Oral once PRN Blood Glucose LESS THAN 70 milliGRAM(s)/deciliter  glucagon  Injectable 1 milliGRAM(s) IntraMuscular once PRN Glucose LESS THAN 70 milligrams/deciliter  ondansetron Injectable 4 milliGRAM(s) IV Push every 6 hours PRN Nausea and/or Vomiting  senna 2 Tablet(s) Oral at bedtime PRN Constipation  simethicone 160 milliGRAM(s) Chew daily PRN Gas    PAST MEDICAL & SURGICAL HISTORY:  Anxiety  Pleural effusion, left  Lung cancer  COPD (chronic obstructive pulmonary disease)  HLD (hyperlipidemia)  Diabetes mellitus  Hypertension  No significant past surgical history    FAMILY HISTORY:  Family history of diabetes mellitus: Parents    Allergies    No Known Allergies    Intolerances        SHx - No smoking, No ETOH, No drug abuse      Review of Systems:  CONSTITUTIONAL:   HEENT:  No visual loss, blurred vision, double vision.  No hearing loss, sneezing, congestion, runny nose or sore throat.  SKIN:  No rash or itching.  CARDIOVASCULAR:  No chest pain, chest pressure or chest discomfort. No palpitations or edema.  RESPIRATORY:  No shortness of breath, cough or sputum.  GASTROINTESTINAL:  No anorexia, nausea, vomiting or diarrhea. No abdominal pain.  GENITOURINARY:  NO dysuria. No increased frequency. No retention. No incontinence.  NEUROLOGICAL:  See HPI  MUSCULOSKELETAL:  No muscle, back pain, joint pain or stiffness.  HEMATOLOGIC:  No anemia, bleeding or bruising.  PSYCHIATRIC:    ENDOCRINOLOGIC:  No reports of sweating, cold or heat intolerance. No polyuria or polydipsia.        Vital Signs Last 24 Hrs  T(C): 37.2 (30 Mar 2019 11:01), Max: 37.2 (30 Mar 2019 11:01)  T(F): 98.9 (30 Mar 2019 11:01), Max: 98.9 (30 Mar 2019 11:01)  HR: 80 (30 Mar 2019 11:01) (77 - 94)  BP: 131/57 (30 Mar 2019 11:01) (131/57 - 149/62)  BP(mean): --  RR: 20 (30 Mar 2019 11:01) (20 - 80)  SpO2: 94% (30 Mar 2019 11:01) (90% - 96%)    General Exam:   General appearance: No acute distress    Cardiac:  Pulm:                 Neurological Exam:  Mental Status: Orientated to self, date and place.  Attention intact.  No dysarthria. Speech fluent.  Cranial Nerves:   PERRL, EOMI, VFF, no nystagmus.    CN V1-3 intact to light touch .  No facial asymmetry.  Hearing intact to finger rub bilaterally.  Tongue, uvula and palate midline.  Sternocleidomastoid and Trapezius intact bilaterally.    Motor:   Tone: normal.                  Strength:     [] Upper extremity                      Delt       Bicep    Tricep                                                  R         5/5 5/5        5/5       5/5                                               L          5/5        5/5        5/5       5/5  [] Lower extremity                       HF          KE          KF        DF         PF                                               R        5/5 5/5        5/5       5/5       5/5                                               L         5/5 5/5       5/5       5/5        5/5  Pronator drift: none                 Dysmetria: None to finger-nose-finger or heel-shin-heel  No truncal ataxia.    Tremor: No resting, postural or action tremor.  No myoclonus.    Sensation: intact to light touch, pinprick, vibration and proprioception    Deep Tendon Reflexes:     Biceps          Triceps      BR        Patellar        Ankle         Babinski                                         R       2+                   2+           2+            2+               2+           downgoing                                         L        2+                  2+           2+            2+               2+           downgoing    Gait: normal.      Other:    03-30    135  |  92<L>  |  6<L>  ----------------------------<  98  3.6   |  28  |  0.63    Ca    9.2      30 Mar 2019 07:48  Phos  3.3     03-30  Mg     1.6     03-30                              10.6   12.06 )-----------( 397      ( 30 Mar 2019 07:48 )             34.2       Radiology    CT:   MRI  EKG:  tele:  TTE:  EEG: JACK [id number] is a 63y old  Male who presents with a chief complaint of Odynophagia/dysphagia, minimal PO intake (29 Mar 2019 16:30)      HPI:  62 yo man, current smoker (~1 ppd), with history of stage 4 left lung adenocarcinoma, on immunotherapy with Keytruda (first session on 3/4/19), recurrent left pleural effusion s/p left VATS with pleurX placement (2/2019), COPD, DM2, HTN, HLD, and anxiety presented to hospital with 1 week of worsening throat pain with odynophagia and dysphagia. He was found to have oral thrush and was treated with fluconazole.     In the ED,  T 98.1-98.8, HR 75-88, -144/60-64, RR 16-32, O2 sats 95-97% RA.   CXR with unchanged left loculated pleural effusion. (20 Mar 2019 23:08)          MEDICATIONS  (STANDING):  acyclovir IVPB      acyclovir IVPB 800 milliGRAM(s) IV Intermittent every 8 hours  aspirin enteric coated 81 milliGRAM(s) Oral daily  atorvastatin 40 milliGRAM(s) Oral at bedtime  buDESOnide 160 MICROgram(s)/formoterol 4.5 MICROgram(s) Inhaler 2 Puff(s) Inhalation two times a day  dextrose 5%. 1000 milliLiter(s) (50 mL/Hr) IV Continuous <Continuous>  dextrose 50% Injectable 12.5 Gram(s) IV Push once  dextrose 50% Injectable 25 Gram(s) IV Push once  dextrose 50% Injectable 25 Gram(s) IV Push once  docusate sodium 100 milliGRAM(s) Oral three times a day  enoxaparin Injectable 40 milliGRAM(s) SubCutaneous daily  fluconAZOLE IVPB      fluconAZOLE IVPB 400 milliGRAM(s) IV Intermittent every 24 hours  insulin lispro (HumaLOG) corrective regimen sliding scale   SubCutaneous three times a day before meals  insulin lispro (HumaLOG) corrective regimen sliding scale   SubCutaneous at bedtime  lactated ringers. 1000 milliLiter(s) (50 mL/Hr) IV Continuous <Continuous>  melatonin 3 milliGRAM(s) Oral at bedtime  methadone    Tablet 2.5 milliGRAM(s) Oral two times a day  metoprolol tartrate 25 milliGRAM(s) Oral two times a day  nicotine -  14 mG/24Hr(s) Patch 1 patch Transdermal daily  polyethylene glycol 3350 17 Gram(s) Oral two times a day  pyridoxine 100 milliGRAM(s) Oral daily  tiotropium 18 MICROgram(s) Capsule 1 Capsule(s) Inhalation daily    MEDICATIONS  (PRN):  ALBUTerol    90 MICROgram(s) HFA Inhaler 2 Puff(s) Inhalation every 6 hours PRN Shortness of Breath and/or Wheezing  dextrose 40% Gel 15 Gram(s) Oral once PRN Blood Glucose LESS THAN 70 milliGRAM(s)/deciliter  glucagon  Injectable 1 milliGRAM(s) IntraMuscular once PRN Glucose LESS THAN 70 milligrams/deciliter  ondansetron Injectable 4 milliGRAM(s) IV Push every 6 hours PRN Nausea and/or Vomiting  senna 2 Tablet(s) Oral at bedtime PRN Constipation  simethicone 160 milliGRAM(s) Chew daily PRN Gas    PAST MEDICAL & SURGICAL HISTORY:  Anxiety  Pleural effusion, left  Lung cancer  COPD (chronic obstructive pulmonary disease)  HLD (hyperlipidemia)  Diabetes mellitus  Hypertension  No significant past surgical history    FAMILY HISTORY:  Family history of diabetes mellitus: Parents    Allergies    No Known Allergies    Intolerances        SHx - No smoking, No ETOH, No drug abuse      Review of Systems:  CONSTITUTIONAL:   HEENT:  No visual loss, blurred vision, double vision.  No hearing loss, sneezing, congestion, runny nose or sore throat.  SKIN:  No rash or itching.  CARDIOVASCULAR:  No chest pain, chest pressure or chest discomfort. No palpitations or edema.  RESPIRATORY:  No shortness of breath, cough or sputum.  GASTROINTESTINAL:  No anorexia, nausea, vomiting or diarrhea. No abdominal pain.  GENITOURINARY:  NO dysuria. No increased frequency. No retention. No incontinence.  NEUROLOGICAL:  See HPI  MUSCULOSKELETAL:  No muscle, back pain, joint pain or stiffness.  HEMATOLOGIC:  No anemia, bleeding or bruising.  PSYCHIATRIC:    ENDOCRINOLOGIC:  No reports of sweating, cold or heat intolerance. No polyuria or polydipsia.        Vital Signs Last 24 Hrs  T(C): 37.2 (30 Mar 2019 11:01), Max: 37.2 (30 Mar 2019 11:01)  T(F): 98.9 (30 Mar 2019 11:01), Max: 98.9 (30 Mar 2019 11:01)  HR: 80 (30 Mar 2019 11:01) (77 - 94)  BP: 131/57 (30 Mar 2019 11:01) (131/57 - 149/62)  BP(mean): --  RR: 20 (30 Mar 2019 11:01) (20 - 80)  SpO2: 94% (30 Mar 2019 11:01) (90% - 96%)    General Exam:   General appearance: No acute distress    Cardiac:  Pulm:                 Neurological Exam:  Mental Status: Orientated to self, date and place.  Attention intact.  No dysarthria. Speech fluent.  Cranial Nerves:   PERRL, EOMI, VFF, no nystagmus.    CN V1-3 intact to light touch .  No facial asymmetry.  Hearing intact to finger rub bilaterally.  Tongue, uvula and palate midline.  Sternocleidomastoid and Trapezius intact bilaterally.    Motor:   Tone: normal.                  Strength:     [] Upper extremity                      Delt       Bicep    Tricep                                                  R         5/5        5/5        5/5       5/5                                               L          5/5        5/5        5/5       5/5  [] Lower extremity                       HF          KE          KF        DF         PF                                               R        5/5        5/5        5/5       5/5       5/5                                               L         5/5        5/5       5/5       5/5        5/5  Pronator drift: none                 Dysmetria: None to finger-nose-finger or heel-shin-heel  No truncal ataxia.    Tremor: No resting, postural or action tremor.  No myoclonus.    Sensation: intact to light touch, pinprick, vibration and proprioception    Deep Tendon Reflexes:     Biceps          Triceps      BR        Patellar        Ankle         Babinski                                         R       2+                   2+           2+            2+               2+           downgoing                                         L        2+                  2+           2+            2+               2+           downgoing    Gait: normal.      Other:    03-30    135  |  92<L>  |  6<L>  ----------------------------<  98  3.6   |  28  |  0.63    Ca    9.2      30 Mar 2019 07:48  Phos  3.3     03-30  Mg     1.6     03-30                              10.6   12.06 )-----------( 397      ( 30 Mar 2019 07:48 )             34.2       Radiology    CT:   MRI  EKG:  tele:  TTE:  EEG: JACK [id number] is a 63y old  Male who presents with a chief complaint of Odynophagia/dysphagia, minimal PO intake (29 Mar 2019 16:30)      HPI:  62 yo man, current smoker (~1 ppd), with history of stage 4 left lung adenocarcinoma, on immunotherapy with Keytruda (first session on 3/4/19), recurrent left pleural effusion s/p left VATS with pleurX placement (2/2019), COPD, DM2, HTN, HLD, and anxiety presented to hospital with 1 week of worsening throat pain with odynophagia and dysphagia. He was found to have oral thrush and was treated with fluconazole. He was found to have herpes zoster, was treated with acyclovir.     For staging of his lung cancer, he underwent MRI brain to r/o brain mets. MRI brain showed possible subacute infarct in the left lentiform nucleus. Neurology consulted for imaging findings. Denies any numbness/tingling, weakness.    MEDICATIONS  (STANDING):  acyclovir IVPB      acyclovir IVPB 800 milliGRAM(s) IV Intermittent every 8 hours  aspirin enteric coated 81 milliGRAM(s) Oral daily  atorvastatin 40 milliGRAM(s) Oral at bedtime  buDESOnide 160 MICROgram(s)/formoterol 4.5 MICROgram(s) Inhaler 2 Puff(s) Inhalation two times a day  dextrose 5%. 1000 milliLiter(s) (50 mL/Hr) IV Continuous <Continuous>  dextrose 50% Injectable 12.5 Gram(s) IV Push once  dextrose 50% Injectable 25 Gram(s) IV Push once  dextrose 50% Injectable 25 Gram(s) IV Push once  docusate sodium 100 milliGRAM(s) Oral three times a day  enoxaparin Injectable 40 milliGRAM(s) SubCutaneous daily  fluconAZOLE IVPB      fluconAZOLE IVPB 400 milliGRAM(s) IV Intermittent every 24 hours  insulin lispro (HumaLOG) corrective regimen sliding scale   SubCutaneous three times a day before meals  insulin lispro (HumaLOG) corrective regimen sliding scale   SubCutaneous at bedtime  lactated ringers. 1000 milliLiter(s) (50 mL/Hr) IV Continuous <Continuous>  melatonin 3 milliGRAM(s) Oral at bedtime  methadone    Tablet 2.5 milliGRAM(s) Oral two times a day  metoprolol tartrate 25 milliGRAM(s) Oral two times a day  nicotine -  14 mG/24Hr(s) Patch 1 patch Transdermal daily  polyethylene glycol 3350 17 Gram(s) Oral two times a day  pyridoxine 100 milliGRAM(s) Oral daily  tiotropium 18 MICROgram(s) Capsule 1 Capsule(s) Inhalation daily    MEDICATIONS  (PRN):  ALBUTerol    90 MICROgram(s) HFA Inhaler 2 Puff(s) Inhalation every 6 hours PRN Shortness of Breath and/or Wheezing  dextrose 40% Gel 15 Gram(s) Oral once PRN Blood Glucose LESS THAN 70 milliGRAM(s)/deciliter  glucagon  Injectable 1 milliGRAM(s) IntraMuscular once PRN Glucose LESS THAN 70 milligrams/deciliter  ondansetron Injectable 4 milliGRAM(s) IV Push every 6 hours PRN Nausea and/or Vomiting  senna 2 Tablet(s) Oral at bedtime PRN Constipation  simethicone 160 milliGRAM(s) Chew daily PRN Gas    PAST MEDICAL & SURGICAL HISTORY:  Anxiety  Pleural effusion, left  Lung cancer  COPD (chronic obstructive pulmonary disease)  HLD (hyperlipidemia)  Diabetes mellitus  Hypertension  No significant past surgical history    FAMILY HISTORY:  Family history of diabetes mellitus: Parents    Allergies    No Known Allergies    Intolerances        SHx - No smoking, No ETOH, No drug abuse      Review of Systems:    see hpi      Vital Signs Last 24 Hrs  T(C): 37.2 (30 Mar 2019 11:01), Max: 37.2 (30 Mar 2019 11:01)  T(F): 98.9 (30 Mar 2019 11:01), Max: 98.9 (30 Mar 2019 11:01)  HR: 80 (30 Mar 2019 11:01) (77 - 94)  BP: 131/57 (30 Mar 2019 11:01) (131/57 - 149/62)  BP(mean): --  RR: 20 (30 Mar 2019 11:01) (20 - 80)  SpO2: 94% (30 Mar 2019 11:01) (90% - 96%)    Neurological Exam:  Mental Status: Orientated to self, year, did not know month (stated "May").  Attention intact.  No dysarthria. Speech fluent. names pen, thumb, gloves. follows all commands  Cranial Nerves:  EOMI, VFF, no nystagmus.  No facial asymmetry.   Tongue midline.  Sternocleidomastoid and Trapezius intact bilaterally.          Strength:     [] Upper extremity                      Delt       Bicep    Tricep                                                  R         5/5        5/5        5/5       5/5                                               L          5/5 5/5 5/5 5/5  [] Lower extremity                       HF          KE          KF        DF         PF                                               R        5/5 5/5 5/5 5/5 5/5                                               L         5/5 5/5 5/5 5/5 5/5  Pronator drift: none                 Dysmetria: None to finger-nose-finger   No truncal ataxia.    Tremor: No resting, postural or action tremor.  No myoclonus.    Sensation: intact to light touch. no extinction    Deep Tendon Reflexes:     Biceps          Triceps      BR        Patellar        Ankle         Babinski                                         R       2+                   2+           2+            2+               2+           downgoing                                         L        2+                  2+           2+            2+               2+           downgoing      Other:    03-30    135  |  92<L>  |  6<L>  ----------------------------<  98  3.6   |  28  |  0.63    Ca    9.2      30 Mar 2019 07:48  Phos  3.3     03-30  Mg     1.6     03-30                              10.6   12.06 )-----------( 397      ( 30 Mar 2019 07:48 )             34.2       Radiology    CT: < from: MR Head w/ IV Cont (03.29.19 @ 17:26) >  Impression: This exam is limited by motion.    subacute lacunar infarct suspected in the posterior left lenticular   nucleus region.    < end of copied text >    MRI  EKG:  tele:  TTE:  EEG:

## 2019-03-30 NOTE — CONSULT NOTE ADULT - ASSESSMENT
Impression: Not entirely clear if the left lentiform nucleus infarct is indeed a true infarct. If it is an infarct, etiology is undetermined, but could be embolic in the setting of hypercoagulable state of malignancy vs competing mechanism of small vessel disease vs. marantic endocarditis. Underwent TTE in February 2019 which showed grossly normal EF, and no major valvular pathology. This does not rule out marantic endocarditis, as these lesions are not visualized on TTE.    Plan:  -c/w ASA 81 for secondary stroke prevention  -MRA head w/o contrast  -MRA neck w/ contrast  -if unable to tolerate MRA head and neck, can do bilateral carotid dopplers  -telemetry monitoring  -consider ILR placement pending above workup  -may eventually need therapeutic lovenox 1 mg/kg BID for secondary stroke prevention in the setting of hypercoagulable state of malignancy   -PT/OT

## 2019-03-31 LAB
ANION GAP SERPL CALC-SCNC: 16 MMO/L — HIGH (ref 7–14)
BUN SERPL-MCNC: 5 MG/DL — LOW (ref 7–23)
CALCIUM SERPL-MCNC: 9.1 MG/DL — SIGNIFICANT CHANGE UP (ref 8.4–10.5)
CHLORIDE SERPL-SCNC: 93 MMOL/L — LOW (ref 98–107)
CO2 SERPL-SCNC: 25 MMOL/L — SIGNIFICANT CHANGE UP (ref 22–31)
CREAT SERPL-MCNC: 0.58 MG/DL — SIGNIFICANT CHANGE UP (ref 0.5–1.3)
GLUCOSE SERPL-MCNC: 98 MG/DL — SIGNIFICANT CHANGE UP (ref 70–99)
HCT VFR BLD CALC: 33.5 % — LOW (ref 39–50)
HGB BLD-MCNC: 10.4 G/DL — LOW (ref 13–17)
MAGNESIUM SERPL-MCNC: 1.7 MG/DL — SIGNIFICANT CHANGE UP (ref 1.6–2.6)
MCHC RBC-ENTMCNC: 24.4 PG — LOW (ref 27–34)
MCHC RBC-ENTMCNC: 31 % — LOW (ref 32–36)
MCV RBC AUTO: 78.6 FL — LOW (ref 80–100)
NRBC # FLD: 0.03 K/UL — SIGNIFICANT CHANGE UP (ref 0–0)
PHOSPHATE SERPL-MCNC: 3.1 MG/DL — SIGNIFICANT CHANGE UP (ref 2.5–4.5)
PLATELET # BLD AUTO: 382 K/UL — SIGNIFICANT CHANGE UP (ref 150–400)
PMV BLD: 8.9 FL — SIGNIFICANT CHANGE UP (ref 7–13)
POTASSIUM SERPL-MCNC: 3.7 MMOL/L — SIGNIFICANT CHANGE UP (ref 3.5–5.3)
POTASSIUM SERPL-SCNC: 3.7 MMOL/L — SIGNIFICANT CHANGE UP (ref 3.5–5.3)
RBC # BLD: 4.26 M/UL — SIGNIFICANT CHANGE UP (ref 4.2–5.8)
RBC # FLD: 15.5 % — HIGH (ref 10.3–14.5)
SODIUM SERPL-SCNC: 134 MMOL/L — LOW (ref 135–145)
WBC # BLD: 13.6 K/UL — HIGH (ref 3.8–10.5)
WBC # FLD AUTO: 13.6 K/UL — HIGH (ref 3.8–10.5)

## 2019-03-31 PROCEDURE — 93880 EXTRACRANIAL BILAT STUDY: CPT | Mod: 26

## 2019-03-31 PROCEDURE — 99233 SBSQ HOSP IP/OBS HIGH 50: CPT

## 2019-03-31 RX ADMIN — Medication 3 MILLIGRAM(S): at 21:12

## 2019-03-31 RX ADMIN — TIOTROPIUM BROMIDE 1 CAPSULE(S): 18 CAPSULE ORAL; RESPIRATORY (INHALATION) at 11:38

## 2019-03-31 RX ADMIN — SODIUM CHLORIDE 50 MILLILITER(S): 9 INJECTION, SOLUTION INTRAVENOUS at 06:22

## 2019-03-31 RX ADMIN — Medication 100 MILLIGRAM(S): at 06:20

## 2019-03-31 RX ADMIN — Medication 100 MILLIGRAM(S): at 21:12

## 2019-03-31 RX ADMIN — Medication 25 MILLIGRAM(S): at 17:53

## 2019-03-31 RX ADMIN — Medication 1 PATCH: at 18:03

## 2019-03-31 RX ADMIN — SODIUM CHLORIDE 50 MILLILITER(S): 9 INJECTION, SOLUTION INTRAVENOUS at 11:43

## 2019-03-31 RX ADMIN — FLUCONAZOLE 100 MILLIGRAM(S): 150 TABLET ORAL at 15:00

## 2019-03-31 RX ADMIN — Medication 266 MILLIGRAM(S): at 14:17

## 2019-03-31 RX ADMIN — Medication 1 PATCH: at 12:47

## 2019-03-31 RX ADMIN — ENOXAPARIN SODIUM 40 MILLIGRAM(S): 100 INJECTION SUBCUTANEOUS at 11:40

## 2019-03-31 RX ADMIN — ATORVASTATIN CALCIUM 40 MILLIGRAM(S): 80 TABLET, FILM COATED ORAL at 21:12

## 2019-03-31 RX ADMIN — Medication 100 MILLIGRAM(S): at 14:19

## 2019-03-31 RX ADMIN — Medication 266 MILLIGRAM(S): at 06:20

## 2019-03-31 RX ADMIN — Medication 1 PATCH: at 11:41

## 2019-03-31 RX ADMIN — BUDESONIDE AND FORMOTEROL FUMARATE DIHYDRATE 2 PUFF(S): 160; 4.5 AEROSOL RESPIRATORY (INHALATION) at 21:12

## 2019-03-31 RX ADMIN — BUDESONIDE AND FORMOTEROL FUMARATE DIHYDRATE 2 PUFF(S): 160; 4.5 AEROSOL RESPIRATORY (INHALATION) at 09:13

## 2019-03-31 RX ADMIN — Medication 1 PATCH: at 06:28

## 2019-03-31 RX ADMIN — Medication 81 MILLIGRAM(S): at 11:43

## 2019-03-31 RX ADMIN — Medication 100 MILLIGRAM(S): at 11:43

## 2019-03-31 RX ADMIN — Medication 25 MILLIGRAM(S): at 06:20

## 2019-03-31 RX ADMIN — Medication 266 MILLIGRAM(S): at 21:12

## 2019-03-31 NOTE — CHART NOTE - NSCHARTNOTEFT_GEN_A_CORE
Gastroenterology Brief Note   - NPO for EGD tomorrow   - to be done for dysphagia   - patient now off contact

## 2019-03-31 NOTE — PROGRESS NOTE ADULT - ASSESSMENT
62 yo man, current smoker (~1 ppd), with history of stage 4 left lung adenocarcinoma, on immunotherapy with Keytruda (first session on 3/4/19), recurrent left pleural effusion s/p left VATS with pleurX placement (2/2019), COPD, DM2, HTN, HLD, and anxiety presents with 1 week of worsening throat pain with odynophagia and dysphagia, started on fluconazole for thrush and possible esophageal candidiasis, also with herpes zoster.    Problem/Plan - 1:  ·  Problem: Herpes zoster.  Plan: -pt is immunocompromised and c/o new rash on his right mid back crossing to his abdomen, rash is erythematous vesicular c/w herpes zoster, not crusted  - cont iv acyclovir 10 mg/kg q8h, monitor lesion, IVF while on iv acyclovir  - plan to change to oral valacyclovir 1000 mg q8h on DC to home - finish total 14 days of therapy  - contact and airborne precautions    Problem/Plan - 2:  ·  Problem: Odynophagia.  Plan: dysphagia and odynophagia, likely 2/2 progression of metastatic disease in mediastinal area. Recent PET/CT showing hypermetabolic right supraclavicular, bilateral mediastinal, and left perihilar lymphadenopathy, increased in size from a month ago. MRI brain with possible subacute lacunar infarct in posterior L lenticular nucleus region - may also have neurogenic dysphagia. Given oral thrush, possible candida esophagitis  - odynophagia improved, however still eating minimally, report of food being "stuck"  - EGD by GI - patient now agreeable, off airborne isolation  - continue fluconazole 400 mg IV for possible candida esophagitis  - s/p MBS - oropharyngeal dysphagia - SLP recommending dysphagia 2 with nectar thick liquids - liquids to be consumed via small, single cup sip ONLY  - calorie count - checked for papers in chart, not there - will restart  - if oral intake insufficient for caloric/nutritional needs, may need G tube for feeding    Problem/Plan - 3:  ·  Problem: Failure to thrive in adult.  Plan: Pt with poor nutrition, decreased appetite, and weight loss in setting of metastatic lung cancer.   - pt with severe protein calorie malnutrition per nutrition eval  - plan as above for odynophagia, dysphagia  - if oral intake insufficient for caloric/nutritional needs, may need G tube for feeding  - f/u palliative care for symptomatic management  - anticipate home with home PT per physical therapy recs    Problem/Plan - 4:  ·  Problem: Pleural effusion, left.  Plan: Pt with recurrent left loculated pleural effusion, s/p left VATS and pleurX placement. PleurX still in place, draining serosanguinous fluid likely due to malignancy  - CXR stable L pleural effusion  - C/w PleurX drainage; monitor output  - Monitor respiratory status  - f/u CTS recs, pt follows with Dr. Moore    Problem/Plan - 5:  ·  Problem: Lung cancer.  Plan: Pt with stage 4 left lung adenocarcinoma, started on immunotherapy on 3/4/19 with Keytruda.  - Oncology consult appreciated  - MRI brain limited by motion, no gross evidence of metastatic disease  - appreciate palliative care recs  - per Dr. Mao, patient should f/u for additional Keytruda treatment  - C/w methadone (I-Stop Reference #: [id number]) and oxycodone PRN    Problem/Plan - 6:  Problem: COPD (chronic obstructive pulmonary disease). Plan: No S/S of exacerbation.  - C/w albuterol PRN  - Anoro Ellipta not available through pharmacy. Will do Symbicort and Spiriva for now and have pt's family bring in medication from home.    Problem/Plan - 7:  ·  Problem: Diabetes mellitus.  Plan: Pt on Januvia and Metformin at home  - fingersticks all 90's to 100's  - likely due to patient eating little  - monitor on correction scale for now    Problem/Plan - 8:  ·  Problem: Hypertension.  Plan: Pt on amlodipine, valsartan, metoprolol, and lasix at home.  - Will c/w metoprolol, but will hold amlodipine (given chronic b/l LE edema), valsartan, and lasix  - Will restart additional home BP meds if needed.     Problem/Plan - 9:  ·  Problem: Need for prophylactic measure.  Plan: - DVT ppx: pt with hypercoagulable state, start lovenox sc   OOB and ambulation as tolerated.  - Diet: soft diet.     Dispo: pending ability to tolerate food, EGD, neurology recs for possible subacute CVA  - will need close f/u with Dr. Mao for lung cancer treatment  - PT: home w home PT 64 yo man, current smoker (~1 ppd), with history of stage 4 left lung adenocarcinoma, on immunotherapy with Keytruda (first session on 3/4/19), recurrent left pleural effusion s/p left VATS with pleurX placement (2/2019), COPD, DM2, HTN, HLD, and anxiety presents with 1 week of worsening throat pain with odynophagia and dysphagia, started on fluconazole for thrush and possible esophageal candidiasis, also with herpes zoster.    Problem/Plan - 1:  ·  Problem: Herpes zoster.  Plan: -pt is immunocompromised and c/o new rash on his right mid back crossing to his abdomen, rash is erythematous vesicular c/w herpes zoster, not crusted  - cont iv acyclovir 10 mg/kg q8h, monitor lesion, IVF while on iv acyclovir  - plan to change to oral valacyclovir 1000 mg q8h on DC to home - finish total 14 days of therapy  - contact and airborne precautions    Problem/Plan - 2:  ·  Problem: Odynophagia.  Plan: dysphagia and odynophagia, likely 2/2 progression of metastatic disease in mediastinal area. Recent PET/CT showing hypermetabolic right supraclavicular, bilateral mediastinal, and left perihilar lymphadenopathy, increased in size from a month ago. MRI brain with possible subacute lacunar infarct in posterior L lenticular nucleus region - may also have neurogenic dysphagia. Given oral thrush, possible candida esophagitis  - odynophagia improved, however still eating minimally, report of food being "stuck"  - EGD by GI - patient now agreeable, off airborne isolation  - continue fluconazole 400 mg IV for possible candida esophagitis  - s/p MBS - oropharyngeal dysphagia - SLP recommending dysphagia 2 with nectar thick liquids - liquids to be consumed via small, single cup sip ONLY  - calorie count - checked for papers in chart, not there - will restart  - if oral intake insufficient for caloric/nutritional needs, may need G tube for feeding    Problem/Plan - 3:  ·  Problem: Failure to thrive in adult.  Plan: Pt with poor nutrition, decreased appetite, and weight loss in setting of metastatic lung cancer.   - pt with severe protein calorie malnutrition per nutrition eval  - plan as above for odynophagia, dysphagia  - if oral intake insufficient for caloric/nutritional needs, may need G tube for feeding  - f/u palliative care for symptomatic management  - anticipate home with home PT per physical therapy recs    Problem/Plan - 4:  ·  Problem: Pleural effusion, left.  Plan: Pt with recurrent left loculated pleural effusion, s/p left VATS and pleurX placement. PleurX still in place, draining serosanguinous fluid likely due to malignancy  - CXR stable L pleural effusion  - C/w PleurX drainage; monitor output  - Monitor respiratory status  - f/u CTS recs, pt follows with Dr. Moore    Problem/Plan - 5:  ·  Problem: Lung cancer.  Plan: Pt with stage 4 left lung adenocarcinoma, started on immunotherapy on 3/4/19 with Keytruda.  - Oncology consult appreciated  - MRI brain with subacute lacunar infarct  - neuro consulted, f/u recs  - appreciate palliative care recs  - per Dr. Mao, patient should f/u for additional Keytruda treatment  - C/w methadone (I-Stop Reference #: [id number]) and oxycodone PRN    Problem/Plan - 6:  Problem: COPD (chronic obstructive pulmonary disease). Plan: No S/S of exacerbation.  - C/w albuterol PRN  - Anoro Ellipta not available through pharmacy. Will do Symbicort and Spiriva for now and have pt's family bring in medication from home.    Problem/Plan - 7:  ·  Problem: Diabetes mellitus.  Plan: Pt on Januvia and Metformin at home  - fingersticks all 90's to 100's  - likely due to patient eating little  - monitor on correction scale for now    Problem/Plan - 8:  ·  Problem: Hypertension.  Plan: Pt on amlodipine, valsartan, metoprolol, and lasix at home.  - Will c/w metoprolol, but will hold amlodipine (given chronic b/l LE edema), valsartan, and lasix  - Will restart additional home BP meds if needed.     Problem/Plan - 9:  ·  Problem: Need for prophylactic measure.  Plan: - DVT ppx: pt with hypercoagulable state, start lovenox sc   OOB and ambulation as tolerated.  - Diet: soft diet.     Dispo: pending ability to tolerate food, EGD, neurology recs for possible subacute CVA  - will need close f/u with Dr. Moa for lung cancer treatment  - PT: home w home PT

## 2019-03-31 NOTE — PROGRESS NOTE ADULT - SUBJECTIVE AND OBJECTIVE BOX
CHIEF COMPLAINT: Patient is a 63y old  male who presents with a chief complaint of Odynophagia/dysphagia, minimal PO intake (31 Mar 2019 09:34)      SUBJECTIVE / OVERNIGHT EVENTS:    Seen with wife at bedside. Patient eating very little. Patient has very little appetite. Taking in minimal food - does not want to eat. Discussed with patient - appears to understand the importance of nutrition, which is necessary for receiving cancer treatment. Discussed possibility of feeding tube - patient agreeable "if it's necessary." Constipated - has not have a BM for many days.    MEDICATIONS  (STANDING):  acyclovir IVPB      acyclovir IVPB 800 milliGRAM(s) IV Intermittent every 8 hours  aspirin enteric coated 81 milliGRAM(s) Oral daily  atorvastatin 40 milliGRAM(s) Oral at bedtime  buDESOnide 160 MICROgram(s)/formoterol 4.5 MICROgram(s) Inhaler 2 Puff(s) Inhalation two times a day  dextrose 5%. 1000 milliLiter(s) (50 mL/Hr) IV Continuous <Continuous>  dextrose 50% Injectable 12.5 Gram(s) IV Push once  dextrose 50% Injectable 25 Gram(s) IV Push once  dextrose 50% Injectable 25 Gram(s) IV Push once  docusate sodium 100 milliGRAM(s) Oral three times a day  enoxaparin Injectable 40 milliGRAM(s) SubCutaneous daily  fluconAZOLE IVPB      fluconAZOLE IVPB 400 milliGRAM(s) IV Intermittent every 24 hours  insulin lispro (HumaLOG) corrective regimen sliding scale   SubCutaneous three times a day before meals  insulin lispro (HumaLOG) corrective regimen sliding scale   SubCutaneous at bedtime  lactated ringers. 1000 milliLiter(s) (50 mL/Hr) IV Continuous <Continuous>  melatonin 3 milliGRAM(s) Oral at bedtime  methadone    Tablet 2.5 milliGRAM(s) Oral two times a day  metoprolol tartrate 25 milliGRAM(s) Oral two times a day  nicotine -  14 mG/24Hr(s) Patch 1 patch Transdermal daily  polyethylene glycol 3350 17 Gram(s) Oral two times a day  pyridoxine 100 milliGRAM(s) Oral daily  tiotropium 18 MICROgram(s) Capsule 1 Capsule(s) Inhalation daily    MEDICATIONS  (PRN):  ALBUTerol    90 MICROgram(s) HFA Inhaler 2 Puff(s) Inhalation every 6 hours PRN Shortness of Breath and/or Wheezing  dextrose 40% Gel 15 Gram(s) Oral once PRN Blood Glucose LESS THAN 70 milliGRAM(s)/deciliter  glucagon  Injectable 1 milliGRAM(s) IntraMuscular once PRN Glucose LESS THAN 70 milligrams/deciliter  ondansetron Injectable 4 milliGRAM(s) IV Push every 6 hours PRN Nausea and/or Vomiting  senna 2 Tablet(s) Oral at bedtime PRN Constipation  simethicone 160 milliGRAM(s) Chew daily PRN Gas      VITALS:  T(F): 99.5 (03-31-19 @ 14:16), Max: 99.6 (03-31-19 @ 06:16)  HR: 71 (03-31-19 @ 14:16) (71 - 87)  BP: 146/82 (03-31-19 @ 14:16) (134/71 - 146/82)  RR: 19 (03-31-19 @ 14:16) (19 - 19)  SpO2: 96% (03-31-19 @ 14:16)      CAPILLARY BLOOD GLUCOSE    Output     I&O's Summary  T(F): 99.5 (03-31-19 @ 14:16), Max: 99.6 (03-31-19 @ 06:16)  HR: 71 (03-31-19 @ 14:16) (71 - 87)  BP: 146/82 (03-31-19 @ 14:16) (134/71 - 146/82)  RR: 19 (03-31-19 @ 14:16) (19 - 19)  SpO2: 96% (03-31-19 @ 14:16)    PHYSICAL EXAM:  GENERAL: NAD, well-developed  HEAD:  Atraumatic, Normocephalic  EYES: EOMI  NECK: Supple, No JVD  CHEST/LUNG: nonlabored breathing  HEART: nl S1/S2  ABDOMEN: mildly distended, soft, tympanitic  EXTREMITIES:  no LE edema  PSYCH: alert, answering questions appropriately  NEUROLOGY: non-focal  SKIN: No rashes noted    LABS:              10.4                 134  | 25   | 5            13.60 >-----------< 382     ------------------------< 98                    33.5                 3.7  | 93   | 0.58                                         Ca 9.1   Mg 1.7   Ph 3.1                        MICROBIOLOGY:        RADIOLOGY & ADDITIONAL TESTS:    Imaging Personally Reviewed:    < from: MR Head w/ IV Cont (03.29.19 @ 17:26) >  Impression: This exam is limited by motion.    subacute lacunar infarct suspected in the posterior left lenticular   nucleus region.    < end of copied text >        [x] Care Discussed with Consultants/Other Providers:      All Gleason M.D.  Hospitalist  Pager [phone]

## 2019-03-31 NOTE — PROGRESS NOTE ADULT - SUBJECTIVE AND OBJECTIVE BOX
· Subjective and Objective: 	  JACK [id number] is a 63y old  Male who presents with a chief complaint of Odynophagia/dysphagia, minimal PO intake (29 Mar 2019 16:30)      HPI:  62 yo man, current smoker (~1 ppd), with history of stage 4 left lung adenocarcinoma, on immunotherapy with Keytruda (first session on 3/4/19), recurrent left pleural effusion s/p left VATS with pleurX placement (2/2019), COPD, DM2, HTN, HLD, and anxiety presented to hospital with 1 week of worsening throat pain with odynophagia and dysphagia. He was found to have oral thrush and was treated with fluconazole. He was found to have herpes zoster, was treated with acyclovir.     For staging of his lung cancer, he underwent MRI brain to r/o brain mets. MRI brain showed possible subacute infarct in the left lentiform nucleus. Neurology consulted for imaging findings. Denies any numbness/tingling, weakness.    MEDICATIONS  (STANDING):  acyclovir IVPB      acyclovir IVPB 800 milliGRAM(s) IV Intermittent every 8 hours  aspirin enteric coated 81 milliGRAM(s) Oral daily  atorvastatin 40 milliGRAM(s) Oral at bedtime  buDESOnide 160 MICROgram(s)/formoterol 4.5 MICROgram(s) Inhaler 2 Puff(s) Inhalation two times a day  dextrose 5%. 1000 milliLiter(s) (50 mL/Hr) IV Continuous <Continuous>  dextrose 50% Injectable 12.5 Gram(s) IV Push once  dextrose 50% Injectable 25 Gram(s) IV Push once  dextrose 50% Injectable 25 Gram(s) IV Push once  docusate sodium 100 milliGRAM(s) Oral three times a day  enoxaparin Injectable 40 milliGRAM(s) SubCutaneous daily  fluconAZOLE IVPB      fluconAZOLE IVPB 400 milliGRAM(s) IV Intermittent every 24 hours  insulin lispro (HumaLOG) corrective regimen sliding scale   SubCutaneous three times a day before meals  insulin lispro (HumaLOG) corrective regimen sliding scale   SubCutaneous at bedtime  lactated ringers. 1000 milliLiter(s) (50 mL/Hr) IV Continuous <Continuous>  melatonin 3 milliGRAM(s) Oral at bedtime  methadone    Tablet 2.5 milliGRAM(s) Oral two times a day  metoprolol tartrate 25 milliGRAM(s) Oral two times a day  nicotine -  14 mG/24Hr(s) Patch 1 patch Transdermal daily  polyethylene glycol 3350 17 Gram(s) Oral two times a day  pyridoxine 100 milliGRAM(s) Oral daily  tiotropium 18 MICROgram(s) Capsule 1 Capsule(s) Inhalation daily    MEDICATIONS  (PRN):  ALBUTerol    90 MICROgram(s) HFA Inhaler 2 Puff(s) Inhalation every 6 hours PRN Shortness of Breath and/or Wheezing  dextrose 40% Gel 15 Gram(s) Oral once PRN Blood Glucose LESS THAN 70 milliGRAM(s)/deciliter  glucagon  Injectable 1 milliGRAM(s) IntraMuscular once PRN Glucose LESS THAN 70 milligrams/deciliter  ondansetron Injectable 4 milliGRAM(s) IV Push every 6 hours PRN Nausea and/or Vomiting  senna 2 Tablet(s) Oral at bedtime PRN Constipation  simethicone 160 milliGRAM(s) Chew daily PRN Gas    PAST MEDICAL & SURGICAL HISTORY:  Anxiety  Pleural effusion, left  Lung cancer  COPD (chronic obstructive pulmonary disease)  HLD (hyperlipidemia)  Diabetes mellitus  Hypertension  No significant past surgical history    FAMILY HISTORY:  Family history of diabetes mellitus: Parents    Allergies    No Known Allergies    Intolerances        SHx - No smoking, No ETOH, No drug abuse      Review of Systems:    see hpi      Vital Signs Last 24 Hrs  T(C): 37.8  HR: 82  BP: 132/68  RR: 18    Neurological Exam:  Mental Status: Orientated to self, year, did not know month (stated "May").  Attention intact.  No dysarthria. Speech fluent. names pen, thumb, gloves. follows all commands  Cranial Nerves:  EOMI, VFF, no nystagmus.  No facial asymmetry.   Tongue midline.  Sternocleidomastoid and Trapezius intact bilaterally.          Strength:     [] Upper extremity                      Delt       Bicep    Tricep                                                  R         5/5        5/5        5/5       5/5                                               L          5/5        5/5        5/5       5/5  [] Lower extremity                       HF          KE          KF        DF         PF                                               R        5/5        5/5        5/5       5/5       5/5                                               L         5/5        5/5       5/5       5/5        5/5  Pronator drift: none                 Dysmetria: None to finger-nose-finger   No truncal ataxia.    Tremor: No resting, postural or action tremor.  No myoclonus.    Sensation: intact to light touch. no extinction    Deep Tendon Reflexes:     Biceps          Triceps      BR        Patellar        Ankle         Babinski                                         R       2+                   2+           2+            2+               2+           downgoing                                         L        2+                  2+           2+            2+               2+           downgoing      Other:    03-30    135  |  92<L>  |  6<L>  ----------------------------<  98  3.6   |  28  |  0.63    Ca    9.2      30 Mar 2019 07:48  Phos  3.3     03-30  Mg     1.6     03-30                              10.6   12.06 )-----------( 397      ( 30 Mar 2019 07:48 )             34.2       Radiology    CT: < from: MR Head w/ IV Cont (03.29.19 @ 17:26) >  Impression: This exam is limited by motion.    subacute lacunar infarct suspected in the posterior left lenticular   nucleus region.    < end of copied text >    MRI  EKG:  tele:  TTE:  EEG:                 Assessment and Recommendation:   · Assessment		  Impression: Not entirely clear if the left lentiform nucleus infarct is indeed a true infarct. If it is an infarct, etiology is undetermined, but could be embolic in the setting of hypercoagulable state of malignancy vs competing mechanism of small vessel disease vs. marantic endocarditis. Underwent TTE in February 2019 which showed grossly normal EF, and no major valvular pathology. This does not rule out marantic endocarditis, as these lesions are not visualized on TTE.    Plan:  -c/w ASA 81 for secondary stroke prevention  -MRA head w/o contrast  -MRA neck w/ contrast  -if unable to tolerate MRA head and neck, can do bilateral carotid dopplers  -telemetry monitoring  -consider ILR placement pending above workup  -may eventually need therapeutic lovenox 1 mg/kg BID for secondary stroke prevention in the setting of hypercoagulable state of malignancy   -PT/OT           Patient seen and examined agree with above.  The new stroke found on MRI needs to have a full work.  Certainly cancer brings the patient into a hypercoagulable state

## 2019-04-01 ENCOUNTER — RESULT REVIEW (OUTPATIENT)
Age: 64
End: 2019-04-01

## 2019-04-01 LAB
ANION GAP SERPL CALC-SCNC: 15 MMO/L — HIGH (ref 7–14)
BUN SERPL-MCNC: 6 MG/DL — LOW (ref 7–23)
CALCIUM SERPL-MCNC: 9.3 MG/DL — SIGNIFICANT CHANGE UP (ref 8.4–10.5)
CHLORIDE SERPL-SCNC: 93 MMOL/L — LOW (ref 98–107)
CO2 SERPL-SCNC: 27 MMOL/L — SIGNIFICANT CHANGE UP (ref 22–31)
CREAT SERPL-MCNC: 0.56 MG/DL — SIGNIFICANT CHANGE UP (ref 0.5–1.3)
GLUCOSE SERPL-MCNC: 115 MG/DL — HIGH (ref 70–99)
HCT VFR BLD CALC: 32.9 % — LOW (ref 39–50)
HGB BLD-MCNC: 10 G/DL — LOW (ref 13–17)
MAGNESIUM SERPL-MCNC: 1.7 MG/DL — SIGNIFICANT CHANGE UP (ref 1.6–2.6)
MCHC RBC-ENTMCNC: 24.2 PG — LOW (ref 27–34)
MCHC RBC-ENTMCNC: 30.4 % — LOW (ref 32–36)
MCV RBC AUTO: 79.7 FL — LOW (ref 80–100)
NRBC # FLD: 0 K/UL — SIGNIFICANT CHANGE UP (ref 0–0)
PHOSPHATE SERPL-MCNC: 3 MG/DL — SIGNIFICANT CHANGE UP (ref 2.5–4.5)
PLATELET # BLD AUTO: 391 K/UL — SIGNIFICANT CHANGE UP (ref 150–400)
PMV BLD: 9.2 FL — SIGNIFICANT CHANGE UP (ref 7–13)
POTASSIUM SERPL-MCNC: 3.4 MMOL/L — LOW (ref 3.5–5.3)
POTASSIUM SERPL-SCNC: 3.4 MMOL/L — LOW (ref 3.5–5.3)
RBC # BLD: 4.13 M/UL — LOW (ref 4.2–5.8)
RBC # FLD: 15.2 % — HIGH (ref 10.3–14.5)
SODIUM SERPL-SCNC: 135 MMOL/L — SIGNIFICANT CHANGE UP (ref 135–145)
WBC # BLD: 13.86 K/UL — HIGH (ref 3.8–10.5)
WBC # FLD AUTO: 13.86 K/UL — HIGH (ref 3.8–10.5)

## 2019-04-01 PROCEDURE — 88312 SPECIAL STAINS GROUP 1: CPT | Mod: 26

## 2019-04-01 PROCEDURE — 88305 TISSUE EXAM BY PATHOLOGIST: CPT | Mod: 26

## 2019-04-01 PROCEDURE — 99233 SBSQ HOSP IP/OBS HIGH 50: CPT

## 2019-04-01 PROCEDURE — 99232 SBSQ HOSP IP/OBS MODERATE 35: CPT

## 2019-04-01 PROCEDURE — 43239 EGD BIOPSY SINGLE/MULTIPLE: CPT | Mod: GC

## 2019-04-01 RX ORDER — METHADONE HYDROCHLORIDE 40 MG/1
2.5 TABLET ORAL
Qty: 0 | Refills: 0 | Status: DISCONTINUED | OUTPATIENT
Start: 2019-04-01 | End: 2019-04-05

## 2019-04-01 RX ADMIN — BUDESONIDE AND FORMOTEROL FUMARATE DIHYDRATE 2 PUFF(S): 160; 4.5 AEROSOL RESPIRATORY (INHALATION) at 12:36

## 2019-04-01 RX ADMIN — SODIUM CHLORIDE 50 MILLILITER(S): 9 INJECTION, SOLUTION INTRAVENOUS at 06:44

## 2019-04-01 RX ADMIN — Medication 266 MILLIGRAM(S): at 05:12

## 2019-04-01 RX ADMIN — Medication 100 MILLIGRAM(S): at 13:06

## 2019-04-01 RX ADMIN — Medication 25 MILLIGRAM(S): at 17:54

## 2019-04-01 RX ADMIN — ENOXAPARIN SODIUM 40 MILLIGRAM(S): 100 INJECTION SUBCUTANEOUS at 12:39

## 2019-04-01 RX ADMIN — Medication 3 MILLIGRAM(S): at 21:42

## 2019-04-01 RX ADMIN — Medication 1 PATCH: at 17:55

## 2019-04-01 RX ADMIN — Medication 81 MILLIGRAM(S): at 12:42

## 2019-04-01 RX ADMIN — Medication 100 MILLIGRAM(S): at 21:42

## 2019-04-01 RX ADMIN — Medication 100 MILLIGRAM(S): at 12:39

## 2019-04-01 RX ADMIN — Medication 266 MILLIGRAM(S): at 21:42

## 2019-04-01 RX ADMIN — Medication 1 PATCH: at 12:40

## 2019-04-01 RX ADMIN — POLYETHYLENE GLYCOL 3350 17 GRAM(S): 17 POWDER, FOR SOLUTION ORAL at 17:55

## 2019-04-01 RX ADMIN — BUDESONIDE AND FORMOTEROL FUMARATE DIHYDRATE 2 PUFF(S): 160; 4.5 AEROSOL RESPIRATORY (INHALATION) at 21:42

## 2019-04-01 RX ADMIN — Medication 1 PATCH: at 06:42

## 2019-04-01 RX ADMIN — Medication 1 PATCH: at 08:20

## 2019-04-01 RX ADMIN — FLUCONAZOLE 100 MILLIGRAM(S): 150 TABLET ORAL at 14:56

## 2019-04-01 RX ADMIN — TIOTROPIUM BROMIDE 1 CAPSULE(S): 18 CAPSULE ORAL; RESPIRATORY (INHALATION) at 12:37

## 2019-04-01 RX ADMIN — ATORVASTATIN CALCIUM 40 MILLIGRAM(S): 80 TABLET, FILM COATED ORAL at 21:42

## 2019-04-01 RX ADMIN — SODIUM CHLORIDE 50 MILLILITER(S): 9 INJECTION, SOLUTION INTRAVENOUS at 13:05

## 2019-04-01 RX ADMIN — Medication 266 MILLIGRAM(S): at 13:05

## 2019-04-01 NOTE — PROGRESS NOTE ADULT - SUBJECTIVE AND OBJECTIVE BOX
Patient is a 63y old  Male who presents with a chief complaint of Odynophagia/dysphagia, minimal PO intake (01 Apr 2019 15:19)      SUBJECTIVE / OVERNIGHT EVENTS:  s/p EGD. Pt sits in chair, chronic ill appearance. He reported he felt better while they were doing EGD when air got out from his stomach. denied cp/sob    MEDICATIONS  (STANDING):  acyclovir IVPB      acyclovir IVPB 800 milliGRAM(s) IV Intermittent every 8 hours  aspirin enteric coated 81 milliGRAM(s) Oral daily  atorvastatin 40 milliGRAM(s) Oral at bedtime  buDESOnide 160 MICROgram(s)/formoterol 4.5 MICROgram(s) Inhaler 2 Puff(s) Inhalation two times a day  dextrose 5%. 1000 milliLiter(s) (50 mL/Hr) IV Continuous <Continuous>  dextrose 50% Injectable 12.5 Gram(s) IV Push once  dextrose 50% Injectable 25 Gram(s) IV Push once  dextrose 50% Injectable 25 Gram(s) IV Push once  docusate sodium 100 milliGRAM(s) Oral three times a day  enoxaparin Injectable 40 milliGRAM(s) SubCutaneous daily  fluconAZOLE IVPB      fluconAZOLE IVPB 400 milliGRAM(s) IV Intermittent every 24 hours  insulin lispro (HumaLOG) corrective regimen sliding scale   SubCutaneous three times a day before meals  insulin lispro (HumaLOG) corrective regimen sliding scale   SubCutaneous at bedtime  lactated ringers. 1000 milliLiter(s) (50 mL/Hr) IV Continuous <Continuous>  melatonin 3 milliGRAM(s) Oral at bedtime  methadone    Tablet 2.5 milliGRAM(s) Oral two times a day  metoprolol tartrate 25 milliGRAM(s) Oral two times a day  nicotine -  14 mG/24Hr(s) Patch 1 patch Transdermal daily  polyethylene glycol 3350 17 Gram(s) Oral two times a day  pyridoxine 100 milliGRAM(s) Oral daily  tiotropium 18 MICROgram(s) Capsule 1 Capsule(s) Inhalation daily    MEDICATIONS  (PRN):  ALBUTerol    90 MICROgram(s) HFA Inhaler 2 Puff(s) Inhalation every 6 hours PRN Shortness of Breath and/or Wheezing  dextrose 40% Gel 15 Gram(s) Oral once PRN Blood Glucose LESS THAN 70 milliGRAM(s)/deciliter  glucagon  Injectable 1 milliGRAM(s) IntraMuscular once PRN Glucose LESS THAN 70 milligrams/deciliter  ondansetron Injectable 4 milliGRAM(s) IV Push every 6 hours PRN Nausea and/or Vomiting  senna 2 Tablet(s) Oral at bedtime PRN Constipation  simethicone 160 milliGRAM(s) Chew daily PRN Gas      T(C): 37 (04-01-19 @ 12:30), Max: 37.1 (03-31-19 @ 22:00)  HR: 82 (04-01-19 @ 12:30) (76 - 88)  BP: 130/68 (04-01-19 @ 12:30) (130/68 - 147/84)  RR: 18 (04-01-19 @ 12:30) (18 - 20)  SpO2: 98% (04-01-19 @ 12:30) (95% - 98%)  CAPILLARY BLOOD GLUCOSE      POCT Blood Glucose.: 93 mg/dL (01 Apr 2019 12:42)  POCT Blood Glucose.: 123 mg/dL (31 Mar 2019 22:21)  POCT Blood Glucose.: 87 mg/dL (31 Mar 2019 17:46)    I&O's Summary    31 Mar 2019 07:01  -  01 Apr 2019 07:00  --------------------------------------------------------  IN: 2130 mL / OUT: 670 mL / NET: 1460 mL    01 Apr 2019 07:01  -  01 Apr 2019 16:15  --------------------------------------------------------  IN: 675 mL / OUT: 0 mL / NET: 675 mL        PHYSICAL EXAM:  GENERAL: chronic ill appearance, frail, NAD  HEAD:  Atraumatic, Normocephalic  EYES: EOMI  NECK: Supple, No JVD  CHEST/LUNG: nonlabored breathing  HEART: nl S1/S2  ABDOMEN: mildly distended, soft, tympanitic  EXTREMITIES:  + LE edema  PSYCH: alert, answering questions appropriately  NEUROLOGY: non-focal      LABS:                        10.0   13.86 )-----------( 391      ( 01 Apr 2019 06:08 )             32.9     04-01    135  |  93<L>  |  6<L>  ----------------------------<  115<H>  3.4<L>   |  27  |  0.56    Ca    9.3      01 Apr 2019 06:08  Phos  3.0     04-01  Mg     1.7     04-01                RADIOLOGY & ADDITIONAL TESTS:    Imaging Personally Reviewed:    Consultant(s) Notes Reviewed:      Care Discussed with Consultants/Other Providers:

## 2019-04-01 NOTE — PROGRESS NOTE ADULT - SUBJECTIVE AND OBJECTIVE BOX
INTERVAL HPI/OVERNIGHT EVENTS:  Patient seen at bedside. Says hallucinations are improved. attributes them to pain meds.  Reports that dysphagia an odynophagia have improved      VITAL SIGNS:  T(F): 98.5 (03-28-19 @ 06:21)  HR: 79 (03-28-19 @ 06:21)  BP: 145/75 (03-28-19 @ 06:21)  RR: 20 (03-28-19 @ 06:21)  SpO2: 95% (03-28-19 @ 06:21)  Wt(kg): --    PHYSICAL EXAM:    Constitutional: NAD, resting in bed comfortably  Eyes: EOMI, sclera non-icteric  Neck: supple, no LAP  Respiratory: CTA b/l, good air entry b/l, no wheezing, rhonchi or crackels  Cardiovascular: RRR, normal S1S2, no M/R/G  Gastrointestinal: soft, NTND  Extremities: no edema  Neurological: AAOx3, non focal  Skin: Normal temperature    MEDICATIONS  (STANDING):  acyclovir IVPB      acyclovir IVPB 800 milliGRAM(s) IV Intermittent every 8 hours  aspirin enteric coated 81 milliGRAM(s) Oral daily  atorvastatin 40 milliGRAM(s) Oral at bedtime  bisacodyl Suppository 10 milliGRAM(s) Rectal once  buDESOnide 160 MICROgram(s)/formoterol 4.5 MICROgram(s) Inhaler 2 Puff(s) Inhalation two times a day  dextrose 5%. 1000 milliLiter(s) (50 mL/Hr) IV Continuous <Continuous>  dextrose 50% Injectable 12.5 Gram(s) IV Push once  dextrose 50% Injectable 25 Gram(s) IV Push once  dextrose 50% Injectable 25 Gram(s) IV Push once  docusate sodium 100 milliGRAM(s) Oral three times a day  enoxaparin Injectable 40 milliGRAM(s) SubCutaneous daily  fluconAZOLE IVPB      fluconAZOLE IVPB 400 milliGRAM(s) IV Intermittent every 24 hours  insulin lispro (HumaLOG) corrective regimen sliding scale   SubCutaneous three times a day before meals  insulin lispro (HumaLOG) corrective regimen sliding scale   SubCutaneous at bedtime  lactated ringers. 1000 milliLiter(s) (50 mL/Hr) IV Continuous <Continuous>  LORazepam     Tablet 1 milliGRAM(s) Oral once  melatonin 3 milliGRAM(s) Oral at bedtime  methadone    Tablet 2.5 milliGRAM(s) Oral two times a day  metoprolol tartrate 25 milliGRAM(s) Oral two times a day  polyethylene glycol 3350 17 Gram(s) Oral two times a day  pyridoxine 100 milliGRAM(s) Oral daily  tiotropium 18 MICROgram(s) Capsule 1 Capsule(s) Inhalation daily    MEDICATIONS  (PRN):  ALBUTerol    90 MICROgram(s) HFA Inhaler 2 Puff(s) Inhalation every 6 hours PRN Shortness of Breath and/or Wheezing  dextrose 40% Gel 15 Gram(s) Oral once PRN Blood Glucose LESS THAN 70 milliGRAM(s)/deciliter  glucagon  Injectable 1 milliGRAM(s) IntraMuscular once PRN Glucose LESS THAN 70 milligrams/deciliter  LORazepam     Tablet 0.5 milliGRAM(s) Oral three times a day PRN Anxiety  ondansetron Injectable 4 milliGRAM(s) IV Push every 6 hours PRN Nausea and/or Vomiting  oxyCODONE    IR 5 milliGRAM(s) Oral every 6 hours PRN Moderate Pain (4 - 6)  oxyCODONE    IR 10 milliGRAM(s) Oral every 6 hours PRN Severe Pain (7 - 10)  senna 2 Tablet(s) Oral at bedtime PRN Constipation  simethicone 160 milliGRAM(s) Chew daily PRN Gas      Allergies    No Known Allergies    Intolerances        LABS:                        10.7   12.88 )-----------( 419      ( 27 Mar 2019 06:30 )             34.5     03-27    136  |  94<L>  |  6<L>  ----------------------------<  88  3.8   |  28  |  0.65    Ca    9.3      27 Mar 2019 06:30  Phos  3.2     03-27  Mg     1.6     03-27            RADIOLOGY & ADDITIONAL TESTS:  Studies reviewed.

## 2019-04-01 NOTE — PRE-OP CHECKLIST - SPO2 (%)
Verified Results  COMP METABOLIC PANEL WITH CBCA, LIPID PANEL AND TSH (CMP,CBCA,LIPFA,TSH) [id number] 12:01AM [place]   [Feb 9, 2017 4:06PM [place]]  labs overall ok EXCEPT:  1. VIt D low. see result.   2. HDL good chol low. REcommend increasing exercise, green vegges to increase HDL.     Test Name Result Flag Reference   WHITE BLOOD COUNT 5.7 K/mcL  4.2-11.0   RED CELL COUNT 5.56 mil/mcL  4.50-5.90   HEMOGLOBIN 15.3 g/dl  13.0-17.0   HEMATOCRIT 47.0 %  39.0-51.0   MEAN CORPUSCULAR VOLUME 84.5 fL  78.0-100.0   MEAN CORPUSCULAR HEMOGLOBIN 27.5 pg  26.0-34.0   MEAN CORPUSCULAR HGB CONC 32.6 g/dl  32.0-36.5   RDW-CV 13.2 %  11.0-15.0   PLATELET COUNT 155 K/mcL  140-450   DIFF TYPE      AUTOMATED DIFFERENTIAL   LEDA% 57 %     LYM% 32 %     MON% 9 %     EOS% 2 %     BASO% 0 %     LEDA ABS 3.3 K/mcL  1.8-7.7   LYM ABS 1.8 K/mcL  1.0-4.8   MON ABS 0.5 K/mcL  0.3-0.9   EOS ABS 0.1 K/mcL  0.1-0.5   BASO ABS 0.0 K/mcL  0.0-0.3   FASTING STATUS UNKNOWN hrs     SODIUM 141 mmol/L  135-145   POTASSIUM 4.2 mmol/L  3.4-5.1   CHLORIDE 105 mmol/L     CARBON DIOXIDE 25 mmol/L  21-32   ANION GAP 15 mmol/L  10-20   GLUCOSE 98 mg/dl  65-99   BUN 10 mg/dl  10-20   CREATININE 0.93 mg/dl  0.67-1.17   GFR EST. AMER >90     In patients with known chronic kidney disease, the stage of disease based on eGFR is interpreted as follows:  eGFR results = or >90 mL/min/1.73m2 = Stage I normal kidney function.   GFR EST.NONAFRI AMER >90     In patients with known chronic kidney disease, the stage of disease based on eGFR is interpreted as follows:  eGFR results = or >90 mL/min/1.73m2 = Stage I normal kidney function.   BUN/CREATININE RATIO 11  7-25   CALCIUM 9.1 mg/dl  8.4-10.2   BILIRUBIN TOTAL 0.6 mg/dl  0.2-1.0   GOT/AST 16 Units/L  <38   GPT/ALT 39 Units/L  <79   ALKALINE PHOSPHATASE 88 Units/L     TOTAL PROTEIN 7.4 g/dl  6.4-8.2   ALBUMIN 4.1 g/dl  3.6-5.1   GLOBULIN (CALCULATED) 3.3 g/dl  2.0-4.0   A/G RATIO 1.2   1.0-2.4   FASTING STATUS UNKNOWN hrs     CHOLESTEROL 183 mg/dl  <200   Desirable            <200  Borderline High      200 to 239  High                 >=240   LDL CHOLESTEROL (CALCULATED) 127 mg/dl  <130   OPTIMAL               <100  NEAR OPTIMAL          100-129  BORDERLINE HIGH       130-159  HIGH                  160-189  VERY HIGH             >=190   HDL CHOLESTEROL 33 mg/dl L >59   Low            <40  Borderline Low 40 to 49  Near Optimal   50 to 59  Optimal        >=60   TRIGLYCERIDES 115 mg/dl  <150   Normal                   <150  Borderline High          150 to 199  High                     200 to 499  Very High                >=500   NON-HDL CHOLESTEROL 150 mg/dl     Therapeutic Target:  CHD and risk equivalents <130  Multiple risk factors    <160  0 to 1 risk factors      <190   CHOLESTEROL/HDL RATIO 5.5 H <4.5   TSH 3.290 mcUnits/mL  0.350-5.000        96

## 2019-04-01 NOTE — CHART NOTE - NSCHARTNOTEFT_GEN_A_CORE
Reviewed with patients wife at bedside, no behavioral issues for  at least 4-5 days and family at bedside at all times. Will downgrade constant observation to enhanced care. If no issues downgrade would consider stopping enhanced care in next day.

## 2019-04-01 NOTE — PROGRESS NOTE ADULT - ASSESSMENT
62 yo man, current smoker (~1 ppd), with history of stage 4 left lung adenocarcinoma (PDL-1 70% positive) diagnosed January 2019, on immunotherapy with Keytruda (first session on 3/4/19), recurrent left loculated pleural effusion s/p left VATS with pleurX placement (2/2019), COPD, DM2, HTN, HLD, and anxiety presents with 1 week of worsening throat pain, odynophagia, and dysphagia. PET/CT obtained for staging c/w known metastatic disease - he has just started therapy.   Pt has only had one dose of keytruda, it is too early to  response to treatment.   MRI brain with no evidence of metastatic disease.   EGD done today, Diffuse mild mucosal changes characterized by desquamation were found in the entire esophagus.LA Grade A (one or more mucosal breaks less than 5 mm, not extending between tops of 2 mucosal folds) esophagitis with no bleeding was found in the distal esophagus. Diffuse mild inflammation characterized by erythema was found in the entire examined stomach. Biopsies were taken with a cold forceps for  histology.    -Appreciate GI input, of note, Pembrolizumab can be rarely a/w immune related esophagitis.  -thoracic surgery input appreciated  -Supportive care, pain control, PT/OT, nutrition, HSQ  -Outpt oncology follow up. After discussing risks and benefits and speaking to primary oncologist, patient has decided to continue with immunotherapy for at least one more cycle. Infections need to resolve before treatment is restarted.       Will follow. Please do not hesitate to call back with questions.     Shannon Vieira MD  Medical Oncology Attending

## 2019-04-01 NOTE — PROGRESS NOTE ADULT - ASSESSMENT
62 yo man, current smoker (~1 ppd), with history of stage 4 left lung adenocarcinoma, on immunotherapy with Keytruda (first session on 3/4/19), recurrent left pleural effusion s/p left VATS with pleurX placement (2/2019), COPD, DM2, HTN, HLD, and anxiety presents with 1 week of worsening throat pain with odynophagia and dysphagia, started on fluconazole for thrush and possible esophageal candidiasis, also with herpes zoster.    Problem/Plan - 1:  ·  Problem: Herpes zoster.  Plan: -pt is immunocompromised and c/o new rash on his right mid back crossing to his abdomen, rash is erythematous vesicular c/w herpes zoster  - cont iv acyclovir 10 mg/kg q8h, monitor lesion, IVF while on iv acyclovir    Problem/Plan - 2:  ·  Problem: Odynophagia.  Plan: dysphagia and odynophagia, likely 2/2 progression of metastatic disease in mediastinal area. Recent PET/CT showing hypermetabolic right supraclavicular, bilateral mediastinal, and left perihilar lymphadenopathy, increased in size from a month ago. MRI brain with possible subacute lacunar infarct in posterior L lenticular nucleus region - may also have neurogenic dysphagia. Given oral thrush, possible candida esophagitis  - odynophagia improved, however still eating minimally, report of food being "stuck"  - EGD 4/1: Desquamation mucosa in the esophagus. LA Grade A esophagitis. Biopsied. Gastritis. Biopsied. Normal examined duodenum.  - continue fluconazole 400 mg IV for possible candida esophagitis  - s/p MBS - oropharyngeal dysphagia - SLP recommending dysphagia 2 with nectar thick liquids   - if oral intake insufficient for caloric/nutritional needs, may need G tube for feeding    Problem/Plan - 3:  ·  Problem: Failure to thrive in adult.  Plan: Pt with poor nutrition, decreased appetite, and weight loss in setting of metastatic lung cancer.   - pt with severe protein calorie malnutrition per nutrition eval  - plan as above for odynophagia, dysphagia  - if oral intake insufficient for caloric/nutritional needs, may need G tube for feeding  - f/u palliative care for symptomatic management  - anticipate home with home PT per physical therapy recs    Problem/Plan - 4:  ·  Problem: Pleural effusion, left.  Plan: Pt with recurrent left loculated pleural effusion, s/p left VATS and pleurX placement. PleurX still in place, draining serosanguinous fluid likely due to malignancy  - CXR stable L pleural effusion  - C/w PleurX drainage; monitor output  - Monitor respiratory status  - f/u CTS recs, pt follows with Dr. Moore    Problem/Plan - 5:  ·  Problem: Lung cancer.  Plan: Pt with stage 4 left lung adenocarcinoma, started on immunotherapy on 3/4/19 with Keytruda.  - Oncology consult appreciated  - MRI brain with subacute lacunar infarct  - neuro consulted, f/u recs  - appreciate palliative care recs  - per Dr. Mao, patient should f/u for additional Keytruda treatment  - C/w methadone ([employer]-[facility] Reference #: [id number]) and oxycodone PRN    Problem/Plan - 6:  Problem: COPD (chronic obstructive pulmonary disease). Plan: No S/S of exacerbation.  - C/w albuterol PRN  - Anoro Ellipta not available through pharmacy. c/w Symbicort and Spiriva for now    Problem/Plan - 7:  ·  Problem: Diabetes mellitus.  Plan: Pt on Januvia and Metformin at home  - fingersticks all 90's to 100's  - likely due to patient eating little  - monitor on correction scale for now    Problem/Plan - 8:  ·  Problem: Hypertension.  Plan: Pt on amlodipine, valsartan, metoprolol, and lasix at home.  - Will c/w metoprolol, but will hold amlodipine (given chronic b/l LE edema), valsartan, and lasix  - Will restart additional home BP meds if needed.     Problem/Plan - 9:  ·  Problem: Need for prophylactic measure.  Plan: - DVT ppx: pt with hypercoagulable state, lovenox sc   OOB and ambulation as tolerated.  - Diet: soft diet.     Overall prognosis is very poor. I d/w wife extensively at bedside. She verbalized the understanding that the prognosis is poor. She is not concerned about the complications of PEG and doesn't want it at this time.

## 2019-04-02 ENCOUNTER — APPOINTMENT (OUTPATIENT)
Dept: THORACIC SURGERY | Facility: CLINIC | Age: 64
End: 2019-04-02

## 2019-04-02 DIAGNOSIS — C34.92 MALIGNANT NEOPLASM OF UNSPECIFIED PART OF LEFT BRONCHUS OR LUNG: ICD-10-CM

## 2019-04-02 LAB
ANION GAP SERPL CALC-SCNC: 15 MMO/L — HIGH (ref 7–14)
BUN SERPL-MCNC: 5 MG/DL — LOW (ref 7–23)
CALCIUM SERPL-MCNC: 9.5 MG/DL — SIGNIFICANT CHANGE UP (ref 8.4–10.5)
CHLORIDE SERPL-SCNC: 94 MMOL/L — LOW (ref 98–107)
CO2 SERPL-SCNC: 30 MMOL/L — SIGNIFICANT CHANGE UP (ref 22–31)
CREAT SERPL-MCNC: 0.65 MG/DL — SIGNIFICANT CHANGE UP (ref 0.5–1.3)
GLUCOSE SERPL-MCNC: 93 MG/DL — SIGNIFICANT CHANGE UP (ref 70–99)
HCT VFR BLD CALC: 33.6 % — LOW (ref 39–50)
HGB BLD-MCNC: 10.4 G/DL — LOW (ref 13–17)
MAGNESIUM SERPL-MCNC: 1.8 MG/DL — SIGNIFICANT CHANGE UP (ref 1.6–2.6)
MCHC RBC-ENTMCNC: 24.1 PG — LOW (ref 27–34)
MCHC RBC-ENTMCNC: 31 % — LOW (ref 32–36)
MCV RBC AUTO: 77.8 FL — LOW (ref 80–100)
NRBC # FLD: 0 K/UL — SIGNIFICANT CHANGE UP (ref 0–0)
PHOSPHATE SERPL-MCNC: 3.2 MG/DL — SIGNIFICANT CHANGE UP (ref 2.5–4.5)
PLATELET # BLD AUTO: 405 K/UL — HIGH (ref 150–400)
PMV BLD: 8.9 FL — SIGNIFICANT CHANGE UP (ref 7–13)
POTASSIUM SERPL-MCNC: 3.9 MMOL/L — SIGNIFICANT CHANGE UP (ref 3.5–5.3)
POTASSIUM SERPL-SCNC: 3.9 MMOL/L — SIGNIFICANT CHANGE UP (ref 3.5–5.3)
RBC # BLD: 4.32 M/UL — SIGNIFICANT CHANGE UP (ref 4.2–5.8)
RBC # FLD: 15.3 % — HIGH (ref 10.3–14.5)
SODIUM SERPL-SCNC: 139 MMOL/L — SIGNIFICANT CHANGE UP (ref 135–145)
WBC # BLD: 14.79 K/UL — HIGH (ref 3.8–10.5)
WBC # FLD AUTO: 14.79 K/UL — HIGH (ref 3.8–10.5)

## 2019-04-02 PROCEDURE — 70548 MR ANGIOGRAPHY NECK W/DYE: CPT | Mod: 26

## 2019-04-02 PROCEDURE — 99232 SBSQ HOSP IP/OBS MODERATE 35: CPT | Mod: GC

## 2019-04-02 PROCEDURE — 99233 SBSQ HOSP IP/OBS HIGH 50: CPT

## 2019-04-02 PROCEDURE — 70544 MR ANGIOGRAPHY HEAD W/O DYE: CPT | Mod: 26

## 2019-04-02 RX ADMIN — TIOTROPIUM BROMIDE 1 CAPSULE(S): 18 CAPSULE ORAL; RESPIRATORY (INHALATION) at 07:44

## 2019-04-02 RX ADMIN — Medication 100 MILLIGRAM(S): at 05:45

## 2019-04-02 RX ADMIN — Medication 1 MILLIGRAM(S): at 07:41

## 2019-04-02 RX ADMIN — Medication 100 MILLIGRAM(S): at 21:52

## 2019-04-02 RX ADMIN — FLUCONAZOLE 100 MILLIGRAM(S): 150 TABLET ORAL at 14:43

## 2019-04-02 RX ADMIN — ATORVASTATIN CALCIUM 40 MILLIGRAM(S): 80 TABLET, FILM COATED ORAL at 21:52

## 2019-04-02 RX ADMIN — Medication 100 MILLIGRAM(S): at 11:23

## 2019-04-02 RX ADMIN — Medication 1 PATCH: at 11:24

## 2019-04-02 RX ADMIN — Medication 266 MILLIGRAM(S): at 13:14

## 2019-04-02 RX ADMIN — Medication 25 MILLIGRAM(S): at 17:30

## 2019-04-02 RX ADMIN — Medication 25 MILLIGRAM(S): at 05:45

## 2019-04-02 RX ADMIN — Medication 1 PATCH: at 07:02

## 2019-04-02 RX ADMIN — SODIUM CHLORIDE 50 MILLILITER(S): 9 INJECTION, SOLUTION INTRAVENOUS at 07:44

## 2019-04-02 RX ADMIN — Medication 3 MILLIGRAM(S): at 21:52

## 2019-04-02 RX ADMIN — BUDESONIDE AND FORMOTEROL FUMARATE DIHYDRATE 2 PUFF(S): 160; 4.5 AEROSOL RESPIRATORY (INHALATION) at 07:43

## 2019-04-02 RX ADMIN — ENOXAPARIN SODIUM 40 MILLIGRAM(S): 100 INJECTION SUBCUTANEOUS at 11:24

## 2019-04-02 RX ADMIN — Medication 100 MILLIGRAM(S): at 13:14

## 2019-04-02 RX ADMIN — Medication 266 MILLIGRAM(S): at 05:45

## 2019-04-02 RX ADMIN — Medication 81 MILLIGRAM(S): at 11:23

## 2019-04-02 RX ADMIN — Medication 1 PATCH: at 11:23

## 2019-04-02 RX ADMIN — Medication 266 MILLIGRAM(S): at 21:52

## 2019-04-02 RX ADMIN — Medication 1 PATCH: at 20:16

## 2019-04-02 RX ADMIN — BUDESONIDE AND FORMOTEROL FUMARATE DIHYDRATE 2 PUFF(S): 160; 4.5 AEROSOL RESPIRATORY (INHALATION) at 21:52

## 2019-04-02 NOTE — PROGRESS NOTE ADULT - SUBJECTIVE AND OBJECTIVE BOX
Patient is a 63y old  Male who presents with a chief complaint of Odynophagia/dysphagia, minimal PO intake (02 Apr 2019 13:33)      SUBJECTIVE / OVERNIGHT EVENTS:  Pt sitting in chair, chronic ill appearance, reports feeling slightly better today. no cp/sob/fever/dizziness.     MEDICATIONS  (STANDING):  acyclovir IVPB      acyclovir IVPB 800 milliGRAM(s) IV Intermittent every 8 hours  aspirin enteric coated 81 milliGRAM(s) Oral daily  atorvastatin 40 milliGRAM(s) Oral at bedtime  buDESOnide 160 MICROgram(s)/formoterol 4.5 MICROgram(s) Inhaler 2 Puff(s) Inhalation two times a day  dextrose 5%. 1000 milliLiter(s) (50 mL/Hr) IV Continuous <Continuous>  dextrose 50% Injectable 12.5 Gram(s) IV Push once  dextrose 50% Injectable 25 Gram(s) IV Push once  dextrose 50% Injectable 25 Gram(s) IV Push once  docusate sodium 100 milliGRAM(s) Oral three times a day  enoxaparin Injectable 40 milliGRAM(s) SubCutaneous daily  fluconAZOLE IVPB      fluconAZOLE IVPB 400 milliGRAM(s) IV Intermittent every 24 hours  insulin lispro (HumaLOG) corrective regimen sliding scale   SubCutaneous three times a day before meals  insulin lispro (HumaLOG) corrective regimen sliding scale   SubCutaneous at bedtime  lactated ringers. 1000 milliLiter(s) (50 mL/Hr) IV Continuous <Continuous>  melatonin 3 milliGRAM(s) Oral at bedtime  methadone    Tablet 2.5 milliGRAM(s) Oral two times a day  metoprolol tartrate 25 milliGRAM(s) Oral two times a day  nicotine -  14 mG/24Hr(s) Patch 1 patch Transdermal daily  polyethylene glycol 3350 17 Gram(s) Oral two times a day  pyridoxine 100 milliGRAM(s) Oral daily  tiotropium 18 MICROgram(s) Capsule 1 Capsule(s) Inhalation daily    MEDICATIONS  (PRN):  ALBUTerol    90 MICROgram(s) HFA Inhaler 2 Puff(s) Inhalation every 6 hours PRN Shortness of Breath and/or Wheezing  dextrose 40% Gel 15 Gram(s) Oral once PRN Blood Glucose LESS THAN 70 milliGRAM(s)/deciliter  glucagon  Injectable 1 milliGRAM(s) IntraMuscular once PRN Glucose LESS THAN 70 milligrams/deciliter  ondansetron Injectable 4 milliGRAM(s) IV Push every 6 hours PRN Nausea and/or Vomiting  senna 2 Tablet(s) Oral at bedtime PRN Constipation  simethicone 160 milliGRAM(s) Chew daily PRN Gas      T(C): 36.9 (04-02-19 @ 14:01), Max: 36.9 (04-02-19 @ 14:01)  HR: 82 (04-02-19 @ 14:01) (76 - 82)  BP: 125/66 (04-02-19 @ 14:01) (125/66 - 146/59)  RR: 17 (04-02-19 @ 14:01) (16 - 18)  SpO2: 94% (04-02-19 @ 14:01) (94% - 96%)  CAPILLARY BLOOD GLUCOSE      POCT Blood Glucose.: 91 mg/dL (02 Apr 2019 13:04)  POCT Blood Glucose.: 120 mg/dL (02 Apr 2019 07:36)  POCT Blood Glucose.: 97 mg/dL (01 Apr 2019 21:20)  POCT Blood Glucose.: 94 mg/dL (01 Apr 2019 17:47)    I&O's Summary    01 Apr 2019 07:01  -  02 Apr 2019 07:00  --------------------------------------------------------  IN: 1565 mL / OUT: 450 mL / NET: 1115 mL    02 Apr 2019 07:01  -  02 Apr 2019 14:18  --------------------------------------------------------  IN: 0 mL / OUT: 150 mL / NET: -150 mL        PHYSICAL EXAM:  GENERAL: chronic ill appearance, frail, NAD  HEAD:  Atraumatic, Normocephalic  EYES: EOMI  NECK: Supple, No JVD  CHEST/LUNG: nonlabored breathing  HEART: nl S1/S2  ABDOMEN: mildly distended, soft, tympanitic  EXTREMITIES:  + LE edema  PSYCH: alert, answering questions appropriately  NEUROLOGY: non-focal    LABS:                        10.4   14.79 )-----------( 405      ( 02 Apr 2019 05:40 )             33.6     04-02    139  |  94<L>  |  5<L>  ----------------------------<  93  3.9   |  30  |  0.65    Ca    9.5      02 Apr 2019 05:40  Phos  3.2     04-02  Mg     1.8     04-02                RADIOLOGY & ADDITIONAL TESTS:    Imaging Personally Reviewed: < from: MR Angio Neck w/ IV Cont (04.02.19 @ 09:24) >  IMPRESSION:  Intracranial MR angiography demonstrates diminution of right MCA branch   vasculature flow and caliber..  Extracranial MR angiography demonstrates no flow-limiting stenosis by   NASCET criteria.    < end of copied text >          Consultant(s) Notes Reviewed:      Care Discussed with Consultants/Other Providers:

## 2019-04-02 NOTE — CONSULT NOTE ADULT - ASSESSMENT
62 yo man, current smoker (~1 ppd), with history of stage 4 left lung adenocarcinoma, on immunotherapy with Keytruda (first session on 3/4/19), recurrent left pleural effusion s/p left VATS with pleurX placement (2/2019), COPD, DM2, HTN, HLD, and anxiety presents with 1 week of worsening throat pain with odynophagia and dysphagia. Pt states that for the past 1 week, he has been feeling overwhelming dryness/scratchiness in his throat, causing pain and difficulty with swallowing. Pt notes that whenever he tries to swallow, he experiences the sensation of something getting stuck in his throat and upper chest area. Pt has this sensation with both liquids and solids. As a result, pt has been having very little to eat and drink, avoiding water entirely the last 2 days. Pt also reports poor appetite and ongoing pain in his left shoulder/scapula and near his pleurX insertion site. Pt saw Dr. Avitia (Palliative) earlier this month and was started on methadone for pain control. Pt denies any recent F/C, CP, palpitations, cough, abdominal pain, N/V, diarrhea, constipation, or urinary symptoms. Pt continues to have SOB when lying flat, and therefore, currently sleeps upright in a chair.     In the ED,  T 98.1-98.8, HR 75-88, -144/60-64, RR 16-32, O2 sats 95-97% RA.   CXR with unchanged left loculated pleural effusion. (20 Mar 2019 23:08)      PT afebrile, WBC has been in the low teens since admission.    Pt had EGD on the admission with esophagitis, (+) oral thrush.  On fluconazole for esophageal candidiasis.  Also developed vesicular rash on righ posterior thorax and right abd, on treatment for herpes zoster and is on IV acyclovir.  PT very weak, confused at times, poor PO intake.  Spoke to pt's wife at length, who declined PEG or further chemotherapy at this time.  Wants to focus on his comfort, and considering home hospice.  She is aware of pt's poor prognosis.      ID Consult called for further antimicrobial managment.        Recommend:    - Possible esophageal candidiasis.  Cont IV fluconazole for now at present dose.  Complete total 14 day course, can change to PO when pt able to tolerate.    - Herpes zoster in immunocompromised patient.  Lesions are healing in right thorax, no new vesicles noted.  Cont acyclovir IV.  No signs/symptoms of systemic infection or disseminated disease.  Lesions are almost all healed.  Can switch to PO valacyclovir to complete 14 day course.    Will follow,    Kelli Hernandez  [phone]

## 2019-04-02 NOTE — CONSULT NOTE ADULT - SUBJECTIVE AND OBJECTIVE BOX
Patient is a 63y old  Male who presents with a chief complaint of Odynophagia/dysphagia, minimal PO intake (02 Apr 2019 13:33)      HPI:    62 yo man, current smoker (~1 ppd), with history of stage 4 left lung adenocarcinoma, on immunotherapy with Keytruda (first session on 3/4/19), recurrent left pleural effusion s/p left VATS with pleurX placement (2/2019), COPD, DM2, HTN, HLD, and anxiety presents with 1 week of worsening throat pain with odynophagia and dysphagia. Pt states that for the past 1 week, he has been feeling overwhelming dryness/scratchiness in his throat, causing pain and difficulty with swallowing. Pt notes that whenever he tries to swallow, he experiences the sensation of something getting stuck in his throat and upper chest area. Pt has this sensation with both liquids and solids. As a result, pt has been having very little to eat and drink, avoiding water entirely the last 2 days. Pt also reports poor appetite and ongoing pain in his left shoulder/scapula and near his pleurX insertion site. Pt saw Dr. Avitia (Palliative) earlier this month and was started on methadone for pain control. Pt denies any recent F/C, CP, palpitations, cough, abdominal pain, N/V, diarrhea, constipation, or urinary symptoms. Pt continues to have SOB when lying flat, and therefore, currently sleeps upright in a chair.     In the ED,  T 98.1-98.8, HR 75-88, -144/60-64, RR 16-32, O2 sats 95-97% RA.   CXR with unchanged left loculated pleural effusion. (20 Mar 2019 23:08)      REVIEW OF SYSTEMS:    CONSTITUTIONAL: No fever, weight loss, or fatigue  EYES: No eye pain, visual disturbances, or discharge  ENMT:  No sore throat  NECK: No pain or stiffness  RESPIRATORY: No cough, wheezing, chills or hemoptysis; No shortness of breath  CARDIOVASCULAR: No chest pain, palpitations, dizziness, or leg swelling  GASTROINTESTINAL: No abdominal or epigastric pain. No nausea, vomiting, or hematemesis; No diarrhea or constipation. No melena or hematochezia.  GENITOURINARY: No dysuria, frequency, hematuria, or incontinence  NEUROLOGICAL: No headaches, memory loss, loss of strength, numbness, or tremors  SKIN: No itching, burning, rashes, or lesions   LYMPH NODES: No enlarged glands  MUSCULOSKELETAL: No joint pain or swelling; No muscle, back, or extremity pain      PAST MEDICAL & SURGICAL HISTORY:  Anxiety  Pleural effusion, left  Lung cancer  COPD (chronic obstructive pulmonary disease)  HLD (hyperlipidemia)  Diabetes mellitus  Hypertension  No significant past surgical history      Allergies    No Known Allergies    Intolerances        FAMILY HISTORY:  Family history of diabetes mellitus: Parents      SOCIAL HISTORY:        MEDICATIONS  (STANDING):  acyclovir IVPB      acyclovir IVPB 800 milliGRAM(s) IV Intermittent every 8 hours  aspirin enteric coated 81 milliGRAM(s) Oral daily  atorvastatin 40 milliGRAM(s) Oral at bedtime  buDESOnide 160 MICROgram(s)/formoterol 4.5 MICROgram(s) Inhaler 2 Puff(s) Inhalation two times a day  dextrose 5%. 1000 milliLiter(s) (50 mL/Hr) IV Continuous <Continuous>  dextrose 50% Injectable 12.5 Gram(s) IV Push once  dextrose 50% Injectable 25 Gram(s) IV Push once  dextrose 50% Injectable 25 Gram(s) IV Push once  docusate sodium 100 milliGRAM(s) Oral three times a day  enoxaparin Injectable 40 milliGRAM(s) SubCutaneous daily  fluconAZOLE IVPB      fluconAZOLE IVPB 400 milliGRAM(s) IV Intermittent every 24 hours  insulin lispro (HumaLOG) corrective regimen sliding scale   SubCutaneous three times a day before meals  insulin lispro (HumaLOG) corrective regimen sliding scale   SubCutaneous at bedtime  lactated ringers. 1000 milliLiter(s) (50 mL/Hr) IV Continuous <Continuous>  melatonin 3 milliGRAM(s) Oral at bedtime  methadone    Tablet 2.5 milliGRAM(s) Oral two times a day  metoprolol tartrate 25 milliGRAM(s) Oral two times a day  nicotine -  14 mG/24Hr(s) Patch 1 patch Transdermal daily  polyethylene glycol 3350 17 Gram(s) Oral two times a day  pyridoxine 100 milliGRAM(s) Oral daily  tiotropium 18 MICROgram(s) Capsule 1 Capsule(s) Inhalation daily    MEDICATIONS  (PRN):  ALBUTerol    90 MICROgram(s) HFA Inhaler 2 Puff(s) Inhalation every 6 hours PRN Shortness of Breath and/or Wheezing  dextrose 40% Gel 15 Gram(s) Oral once PRN Blood Glucose LESS THAN 70 milliGRAM(s)/deciliter  glucagon  Injectable 1 milliGRAM(s) IntraMuscular once PRN Glucose LESS THAN 70 milligrams/deciliter  ondansetron Injectable 4 milliGRAM(s) IV Push every 6 hours PRN Nausea and/or Vomiting  senna 2 Tablet(s) Oral at bedtime PRN Constipation  simethicone 160 milliGRAM(s) Chew daily PRN Gas      Vital Signs Last 24 Hrs  T(C): 36.9 (02 Apr 2019 14:01), Max: 36.9 (02 Apr 2019 14:01)  T(F): 98.5 (02 Apr 2019 14:01), Max: 98.5 (02 Apr 2019 14:01)  HR: 82 (02 Apr 2019 14:01) (76 - 82)  BP: 125/66 (02 Apr 2019 14:01) (125/66 - 146/59)  BP(mean): --  RR: 17 (02 Apr 2019 14:01) (16 - 18)  SpO2: 94% (02 Apr 2019 14:01) (94% - 96%)    PHYSICAL EXAM:    GENERAL: NAD, well-groomed  HEAD:  Atraumatic, Normocephalic  EYES: EOMI, PERRLA, conjunctiva and sclera clear  ENMT: No tonsillar erythema, exudates, or enlargement; Moist mucous membranes  NECK: Supple, No JVD  CHEST/LUNG: Clear to percussion bilaterally; No rales, rhonchi, wheezing, or rubs  HEART: Regular rate and rhythm; No murmurs, rubs, or gallops  ABDOMEN: Soft, Nontender, Nondistended; Bowel sounds present  EXTREMITIES:  2+ Peripheral Pulses, No clubbing, cyanosis, or edema  LYMPH: No lymphadenopathy noted  SKIN: No rashes or lesions    LABS:  CBC Full  -  ( 02 Apr 2019 05:40 )  WBC Count : 14.79 K/uL  RBC Count : 4.32 M/uL  Hemoglobin : 10.4 g/dL  Hematocrit : 33.6 %  Platelet Count - Automated : 405 K/uL  Mean Cell Volume : 77.8 fL  Mean Cell Hemoglobin : 24.1 pg  Mean Cell Hemoglobin Concentration : 31.0 %  Auto Neutrophil # : x  Auto Lymphocyte # : x  Auto Monocyte # : x  Auto Eosinophil # : x  Auto Basophil # : x  Auto Neutrophil % : x  Auto Lymphocyte % : x  Auto Monocyte % : x  Auto Eosinophil % : x  Auto Basophil % : x      04-02    139  |  94<L>  |  5<L>  ----------------------------<  93  3.9   |  30  |  0.65    Ca    9.5      02 Apr 2019 05:40  Phos  3.2     04-02  Mg     1.8     04-02          MICROBIOLOGY:                        RADIOLOGY:      < from: MR Angio Neck w/ IV Cont (04.02.19 @ 09:24) >  IMPRESSION:  Intracranial MR angiography demonstrates diminution of right MCA branch   vasculature flow and caliber..  Extracranial MR angiography demonstrates no flow-limiting stenosis by   NASCET criteria.    < end of copied text >            < from: Xray Cinesophagram (03.22.19 @ 08:44) >    IMPRESSION:       Deep silent laryngeal penetration without retrieval of thin liquids. Chin   tuck posture eliminate eliminates laryngeal penetration.    The patient reported food being stuck.     A screening frontal view of the esophagus demonstrates intermittent   tertiary contractions of the distal esophagus. No hiatal hernia or   extrinsic compression. A left chest tube is also partially visualized.    For further information and recommendations, please refer to the speech   pathologist final report which is available for review in the electronic   medical record.    < end of copied text >        < from: US Duplex Venous Lower Ext Complete, Bilateral (03.21.19 @ 10:13) >  FINDINGS:    There is normal compressibility of the bilateral common femoral, femoral   and popliteal veins. No calf vein thrombosis is detected.    Doppler examination shows normal spontaneous and phasic flow.    IMPRESSION:     No evidence of bilateral lower extremity deep venous thrombosis.    < end of copied text >        < from: Xray Chest 2 Views PA/Lat (03.20.19 @ 19:10) >  FINDINGS:   Left chest tube is in place.    Trace right pleural effusion.  Unchanged loculated pneumothorax projecting over the lateral inferior   left lower chest.  Unchanged left mid and lower lung atelectasis.  No pleural effusion or pneumothorax.   Cardiac size is enlarged. Degenerative changes of the spine    IMPRESSION:   Unchanged loculated pneumothorax projecting over the lateral inferior   left lower chest  Unchanged left mid and lower lung atelectasis.    < end of copied text > Patient is a 63y old  Male who presents with a chief complaint of Odynophagia/dysphagia, minimal PO intake (02 Apr 2019 13:33)      HPI:    64 yo man, current smoker (~1 ppd), with history of stage 4 left lung adenocarcinoma, on immunotherapy with Keytruda (first session on 3/4/19), recurrent left pleural effusion s/p left VATS with pleurX placement (2/2019), COPD, DM2, HTN, HLD, and anxiety presents with 1 week of worsening throat pain with odynophagia and dysphagia. Pt states that for the past 1 week, he has been feeling overwhelming dryness/scratchiness in his throat, causing pain and difficulty with swallowing. Pt notes that whenever he tries to swallow, he experiences the sensation of something getting stuck in his throat and upper chest area. Pt has this sensation with both liquids and solids. As a result, pt has been having very little to eat and drink, avoiding water entirely the last 2 days. Pt also reports poor appetite and ongoing pain in his left shoulder/scapula and near his pleurX insertion site. Pt saw Dr. Avitia (Palliative) earlier this month and was started on methadone for pain control. Pt denies any recent F/C, CP, palpitations, cough, abdominal pain, N/V, diarrhea, constipation, or urinary symptoms. Pt continues to have SOB when lying flat, and therefore, currently sleeps upright in a chair.     In the ED,  T 98.1-98.8, HR 75-88, -144/60-64, RR 16-32, O2 sats 95-97% RA.   CXR with unchanged left loculated pleural effusion. (20 Mar 2019 23:08)      PT afebrile, WBC has been in the low teens since admission.    Pt had EGD on the admission with esophagitis, (+) oral thrush.  On fluconazole for esophageal candidiasis.  Also developed vesicular rash on righ posterior thorax and right abd, on treatment for herpes zoster and is on IV acyclovir.  PT very weak, confused at times, poor PO intake.  Spoke to pt's wife at length, who declined PEG or further chemotherapy at this time.  Wants to focus on his comfort, and considering home hospice.  She is aware of pt's poor prognosis.      ID Consult called for further antimicrobial managment.        REVIEW OF SYSTEMS:    Poor historian    CONSTITUTIONAL: No fever, weight loss, or fatigue  EYES: No eye pain, visual disturbances, or discharge  ENMT:  No sore throat  NECK: No pain or stiffness  RESPIRATORY: No cough, wheezing, chills or hemoptysis; No shortness of breath  CARDIOVASCULAR: No chest pain, palpitations, dizziness, or leg swelling  GASTROINTESTINAL: poor appetite, unable to swallow  GENITOURINARY: No dysuria, frequency, hematuria, or incontinence  NEUROLOGICAL: No headaches, memory loss, loss of strength, numbness, or tremors  SKIN: No itching, burning, rashes, or lesions   LYMPH NODES: No enlarged glands  MUSCULOSKELETAL: No joint pain or swelling; No muscle, back, or extremity pain      PAST MEDICAL & SURGICAL HISTORY:  Anxiety  Pleural effusion, left  Lung cancer  COPD (chronic obstructive pulmonary disease)  HLD (hyperlipidemia)  Diabetes mellitus  Hypertension  No significant past surgical history      Allergies    No Known Allergies    Intolerances        FAMILY HISTORY:  Family history of diabetes mellitus: Parents      SOCIAL HISTORY:    (+) current smoker, no ivdu, etoh    MEDICATIONS  (STANDING):  acyclovir IVPB      acyclovir IVPB 800 milliGRAM(s) IV Intermittent every 8 hours  aspirin enteric coated 81 milliGRAM(s) Oral daily  atorvastatin 40 milliGRAM(s) Oral at bedtime  buDESOnide 160 MICROgram(s)/formoterol 4.5 MICROgram(s) Inhaler 2 Puff(s) Inhalation two times a day  dextrose 5%. 1000 milliLiter(s) (50 mL/Hr) IV Continuous <Continuous>  dextrose 50% Injectable 12.5 Gram(s) IV Push once  dextrose 50% Injectable 25 Gram(s) IV Push once  dextrose 50% Injectable 25 Gram(s) IV Push once  docusate sodium 100 milliGRAM(s) Oral three times a day  enoxaparin Injectable 40 milliGRAM(s) SubCutaneous daily  fluconAZOLE IVPB      fluconAZOLE IVPB 400 milliGRAM(s) IV Intermittent every 24 hours  insulin lispro (HumaLOG) corrective regimen sliding scale   SubCutaneous three times a day before meals  insulin lispro (HumaLOG) corrective regimen sliding scale   SubCutaneous at bedtime  lactated ringers. 1000 milliLiter(s) (50 mL/Hr) IV Continuous <Continuous>  melatonin 3 milliGRAM(s) Oral at bedtime  methadone    Tablet 2.5 milliGRAM(s) Oral two times a day  metoprolol tartrate 25 milliGRAM(s) Oral two times a day  nicotine -  14 mG/24Hr(s) Patch 1 patch Transdermal daily  polyethylene glycol 3350 17 Gram(s) Oral two times a day  pyridoxine 100 milliGRAM(s) Oral daily  tiotropium 18 MICROgram(s) Capsule 1 Capsule(s) Inhalation daily    MEDICATIONS  (PRN):  ALBUTerol    90 MICROgram(s) HFA Inhaler 2 Puff(s) Inhalation every 6 hours PRN Shortness of Breath and/or Wheezing  dextrose 40% Gel 15 Gram(s) Oral once PRN Blood Glucose LESS THAN 70 milliGRAM(s)/deciliter  glucagon  Injectable 1 milliGRAM(s) IntraMuscular once PRN Glucose LESS THAN 70 milligrams/deciliter  ondansetron Injectable 4 milliGRAM(s) IV Push every 6 hours PRN Nausea and/or Vomiting  senna 2 Tablet(s) Oral at bedtime PRN Constipation  simethicone 160 milliGRAM(s) Chew daily PRN Gas      Vital Signs Last 24 Hrs  T(C): 36.9 (02 Apr 2019 14:01), Max: 36.9 (02 Apr 2019 14:01)  T(F): 98.5 (02 Apr 2019 14:01), Max: 98.5 (02 Apr 2019 14:01)  HR: 82 (02 Apr 2019 14:01) (76 - 82)  BP: 125/66 (02 Apr 2019 14:01) (125/66 - 146/59)  BP(mean): --  RR: 17 (02 Apr 2019 14:01) (16 - 18)  SpO2: 94% (02 Apr 2019 14:01) (94% - 96%)    PHYSICAL EXAM:    GENERAL: appears weak, slumped over in chair, lethargic, arousable  HEAD:  Atraumatic, Normocephalic  EYES: EOMI, PERRLA, conjunctiva and sclera clear  ENMT: No tonsillar erythema, exudates, or enlargement; Moist mucous membranes, mild oral thrush  NECK: Supple, No JVD  CHEST/LUNG: Clear to percussion bilaterally; No rales, rhonchi, wheezing, or rubs  HEART: Regular rate and rhythm; No murmurs, rubs, or gallops  ABDOMEN: Soft, Nontender, Nondistended; Bowel sounds present  EXTREMITIES:  2+ Peripheral Pulses, No clubbing, cyanosis, or edema  LYMPH: No lymphadenopathy noted  SKIN: healing vesicular rash over right thorax and abd, (+) crusting.  No active vesicles    LABS:  CBC Full  -  ( 02 Apr 2019 05:40 )  WBC Count : 14.79 K/uL  RBC Count : 4.32 M/uL  Hemoglobin : 10.4 g/dL  Hematocrit : 33.6 %  Platelet Count - Automated : 405 K/uL  Mean Cell Volume : 77.8 fL  Mean Cell Hemoglobin : 24.1 pg  Mean Cell Hemoglobin Concentration : 31.0 %  Auto Neutrophil # : x  Auto Lymphocyte # : x  Auto Monocyte # : x  Auto Eosinophil # : x  Auto Basophil # : x  Auto Neutrophil % : x  Auto Lymphocyte % : x  Auto Monocyte % : x  Auto Eosinophil % : x  Auto Basophil % : x      04-02    139  |  94<L>  |  5<L>  ----------------------------<  93  3.9   |  30  |  0.65    Ca    9.5      02 Apr 2019 05:40  Phos  3.2     04-02  Mg     1.8     04-02          MICROBIOLOGY:                        RADIOLOGY:      < from: MR Angio Neck w/ IV Cont (04.02.19 @ 09:24) >  IMPRESSION:  Intracranial MR angiography demonstrates diminution of right MCA branch   vasculature flow and caliber..  Extracranial MR angiography demonstrates no flow-limiting stenosis by   NASCET criteria.    < end of copied text >            < from: Xray Cinesophagram (03.22.19 @ 08:44) >    IMPRESSION:       Deep silent laryngeal penetration without retrieval of thin liquids. Chin   tuck posture eliminate eliminates laryngeal penetration.    The patient reported food being stuck.     A screening frontal view of the esophagus demonstrates intermittent   tertiary contractions of the distal esophagus. No hiatal hernia or   extrinsic compression. A left chest tube is also partially visualized.    For further information and recommendations, please refer to the speech   pathologist final report which is available for review in the electronic   medical record.    < end of copied text >        < from: US Duplex Venous Lower Ext Complete, Bilateral (03.21.19 @ 10:13) >  FINDINGS:    There is normal compressibility of the bilateral common femoral, femoral   and popliteal veins. No calf vein thrombosis is detected.    Doppler examination shows normal spontaneous and phasic flow.    IMPRESSION:     No evidence of bilateral lower extremity deep venous thrombosis.    < end of copied text >        < from: Xray Chest 2 Views PA/Lat (03.20.19 @ 19:10) >  FINDINGS:   Left chest tube is in place.    Trace right pleural effusion.  Unchanged loculated pneumothorax projecting over the lateral inferior   left lower chest.  Unchanged left mid and lower lung atelectasis.  No pleural effusion or pneumothorax.   Cardiac size is enlarged. Degenerative changes of the spine    IMPRESSION:   Unchanged loculated pneumothorax projecting over the lateral inferior   left lower chest  Unchanged left mid and lower lung atelectasis.    < end of copied text >

## 2019-04-02 NOTE — PROGRESS NOTE ADULT - ASSESSMENT
Impression:  1) Odynophagia/Dysphagia - with noted oral thrush on exam.  Likely do to candidal esophagitis.  DDx also includes CMV or HSV esophagitis in an immunocompromised patient. EGD showing grade A esophagitis      Recommendations:   - await pathology   - c/w  fluconazole for total 14 days   - symptoms management per primary team   - please call GI back with any further questions    Lorena Franco, PGY-4  Gastroenterology Fellow  Pager x [phone] or [phone]  (After 5 pm or on weekends please page GI on call) Impression:  1) Odynophagia/Dysphagia - with noted oral thrush on exam.  Likely do to candidal esophagitis.  DDx also includes CMV or HSV esophagitis in an immunocompromised patient. EGD showing grade A esophagitis      Recommendations:   - obtain speech and swallow evaluation again if possible   - would include oral and esophogeal phase on barium swallow (cine + barium esophogram)   - await pathology   - c/w  fluconazole for total 14 days   - symptoms management per primary team   - please call GI back with any further questions    Lorena Franco, PGY-4  Gastroenterology Fellow  Pager x [phone] or [phone]  (After 5 pm or on weekends please page GI on call)

## 2019-04-02 NOTE — PROGRESS NOTE ADULT - ASSESSMENT
For staging of his lung cancer, he underwent MRI brain to r/o brain mets. MRI brain showed possible subacute infarct in the left lentiform nucleus. Neurology consulted for imaging findings. Denies any numbness/tingling, weakness.    Impression     MRI reviewed with stroke attending     Plan     No stroke on MRI  No further workup needed   [facility] medical care

## 2019-04-02 NOTE — PROGRESS NOTE ADULT - ASSESSMENT
64 yo man, current smoker (~1 ppd), with history of stage 4 left lung adenocarcinoma, on immunotherapy with Keytruda (first session on 3/4/19), recurrent left pleural effusion s/p left VATS with pleurX placement (2/2019), COPD, DM2, HTN, HLD, and anxiety presents with 1 week of worsening throat pain with odynophagia and dysphagia, started on fluconazole for thrush and possible esophageal candidiasis, also with herpes zoster.    Problem/Plan - 1:  ·  Problem: Herpes zoster.  Plan: -pt is immunocompromised and c/o new rash on his right mid back crossing to his abdomen, rash is erythematous vesicular c/w herpes zoster  - cont iv acyclovir 10 mg/kg q8h, monitor lesion, IVF while on iv acyclovir  ID consulted     Problem/Plan - 2:  ·  Problem: Odynophagia.  Plan: dysphagia and odynophagia, likely 2/2 progression of metastatic disease in mediastinal area. Recent PET/CT showing hypermetabolic right supraclavicular, bilateral mediastinal, and left perihilar lymphadenopathy, increased in size from a month ago. MRI brain with possible subacute lacunar infarct in posterior L lenticular nucleus region - may also have neurogenic dysphagia. Given oral thrush, possible candida esophagitis  - odynophagia improved, however still eating minimally, report of food being "stuck"  - EGD 4/1: Desquamation mucosa in the esophagus. LA Grade A esophagitis. Biopsied. Gastritis. Biopsied. Normal examined duodenum.  - continue fluconazole 400 mg IV for possible candida esophagitis  - s/p MBS - oropharyngeal dysphagia - SLP recommending dysphagia 2 with nectar thick liquids   - if oral intake insufficient for caloric/nutritional needs, may need G tube for feeding    Problem/Plan - 3:  ·  Problem: Failure to thrive in adult.  Plan: Pt with poor nutrition, decreased appetite, and weight loss in setting of metastatic lung cancer.   - pt with severe protein calorie malnutrition per nutrition eval  - plan as above for odynophagia, dysphagia  - if oral intake insufficient for caloric/nutritional needs, may need G tube for feeding  - f/u palliative care for symptomatic management  - anticipate home with home PT per physical therapy recs    Problem/Plan - 4:  ·  Problem: Pleural effusion, left.  Plan: Pt with recurrent left loculated pleural effusion, s/p left VATS and pleurX placement. PleurX still in place, draining serosanguinous fluid likely due to malignancy  - CXR stable L pleural effusion  - C/w PleurX drainage; monitor output  - Monitor respiratory status  - f/u CTS recs, pt follows with Dr. Moore    Problem/Plan - 5:  ·  Problem: Lung cancer.  Plan: Pt with stage 4 left lung adenocarcinoma, started on immunotherapy on 3/4/19 with Keytruda.  - Oncology consult appreciated  - MRI brain with subacute lacunar infarct  - neuro consulted, f/u recs  - appreciate palliative care recs  - per Dr. Mao, patient should f/u for additional Keytruda treatment  - C/w methadone ([employer] Reference #: [id number]) and oxycodone PRN    Problem/Plan - 6:  Problem: COPD (chronic obstructive pulmonary disease). Plan: No S/S of exacerbation.  - C/w albuterol PRN  - Anoro Ellipta not available through pharmacy. c/w Symbicort and Spiriva for now    Problem/Plan - 7:  ·  Problem: Diabetes mellitus.  Plan: Pt on Januvia and Metformin at home  - fingersticks all 90's to 100's  - likely due to patient eating little  - monitor on correction scale for now    Problem/Plan - 8:  ·  Problem: Hypertension.  Plan: Pt on amlodipine, valsartan, metoprolol, and lasix at home.  - Will c/w metoprolol, but will hold amlodipine (given chronic b/l LE edema), valsartan, and lasix  - Will restart additional home BP meds if needed.     Problem/Plan - 9:  ·  Problem: Need for prophylactic measure.  Plan: - DVT ppx: pt with hypercoagulable state, lovenox sc   OOB and ambulation as tolerated.  - Diet: soft diet.     Overall prognosis is very poor. I d/w wife extensively at bedside again today. She verbalized the understanding that the prognosis is poor. She is not concerned about the complications of PEG and doesn't want it at this time.

## 2019-04-02 NOTE — PROGRESS NOTE ADULT - SUBJECTIVE AND OBJECTIVE BOX
Chief Complaint:  Patient is a 63y old  Male who presents with a chief complaint of Odynophagia/dysphagia, minimal PO intake (2019 16:14)      Interval Events:   Yesterday patient had upper endoscopy which showed Desquamation mucosa in the esophagus. LA Grade A esophagitis. Biopsied. Gastritis. Biopsied. Normal examined duodenum.    Allergies:  No Known Allergies      Hospital Medications:  acyclovir IVPB      acyclovir IVPB 800 milliGRAM(s) IV Intermittent every 8 hours  ALBUTerol    90 MICROgram(s) HFA Inhaler 2 Puff(s) Inhalation every 6 hours PRN  aspirin enteric coated 81 milliGRAM(s) Oral daily  atorvastatin 40 milliGRAM(s) Oral at bedtime  buDESOnide 160 MICROgram(s)/formoterol 4.5 MICROgram(s) Inhaler 2 Puff(s) Inhalation two times a day  dextrose 40% Gel 15 Gram(s) Oral once PRN  dextrose 5%. 1000 milliLiter(s) IV Continuous <Continuous>  dextrose 50% Injectable 12.5 Gram(s) IV Push once  dextrose 50% Injectable 25 Gram(s) IV Push once  dextrose 50% Injectable 25 Gram(s) IV Push once  docusate sodium 100 milliGRAM(s) Oral three times a day  enoxaparin Injectable 40 milliGRAM(s) SubCutaneous daily  fluconAZOLE IVPB      fluconAZOLE IVPB 400 milliGRAM(s) IV Intermittent every 24 hours  glucagon  Injectable 1 milliGRAM(s) IntraMuscular once PRN  insulin lispro (HumaLOG) corrective regimen sliding scale   SubCutaneous three times a day before meals  insulin lispro (HumaLOG) corrective regimen sliding scale   SubCutaneous at bedtime  lactated ringers. 1000 milliLiter(s) IV Continuous <Continuous>  melatonin 3 milliGRAM(s) Oral at bedtime  methadone    Tablet 2.5 milliGRAM(s) Oral two times a day  metoprolol tartrate 25 milliGRAM(s) Oral two times a day  nicotine -  14 mG/24Hr(s) Patch 1 patch Transdermal daily  ondansetron Injectable 4 milliGRAM(s) IV Push every 6 hours PRN  polyethylene glycol 3350 17 Gram(s) Oral two times a day  pyridoxine 100 milliGRAM(s) Oral daily  senna 2 Tablet(s) Oral at bedtime PRN  simethicone 160 milliGRAM(s) Chew daily PRN  tiotropium 18 MICROgram(s) Capsule 1 Capsule(s) Inhalation daily      PMHX/PSHX:  Anxiety  Pleural effusion, left  Lung cancer  COPD (chronic obstructive pulmonary disease)  HLD (hyperlipidemia)  Diabetes mellitus  Hypertension  No significant past surgical history      Family history:  Family history of diabetes mellitus          PHYSICAL EXAM:     GENERAL:  Appears stated age, well-groomed, well-nourished, no distress  HEENT:  NC/AT,  conjunctivae clear, sclera -anicteric  CHEST:  Full & symmetric excursion, no increased  HEART:  Regular rhythm  ABDOMEN:  Soft, non-tender, non-distended, normoactive bowel sounds,  no masses ,no hepato-splenomegaly,   EXTREMITIES:  no cyanosis,clubbing or edema  SKIN:  No rash/erythema/ecchymoses/petechiae/wounds/abscess/warm/dry  NEURO:  Alert, oriented    Vital Signs:  Vital Signs Last 24 Hrs  T(C): 36.6 (2019 04:00), Max: 37 (2019 07:22)  T(F): 97.9 (2019 04:00), Max: 98.6 (2019 07:22)  HR: 76 (2019 04:00) (76 - 84)  BP: 135/65 (2019 04:00) (130/68 - 146/59)  BP(mean): --  RR: 16 (2019 04:00) (16 - 18)  SpO2: 95% (2019 04:00) (95% - 98%)  Daily     Daily Weight in k (2019 06:07)    LABS:                        10.4   14.79 )-----------( 405      ( 2019 05:40 )             33.6     04    139  |  94<L>  |  5<L>  ----------------------------<  93  3.9   |  30  |  0.65    Ca    9.5      2019 05:40  Phos  3.2     0402  Mg     1.8                     Imaging:

## 2019-04-02 NOTE — PROGRESS NOTE ADULT - SUBJECTIVE AND OBJECTIVE BOX
Subjective:Interval History - No events overnight    Objective:   Vital Signs Last 24 Hrs  T(C): 36.6 (02 Apr 2019 04:00), Max: 36.7 (01 Apr 2019 17:22)  T(F): 97.9 (02 Apr 2019 04:00), Max: 98.1 (01 Apr 2019 22:00)  HR: 76 (02 Apr 2019 04:00) (76 - 79)  BP: 135/65 (02 Apr 2019 04:00) (135/65 - 146/59)  BP(mean): --  RR: 16 (02 Apr 2019 04:00) (16 - 18)  SpO2: 95% (02 Apr 2019 04:00) (95% - 96%)    Neuro exam     Mental Status: Orientated to self, year, did not know month (stated "May").  Attention intact.  No dysarthria. Speech fluent. names pen, thumb, gloves. follows all commands  Cranial Nerves:  EOMI, VFF, no nystagmus.  No facial asymmetry.   Tongue midline.  Sternocleidomastoid and Trapezius intact bilaterally.          Strength:     [] Upper extremity                      Delt       Bicep    Tricep                                                  R         5/5        5/5        5/5       5/5                                               L          5/5        5/5        5/5       5/5  [] Lower extremity                       HF          KE          KF        DF         PF                                               R        5/5        5/5        5/5       5/5       5/5                                               L         5/5        5/5       5/5       5/5        5/5  Pronator drift: none                 Dysmetria: None to finger-nose-finger   No truncal ataxia.    Tremor: No resting, postural or action tremor.  No myoclonus.    Sensation: intact to light touch. no extinction    Deep Tendon Reflexes:     Biceps          Triceps      BR        Patellar        Ankle         Babinski                                         R       2+                   2+           2+            2+               2+           downgoing                                         L        2+                  2+           2+            2+               2+           downgoing    Other:    04-02    139  |  94<L>  |  5<L>  ----------------------------<  93  3.9   |  30  |  0.65    Ca    9.5      02 Apr 2019 05:40  Phos  3.2     04-02  Mg     1.8     04-02                              10.4   14.79 )-----------( 405      ( 02 Apr 2019 05:40 )             33.6       Radiology    EEG:    MEDICATIONS  (STANDING):  acyclovir IVPB      acyclovir IVPB 800 milliGRAM(s) IV Intermittent every 8 hours  aspirin enteric coated 81 milliGRAM(s) Oral daily  atorvastatin 40 milliGRAM(s) Oral at bedtime  buDESOnide 160 MICROgram(s)/formoterol 4.5 MICROgram(s) Inhaler 2 Puff(s) Inhalation two times a day  dextrose 5%. 1000 milliLiter(s) (50 mL/Hr) IV Continuous <Continuous>  dextrose 50% Injectable 12.5 Gram(s) IV Push once  dextrose 50% Injectable 25 Gram(s) IV Push once  dextrose 50% Injectable 25 Gram(s) IV Push once  docusate sodium 100 milliGRAM(s) Oral three times a day  enoxaparin Injectable 40 milliGRAM(s) SubCutaneous daily  fluconAZOLE IVPB      fluconAZOLE IVPB 400 milliGRAM(s) IV Intermittent every 24 hours  insulin lispro (HumaLOG) corrective regimen sliding scale   SubCutaneous three times a day before meals  insulin lispro (HumaLOG) corrective regimen sliding scale   SubCutaneous at bedtime  lactated ringers. 1000 milliLiter(s) (50 mL/Hr) IV Continuous <Continuous>  melatonin 3 milliGRAM(s) Oral at bedtime  methadone    Tablet 2.5 milliGRAM(s) Oral two times a day  metoprolol tartrate 25 milliGRAM(s) Oral two times a day  nicotine -  14 mG/24Hr(s) Patch 1 patch Transdermal daily  polyethylene glycol 3350 17 Gram(s) Oral two times a day  pyridoxine 100 milliGRAM(s) Oral daily  tiotropium 18 MICROgram(s) Capsule 1 Capsule(s) Inhalation daily    MEDICATIONS  (PRN):  ALBUTerol    90 MICROgram(s) HFA Inhaler 2 Puff(s) Inhalation every 6 hours PRN Shortness of Breath and/or Wheezing  dextrose 40% Gel 15 Gram(s) Oral once PRN Blood Glucose LESS THAN 70 milliGRAM(s)/deciliter  glucagon  Injectable 1 milliGRAM(s) IntraMuscular once PRN Glucose LESS THAN 70 milligrams/deciliter  ondansetron Injectable 4 milliGRAM(s) IV Push every 6 hours PRN Nausea and/or Vomiting  senna 2 Tablet(s) Oral at bedtime PRN Constipation  simethicone 160 milliGRAM(s) Chew daily PRN Gas

## 2019-04-03 LAB
ANION GAP SERPL CALC-SCNC: 13 MMO/L — SIGNIFICANT CHANGE UP (ref 7–14)
ANION GAP SERPL CALC-SCNC: 14 MMO/L — SIGNIFICANT CHANGE UP (ref 7–14)
BASOPHILS # BLD AUTO: 0.06 K/UL — SIGNIFICANT CHANGE UP (ref 0–0.2)
BASOPHILS NFR BLD AUTO: 0.4 % — SIGNIFICANT CHANGE UP (ref 0–2)
BUN SERPL-MCNC: 6 MG/DL — LOW (ref 7–23)
BUN SERPL-MCNC: 6 MG/DL — LOW (ref 7–23)
CALCIUM SERPL-MCNC: 9.1 MG/DL — SIGNIFICANT CHANGE UP (ref 8.4–10.5)
CALCIUM SERPL-MCNC: 9.2 MG/DL — SIGNIFICANT CHANGE UP (ref 8.4–10.5)
CHLORIDE SERPL-SCNC: 94 MMOL/L — LOW (ref 98–107)
CHLORIDE SERPL-SCNC: 96 MMOL/L — LOW (ref 98–107)
CO2 SERPL-SCNC: 28 MMOL/L — SIGNIFICANT CHANGE UP (ref 22–31)
CO2 SERPL-SCNC: 28 MMOL/L — SIGNIFICANT CHANGE UP (ref 22–31)
CREAT SERPL-MCNC: 0.53 MG/DL — SIGNIFICANT CHANGE UP (ref 0.5–1.3)
CREAT SERPL-MCNC: 0.56 MG/DL — SIGNIFICANT CHANGE UP (ref 0.5–1.3)
EOSINOPHIL # BLD AUTO: 0.13 K/UL — SIGNIFICANT CHANGE UP (ref 0–0.5)
EOSINOPHIL NFR BLD AUTO: 1 % — SIGNIFICANT CHANGE UP (ref 0–6)
GLUCOSE SERPL-MCNC: 106 MG/DL — HIGH (ref 70–99)
GLUCOSE SERPL-MCNC: 107 MG/DL — HIGH (ref 70–99)
HCT VFR BLD CALC: 33.5 % — LOW (ref 39–50)
HGB BLD-MCNC: 10.4 G/DL — LOW (ref 13–17)
IMM GRANULOCYTES NFR BLD AUTO: 0.7 % — SIGNIFICANT CHANGE UP (ref 0–1.5)
LYMPHOCYTES # BLD AUTO: 1.04 K/UL — SIGNIFICANT CHANGE UP (ref 1–3.3)
LYMPHOCYTES # BLD AUTO: 7.8 % — LOW (ref 13–44)
MAGNESIUM SERPL-MCNC: 1.7 MG/DL — SIGNIFICANT CHANGE UP (ref 1.6–2.6)
MAGNESIUM SERPL-MCNC: 1.7 MG/DL — SIGNIFICANT CHANGE UP (ref 1.6–2.6)
MCHC RBC-ENTMCNC: 24.5 PG — LOW (ref 27–34)
MCHC RBC-ENTMCNC: 31 % — LOW (ref 32–36)
MCV RBC AUTO: 78.8 FL — LOW (ref 80–100)
MONOCYTES # BLD AUTO: 1.15 K/UL — HIGH (ref 0–0.9)
MONOCYTES NFR BLD AUTO: 8.6 % — SIGNIFICANT CHANGE UP (ref 2–14)
NEUTROPHILS # BLD AUTO: 10.89 K/UL — HIGH (ref 1.8–7.4)
NEUTROPHILS NFR BLD AUTO: 81.5 % — HIGH (ref 43–77)
NRBC # FLD: 0 K/UL — SIGNIFICANT CHANGE UP (ref 0–0)
PHOSPHATE SERPL-MCNC: 2.9 MG/DL — SIGNIFICANT CHANGE UP (ref 2.5–4.5)
PLATELET # BLD AUTO: 407 K/UL — HIGH (ref 150–400)
PMV BLD: 9.6 FL — SIGNIFICANT CHANGE UP (ref 7–13)
POTASSIUM SERPL-MCNC: 3.4 MMOL/L — LOW (ref 3.5–5.3)
POTASSIUM SERPL-MCNC: 3.5 MMOL/L — SIGNIFICANT CHANGE UP (ref 3.5–5.3)
POTASSIUM SERPL-SCNC: 3.4 MMOL/L — LOW (ref 3.5–5.3)
POTASSIUM SERPL-SCNC: 3.5 MMOL/L — SIGNIFICANT CHANGE UP (ref 3.5–5.3)
RBC # BLD: 4.25 M/UL — SIGNIFICANT CHANGE UP (ref 4.2–5.8)
RBC # FLD: 15.8 % — HIGH (ref 10.3–14.5)
SODIUM SERPL-SCNC: 135 MMOL/L — SIGNIFICANT CHANGE UP (ref 135–145)
SODIUM SERPL-SCNC: 138 MMOL/L — SIGNIFICANT CHANGE UP (ref 135–145)
WBC # BLD: 13.36 K/UL — HIGH (ref 3.8–10.5)
WBC # FLD AUTO: 13.36 K/UL — HIGH (ref 3.8–10.5)

## 2019-04-03 PROCEDURE — 99233 SBSQ HOSP IP/OBS HIGH 50: CPT

## 2019-04-03 PROCEDURE — 74220 X-RAY XM ESOPHAGUS 1CNTRST: CPT | Mod: 26

## 2019-04-03 PROCEDURE — 93010 ELECTROCARDIOGRAM REPORT: CPT

## 2019-04-03 PROCEDURE — 99232 SBSQ HOSP IP/OBS MODERATE 35: CPT | Mod: GC

## 2019-04-03 RX ORDER — POTASSIUM CHLORIDE 20 MEQ
20 PACKET (EA) ORAL ONCE
Qty: 0 | Refills: 0 | Status: DISCONTINUED | OUTPATIENT
Start: 2019-04-03 | End: 2019-04-03

## 2019-04-03 RX ORDER — POTASSIUM CHLORIDE 20 MEQ
20 PACKET (EA) ORAL ONCE
Qty: 0 | Refills: 0 | Status: COMPLETED | OUTPATIENT
Start: 2019-04-03 | End: 2019-04-03

## 2019-04-03 RX ORDER — FUROSEMIDE 40 MG
40 TABLET ORAL DAILY
Qty: 0 | Refills: 0 | Status: DISCONTINUED | OUTPATIENT
Start: 2019-04-03 | End: 2019-04-03

## 2019-04-03 RX ORDER — MAGNESIUM OXIDE 400 MG ORAL TABLET 241.3 MG
400 TABLET ORAL ONCE
Qty: 0 | Refills: 0 | Status: COMPLETED | OUTPATIENT
Start: 2019-04-03 | End: 2019-04-03

## 2019-04-03 RX ORDER — ACETAMINOPHEN 500 MG
650 TABLET ORAL EVERY 6 HOURS
Qty: 0 | Refills: 0 | Status: DISCONTINUED | OUTPATIENT
Start: 2019-04-03 | End: 2019-04-05

## 2019-04-03 RX ORDER — MAGNESIUM OXIDE 400 MG ORAL TABLET 241.3 MG
400 TABLET ORAL
Qty: 0 | Refills: 0 | Status: DISCONTINUED | OUTPATIENT
Start: 2019-04-03 | End: 2019-04-03

## 2019-04-03 RX ORDER — DILTIAZEM HCL 120 MG
10 CAPSULE, EXT RELEASE 24 HR ORAL ONCE
Qty: 0 | Refills: 0 | Status: COMPLETED | OUTPATIENT
Start: 2019-04-03 | End: 2019-04-03

## 2019-04-03 RX ORDER — DIGOXIN 250 MCG
0.25 TABLET ORAL DAILY
Qty: 0 | Refills: 0 | Status: DISCONTINUED | OUTPATIENT
Start: 2019-04-04 | End: 2019-04-05

## 2019-04-03 RX ORDER — DILTIAZEM HCL 120 MG
30 CAPSULE, EXT RELEASE 24 HR ORAL EVERY 6 HOURS
Qty: 0 | Refills: 0 | Status: DISCONTINUED | OUTPATIENT
Start: 2019-04-03 | End: 2019-04-05

## 2019-04-03 RX ORDER — HYDROMORPHONE HYDROCHLORIDE 2 MG/ML
1 INJECTION INTRAMUSCULAR; INTRAVENOUS; SUBCUTANEOUS ONCE
Qty: 0 | Refills: 0 | Status: DISCONTINUED | OUTPATIENT
Start: 2019-04-03 | End: 2019-04-03

## 2019-04-03 RX ORDER — ACETAMINOPHEN 500 MG
650 TABLET ORAL ONCE
Qty: 0 | Refills: 0 | Status: COMPLETED | OUTPATIENT
Start: 2019-04-03 | End: 2019-04-03

## 2019-04-03 RX ORDER — METOPROLOL TARTRATE 50 MG
5 TABLET ORAL ONCE
Qty: 0 | Refills: 0 | Status: COMPLETED | OUTPATIENT
Start: 2019-04-03 | End: 2019-04-03

## 2019-04-03 RX ORDER — DIGOXIN 250 MCG
0.5 TABLET ORAL ONCE
Qty: 0 | Refills: 0 | Status: COMPLETED | OUTPATIENT
Start: 2019-04-03 | End: 2019-04-03

## 2019-04-03 RX ORDER — DIGOXIN 250 MCG
0.25 TABLET ORAL ONCE
Qty: 0 | Refills: 0 | Status: COMPLETED | OUTPATIENT
Start: 2019-04-03 | End: 2019-04-03

## 2019-04-03 RX ADMIN — FLUCONAZOLE 100 MILLIGRAM(S): 150 TABLET ORAL at 15:03

## 2019-04-03 RX ADMIN — Medication 81 MILLIGRAM(S): at 17:16

## 2019-04-03 RX ADMIN — Medication 5 MILLIGRAM(S): at 14:41

## 2019-04-03 RX ADMIN — Medication 10 MILLIGRAM(S): at 15:03

## 2019-04-03 RX ADMIN — HYDROMORPHONE HYDROCHLORIDE 1 MILLIGRAM(S): 2 INJECTION INTRAMUSCULAR; INTRAVENOUS; SUBCUTANEOUS at 15:42

## 2019-04-03 RX ADMIN — Medication 20 MILLIEQUIVALENT(S): at 03:41

## 2019-04-03 RX ADMIN — HYDROMORPHONE HYDROCHLORIDE 1 MILLIGRAM(S): 2 INJECTION INTRAMUSCULAR; INTRAVENOUS; SUBCUTANEOUS at 15:57

## 2019-04-03 RX ADMIN — Medication 100 MILLIGRAM(S): at 17:16

## 2019-04-03 RX ADMIN — Medication 25 MILLIGRAM(S): at 05:49

## 2019-04-03 RX ADMIN — Medication 30 MILLIGRAM(S): at 22:20

## 2019-04-03 RX ADMIN — BUDESONIDE AND FORMOTEROL FUMARATE DIHYDRATE 2 PUFF(S): 160; 4.5 AEROSOL RESPIRATORY (INHALATION) at 07:26

## 2019-04-03 RX ADMIN — Medication 650 MILLIGRAM(S): at 15:31

## 2019-04-03 RX ADMIN — Medication 0.25 MILLIGRAM(S): at 22:19

## 2019-04-03 RX ADMIN — Medication 25 MILLIGRAM(S): at 17:16

## 2019-04-03 RX ADMIN — Medication 100 MILLIGRAM(S): at 22:19

## 2019-04-03 RX ADMIN — TIOTROPIUM BROMIDE 1 CAPSULE(S): 18 CAPSULE ORAL; RESPIRATORY (INHALATION) at 07:26

## 2019-04-03 RX ADMIN — Medication 266 MILLIGRAM(S): at 05:49

## 2019-04-03 RX ADMIN — ATORVASTATIN CALCIUM 40 MILLIGRAM(S): 80 TABLET, FILM COATED ORAL at 22:19

## 2019-04-03 RX ADMIN — Medication 0.5 MILLIGRAM(S): at 16:25

## 2019-04-03 RX ADMIN — Medication 1 PATCH: at 06:57

## 2019-04-03 RX ADMIN — Medication 650 MILLIGRAM(S): at 03:41

## 2019-04-03 RX ADMIN — BUDESONIDE AND FORMOTEROL FUMARATE DIHYDRATE 2 PUFF(S): 160; 4.5 AEROSOL RESPIRATORY (INHALATION) at 22:19

## 2019-04-03 RX ADMIN — Medication 650 MILLIGRAM(S): at 16:28

## 2019-04-03 RX ADMIN — Medication 266 MILLIGRAM(S): at 22:19

## 2019-04-03 RX ADMIN — Medication 1 PATCH: at 11:40

## 2019-04-03 RX ADMIN — Medication 650 MILLIGRAM(S): at 04:41

## 2019-04-03 RX ADMIN — Medication 1 PATCH: at 11:35

## 2019-04-03 RX ADMIN — Medication 30 MILLIGRAM(S): at 15:34

## 2019-04-03 RX ADMIN — MAGNESIUM OXIDE 400 MG ORAL TABLET 400 MILLIGRAM(S): 241.3 TABLET ORAL at 03:41

## 2019-04-03 RX ADMIN — Medication 266 MILLIGRAM(S): at 13:42

## 2019-04-03 RX ADMIN — Medication 3 MILLIGRAM(S): at 22:19

## 2019-04-03 RX ADMIN — ENOXAPARIN SODIUM 40 MILLIGRAM(S): 100 INJECTION SUBCUTANEOUS at 11:34

## 2019-04-03 RX ADMIN — Medication 1 PATCH: at 19:00

## 2019-04-03 NOTE — PROGRESS NOTE ADULT - ASSESSMENT
64 yo man, current smoker (~1 ppd), with history of stage 4 left lung adenocarcinoma, on immunotherapy with Keytruda (first session on 3/4/19), recurrent left pleural effusion s/p left VATS with pleurX placement (2/2019), COPD, DM2, HTN, HLD, and anxiety presents with 1 week of worsening throat pain with odynophagia and dysphagia, started on fluconazole for thrush and possible esophageal candidiasis, also with herpes zoster.    New afib with RVR: appreciate cardiology eval. rec load with digoxin per Card. c/w tele    Problem/Plan - 1:  ·  Problem: Herpes zoster.  cont iv acyclovir 10 mg/kg q8h while in the hospital, IVF while on iv acyclovir  appreciate ID rec     Problem/Plan - 2:  ·  Problem: Odynophagia.  Plan: dysphagia and odynophagia, likely 2/2 progression of metastatic disease in mediastinal area. Recent PET/CT showing hypermetabolic right supraclavicular, bilateral mediastinal, and left perihilar lymphadenopathy, increased in size from a month ago. MRI brain with possible subacute lacunar infarct in posterior L lenticular nucleus region - may also have neurogenic dysphagia. Given oral thrush, possible candida esophagitis  - odynophagia improved, however still eating minimally, report of food being "stuck"  - EGD 4/1: Desquamation mucosa in the esophagus. LA Grade A esophagitis. Biopsied. Gastritis. Biopsied. Normal examined duodenum.  - continue fluconazole 400 mg IV for possible candida esophagitis  - s/p MBS - oropharyngeal dysphagia - SLP recommending dysphagia 2 with nectar thick liquids   - oral intake is insufficient for caloric/nutritional needs based on calorie count. However, wife refused G tube for feeding    Problem/Plan - 3:  ·  Problem: Failure to thrive in adult.  Plan: Pt with poor nutrition, decreased appetite, and weight loss in setting of metastatic lung cancer.   - pt with severe protein calorie malnutrition per nutrition eval  - plan as above for odynophagia, dysphagia  - f/u palliative care for symptomatic management    Problem/Plan - 4:  ·  Problem: Pleural effusion, left.  Plan: Pt with recurrent left loculated pleural effusion, s/p left VATS and pleurX placement. PleurX still in place, draining serosanguinous fluid likely due to malignancy  - CXR stable L pleural effusion  - C/w PleurX drainage; monitor output  - Monitor respiratory status  - f/u CTS recs, pt follows with Dr. Moore    Problem/Plan - 5:  ·  Problem: Lung cancer.  Plan: Pt with stage 4 left lung adenocarcinoma, started on immunotherapy on 3/4/19 with Keytruda.  - Oncology consult appreciated  - MRI brain with subacute lacunar infarct  - No further work up per Neuro  - appreciate palliative care recs  - per Dr. Mao, patient should f/u for additional Keytruda treatment. However, wife wants no more chemo now.   - C/w methadone ([employer] Reference #: [id number]) and oxycodone PRN    Problem/Plan - 6:  Problem: COPD (chronic obstructive pulmonary disease). Plan: No S/S of exacerbation.  - C/w albuterol PRN  - Anoro Ellipta not available through pharmacy. c/w Symbicort and Spiriva for now    Problem/Plan - 7:  ·  Problem: Diabetes mellitus.  Plan: Pt on Januvia and Metformin at home  monitor on correction scale for now    Problem/Plan - 8:  ·  Problem: Hypertension.  Plan: Pt on amlodipine, valsartan, metoprolol, and lasix at home.  - Will c/w metoprolol, but will hold amlodipine (given chronic b/l LE edema), valsartan, and lasix      Problem/Plan - 9:  ·  Problem: Need for prophylactic measure.  Plan: - DVT ppx: pt with hypercoagulable state, lovenox sc   OOB and ambulation as tolerated.    Overall prognosis is very poor. I d/w wife extensively at bedside again today. She verbalized the understanding that the prognosis is poor. She is not concerned about the complications of PEG and doesn't want it at this time.

## 2019-04-03 NOTE — CONSULT NOTE ADULT - REASON FOR ADMISSION
Odynophagia/dysphagia, minimal PO intake

## 2019-04-03 NOTE — CONSULT NOTE ADULT - CONSULT REASON
Afib with RVR
Assess Pleurx
Dysphagia
Metastatic lung cancer
candida esophagitis/zoster
subacute infarct found on MRI
NSCLC metastatic

## 2019-04-03 NOTE — CHART NOTE - NSCHARTNOTEFT_GEN_A_CORE
Nutrition Follow-Up Note: Calorie Count     Source: Patient [ ]    Family [X] Spouse @ bed side     other [X] Nurse, Shanae PEGN MD, Medical Chart   Diet rx: Dysphagia 2 Mechanical Soft-Nectar Consistency Fluid    Pt 64 yo male with history of stage 4 left lung adenocarcinoma. Pt with Odynophagia/dysphagia, minimal PO intake. At time of visit Pt appears drowsy/sleepy; spouse @ bed side answered questions. Per spouse Pt's appetite has been poor. Of note Pt on Dysphagia 2 Mechanical Soft-Nectar Consistency Fluid @ present.       Patient reports [ ] nausea  [ ] vomiting [ ] diarrhea [ ] constipation  [ ]chewing problems [ ] swallowing issues  [ ] other:      PO intake:  < 50% [ ] 50-75% [ ]   % [ ]  other :     Source for PO intake [ ] Patient [ ] family [ ] chart [ ] staff [ ] other     Enteral /Parenteral Nutrition:       Current Weight:   % Weight Change    Pertinent Medications: MEDICATIONS  (STANDING):  acyclovir IVPB      acyclovir IVPB 800 milliGRAM(s) IV Intermittent every 8 hours  aspirin enteric coated 81 milliGRAM(s) Oral daily  atorvastatin 40 milliGRAM(s) Oral at bedtime  buDESOnide 160 MICROgram(s)/formoterol 4.5 MICROgram(s) Inhaler 2 Puff(s) Inhalation two times a day  dextrose 5%. 1000 milliLiter(s) (50 mL/Hr) IV Continuous <Continuous>  dextrose 50% Injectable 12.5 Gram(s) IV Push once  dextrose 50% Injectable 25 Gram(s) IV Push once  dextrose 50% Injectable 25 Gram(s) IV Push once  docusate sodium 100 milliGRAM(s) Oral three times a day  enoxaparin Injectable 40 milliGRAM(s) SubCutaneous daily  fluconAZOLE IVPB      fluconAZOLE IVPB 400 milliGRAM(s) IV Intermittent every 24 hours  insulin lispro (HumaLOG) corrective regimen sliding scale   SubCutaneous three times a day before meals  insulin lispro (HumaLOG) corrective regimen sliding scale   SubCutaneous at bedtime  lactated ringers. 1000 milliLiter(s) (50 mL/Hr) IV Continuous <Continuous>  melatonin 3 milliGRAM(s) Oral at bedtime  methadone    Tablet 2.5 milliGRAM(s) Oral two times a day  metoprolol tartrate 25 milliGRAM(s) Oral two times a day  nicotine -  14 mG/24Hr(s) Patch 1 patch Transdermal daily  polyethylene glycol 3350 17 Gram(s) Oral two times a day  pyridoxine 100 milliGRAM(s) Oral daily  tiotropium 18 MICROgram(s) Capsule 1 Capsule(s) Inhalation daily    MEDICATIONS  (PRN):  ALBUTerol    90 MICROgram(s) HFA Inhaler 2 Puff(s) Inhalation every 6 hours PRN Shortness of Breath and/or Wheezing  dextrose 40% Gel 15 Gram(s) Oral once PRN Blood Glucose LESS THAN 70 milliGRAM(s)/deciliter  glucagon  Injectable 1 milliGRAM(s) IntraMuscular once PRN Glucose LESS THAN 70 milligrams/deciliter  ondansetron Injectable 4 milliGRAM(s) IV Push every 6 hours PRN Nausea and/or Vomiting  senna 2 Tablet(s) Oral at bedtime PRN Constipation  simethicone 160 milliGRAM(s) Chew daily PRN Gas    Pertinent Labs:  04-03 Na138 mmol/L Glu 106 mg/dL<H> K+ 3.4 mmol/L<L> Cr  0.53 mg/dL BUN 6 mg/dL<L> 04-03 Phos 2.9 mg/dL 03-21 [id number] 7.2 %<H>      Skin:     Estimated Needs:   [ ] no change since previous assessment  [ ] recalculated:       Previous Nutrition Diagnosis:     [ ] Inadequate Energy Intake [ ]Inadequate Oral Intake [ ] Excessive Energy Intake     [ ] Underweight [ ] Increased Nutrient Needs [ ] Overweight/Obesity     [ ] Altered GI Function [ ] Unintended Weight Loss [ ] Food & Nutrition Related Knowledge Deficit [ ] Malnutrition      Nutrition Diagnosis is [ ] ongoing  [ ] resolved [ ] not applicable          New Nutrition Diagnosis: [ ] not applicable    [ ] Inadequate Protein Energy Intake   [ ]Inadequate Oral Intake   [ ] Excessive Energy Intake   [ ] Underweight   [ ] Increased Nutrient Needs   [ ] Overweight/Obesity   [ ] Altered GI Function   [ ] Unintended Weight Loss   [ ] Food & Nutrition Related Knowledge Deficit  [ ] Limited Adherence to nutrition related recommendations   [ ] Malnutrition    [ ] other:        Related to:     As evidenced by:     Interventions:     Recommend    [ ] Change Diet To:    [ ] Nutrition Supplement    [ ] Nutrition Support    [ ] Other:        Monitoring and Evaluation:     [ ] PO intake [ ] Tolerance to diet prescription [ ] weights [ ] follow up per protocol    [ ] other: Nutrition Follow-Up Note: Calorie Count     Source: Patient [ ]    Family [X] Spouse @ bed side     other [X] Nurse, Tele PA, MD, Medical Chart   Diet rx: Dysphagia 2 Mechanical Soft-Nectar Consistency Fluid    Pt 64 yo male with history of stage 4 left lung adenocarcinoma. Pt with Odynophagia/dysphagia, minimal PO intake. At time of visit Pt appears drowsy/sleepy; spouse @ bed side answered questions. Per spouse Pt's appetite has been poor. Spouse also stated Pt with weight loss of ~23# in past 2.5 weeks. Pt on Dysphagia 2 Mechanical Soft-Nectar Consistency Fluid @ present. Case discussed with MD, Tele PA. RDN recommended to add PO supplement: Glucerna Shake - 3x daily to Pt's diet rx. No report of nausea, vomiting or diarrhea @ this time;   Calorie Count: Calorie Count was initiated on 3/31/19 for 3 days (to complete on 4/3/19). Incomplete information available @ present (only 1.5 days of calorie intake records available); According to Calorie Count records/documentations Pt's PO intake < 25% of Kcal. Discussed result with MD.            Patient reports [ ] nausea  [ ] vomiting [ ] diarrhea [ ] constipation  [X] chewing problems [X] swallowing issues  [ ] other:   PO intake:  < 50% [X] 50-75% [ ]   % [ ]  other :  Source for PO intake [ ] Patient [X] family, spouse [ ] chart       [X] staff, Nurse     [X] other: Calorie Count records   Enteral/Parenteral Nutrition: N/A    Current Weight: 81.5 Kg - 3/20/19  Per spouse Pt with weight loss of ~23# in past ~2.5 weeks   Pertinent Medications: Lovenox, Aspirin, Insulin (Humalog), Miralax, Vit B6, Colace, Senna, Mylicon   Pertinent Labs:  (4/3) WBC 13.36 H, H/H 10.4/33.5 L,  H, K 3.4 L, Cl 96 L, BUN 6 H, Glu 106 H;          (3/21) Albumin 3.3, HbA1c 7.2% H   Skin: 2+ Edema to R & L Legs/Ankles/Feet documented     Estimated Needs:   [X] no change since previous assessment  [ ] recalculated:     Previous Nutrition Diagnosis:   [ ] Inadequate Energy Intake [ ] Inadequate Oral Intake [ ] Excessive Energy Intake   [ ] Underweight [ ] Increased Nutrient Needs [ ] Overweight/Obesity   [ ] Altered GI Function [ ] Unintended Weight Loss [ ] Food & Nutrition Related Knowledge Deficit   [X] Malnutrition, severe     Nutrition Diagnosis is [X] ongoing  [ ] resolved [ ] not applicable     Recommendations:   1. Encourage & assist Pt with meals; Monitor PO diet tolerance; Honor food preferences; Appetite stimulant to boost Pt's PO intake/appetite;   2. Suggest: Add PO supplement: Glucerna Therapeutic Nutrition 8oz. 3x daily (will provide additional ~660 Kcals, ~30 gm Protein);   3. If Pt's PO intake/appetite remains inadequate/poor, suggest initiating alternative means of nutrition support according to Pt &/or Pt's family wishes/GOC;   4. Monitor labs, weekly weights, hydration status;  5. Reconsult nutrition if alternative means of nutrition support to initiate;

## 2019-04-03 NOTE — CONSULT NOTE ADULT - ASSESSMENT
EKG: SR LAD, poor R wave progression     Tele: Afib with RVR     Echo:< from: TTE with Doppler (w/Cont) (02.11.19 @ 09:56) >  1. Mitral annular calcification, otherwise normal mitral  valve. Minimal mitral regurgitation.  2. Endocardium not well visualized; grossly normal left  ventricular systolic function.  Endocardial visualization  enhanced with intravenous injection of echo contrast  (Definity).  3. Unable to accurately evaluate right ventricular size or  systolic function.    < end of copied text >    Assessment and Plan     1) Afib with rvr: s/p Dilt 10 mg, metoprolol 5 mg IVP, start dig load 0.5 mg IV x 1 , followed by .25 mg IV 6 hrs apart , 0.25 mg po daily from tomorrow, if converts into SR and bradycardic d/c po dilt, c/w metoprolol 25 mg po bid. get echo to r/o pericardial effusion given metastatic lung CA, hold a/c till pericardial effusion ruled out     2) Metastatic Lung CA: T/t per primary team    3) DVT ppx : lovenox

## 2019-04-03 NOTE — PROGRESS NOTE ADULT - ASSESSMENT
Impression:  1) Odynophagia/Dysphagia - with noted oral thrush on exam.  Likely do to candidal esophagitis.  DDx also includes CMV or HSV esophagitis in an immunocompromised patient. EGD showing grade A esophagitis      Recommendations:   - obtain speech and swallow evaluation again if possible   - would include oral and esophogeal phase on barium swallow (cine + barium esophogram) - this recommendation was discussed with primary and radiology   - await pathology   - c/w  fluconazole for total 14 days   - symptoms management per primary team   - please call GI back with any further questions    Lorena Franco, PGY-4  Gastroenterology Fellow  Pager x [phone] or [phone]  (After 5 pm or on weekends please page GI on call)

## 2019-04-03 NOTE — CHART NOTE - NSCHARTNOTESELECT_GEN_ALL_CORE
Event Note/gastro
Event Note
Gastroenterology/Event Note
Nutrition Follow Up Note: Calorie Count/Nutrition Services
Nutrition Services/Malnutrition Alert

## 2019-04-03 NOTE — CONSULT NOTE ADULT - CONSULT REQUESTED DATE/TIME
02-Apr-2019 14:00
03-Apr-2019 17:49
21-Mar-2019
21-Mar-2019 14:26
21-Mar-2019 16:00
30-Mar-2019 15:25
21-Mar-2019 09:00

## 2019-04-03 NOTE — CHART NOTE - NSCHARTNOTEFT_GEN_A_CORE
Patient went into rapid afibs on tele monitor while in the bathroom, symptomatic and short of breath. Lopressor 5mg IVP x 1 given with minimal response. BP acceptable, so Cardizem 10mg IVP given stat.   Dr Vernon was made aware. Contacted Dr Byrne's group for official cardiology consult. Recommended Cardizem 30mg q6h. Will uptitrate as necessary.

## 2019-04-03 NOTE — PROGRESS NOTE ADULT - SUBJECTIVE AND OBJECTIVE BOX
Patient is a 63y old  Male who presents with a chief complaint of Odynophagia/dysphagia, minimal PO intake (03 Apr 2019 07:05)      SUBJECTIVE / OVERNIGHT EVENTS:  Pt sits in chair, NAD. He denied cp/sob/fever/pain. Pt developed afib with RVR while he went to the bathroom in PM. no chest pain or palpitation or dizziness.     MEDICATIONS  (STANDING):  acyclovir IVPB      acyclovir IVPB 800 milliGRAM(s) IV Intermittent every 8 hours  aspirin enteric coated 81 milliGRAM(s) Oral daily  atorvastatin 40 milliGRAM(s) Oral at bedtime  buDESOnide 160 MICROgram(s)/formoterol 4.5 MICROgram(s) Inhaler 2 Puff(s) Inhalation two times a day  dextrose 5%. 1000 milliLiter(s) (50 mL/Hr) IV Continuous <Continuous>  dextrose 50% Injectable 12.5 Gram(s) IV Push once  dextrose 50% Injectable 25 Gram(s) IV Push once  dextrose 50% Injectable 25 Gram(s) IV Push once  digoxin  Injectable 0.25 milliGRAM(s) IV Push once  diltiazem    Tablet 30 milliGRAM(s) Oral every 6 hours  docusate sodium 100 milliGRAM(s) Oral three times a day  enoxaparin Injectable 40 milliGRAM(s) SubCutaneous daily  fluconAZOLE IVPB      fluconAZOLE IVPB 400 milliGRAM(s) IV Intermittent every 24 hours  furosemide   Injectable 40 milliGRAM(s) IV Push daily  insulin lispro (HumaLOG) corrective regimen sliding scale   SubCutaneous three times a day before meals  insulin lispro (HumaLOG) corrective regimen sliding scale   SubCutaneous at bedtime  lactated ringers. 1000 milliLiter(s) (50 mL/Hr) IV Continuous <Continuous>  melatonin 3 milliGRAM(s) Oral at bedtime  methadone    Tablet 2.5 milliGRAM(s) Oral two times a day  metoprolol tartrate 25 milliGRAM(s) Oral two times a day  nicotine -  14 mG/24Hr(s) Patch 1 patch Transdermal daily  polyethylene glycol 3350 17 Gram(s) Oral two times a day  pyridoxine 100 milliGRAM(s) Oral daily  tiotropium 18 MICROgram(s) Capsule 1 Capsule(s) Inhalation daily    MEDICATIONS  (PRN):  acetaminophen   Tablet .. 650 milliGRAM(s) Oral every 6 hours PRN Mild Pain (1 - 3), Moderate Pain (4 - 6)  ALBUTerol    90 MICROgram(s) HFA Inhaler 2 Puff(s) Inhalation every 6 hours PRN Shortness of Breath and/or Wheezing  dextrose 40% Gel 15 Gram(s) Oral once PRN Blood Glucose LESS THAN 70 milliGRAM(s)/deciliter  glucagon  Injectable 1 milliGRAM(s) IntraMuscular once PRN Glucose LESS THAN 70 milligrams/deciliter  ondansetron Injectable 4 milliGRAM(s) IV Push every 6 hours PRN Nausea and/or Vomiting  senna 2 Tablet(s) Oral at bedtime PRN Constipation  simethicone 160 milliGRAM(s) Chew daily PRN Gas      T(C): 36.7 (04-03-19 @ 14:30), Max: 37.3 (04-03-19 @ 13:01)  HR: 147 (04-03-19 @ 16:24) (68 - 189)  BP: 95/71 (04-03-19 @ 16:24) (95/71 - 146/66)  RR: 22 (04-03-19 @ 14:30) (18 - 22)  SpO2: 98% (04-03-19 @ 14:30) (96% - 100%)  CAPILLARY BLOOD GLUCOSE      POCT Blood Glucose.: 94 mg/dL (03 Apr 2019 12:59)  POCT Blood Glucose.: 116 mg/dL (03 Apr 2019 07:24)  POCT Blood Glucose.: 89 mg/dL (02 Apr 2019 21:36)  POCT Blood Glucose.: 95 mg/dL (02 Apr 2019 17:26)    I&O's Summary    02 Apr 2019 07:01  -  03 Apr 2019 07:00  --------------------------------------------------------  IN: 1090 mL / OUT: 320 mL / NET: 770 mL    03 Apr 2019 07:01  -  03 Apr 2019 17:06  --------------------------------------------------------  IN: 100 mL / OUT: 0 mL / NET: 100 mL        PHYSICAL EXAM:  GENERAL: chronic ill appearance, frail, NAD  HEAD:  Atraumatic, Normocephalic  EYES: EOMI  NECK: Supple, No JVD  CHEST/LUNG: nonlabored breathing  HEART: nl S1/S2  ABDOMEN: mildly distended, soft, tympanitic  EXTREMITIES:  + LE edema  PSYCH: alert, answering questions appropriately  NEUROLOGY: non-focal    LABS:                        10.4   13.36 )-----------( 407      ( 03 Apr 2019 06:08 )             33.5     04-03    138  |  96<L>  |  6<L>  ----------------------------<  106<H>  3.4<L>   |  28  |  0.53    Ca    9.1      03 Apr 2019 06:08  Phos  2.9     04-03  Mg     1.7     04-03                RADIOLOGY & ADDITIONAL TESTS:    Imaging Personally Reviewed:    Consultant(s) Notes Reviewed:      Care Discussed with Consultants/Other Providers:

## 2019-04-03 NOTE — CHART NOTE - NSCHARTNOTEFT_GEN_A_CORE
RN notified that patient had a run of SVT to 160-170s which lasted few seconds then resolved on its own. patient was asymptomatic and was sleeping during the event.     vitals: T(C): 36.7 (04-02-19 @ 20:38), Max: 36.9 (04-02-19 @ 14:01)  HR: 83 (04-02-19 @ 23:55) (76 - 84)  BP: 142/76 (04-02-19 @ 23:55) (125/66 - 146/66)  RR: 18 (04-02-19 @ 23:55) (16 - 18)  SpO2: 100% (04-02-19 @ 23:55) (94% - 100%)  Wt(kg): --    BMP sent.   04-03  135  |  94<L>  |  6<L>  ----------------------------<  107<H>  3.5   |  28  |  0.56  Ca    9.2      03 Apr 2019 00:30  Phos  2.9     04-03  Mg     1.7     04-03    KCL and Mag ox ordered. Will continue to monitor.

## 2019-04-03 NOTE — CHART NOTE - NSCHARTNOTEFT_GEN_A_CORE
Seen at bedside with Dr Hathaway. Patient in persisent rapid afib. Will give .5mg of Digoxin IVP stat. In 6 hours, will give Digoxin .25mg IVP. If at this time, patient is rate controlled, will start with .25mg PO daily. If not rate controlled, then will give another .25mg IVP prior to initiating standing .25mg PO dose.

## 2019-04-03 NOTE — PROVIDER CONTACT NOTE (OTHER) - SITUATION
Pt ambulated to bathroom and tried to move bowels.  Heart rate went up into 170s-200s.  Pt ambulated back into bed.

## 2019-04-03 NOTE — PROGRESS NOTE ADULT - SUBJECTIVE AND OBJECTIVE BOX
Chief Complaint:  Patient is a 63y old  Male who presents with a chief complaint of Odynophagia/dysphagia, minimal PO intake (02 Apr 2019 14:18)      Interval Events:   Patient pending cine and esophogram for dysphagia.    Allergies:  No Known Allergies      Hospital Medications:  acyclovir IVPB      acyclovir IVPB 800 milliGRAM(s) IV Intermittent every 8 hours  ALBUTerol    90 MICROgram(s) HFA Inhaler 2 Puff(s) Inhalation every 6 hours PRN  aspirin enteric coated 81 milliGRAM(s) Oral daily  atorvastatin 40 milliGRAM(s) Oral at bedtime  buDESOnide 160 MICROgram(s)/formoterol 4.5 MICROgram(s) Inhaler 2 Puff(s) Inhalation two times a day  dextrose 40% Gel 15 Gram(s) Oral once PRN  dextrose 5%. 1000 milliLiter(s) IV Continuous <Continuous>  dextrose 50% Injectable 12.5 Gram(s) IV Push once  dextrose 50% Injectable 25 Gram(s) IV Push once  dextrose 50% Injectable 25 Gram(s) IV Push once  docusate sodium 100 milliGRAM(s) Oral three times a day  enoxaparin Injectable 40 milliGRAM(s) SubCutaneous daily  fluconAZOLE IVPB      fluconAZOLE IVPB 400 milliGRAM(s) IV Intermittent every 24 hours  glucagon  Injectable 1 milliGRAM(s) IntraMuscular once PRN  insulin lispro (HumaLOG) corrective regimen sliding scale   SubCutaneous three times a day before meals  insulin lispro (HumaLOG) corrective regimen sliding scale   SubCutaneous at bedtime  lactated ringers. 1000 milliLiter(s) IV Continuous <Continuous>  melatonin 3 milliGRAM(s) Oral at bedtime  methadone    Tablet 2.5 milliGRAM(s) Oral two times a day  metoprolol tartrate 25 milliGRAM(s) Oral two times a day  nicotine -  14 mG/24Hr(s) Patch 1 patch Transdermal daily  ondansetron Injectable 4 milliGRAM(s) IV Push every 6 hours PRN  polyethylene glycol 3350 17 Gram(s) Oral two times a day  pyridoxine 100 milliGRAM(s) Oral daily  senna 2 Tablet(s) Oral at bedtime PRN  simethicone 160 milliGRAM(s) Chew daily PRN  tiotropium 18 MICROgram(s) Capsule 1 Capsule(s) Inhalation daily      PMHX/PSHX:  Anxiety  Pleural effusion, left  Lung cancer  COPD (chronic obstructive pulmonary disease)  HLD (hyperlipidemia)  Diabetes mellitus  Hypertension  No significant past surgical history      Family history:  Family history of diabetes mellitus          PHYSICAL EXAM:     GENERAL:  Appears stated age, well-groomed, well-nourished, no distress  HEENT:  NC/AT,  conjunctivae clear, sclera -anicteric  CHEST:  Full & symmetric excursion, no increased  HEART:  Regular rhythm  ABDOMEN:  Soft, non-tender, non-distended, normoactive bowel sounds,  no masses ,no hepato-splenomegaly,   EXTREMITIES:  no cyanosis,clubbing or edema  SKIN:  No rash/erythema/ecchymoses/petechiae/wounds/abscess/warm/dry  NEURO:  Alert, oriented    Vital Signs:  Vital Signs Last 24 Hrs  T(C): 36.8 (03 Apr 2019 05:47), Max: 36.9 (02 Apr 2019 14:01)  T(F): 98.3 (03 Apr 2019 05:47), Max: 98.5 (02 Apr 2019 14:01)  HR: 68 (03 Apr 2019 05:47) (68 - 84)  BP: 144/77 (03 Apr 2019 05:47) (125/66 - 146/66)  BP(mean): --  RR: 18 (03 Apr 2019 05:47) (17 - 18)  SpO2: 100% (03 Apr 2019 05:47) (94% - 100%)  Daily     Daily     LABS:                        10.4   14.79 )-----------( 405      ( 02 Apr 2019 05:40 )             33.6     04-03    135  |  94<L>  |  6<L>  ----------------------------<  107<H>  3.5   |  28  |  0.56    Ca    9.2      03 Apr 2019 00:30  Phos  2.9     04-03  Mg     1.7     04-03                Imaging:

## 2019-04-03 NOTE — CONSULT NOTE ADULT - SUBJECTIVE AND OBJECTIVE BOX
Robinson Hathaway MD  Interventional Cardiology   [place] Office : [address] [place] [address] [place] [address]  Tel:   [place] Office : [address] [facility] [place] [address]  Tel: [phone]  Cell : [phone]    HISTORY OF PRESENT ILLNESS:    62 yo man, current smoker (~1 ppd), with history of stage 4 left lung adenocarcinoma, on immunotherapy with Keytruda (first session on 3/4/19), recurrent left pleural effusion s/p left VATS with pleurX placement (2/2019), COPD, DM2, HTN, HLD, and anxiety presents with 1 week of worsening throat pain with odynophagia and dysphagia. Pt states that for the past 1 week, he has been feeling overwhelming dryness/scratchiness in his throat, causing pain and difficulty with swallowing. Pt notes that whenever he tries to swallow, he experiences the sensation of something getting stuck in his throat and upper chest area. Pt has this sensation with both liquids and solids. As a result, pt has been having very little to eat and drink, avoiding water entirely the last 2 days. Pt also reports poor appetite and ongoing pain in his left shoulder/scapula and near his pleurX insertion site. Pt saw Dr. Avitia (Palliative) earlier this month and was started on methadone for pain control. Today pt went into Afib with RVR and cardiology consulted, Currently sitting in the chair wife at bedside, denies worsening SOB or CP , c/p pain at pleural drain site      PAST MEDICAL & SURGICAL HISTORY:  Anxiety  Pleural effusion, left  Lung cancer  COPD (chronic obstructive pulmonary disease)  HLD (hyperlipidemia)  Diabetes mellitus  Hypertension  No significant past surgical history    	    MEDICATIONS:  aspirin enteric coated 81 milliGRAM(s) Oral daily  digoxin     Tablet 0.25 milliGRAM(s) Oral daily  diltiazem    Tablet 30 milliGRAM(s) Oral every 6 hours  enoxaparin Injectable 40 milliGRAM(s) SubCutaneous daily  metoprolol tartrate 25 milliGRAM(s) Oral two times a day    acyclovir IVPB      acyclovir IVPB 800 milliGRAM(s) IV Intermittent every 8 hours  fluconAZOLE IVPB      fluconAZOLE IVPB 400 milliGRAM(s) IV Intermittent every 24 hours    ALBUTerol    90 MICROgram(s) HFA Inhaler 2 Puff(s) Inhalation every 6 hours PRN  buDESOnide 160 MICROgram(s)/formoterol 4.5 MICROgram(s) Inhaler 2 Puff(s) Inhalation two times a day  tiotropium 18 MICROgram(s) Capsule 1 Capsule(s) Inhalation daily    acetaminophen   Tablet .. 650 milliGRAM(s) Oral every 6 hours PRN  melatonin 3 milliGRAM(s) Oral at bedtime  methadone    Tablet 2.5 milliGRAM(s) Oral two times a day  ondansetron Injectable 4 milliGRAM(s) IV Push every 6 hours PRN    docusate sodium 100 milliGRAM(s) Oral three times a day  polyethylene glycol 3350 17 Gram(s) Oral two times a day  senna 2 Tablet(s) Oral at bedtime PRN  simethicone 160 milliGRAM(s) Chew daily PRN    atorvastatin 40 milliGRAM(s) Oral at bedtime  dextrose 40% Gel 15 Gram(s) Oral once PRN  dextrose 50% Injectable 12.5 Gram(s) IV Push once  dextrose 50% Injectable 25 Gram(s) IV Push once  dextrose 50% Injectable 25 Gram(s) IV Push once  glucagon  Injectable 1 milliGRAM(s) IntraMuscular once PRN  insulin lispro (HumaLOG) corrective regimen sliding scale   SubCutaneous three times a day before meals  insulin lispro (HumaLOG) corrective regimen sliding scale   SubCutaneous at bedtime    dextrose 5%. 1000 milliLiter(s) IV Continuous <Continuous>  lactated ringers. 1000 milliLiter(s) IV Continuous <Continuous>  pyridoxine 100 milliGRAM(s) Oral daily      FAMILY HISTORY:  Family history of diabetes mellitus: Parents        Allergies    No Known Allergies    Intolerances    	      PHYSICAL EXAM:  T(C): 37.1 (04-03-19 @ 22:25), Max: 37.3 (04-03-19 @ 13:01)  HR: 65 (04-03-19 @ 22:25) (65 - 189)  BP: 123/62 (04-03-19 @ 22:25) (95/71 - 144/77)  RR: 18 (04-03-19 @ 22:25) (18 - 22)  SpO2: 99% (04-03-19 @ 22:25) (96% - 100%)  Wt(kg): --  I&O's Summary    02 Apr 2019 07:01  -  03 Apr 2019 07:00  --------------------------------------------------------  IN: 1090 mL / OUT: 320 mL / NET: 770 mL    03 Apr 2019 07:01  -  03 Apr 2019 22:51  --------------------------------------------------------  IN: 100 mL / OUT: 36 mL / NET: 64 mL        Appearance: Normal	  HEENT:   Normal oral mucosa, PERRL, EOMI	  Cardiovascular: Normal S1 S2, No JVD, No murmurs, No edema  Respiratory: Lungs clear to auscultation	  Gastrointestinal:  Soft, Non-tender, + BS	  Extremities: Normal range of motion, No clubbing, cyanosis or edema    LABS:	 	    CARDIAC MARKERS:                                  10.4   13.36 )-----------( 407      ( 03 Apr 2019 06:08 )             33.5     04-03    138  |  96<L>  |  6<L>  ----------------------------<  106<H>  3.4<L>   |  28  |  0.53    Ca    9.1      03 Apr 2019 06:08  Phos  2.9     04-03  Mg     1.7     04-03      proBNP:   Lipid Profile:   HgA1c:   TSH:     ASSESSMENT/PLAN:

## 2019-04-03 NOTE — SWALLOW BEDSIDE ASSESSMENT ADULT - SLP PRECAUTIONS/LIMITATIONS: VISION
Rangel Shackleton, CRNP    Síntomas de resfriado, cuidados ambulatorios   INFORMACIÓN GENERAL:   Los síntomas de resfriado  incluyen estornudos, garganta seca, congestión nasal, dolor de josefina, ojos llorosos, y tos  La tos podría ser seca o incluso contener flemas  Usted también podría sentir jennifer musculares, jennifer en las articulaciones y cansancio  La fiebre no es un síntoma común del resfrío toshia podría suceder  Los síntomas de resfriado provienen de la inflamación en harris sistema respiratorio superior causada por un virus  La mayoría de los resfriados se [place] sin tratamiento  Busque cuidados inmediatos para los siguientes síntomas:   · Ritmo cardíaco que para usted, es más rápido de lo normal    · El brandon inflamado y adolorido al tocarlo     · Aumento en el cansancio y debilidad    · Puntos definidos o de más tamaño, rojizos o morados en harris piel     · Falta o ausencia total del apetito  El tratamiento para los síntomas de resfriado  puede llegar a incluir medicamentos PEDRO LUIS para [employer] jennifer musculares y la fiebre  No le de medicamentos PEDRO LUIS a niños menores de 6 meses de edad sin anabell recibido indicaciones del médico del oliva  Los medicamentos para el resfriado también pueden darse para disminuir la tos, la congestión nasal, los estornudos y el flujo nasal  No les de a niños menores de 5 años medicamentos para resfriado sin anabell recibido indicaciones del médico del oliva  Controle los síntomas del resfriado con lo siguiente:   · Melissa líquidos  para ayudar a diluir y aflojar las flemas espesas y así poder expulsarlas cuando tose  Los líquidos también lo mantendrán a usted hidratado  Pregúntele a harris proveedor de katerina cuáles líquidos son mejores para usted y cuánto skinny cada día  · No fume  porque el hacerlo puede empeorar darcy síntomas y aumentar el lapso de tiempo que usted se sentirá enfermo  Hable con harris proveedor de katerina si usted necesita ayuda para dejar de fumar    Prevenga el contagio de
gérmenes  lavándose l las viridiana frecuentemente  Usted puede propagar darcy gérmenes del resfriado a otras personas por al menos 3 días después que darcy síntomas [place]  No comparta artículos, shelby utensilios de cocina  Cúbrase la nariz y boca cuando tose o estornuda usando la parte de adentro de montanez codo en vez de darcy viridiana  Tire las toallas desechables en la basura  Programe latoya jolanta con montanez proveedor de [employer] se le haya indicado: Anote darcy preguntas para que se acuerde de hacerlas noelle darcy visitas  ACUERDOS SOBRE MONTANEZ CUIDADO:   Usted tiene el derecho de participar en la planificación de montanez cuidado  Aprenda todo lo que pueda sobre montanez condición y shelby darle tratamiento  Discuta con darcy médicos darcy opciones de tratamiento para juntos decidir el cuidado que usted quiere recibir  Usted siempre tiene el derecho a rechazar montanez tratamiento  Esta información es sólo para uso en educación  Montanez intención no es darle un consejo médico sobre enfermedades o tratamientos  Colsulte con montanez [employer] farm[employer] antes de seguir cualquier régimen médico para saber si es seguro y efectivo para usted  © 2014 [address] [employer] is for End User's use only and may not be sold, redistributed or otherwise used for commercial purposes  All illustrations and images included in CareNotes® are the copyrighted property of A D A M , Inc  or Nito Gamble 
within functional limits
within functional limits

## 2019-04-03 NOTE — SWALLOW BEDSIDE ASSESSMENT ADULT - SLP PERTINENT HISTORY OF CURRENT PROBLEM
Pt is a 64 y/o M p/w worsening throat pain with odynophagia and dysphagia x1 week. Pt's PMHx is significant for current smoker (~1 ppd), with history of stage 4 left lung adenocarcinoma, on immunotherapy with Keytruda (first session on 3/4/19), recurrent left pleural effusion s/p left VATS with pleurX placement (2/2019), COPD, DM2, HTN, HLD, and anxiety.
63 M current smoker (~1 PPD) PMHx stage 4 L lung adenocarcinoma (on Keytruda, 1rst session 3/4), recurrent L pleural effusion S/P VATS with PleurX placement (2/2019), COPD, DM2, HTN, HLD and anxiety presents with 1 week of worsening throat pain with odynophagia and dysphagia.

## 2019-04-04 ENCOUNTER — OUTPATIENT (OUTPATIENT)
Dept: OUTPATIENT SERVICES | Facility: HOSPITAL | Age: 64
LOS: 1 days | Discharge: HOME | End: 2019-04-04

## 2019-04-04 DIAGNOSIS — I48.0 PAROXYSMAL ATRIAL FIBRILLATION: ICD-10-CM

## 2019-04-04 DIAGNOSIS — C34.90 MALIGNANT NEOPLASM OF UNSPECIFIED PART OF UNSPECIFIED BRONCHUS OR LUNG: ICD-10-CM

## 2019-04-04 PROBLEM — J90 PLEURAL EFFUSION, NOT ELSEWHERE CLASSIFIED: Chronic | Status: ACTIVE | Noted: 2019-03-21

## 2019-04-04 PROBLEM — F41.9 ANXIETY DISORDER, UNSPECIFIED: Chronic | Status: ACTIVE | Noted: 2019-03-21

## 2019-04-04 LAB
ANION GAP SERPL CALC-SCNC: 17 MMO/L — HIGH (ref 7–14)
BASOPHILS # BLD AUTO: 0.05 K/UL — SIGNIFICANT CHANGE UP (ref 0–0.2)
BASOPHILS NFR BLD AUTO: 0.4 % — SIGNIFICANT CHANGE UP (ref 0–2)
BUN SERPL-MCNC: 6 MG/DL — LOW (ref 7–23)
CALCIUM SERPL-MCNC: 9.1 MG/DL — SIGNIFICANT CHANGE UP (ref 8.4–10.5)
CHLORIDE SERPL-SCNC: 94 MMOL/L — LOW (ref 98–107)
CO2 SERPL-SCNC: 27 MMOL/L — SIGNIFICANT CHANGE UP (ref 22–31)
CREAT SERPL-MCNC: 0.56 MG/DL — SIGNIFICANT CHANGE UP (ref 0.5–1.3)
EOSINOPHIL # BLD AUTO: 0.15 K/UL — SIGNIFICANT CHANGE UP (ref 0–0.5)
EOSINOPHIL NFR BLD AUTO: 1.1 % — SIGNIFICANT CHANGE UP (ref 0–6)
GLUCOSE SERPL-MCNC: 89 MG/DL — SIGNIFICANT CHANGE UP (ref 70–99)
HCT VFR BLD CALC: 35.8 % — LOW (ref 39–50)
HGB BLD-MCNC: 10.9 G/DL — LOW (ref 13–17)
IMM GRANULOCYTES NFR BLD AUTO: 0.7 % — SIGNIFICANT CHANGE UP (ref 0–1.5)
LYMPHOCYTES # BLD AUTO: 0.94 K/UL — LOW (ref 1–3.3)
LYMPHOCYTES # BLD AUTO: 7.2 % — LOW (ref 13–44)
MAGNESIUM SERPL-MCNC: 1.9 MG/DL — SIGNIFICANT CHANGE UP (ref 1.6–2.6)
MCHC RBC-ENTMCNC: 24 PG — LOW (ref 27–34)
MCHC RBC-ENTMCNC: 30.4 % — LOW (ref 32–36)
MCV RBC AUTO: 78.9 FL — LOW (ref 80–100)
MONOCYTES # BLD AUTO: 0.94 K/UL — HIGH (ref 0–0.9)
MONOCYTES NFR BLD AUTO: 7.2 % — SIGNIFICANT CHANGE UP (ref 2–14)
NEUTROPHILS # BLD AUTO: 10.89 K/UL — HIGH (ref 1.8–7.4)
NEUTROPHILS NFR BLD AUTO: 83.4 % — HIGH (ref 43–77)
NRBC # FLD: 0 K/UL — SIGNIFICANT CHANGE UP (ref 0–0)
PLATELET # BLD AUTO: 388 K/UL — SIGNIFICANT CHANGE UP (ref 150–400)
PMV BLD: 9.6 FL — SIGNIFICANT CHANGE UP (ref 7–13)
POTASSIUM SERPL-MCNC: 3.7 MMOL/L — SIGNIFICANT CHANGE UP (ref 3.5–5.3)
POTASSIUM SERPL-SCNC: 3.7 MMOL/L — SIGNIFICANT CHANGE UP (ref 3.5–5.3)
RBC # BLD: 4.54 M/UL — SIGNIFICANT CHANGE UP (ref 4.2–5.8)
RBC # FLD: 15.8 % — HIGH (ref 10.3–14.5)
SODIUM SERPL-SCNC: 138 MMOL/L — SIGNIFICANT CHANGE UP (ref 135–145)
WBC # BLD: 13.06 K/UL — HIGH (ref 3.8–10.5)
WBC # FLD AUTO: 13.06 K/UL — HIGH (ref 3.8–10.5)

## 2019-04-04 PROCEDURE — 99232 SBSQ HOSP IP/OBS MODERATE 35: CPT

## 2019-04-04 PROCEDURE — 99231 SBSQ HOSP IP/OBS SF/LOW 25: CPT

## 2019-04-04 PROCEDURE — 99232 SBSQ HOSP IP/OBS MODERATE 35: CPT | Mod: GC

## 2019-04-04 RX ORDER — HYDROMORPHONE HYDROCHLORIDE 2 MG/ML
1 INJECTION INTRAMUSCULAR; INTRAVENOUS; SUBCUTANEOUS ONCE
Qty: 0 | Refills: 0 | Status: DISCONTINUED | OUTPATIENT
Start: 2019-04-04 | End: 2019-04-04

## 2019-04-04 RX ORDER — VALACYCLOVIR 500 MG/1
1000 TABLET, FILM COATED ORAL EVERY 8 HOURS
Qty: 0 | Refills: 0 | Status: DISCONTINUED | OUTPATIENT
Start: 2019-04-05 | End: 2019-04-05

## 2019-04-04 RX ADMIN — Medication 30 MILLIGRAM(S): at 21:11

## 2019-04-04 RX ADMIN — Medication 1 PATCH: at 12:43

## 2019-04-04 RX ADMIN — HYDROMORPHONE HYDROCHLORIDE 1 MILLIGRAM(S): 2 INJECTION INTRAMUSCULAR; INTRAVENOUS; SUBCUTANEOUS at 06:03

## 2019-04-04 RX ADMIN — HYDROMORPHONE HYDROCHLORIDE 1 MILLIGRAM(S): 2 INJECTION INTRAMUSCULAR; INTRAVENOUS; SUBCUTANEOUS at 22:11

## 2019-04-04 RX ADMIN — Medication 266 MILLIGRAM(S): at 22:51

## 2019-04-04 RX ADMIN — Medication 1 PATCH: at 21:34

## 2019-04-04 RX ADMIN — BUDESONIDE AND FORMOTEROL FUMARATE DIHYDRATE 2 PUFF(S): 160; 4.5 AEROSOL RESPIRATORY (INHALATION) at 21:11

## 2019-04-04 RX ADMIN — Medication 25 MILLIGRAM(S): at 09:28

## 2019-04-04 RX ADMIN — HYDROMORPHONE HYDROCHLORIDE 1 MILLIGRAM(S): 2 INJECTION INTRAMUSCULAR; INTRAVENOUS; SUBCUTANEOUS at 05:48

## 2019-04-04 RX ADMIN — Medication 81 MILLIGRAM(S): at 12:43

## 2019-04-04 RX ADMIN — Medication 266 MILLIGRAM(S): at 13:49

## 2019-04-04 RX ADMIN — TIOTROPIUM BROMIDE 1 CAPSULE(S): 18 CAPSULE ORAL; RESPIRATORY (INHALATION) at 09:28

## 2019-04-04 RX ADMIN — FLUCONAZOLE 100 MILLIGRAM(S): 150 TABLET ORAL at 13:49

## 2019-04-04 RX ADMIN — Medication 30 MILLIGRAM(S): at 12:43

## 2019-04-04 RX ADMIN — BUDESONIDE AND FORMOTEROL FUMARATE DIHYDRATE 2 PUFF(S): 160; 4.5 AEROSOL RESPIRATORY (INHALATION) at 09:27

## 2019-04-04 RX ADMIN — SODIUM CHLORIDE 50 MILLILITER(S): 9 INJECTION, SOLUTION INTRAVENOUS at 09:28

## 2019-04-04 RX ADMIN — Medication 266 MILLIGRAM(S): at 05:48

## 2019-04-04 RX ADMIN — HYDROMORPHONE HYDROCHLORIDE 1 MILLIGRAM(S): 2 INJECTION INTRAMUSCULAR; INTRAVENOUS; SUBCUTANEOUS at 21:11

## 2019-04-04 RX ADMIN — ATORVASTATIN CALCIUM 40 MILLIGRAM(S): 80 TABLET, FILM COATED ORAL at 21:11

## 2019-04-04 RX ADMIN — ONDANSETRON 4 MILLIGRAM(S): 8 TABLET, FILM COATED ORAL at 05:48

## 2019-04-04 RX ADMIN — ENOXAPARIN SODIUM 40 MILLIGRAM(S): 100 INJECTION SUBCUTANEOUS at 12:43

## 2019-04-04 RX ADMIN — Medication 1 PATCH: at 07:00

## 2019-04-04 RX ADMIN — Medication 100 MILLIGRAM(S): at 12:43

## 2019-04-04 RX ADMIN — Medication 0.25 MILLIGRAM(S): at 09:32

## 2019-04-04 NOTE — PROGRESS NOTE ADULT - SUBJECTIVE AND OBJECTIVE BOX
Chief Complaint:  Patient is a 63y old  Male who presents with a chief complaint of Odynophagia/dysphagia, minimal PO intake (03 Apr 2019 17:49)      Interval Events:   Patient had esophogram with oral phase included yesterday, normal study.  Speech and swallow also re-visted patient to attempt re-evaluation of oral pharyngeal phase however patient again refused full evaluation. Biopsies of esophagus returned normal.    Allergies:  No Known Allergies      Hospital Medications:  acetaminophen   Tablet .. 650 milliGRAM(s) Oral every 6 hours PRN  acyclovir IVPB      acyclovir IVPB 800 milliGRAM(s) IV Intermittent every 8 hours  ALBUTerol    90 MICROgram(s) HFA Inhaler 2 Puff(s) Inhalation every 6 hours PRN  aspirin enteric coated 81 milliGRAM(s) Oral daily  atorvastatin 40 milliGRAM(s) Oral at bedtime  buDESOnide 160 MICROgram(s)/formoterol 4.5 MICROgram(s) Inhaler 2 Puff(s) Inhalation two times a day  dextrose 40% Gel 15 Gram(s) Oral once PRN  dextrose 5%. 1000 milliLiter(s) IV Continuous <Continuous>  dextrose 50% Injectable 12.5 Gram(s) IV Push once  dextrose 50% Injectable 25 Gram(s) IV Push once  dextrose 50% Injectable 25 Gram(s) IV Push once  digoxin     Tablet 0.25 milliGRAM(s) Oral daily  diltiazem    Tablet 30 milliGRAM(s) Oral every 6 hours  docusate sodium 100 milliGRAM(s) Oral three times a day  enoxaparin Injectable 40 milliGRAM(s) SubCutaneous daily  fluconAZOLE IVPB      fluconAZOLE IVPB 400 milliGRAM(s) IV Intermittent every 24 hours  glucagon  Injectable 1 milliGRAM(s) IntraMuscular once PRN  insulin lispro (HumaLOG) corrective regimen sliding scale   SubCutaneous three times a day before meals  insulin lispro (HumaLOG) corrective regimen sliding scale   SubCutaneous at bedtime  lactated ringers. 1000 milliLiter(s) IV Continuous <Continuous>  melatonin 3 milliGRAM(s) Oral at bedtime  methadone    Tablet 2.5 milliGRAM(s) Oral two times a day  metoprolol tartrate 25 milliGRAM(s) Oral two times a day  nicotine -  14 mG/24Hr(s) Patch 1 patch Transdermal daily  ondansetron Injectable 4 milliGRAM(s) IV Push every 6 hours PRN  polyethylene glycol 3350 17 Gram(s) Oral two times a day  pyridoxine 100 milliGRAM(s) Oral daily  senna 2 Tablet(s) Oral at bedtime PRN  simethicone 160 milliGRAM(s) Chew daily PRN  tiotropium 18 MICROgram(s) Capsule 1 Capsule(s) Inhalation daily      PMHX/PSHX:  Anxiety  Pleural effusion, left  Lung cancer  COPD (chronic obstructive pulmonary disease)  HLD (hyperlipidemia)  Diabetes mellitus  Hypertension  No significant past surgical history      Family history:  Family history of diabetes mellitus          PHYSICAL EXAM:     GENERAL:  Appears stated age, well-groomed, well-nourished, no distress  HEENT:  NC/AT,  conjunctivae clear, sclera -anicteric  CHEST:  Full & symmetric excursion, no increased  HEART:  Regular rhythm  ABDOMEN:  Soft, non-tender, non-distended, normoactive bowel sounds,  no masses ,no hepato-splenomegaly,   EXTREMITIES:  no cyanosis,clubbing or edema  SKIN:  No rash/erythema/ecchymoses/petechiae/wounds/abscess/warm/dry  NEURO:  Alert, oriented    Vital Signs:  Vital Signs Last 24 Hrs  T(C): 36.6 (04 Apr 2019 05:33), Max: 37.3 (03 Apr 2019 13:01)  T(F): 97.9 (04 Apr 2019 05:33), Max: 99.1 (03 Apr 2019 13:01)  HR: 86 (04 Apr 2019 05:33) (65 - 189)  BP: 141/65 (04 Apr 2019 05:33) (95/71 - 141/65)  BP(mean): --  RR: 18 (04 Apr 2019 05:33) (18 - 22)  SpO2: 99% (04 Apr 2019 05:33) (96% - 99%)  Daily     Daily     LABS:                        10.4   13.36 )-----------( 407      ( 03 Apr 2019 06:08 )             33.5     04-03    138  |  96<L>  |  6<L>  ----------------------------<  106<H>  3.4<L>   |  28  |  0.53    Ca    9.1      03 Apr 2019 06:08  Phos  2.9     04-03  Mg     1.7     04-03                Imaging: Chief Complaint:  Patient is a 63y old  Male who presents with a chief complaint of Odynophagia/dysphagia, minimal PO intake (03 Apr 2019 17:49)      Interval Events:   Patient had esophogram with oral phase included yesterday, normal study.  Speech and swallow also re-visted patient to attempt re-evaluation of oral pharyngeal phase however patient again refused full evaluation. Biopsies of esophagus returned normal.  Patient has decided to go home with home hospice    Allergies:  No Known Allergies      Hospital Medications:  acetaminophen   Tablet .. 650 milliGRAM(s) Oral every 6 hours PRN  acyclovir IVPB      acyclovir IVPB 800 milliGRAM(s) IV Intermittent every 8 hours  ALBUTerol    90 MICROgram(s) HFA Inhaler 2 Puff(s) Inhalation every 6 hours PRN  aspirin enteric coated 81 milliGRAM(s) Oral daily  atorvastatin 40 milliGRAM(s) Oral at bedtime  buDESOnide 160 MICROgram(s)/formoterol 4.5 MICROgram(s) Inhaler 2 Puff(s) Inhalation two times a day  dextrose 40% Gel 15 Gram(s) Oral once PRN  dextrose 5%. 1000 milliLiter(s) IV Continuous <Continuous>  dextrose 50% Injectable 12.5 Gram(s) IV Push once  dextrose 50% Injectable 25 Gram(s) IV Push once  dextrose 50% Injectable 25 Gram(s) IV Push once  digoxin     Tablet 0.25 milliGRAM(s) Oral daily  diltiazem    Tablet 30 milliGRAM(s) Oral every 6 hours  docusate sodium 100 milliGRAM(s) Oral three times a day  enoxaparin Injectable 40 milliGRAM(s) SubCutaneous daily  fluconAZOLE IVPB      fluconAZOLE IVPB 400 milliGRAM(s) IV Intermittent every 24 hours  glucagon  Injectable 1 milliGRAM(s) IntraMuscular once PRN  insulin lispro (HumaLOG) corrective regimen sliding scale   SubCutaneous three times a day before meals  insulin lispro (HumaLOG) corrective regimen sliding scale   SubCutaneous at bedtime  lactated ringers. 1000 milliLiter(s) IV Continuous <Continuous>  melatonin 3 milliGRAM(s) Oral at bedtime  methadone    Tablet 2.5 milliGRAM(s) Oral two times a day  metoprolol tartrate 25 milliGRAM(s) Oral two times a day  nicotine -  14 mG/24Hr(s) Patch 1 patch Transdermal daily  ondansetron Injectable 4 milliGRAM(s) IV Push every 6 hours PRN  polyethylene glycol 3350 17 Gram(s) Oral two times a day  pyridoxine 100 milliGRAM(s) Oral daily  senna 2 Tablet(s) Oral at bedtime PRN  simethicone 160 milliGRAM(s) Chew daily PRN  tiotropium 18 MICROgram(s) Capsule 1 Capsule(s) Inhalation daily      PMHX/PSHX:  Anxiety  Pleural effusion, left  Lung cancer  COPD (chronic obstructive pulmonary disease)  HLD (hyperlipidemia)  Diabetes mellitus  Hypertension  No significant past surgical history      Family history:  Family history of diabetes mellitus          PHYSICAL EXAM:     GENERAL:  Appears stated age, well-groomed, well-nourished, no distress  HEENT:  NC/AT,  conjunctivae clear, sclera -anicteric  CHEST:  Full & symmetric excursion, no increased  HEART:  Regular rhythm  ABDOMEN:  Soft, non-tender, non-distended, normoactive bowel sounds,  no masses ,no hepato-splenomegaly,   EXTREMITIES:  no cyanosis,clubbing or edema  SKIN:  No rash/erythema/ecchymoses/petechiae/wounds/abscess/warm/dry  NEURO:  Alert, oriented    Vital Signs:  Vital Signs Last 24 Hrs  T(C): 36.6 (04 Apr 2019 05:33), Max: 37.3 (03 Apr 2019 13:01)  T(F): 97.9 (04 Apr 2019 05:33), Max: 99.1 (03 Apr 2019 13:01)  HR: 86 (04 Apr 2019 05:33) (65 - 189)  BP: 141/65 (04 Apr 2019 05:33) (95/71 - 141/65)  BP(mean): --  RR: 18 (04 Apr 2019 05:33) (18 - 22)  SpO2: 99% (04 Apr 2019 05:33) (96% - 99%)  Daily     Daily     LABS:                        10.4   13.36 )-----------( 407      ( 03 Apr 2019 06:08 )             33.5     04-03    138  |  96<L>  |  6<L>  ----------------------------<  106<H>  3.4<L>   |  28  |  0.53    Ca    9.1      03 Apr 2019 06:08  Phos  2.9     04-03  Mg     1.7     04-03                Imaging:

## 2019-04-04 NOTE — PROGRESS NOTE ADULT - SUBJECTIVE AND OBJECTIVE BOX
Patient is a 63y old  Male who presents with a chief complaint of Odynophagia/dysphagia, minimal PO intake (04 Apr 2019 07:12)      SUBJECTIVE / OVERNIGHT EVENTS:  Pt sits in the chair, chronic ill appearance. He denied cp/sob/dizziness/fever. eats minimall per wife     MEDICATIONS  (STANDING):  acyclovir IVPB      acyclovir IVPB 800 milliGRAM(s) IV Intermittent every 8 hours  aspirin enteric coated 81 milliGRAM(s) Oral daily  atorvastatin 40 milliGRAM(s) Oral at bedtime  buDESOnide 160 MICROgram(s)/formoterol 4.5 MICROgram(s) Inhaler 2 Puff(s) Inhalation two times a day  dextrose 5%. 1000 milliLiter(s) (50 mL/Hr) IV Continuous <Continuous>  dextrose 50% Injectable 12.5 Gram(s) IV Push once  dextrose 50% Injectable 25 Gram(s) IV Push once  dextrose 50% Injectable 25 Gram(s) IV Push once  digoxin     Tablet 0.25 milliGRAM(s) Oral daily  diltiazem    Tablet 30 milliGRAM(s) Oral every 6 hours  docusate sodium 100 milliGRAM(s) Oral three times a day  enoxaparin Injectable 40 milliGRAM(s) SubCutaneous daily  fluconAZOLE IVPB      fluconAZOLE IVPB 400 milliGRAM(s) IV Intermittent every 24 hours  insulin lispro (HumaLOG) corrective regimen sliding scale   SubCutaneous three times a day before meals  insulin lispro (HumaLOG) corrective regimen sliding scale   SubCutaneous at bedtime  lactated ringers. 1000 milliLiter(s) (50 mL/Hr) IV Continuous <Continuous>  melatonin 3 milliGRAM(s) Oral at bedtime  methadone    Tablet 2.5 milliGRAM(s) Oral two times a day  metoprolol tartrate 25 milliGRAM(s) Oral two times a day  nicotine -  14 mG/24Hr(s) Patch 1 patch Transdermal daily  polyethylene glycol 3350 17 Gram(s) Oral two times a day  pyridoxine 100 milliGRAM(s) Oral daily  tiotropium 18 MICROgram(s) Capsule 1 Capsule(s) Inhalation daily    MEDICATIONS  (PRN):  acetaminophen   Tablet .. 650 milliGRAM(s) Oral every 6 hours PRN Mild Pain (1 - 3), Moderate Pain (4 - 6)  ALBUTerol    90 MICROgram(s) HFA Inhaler 2 Puff(s) Inhalation every 6 hours PRN Shortness of Breath and/or Wheezing  dextrose 40% Gel 15 Gram(s) Oral once PRN Blood Glucose LESS THAN 70 milliGRAM(s)/deciliter  glucagon  Injectable 1 milliGRAM(s) IntraMuscular once PRN Glucose LESS THAN 70 milligrams/deciliter  ondansetron Injectable 4 milliGRAM(s) IV Push every 6 hours PRN Nausea and/or Vomiting  senna 2 Tablet(s) Oral at bedtime PRN Constipation  simethicone 160 milliGRAM(s) Chew daily PRN Gas      T(C): 36.8 (04-04-19 @ 12:59), Max: 37.1 (04-03-19 @ 22:25)  HR: 69 (04-04-19 @ 12:59) (65 - 167)  BP: 130/66 (04-04-19 @ 12:59) (95/71 - 141/65)  RR: 17 (04-04-19 @ 12:59) (17 - 18)  SpO2: 94% (04-04-19 @ 12:59) (94% - 99%)  CAPILLARY BLOOD GLUCOSE      POCT Blood Glucose.: 91 mg/dL (04 Apr 2019 12:44)  POCT Blood Glucose.: 106 mg/dL (04 Apr 2019 08:52)  POCT Blood Glucose.: 94 mg/dL (03 Apr 2019 21:58)  POCT Blood Glucose.: 108 mg/dL (03 Apr 2019 17:35)    I&O's Summary    03 Apr 2019 07:01  -  04 Apr 2019 07:00  --------------------------------------------------------  IN: 740 mL / OUT: 211 mL / NET: 529 mL    04 Apr 2019 07:01  -  04 Apr 2019 14:49  --------------------------------------------------------  IN: 400 mL / OUT: 0 mL / NET: 400 mL        PHYSICAL EXAM:  GENERAL: chronic ill appearance, frail, NAD  HEAD:  Atraumatic, Normocephalic  EYES: EOMI  NECK: Supple, No JVD  CHEST/LUNG: nonlabored breathing, + left chest catheter   HEART: nl S1/S2  ABDOMEN: mildly distended, soft, tympanitic  EXTREMITIES:  + LE edema  PSYCH: alert, answering questions appropriately  NEUROLOGY: non-focal      LABS:                        10.9   13.06 )-----------( 388      ( 04 Apr 2019 06:30 )             35.8     04-04    138  |  94<L>  |  6<L>  ----------------------------<  89  3.7   |  27  |  0.56    Ca    9.1      04 Apr 2019 06:30  Phos  2.9     04-03  Mg     1.9     04-04                RADIOLOGY & ADDITIONAL TESTS:    Imaging Personally Reviewed:    Consultant(s) Notes Reviewed:      Care Discussed with Consultants/Other Providers:

## 2019-04-04 NOTE — PROGRESS NOTE ADULT - PROBLEM SELECTOR PLAN 6
Pt with stage 4 left lung adenocarcinoma, started on immunotherapy on 3/4/19 with Keytruda.  - Oncology consult appreciated  - MRI brain with subacute lacunar infarct  - No further work up per Neuro  - appreciate palliative care recs  - C/w methadone (I-Stop Reference #: [id number]) and oxycodone PRN

## 2019-04-04 NOTE — PROGRESS NOTE ADULT - ASSESSMENT
64 yo man, current smoker (~1 ppd), with history of stage 4 left lung adenocarcinoma, on immunotherapy with Keytruda (first session on 3/4/19), recurrent left pleural effusion s/p left VATS with pleurX placement (2/2019), COPD, DM2, HTN, HLD, and anxiety presents with 1 week of worsening throat pain with odynophagia and dysphagia. Pt states that for the past 1 week, he has been feeling overwhelming dryness/scratchiness in his throat, causing pain and difficulty with swallowing. Pt notes that whenever he tries to swallow, he experiences the sensation of something getting stuck in his throat and upper chest area. Pt has this sensation with both liquids and solids. As a result, pt has been having very little to eat and drink, avoiding water entirely the last 2 days. Pt also reports poor appetite and ongoing pain in his left shoulder/scapula and near his pleurX insertion site. Pt saw Dr. Avitia (Palliative) earlier this month and was started on methadone for pain control. Pt denies any recent F/C, CP, palpitations, cough, abdominal pain, N/V, diarrhea, constipation, or urinary symptoms. Pt continues to have SOB when lying flat, and therefore, currently sleeps upright in a chair.     In the ED,  T 98.1-98.8, HR 75-88, -144/60-64, RR 16-32, O2 sats 95-97% RA.   CXR with unchanged left loculated pleural effusion. (20 Mar 2019 23:08)      PT afebrile, WBC has been in the low teens since admission.    Pt had EGD on the admission with esophagitis, (+) oral thrush.  On fluconazole for esophageal candidiasis.  Also developed vesicular rash on righ posterior thorax and right abd, on treatment for herpes zoster and is on IV acyclovir.  PT very weak, confused at times, poor PO intake.  Spoke to pt's wife at length, who declined PEG or further chemotherapy at this time.  Wants to focus on his comfort, and considering home hospice.  She is aware of pt's poor prognosis.      ID Consult called for further antimicrobial managment.        Recommend:    - Possible esophageal candidiasis.  Cont IV fluconazole for now at present dose.  Complete total 14 day course, can change to PO 200mg qdaily when pt able to tolerate.    - Herpes zoster in immunocompromised patient.  Lesions are healing in right thorax, no new vesicles noted.  Cont acyclovir IV.  No signs/symptoms of systemic infection or disseminated disease.  Lesions are almost all healed.  Can switch to PO valacyclovir 1gm tid to complete 14 day course.    Will followKelli Rai  [phone]

## 2019-04-04 NOTE — PROGRESS NOTE ADULT - PROBLEM SELECTOR PLAN 7
No S/S of exacerbation.  - C/w albuterol PRN  - Anoro Ellipta not available through pharmacy. c/w Symbicort and Spiriva for now

## 2019-04-04 NOTE — PROGRESS NOTE ADULT - ASSESSMENT
62 yo man, current smoker (~1 ppd), with history of stage 4 left lung adenocarcinoma (PDL-1 70% positive) diagnosed January 2019, on immunotherapy with Keytruda (first session on 3/4/19), recurrent left loculated pleural effusion s/p left VATS with pleurX placement (2/2019), COPD, DM2, HTN, HLD, and anxiety presents with 1 week of worsening throat pain, odynophagia, and dysphagia. PET/CT obtained for staging c/w known metastatic disease - he has just started therapy.   Pt has only had one dose of keytruda, it is too early to  response to treatment.   MRI brain with no evidence of metastatic disease.   EGD with Diffuse mild mucosal changes characterized by desquamation were found in the entire esophagus.LA Grade A (one or more mucosal breaks less than 5 mm, not extending between tops of 2 mucosal folds) esophagitis with no bleeding was found in the distal esophagus. Diffuse mild inflammation characterized by erythema was found in the entire examined stomach. Biopsies were taken with a cold forceps for  histology.  Patient requesting to go home on hospice and does not want to pursue cancer treatment.     -Appreciate GI input, of note, Pembrolizumab can be rarely a/w immune related esophagitis.  -thoracic surgery input appreciated  -Supportive care, pain control, PT/OT, nutrition, HSQ  -patient requesting home hospice, he is not interested in receiving cancer directed therapy and wants to focus on comfort. Will inform primary oncologist.     Please do not hesitate to call back with questions.     Shannon Vieira MD  Medical Oncology Attending

## 2019-04-04 NOTE — PROGRESS NOTE ADULT - SUBJECTIVE AND OBJECTIVE BOX
63y Male found sitting up in bed in NAD, denies sob, cp. Wife at bedside. PleurX catheter in place connected to PleurEvac to WS with an air leak noted, minimal drainage.     HPI: 63M active smoker with h/o stage 4 left lung adenocarcinoma, on immunotherapy with Keytruda (first session on 3/4/19), recurrent left pleural effusion s/p left VATS with pleurX placement (2/2019), COPD, DM2, HTN, HLD, and anxiety p/w with 1 week of worsening throat pain with odynophagia and dysphagia.        24hr Events: Patient is being discharge home with palliative care PleurX catheter connected to Miniatrium for discharge. Patient had Mini-atrium prior to admission, wife states they have equipment at home and was educated on draining Mini-atrium.    Assessment  63y Male admitted with complaints of Patient is a 63y old  Male who presents with a chief complaint of Odynophagia/dysphagia, minimal PO intake (04 Apr 2019 07:12)      Plan  Continue care and disposition planning per  primary team  Recall with Questions  CTS [place] [id number]  Discussed at AM rounds 63y Male found sitting up in bed in NAD, denies sob, cp. Wife at bedside. PleurX catheter in place connected to PleurEvac to WS with an air leak noted, minimal drainage.     HPI: 63M active smoker with h/o stage 4 left lung adenocarcinoma, on immunotherapy with Keytruda (first session on 3/4/19), recurrent left pleural effusion s/p left VATS with pleurX placement (2/2019), COPD, DM2, HTN, HLD, and anxiety p/w with 1 week of worsening throat pain with odynophagia and dysphagia.        24hr Events: Patient is being discharge home with palliative care PleurX catheter connected to Miniatrium for discharge. Patient had Mini-atrium prior to admission, wife states they have equipment at home and was educated on draining Mini-atrium.    Assessment  63y Male admitted with complaints of Patient is a 63y old  Male who presents with a chief complaint of Odynophagia/dysphagia, minimal PO intake (04 Apr 2019 07:12)      Plan  Continue care and disposition planning per  primary team  Would make sure patient home visiting nurse is re-instated for PleurX management  Recall with Questions  CTS PA [id number]  Discussed at AM rounds

## 2019-04-04 NOTE — PROGRESS NOTE ADULT - ASSESSMENT
64 yo man, current smoker (~1 ppd), with history of stage 4 left lung adenocarcinoma, on immunotherapy with Keytruda (first session on 3/4/19), recurrent left pleural effusion s/p left VATS with pleurX placement (2/2019), COPD, DM2, HTN, HLD, and anxiety presents with 1 week of worsening throat pain with odynophagia and dysphagia, started on fluconazole for thrush and possible esophageal candidiasis, also with herpes zoster.

## 2019-04-04 NOTE — PROGRESS NOTE ADULT - SUBJECTIVE AND OBJECTIVE BOX
Infectious Diseases progress note:    Subjective:  Pt seen earlier in the afternoon.  Wife and other family members at bedside.  Pt sitting in chair, more awake today.  States less odynophagia.  No fevers.  Rash over posterior thorax and abd resolving.      ROS:  CONSTITUTIONAL:  No fever, chills, rigors  CARDIOVASCULAR:  No chest pain or palpitations  RESPIRATORY:   No SOB, cough, dyspnea on exertion.  No wheezing  GASTROINTESTINAL:  No abd pain, N/V, diarrhea/constipation  EXTREMITIES:  No swelling or joint pain  GENITOURINARY:  No burning on urination, increased frequency or urgency.  No flank pain  NEUROLOGIC:  No HA, visual disturbances  SKIN: No rashes    Allergies    No Known Allergies    Intolerances        ANTIBIOTICS/RELEVANT:  antimicrobials  acyclovir IVPB      acyclovir IVPB 800 milliGRAM(s) IV Intermittent every 8 hours  fluconAZOLE IVPB      fluconAZOLE IVPB 400 milliGRAM(s) IV Intermittent every 24 hours    immunologic:    OTHER:  acetaminophen   Tablet .. 650 milliGRAM(s) Oral every 6 hours PRN  ALBUTerol    90 MICROgram(s) HFA Inhaler 2 Puff(s) Inhalation every 6 hours PRN  aspirin enteric coated 81 milliGRAM(s) Oral daily  atorvastatin 40 milliGRAM(s) Oral at bedtime  buDESOnide 160 MICROgram(s)/formoterol 4.5 MICROgram(s) Inhaler 2 Puff(s) Inhalation two times a day  dextrose 40% Gel 15 Gram(s) Oral once PRN  dextrose 5%. 1000 milliLiter(s) IV Continuous <Continuous>  dextrose 50% Injectable 12.5 Gram(s) IV Push once  dextrose 50% Injectable 25 Gram(s) IV Push once  dextrose 50% Injectable 25 Gram(s) IV Push once  digoxin     Tablet 0.25 milliGRAM(s) Oral daily  diltiazem    Tablet 30 milliGRAM(s) Oral every 6 hours  docusate sodium 100 milliGRAM(s) Oral three times a day  enoxaparin Injectable 40 milliGRAM(s) SubCutaneous daily  glucagon  Injectable 1 milliGRAM(s) IntraMuscular once PRN  insulin lispro (HumaLOG) corrective regimen sliding scale   SubCutaneous three times a day before meals  insulin lispro (HumaLOG) corrective regimen sliding scale   SubCutaneous at bedtime  melatonin 3 milliGRAM(s) Oral at bedtime  methadone    Tablet 2.5 milliGRAM(s) Oral two times a day  metoprolol tartrate 25 milliGRAM(s) Oral two times a day  nicotine -  14 mG/24Hr(s) Patch 1 patch Transdermal daily  ondansetron Injectable 4 milliGRAM(s) IV Push every 6 hours PRN  polyethylene glycol 3350 17 Gram(s) Oral two times a day  pyridoxine 100 milliGRAM(s) Oral daily  senna 2 Tablet(s) Oral at bedtime PRN  simethicone 160 milliGRAM(s) Chew daily PRN  tiotropium 18 MICROgram(s) Capsule 1 Capsule(s) Inhalation daily      Objective:  Vital Signs Last 24 Hrs  T(C): 36.6 (04 Apr 2019 21:09), Max: 37.1 (03 Apr 2019 22:25)  T(F): 97.9 (04 Apr 2019 21:09), Max: 98.7 (03 Apr 2019 22:25)  HR: 68 (04 Apr 2019 21:09) (65 - 86)  BP: 141/65 (04 Apr 2019 21:09) (123/62 - 141/65)  BP(mean): --  RR: 18 (04 Apr 2019 21:09) (17 - 18)  SpO2: 98% (04 Apr 2019 21:09) (94% - 99%)    PHYSICAL EXAM:  Constitutional:NAD  Eyes:KATHERINE, EOMI  Ear/Nose/Throat: no thrush, mucositis.  Moist mucous membranes	  Neck:no JVD, no lymphadenopathy, supple  Respiratory: CTA gómez  Cardiovascular: S1S2 RRR, no murmurs  Gastrointestinal:soft, nontender,  nondistended (+) BS  Extremities:no e/e/c  Skin:  no rashes, open wounds or ulcerations        LABS:                        10.9   13.06 )-----------( 388      ( 04 Apr 2019 06:30 )             35.8     04-04    138  |  94<L>  |  6<L>  ----------------------------<  89  3.7   |  27  |  0.56    Ca    9.1      04 Apr 2019 06:30  Phos  2.9     04-03  Mg     1.9     04-04                              MICROBIOLOGY:          RADIOLOGY & ADDITIONAL STUDIES: Infectious Diseases progress note:    Subjective:  Pt seen earlier in the afternoon.  Wife and other family members at bedside.  Pt sitting in chair, more awake today.  States less odynophagia.  No fevers.  Rash over posterior thorax and abd resolving.      ROS:  CONSTITUTIONAL:  No fever, chills, rigors  CARDIOVASCULAR:  No chest pain or palpitations  RESPIRATORY:   No SOB, cough, dyspnea on exertion.  No wheezing  GASTROINTESTINAL:  No abd pain, N/V, diarrhea/constipation  EXTREMITIES:  No swelling or joint pain  GENITOURINARY:  No burning on urination, increased frequency or urgency.  No flank pain  NEUROLOGIC:  No HA, visual disturbances  SKIN: No rashes    Allergies    No Known Allergies    Intolerances        ANTIBIOTICS/RELEVANT:  antimicrobials  acyclovir IVPB      acyclovir IVPB 800 milliGRAM(s) IV Intermittent every 8 hours  fluconAZOLE IVPB      fluconAZOLE IVPB 400 milliGRAM(s) IV Intermittent every 24 hours    immunologic:    OTHER:  acetaminophen   Tablet .. 650 milliGRAM(s) Oral every 6 hours PRN  ALBUTerol    90 MICROgram(s) HFA Inhaler 2 Puff(s) Inhalation every 6 hours PRN  aspirin enteric coated 81 milliGRAM(s) Oral daily  atorvastatin 40 milliGRAM(s) Oral at bedtime  buDESOnide 160 MICROgram(s)/formoterol 4.5 MICROgram(s) Inhaler 2 Puff(s) Inhalation two times a day  dextrose 40% Gel 15 Gram(s) Oral once PRN  dextrose 5%. 1000 milliLiter(s) IV Continuous <Continuous>  dextrose 50% Injectable 12.5 Gram(s) IV Push once  dextrose 50% Injectable 25 Gram(s) IV Push once  dextrose 50% Injectable 25 Gram(s) IV Push once  digoxin     Tablet 0.25 milliGRAM(s) Oral daily  diltiazem    Tablet 30 milliGRAM(s) Oral every 6 hours  docusate sodium 100 milliGRAM(s) Oral three times a day  enoxaparin Injectable 40 milliGRAM(s) SubCutaneous daily  glucagon  Injectable 1 milliGRAM(s) IntraMuscular once PRN  insulin lispro (HumaLOG) corrective regimen sliding scale   SubCutaneous three times a day before meals  insulin lispro (HumaLOG) corrective regimen sliding scale   SubCutaneous at bedtime  melatonin 3 milliGRAM(s) Oral at bedtime  methadone    Tablet 2.5 milliGRAM(s) Oral two times a day  metoprolol tartrate 25 milliGRAM(s) Oral two times a day  nicotine -  14 mG/24Hr(s) Patch 1 patch Transdermal daily  ondansetron Injectable 4 milliGRAM(s) IV Push every 6 hours PRN  polyethylene glycol 3350 17 Gram(s) Oral two times a day  pyridoxine 100 milliGRAM(s) Oral daily  senna 2 Tablet(s) Oral at bedtime PRN  simethicone 160 milliGRAM(s) Chew daily PRN  tiotropium 18 MICROgram(s) Capsule 1 Capsule(s) Inhalation daily      Objective:  Vital Signs Last 24 Hrs  T(C): 36.6 (04 Apr 2019 21:09), Max: 37.1 (03 Apr 2019 22:25)  T(F): 97.9 (04 Apr 2019 21:09), Max: 98.7 (03 Apr 2019 22:25)  HR: 68 (04 Apr 2019 21:09) (65 - 86)  BP: 141/65 (04 Apr 2019 21:09) (123/62 - 141/65)  BP(mean): --  RR: 18 (04 Apr 2019 21:09) (17 - 18)  SpO2: 98% (04 Apr 2019 21:09) (94% - 99%)    PHYSICAL EXAM:  Constitutional:NAD  Eyes:KATHERINE, EOMI  Ear/Nose/Throat: thrush resolving, mucositis.  Moist mucous membranes	  Neck:no JVD, no lymphadenopathy, supple  Respiratory: CTA gómez  Cardiovascular: S1S2 RRR, no murmurs  Gastrointestinal:soft, nontender,  nondistended (+) BS  Extremities:no e/e/c  Skin:  dried lesions over rt post thorax and abd in dermatomal pattern, no new blisters        LABS:                        10.9   13.06 )-----------( 388      ( 04 Apr 2019 06:30 )             35.8     04-04    138  |  94<L>  |  6<L>  ----------------------------<  89  3.7   |  27  |  0.56    Ca    9.1      04 Apr 2019 06:30  Phos  2.9     04-03  Mg     1.9     04-04            MICROBIOLOGY:          RADIOLOGY & ADDITIONAL STUDIES:    < from: Xray Esophagram (04.03.19 @ 08:55) >  FINDINGS:    Preliminary  radiograph of the chest is unremarkable.    The patient swallowed barium without difficulty. The esophagus   demonstrates normal distensibility, contour and course. There is no   abnormal extrinsic mass effect. Contrast passes freely through the   gastroesophageal junction into a normal-appearing stomach.       IMPRESSION:     Normal esophagram.    < end of copied text >          < from: MR Angio Neck w/ IV Cont (04.02.19 @ 09:24) >    IMPRESSION:  Intracranial MR angiography demonstrates diminution of right MCA branch   vasculature flow and caliber..  Extracranial MR angiography demonstrates no flow-limiting stenosis by   NASCET criteria.    < end of copied text >

## 2019-04-04 NOTE — PROGRESS NOTE ADULT - SUBJECTIVE AND OBJECTIVE BOX
INTERVAL HPI/OVERNIGHT EVENTS:  Patient seen at bedside. He is feeling better. He has decided to go home on hospice and forego treatment.       VITAL SIGNS:  T(F): 98.5 (03-28-19 @ 06:21)  HR: 79 (03-28-19 @ 06:21)  BP: 145/75 (03-28-19 @ 06:21)  RR: 20 (03-28-19 @ 06:21)  SpO2: 95% (03-28-19 @ 06:21)  Wt(kg): --    PHYSICAL EXAM:    Constitutional: NAD, sitting in chair comfortably  Eyes: EOMI, sclera non-icteric  Neck: supple, no LAP  Extremities: no edema  Neurological: AAOx3, non focal  Skin: Normal temperature    MEDICATIONS  (STANDING):  acyclovir IVPB      acyclovir IVPB 800 milliGRAM(s) IV Intermittent every 8 hours  aspirin enteric coated 81 milliGRAM(s) Oral daily  atorvastatin 40 milliGRAM(s) Oral at bedtime  bisacodyl Suppository 10 milliGRAM(s) Rectal once  buDESOnide 160 MICROgram(s)/formoterol 4.5 MICROgram(s) Inhaler 2 Puff(s) Inhalation two times a day  dextrose 5%. 1000 milliLiter(s) (50 mL/Hr) IV Continuous <Continuous>  dextrose 50% Injectable 12.5 Gram(s) IV Push once  dextrose 50% Injectable 25 Gram(s) IV Push once  dextrose 50% Injectable 25 Gram(s) IV Push once  docusate sodium 100 milliGRAM(s) Oral three times a day  enoxaparin Injectable 40 milliGRAM(s) SubCutaneous daily  fluconAZOLE IVPB      fluconAZOLE IVPB 400 milliGRAM(s) IV Intermittent every 24 hours  insulin lispro (HumaLOG) corrective regimen sliding scale   SubCutaneous three times a day before meals  insulin lispro (HumaLOG) corrective regimen sliding scale   SubCutaneous at bedtime  lactated ringers. 1000 milliLiter(s) (50 mL/Hr) IV Continuous <Continuous>  LORazepam     Tablet 1 milliGRAM(s) Oral once  melatonin 3 milliGRAM(s) Oral at bedtime  methadone    Tablet 2.5 milliGRAM(s) Oral two times a day  metoprolol tartrate 25 milliGRAM(s) Oral two times a day  polyethylene glycol 3350 17 Gram(s) Oral two times a day  pyridoxine 100 milliGRAM(s) Oral daily  tiotropium 18 MICROgram(s) Capsule 1 Capsule(s) Inhalation daily    MEDICATIONS  (PRN):  ALBUTerol    90 MICROgram(s) HFA Inhaler 2 Puff(s) Inhalation every 6 hours PRN Shortness of Breath and/or Wheezing  dextrose 40% Gel 15 Gram(s) Oral once PRN Blood Glucose LESS THAN 70 milliGRAM(s)/deciliter  glucagon  Injectable 1 milliGRAM(s) IntraMuscular once PRN Glucose LESS THAN 70 milligrams/deciliter  LORazepam     Tablet 0.5 milliGRAM(s) Oral three times a day PRN Anxiety  ondansetron Injectable 4 milliGRAM(s) IV Push every 6 hours PRN Nausea and/or Vomiting  oxyCODONE    IR 5 milliGRAM(s) Oral every 6 hours PRN Moderate Pain (4 - 6)  oxyCODONE    IR 10 milliGRAM(s) Oral every 6 hours PRN Severe Pain (7 - 10)  senna 2 Tablet(s) Oral at bedtime PRN Constipation  simethicone 160 milliGRAM(s) Chew daily PRN Gas      Allergies    No Known Allergies    Intolerances        LABS:                        10.7   12.88 )-----------( 419      ( 27 Mar 2019 06:30 )             34.5     03-27    136  |  94<L>  |  6<L>  ----------------------------<  88  3.8   |  28  |  0.65    Ca    9.3      27 Mar 2019 06:30  Phos  3.2     03-27  Mg     1.6     03-27            RADIOLOGY & ADDITIONAL TESTS:  Studies reviewed.

## 2019-04-04 NOTE — PROGRESS NOTE ADULT - ASSESSMENT
Impression:  1) Odynophagia/Dysphagia - with noted oral thrush on exam.   EGD showing grade A esophagitis, esophogram normal, and partial cine showing silent aspiration of thins. Biopsies from EGD just show mild gastritis.    Recommendations:   - outpatient swallow therapy per speech and swallow recommendations   - await pathology   - symptoms management per primary team   - please call GI back with any further questions    Lorena Franco, PGY-4  Gastroenterology Fellow  Pager x [phone] or [phone]  (After 5 pm or on weekends please page GI on call)

## 2019-04-04 NOTE — PROGRESS NOTE ADULT - PROBLEM SELECTOR PLAN 4
Pt with poor nutrition, decreased appetite, and weight loss in setting of metastatic lung cancer.   - pt with severe protein calorie malnutrition per nutrition eval  - plan as above for odynophagia, dysphagia  - f/u palliative care for symptomatic management

## 2019-04-04 NOTE — GOALS OF CARE CONVERSATION - PERSONAL ADVANCE DIRECTIVE - CONVERSATION DETAILS
HOSPICE CARE NETWORK REFERRAL  Met with pt and spouse, reviewed hospice services. Pt and spouse are seeking home hospice services and no longer seeking aggressive treatments. Case approved by hospice physician for home hospice services. Pt using oxygen at home prior to hospitalization and spouse confirmed oxygen concentrator in patient's residence. Pt and family requested hospital bed, commode and rolling walker to be delivered. DME scheduled to be delivered 4/5/19 between 12-2pm. Reviewed code status with pt and spouse, pt AxOx3, requested full code status. Collaborated with GINETTE Roberts, requested MOLST be completed indicating CPR status and chest tube be assessed and connected to at home device pt admitted with ( [employer] single chamber mini 500 chest tube).     Spouse requested patient continue dilaudid and not methadone, she reports pt had increased confusion after methadone administration.      As per hospice physician, the follow medications are related medications and are covered. PO acyclovir, PO fluconazole, Ipratropium instead of brand name spiriva, melatonin, docusate sodium, pyridoxine, budesonide/ formoterol inhaler, albuterol inhaler, acetaminophen, senna, simethicone. If they are to be ordered upon discharge please e-prescribe to [facility] affiliated pharmacy [facility].     Please call [facility] at [phone].

## 2019-04-04 NOTE — PROGRESS NOTE ADULT - ASSESSMENT
EKG: SR LAD, poor R wave progression     Tele: Afib with RVR     Echo:< from: TTE with Doppler (w/Cont) (02.11.19 @ 09:56) >  1. Mitral annular calcification, otherwise normal mitral  valve. Minimal mitral regurgitation.  2. Endocardium not well visualized; grossly normal left  ventricular systolic function.  Endocardial visualization  enhanced with intravenous injection of echo contrast  (Definity).  3. Unable to accurately evaluate right ventricular size or  systolic function.    < end of copied text >    Assessment and Plan     1) Afib with rvr: now in sinus rhythm , switch metoprolol to Toprol Xl 50 mg po daily , decrease dilt to 0.125 daily, D/C dilt, refused echo, Now DNR/DNI,  n/o a/c as pt for hospice ,    2) Metastatic Lung CA: T/t per primary team    3) DVT ppx : lovenox

## 2019-04-04 NOTE — PROGRESS NOTE ADULT - PROBLEM SELECTOR PLAN 3
dysphagia and odynophagia, likely 2/2 progression of metastatic disease in mediastinal area. Recent PET/CT showing hypermetabolic right supraclavicular, bilateral mediastinal, and left perihilar lymphadenopathy, increased in size from a month ago.   MRI brain with possible subacute lacunar infarct in posterior L lenticular nucleus region - may also have neurogenic dysphagia.   Given oral thrush, possible candida esophagitis  odynophagia improved, however still eating minimally, report of food being "stuck"  EGD 4/1: Desquamation mucosa in the esophagus. LA Grade A esophagitis. Biopsied. Gastritis. Biopsied. Normal examined duodenum.  continue fluconazole 400 mg IV for possible candida esophagitis  s/p MBS - oropharyngeal dysphagia - SLP recommending dysphagia 2 with nectar thick liquids   oral intake is insufficient for caloric/nutritional needs based on calorie count. However, wife refused G tube for feeding

## 2019-04-04 NOTE — PROGRESS NOTE ADULT - PROBLEM SELECTOR PLAN 9
Pt on amlodipine, valsartan, metoprolol, and lasix at home.  Will c/w metoprolol, but will hold amlodipine (given chronic b/l LE edema), valsartan, and lasix

## 2019-04-04 NOTE — PROGRESS NOTE ADULT - SUBJECTIVE AND OBJECTIVE BOX
Robinson Hathaway MD  Interventional Cardiology  [place] Office : [phone] [place] [address] [place] [address]  Tel:   [place] Office : [address] [facility] [place] [address]  Tel: [phone]  Cell : [phone]    Subjective : Pt lying in bed comfortable, not in distress, family at bedside , now DNR/DNI, refused echo this am, states he has had enough   	  MEDICATIONS:  aspirin enteric coated 81 milliGRAM(s) Oral daily  digoxin     Tablet 0.25 milliGRAM(s) Oral daily  diltiazem    Tablet 30 milliGRAM(s) Oral every 6 hours  enoxaparin Injectable 40 milliGRAM(s) SubCutaneous daily  metoprolol tartrate 25 milliGRAM(s) Oral two times a day    acyclovir IVPB      acyclovir IVPB 800 milliGRAM(s) IV Intermittent every 8 hours  fluconAZOLE IVPB      fluconAZOLE IVPB 400 milliGRAM(s) IV Intermittent every 24 hours    ALBUTerol    90 MICROgram(s) HFA Inhaler 2 Puff(s) Inhalation every 6 hours PRN  buDESOnide 160 MICROgram(s)/formoterol 4.5 MICROgram(s) Inhaler 2 Puff(s) Inhalation two times a day  tiotropium 18 MICROgram(s) Capsule 1 Capsule(s) Inhalation daily    acetaminophen   Tablet .. 650 milliGRAM(s) Oral every 6 hours PRN  melatonin 3 milliGRAM(s) Oral at bedtime  methadone    Tablet 2.5 milliGRAM(s) Oral two times a day  ondansetron Injectable 4 milliGRAM(s) IV Push every 6 hours PRN    docusate sodium 100 milliGRAM(s) Oral three times a day  polyethylene glycol 3350 17 Gram(s) Oral two times a day  senna 2 Tablet(s) Oral at bedtime PRN  simethicone 160 milliGRAM(s) Chew daily PRN    atorvastatin 40 milliGRAM(s) Oral at bedtime  dextrose 40% Gel 15 Gram(s) Oral once PRN  dextrose 50% Injectable 12.5 Gram(s) IV Push once  dextrose 50% Injectable 25 Gram(s) IV Push once  dextrose 50% Injectable 25 Gram(s) IV Push once  glucagon  Injectable 1 milliGRAM(s) IntraMuscular once PRN  insulin lispro (HumaLOG) corrective regimen sliding scale   SubCutaneous three times a day before meals  insulin lispro (HumaLOG) corrective regimen sliding scale   SubCutaneous at bedtime    dextrose 5%. 1000 milliLiter(s) IV Continuous <Continuous>  pyridoxine 100 milliGRAM(s) Oral daily      PHYSICAL EXAM:  T(C): 36.6 (04-04-19 @ 21:09), Max: 37.1 (04-03-19 @ 22:25)  HR: 68 (04-04-19 @ 21:09) (65 - 86)  BP: 141/65 (04-04-19 @ 21:09) (123/62 - 141/65)  RR: 18 (04-04-19 @ 21:09) (17 - 18)  SpO2: 98% (04-04-19 @ 21:09) (94% - 99%)  Wt(kg): --  I&O's Summary    03 Apr 2019 07:01  -  04 Apr 2019 07:00  --------------------------------------------------------  IN: 740 mL / OUT: 211 mL / NET: 529 mL    04 Apr 2019 07:01  -  04 Apr 2019 22:23  --------------------------------------------------------  IN: 450 mL / OUT: 0 mL / NET: 450 mL            Appearance: chronically ill 	  HEENT:   Normal oral mucosa,	  Cardiovascular: Normal S1 S2, No JVD, No murmurs,   Respiratory: c/l coarse ronchi   Gastrointestinal:  Soft, Non-tender, + BS	  Extremities:  No clubbing, cyanosis , 2+ edema                                10.9   13.06 )-----------( 388      ( 04 Apr 2019 06:30 )             35.8     04-04    138  |  94<L>  |  6<L>  ----------------------------<  89  3.7   |  27  |  0.56    Ca    9.1      04 Apr 2019 06:30  Phos  2.9     04-03  Mg     1.9     04-04      proBNP:   Lipid Profile:   HgA1c:   TSH:

## 2019-04-04 NOTE — PROGRESS NOTE ADULT - PROBLEM SELECTOR PLAN 1
New afib with RVR, appreciate cardiology eval  s/p load with digoxin   c/w digoxin   sinus rhythm now

## 2019-04-05 ENCOUNTER — TRANSCRIPTION ENCOUNTER (OUTPATIENT)
Age: 64
End: 2019-04-05

## 2019-04-05 VITALS
OXYGEN SATURATION: 96 % | HEART RATE: 83 BPM | SYSTOLIC BLOOD PRESSURE: 135 MMHG | TEMPERATURE: 99 F | DIASTOLIC BLOOD PRESSURE: 64 MMHG | RESPIRATION RATE: 18 BRPM

## 2019-04-05 PROCEDURE — 99239 HOSP IP/OBS DSCHRG MGMT >30: CPT

## 2019-04-05 RX ORDER — AMLODIPINE BESYLATE 2.5 MG/1
1 TABLET ORAL
Qty: 0 | Refills: 0 | COMMUNITY

## 2019-04-05 RX ORDER — VALACYCLOVIR 500 MG/1
1 TABLET, FILM COATED ORAL
Qty: 9 | Refills: 0 | OUTPATIENT
Start: 2019-04-05 | End: 2019-04-07

## 2019-04-05 RX ORDER — FLUCONAZOLE 150 MG/1
1 TABLET ORAL
Qty: 2 | Refills: 0 | OUTPATIENT
Start: 2019-04-05 | End: 2019-04-06

## 2019-04-05 RX ORDER — METOPROLOL TARTRATE 50 MG
50 TABLET ORAL DAILY
Qty: 0 | Refills: 0 | Status: DISCONTINUED | OUTPATIENT
Start: 2019-04-05 | End: 2019-04-05

## 2019-04-05 RX ORDER — METFORMIN HYDROCHLORIDE 850 MG/1
1 TABLET ORAL
Qty: 0 | Refills: 0 | COMMUNITY

## 2019-04-05 RX ORDER — ACETAMINOPHEN 500 MG
2 TABLET ORAL
Qty: 240 | Refills: 0 | OUTPATIENT
Start: 2019-04-05 | End: 2019-05-04

## 2019-04-05 RX ORDER — VALSARTAN 80 MG/1
1 TABLET ORAL
Qty: 0 | Refills: 0 | COMMUNITY

## 2019-04-05 RX ORDER — DIGOXIN 250 MCG
1 TABLET ORAL
Qty: 30 | Refills: 0 | OUTPATIENT
Start: 2019-04-05 | End: 2019-05-04

## 2019-04-05 RX ORDER — NICOTINE POLACRILEX 2 MG
1 GUM BUCCAL
Qty: 30 | Refills: 0 | OUTPATIENT
Start: 2019-04-05 | End: 2019-05-04

## 2019-04-05 RX ORDER — METOPROLOL TARTRATE 50 MG
1 TABLET ORAL
Qty: 30 | Refills: 0 | OUTPATIENT
Start: 2019-04-05 | End: 2019-05-04

## 2019-04-05 RX ADMIN — Medication 81 MILLIGRAM(S): at 13:01

## 2019-04-05 RX ADMIN — Medication 100 MILLIGRAM(S): at 13:01

## 2019-04-05 RX ADMIN — Medication 1 PATCH: at 13:02

## 2019-04-05 RX ADMIN — VALACYCLOVIR 1000 MILLIGRAM(S): 500 TABLET, FILM COATED ORAL at 07:14

## 2019-04-05 RX ADMIN — FLUCONAZOLE 100 MILLIGRAM(S): 150 TABLET ORAL at 13:01

## 2019-04-05 RX ADMIN — TIOTROPIUM BROMIDE 1 CAPSULE(S): 18 CAPSULE ORAL; RESPIRATORY (INHALATION) at 07:12

## 2019-04-05 RX ADMIN — BUDESONIDE AND FORMOTEROL FUMARATE DIHYDRATE 2 PUFF(S): 160; 4.5 AEROSOL RESPIRATORY (INHALATION) at 07:11

## 2019-04-05 RX ADMIN — Medication 0.25 MILLIGRAM(S): at 07:14

## 2019-04-05 RX ADMIN — VALACYCLOVIR 1000 MILLIGRAM(S): 500 TABLET, FILM COATED ORAL at 13:01

## 2019-04-05 RX ADMIN — Medication 1 MILLIGRAM(S): at 08:53

## 2019-04-05 RX ADMIN — Medication 1 PATCH: at 13:01

## 2019-04-05 RX ADMIN — Medication 1 PATCH: at 06:51

## 2019-04-05 NOTE — DISCHARGE NOTE NURSING/CASE MANAGEMENT/SOCIAL WORK - NSDCPEWEB_GEN_ALL_CORE
[facility] for Tobacco Control website --- [email or web address] website --- [email or web address]

## 2019-04-05 NOTE — PROGRESS NOTE ADULT - PROBLEM SELECTOR PROBLEM 1
Paroxysmal atrial fibrillation
Herpes zoster
Odynophagia
Paroxysmal atrial fibrillation
Shortness of breath

## 2019-04-05 NOTE — PROGRESS NOTE ADULT - PROBLEM SELECTOR PROBLEM 5
COPD (chronic obstructive pulmonary disease)
Encounter for palliative care
Encounter for palliative care
Lung cancer
Pleural effusion
Pleural effusion
Encounter for palliative care

## 2019-04-05 NOTE — PROGRESS NOTE ADULT - PROBLEM SELECTOR PLAN 3
dysphagia and odynophagia, likely 2/2 progression of metastatic disease in mediastinal area. Recent PET/CT showing hypermetabolic right supraclavicular, bilateral mediastinal, and left perihilar lymphadenopathy, increased in size from a month ago.   MRI brain with possible subacute lacunar infarct in posterior L lenticular nucleus region - may also have neurogenic dysphagia.   Given oral thrush, possible candida esophagitis  odynophagia improved, however still eating minimally, report of food being "stuck"  EGD 4/1: Desquamation mucosa in the esophagus. LA Grade A esophagitis. Biopsied. Gastritis. Biopsied. Normal examined duodenum.  switch from IV fluconazole to 200 mg PO qD fluconazole for possible candida esophagitis  s/p MBS - oropharyngeal dysphagia - SLP recommending dysphagia 2 with nectar thick liquids   oral intake is insufficient for caloric/nutritional needs based on calorie count. However, wife refused G tube for feeding

## 2019-04-05 NOTE — PROGRESS NOTE ADULT - PROBLEM/PLAN-6
pt reports he shot heroin in his room last night and passed out sitting on the floor, woke up this morning with numbness and tingling in his legs, unable to ambulate.
DISPLAY PLAN FREE TEXT

## 2019-04-05 NOTE — PROGRESS NOTE ADULT - PROBLEM SELECTOR PLAN 6
Pt with stage 4 left lung adenocarcinoma, started on immunotherapy on 3/4/19 with Keytruda.  - Oncology consult appreciated  - MRI brain with subacute lacunar infarct  - No further work up per Neuro  - appreciate palliative care recs  - C/w methadone ([employer] Reference #: [id number]) and oxycodone PRN

## 2019-04-05 NOTE — PROGRESS NOTE ADULT - PROVIDER SPECIALTY LIST ADULT
CT Surgery
Cardiology
Gastroenterology
Heme/Onc
Hospitalist
Infectious Disease
Neurology
Palliative Care
Palliative Care
Thoracic Surgery
Thoracic Surgery
Hospitalist
Palliative Care
Hospitalist
Neurology
Cardiology
Hospitalist
Hospitalist

## 2019-04-05 NOTE — DISCHARGE NOTE NURSING/CASE MANAGEMENT/SOCIAL WORK - NSDCCRNAME_GEN_ALL_CORE_FT
HOSPICE CARE NETWORK/COMMUNITY SURGICAL for EQUIPMENT ;   [facility]ANCE at 3pm 4/5/2019 confirmation # [id number], [phone]

## 2019-04-05 NOTE — PROGRESS NOTE ADULT - SUBJECTIVE AND OBJECTIVE BOX
CC: Patient is a 63y old  Male who presents with a chief complaint of Odynophagia/dysphagia, minimal PO intake (04 Apr 2019 22:24)    SUBJECTIVE / OVERNIGHT EVENTS: Patient without new complaints. Eats minimally per wife. Denies headache, weakness, malaise, fevers, chills, visual changes, dyspnea, cough, abdominal pain, nausea, vomiting, diarrhea, constipation, dysuria, hematuria, polyuria, pruritis, joint pain    MEDICATIONS  (STANDING):  aspirin enteric coated 81 milliGRAM(s) Oral daily  atorvastatin 40 milliGRAM(s) Oral at bedtime  buDESOnide 160 MICROgram(s)/formoterol 4.5 MICROgram(s) Inhaler 2 Puff(s) Inhalation two times a day  dextrose 5%. 1000 milliLiter(s) (50 mL/Hr) IV Continuous <Continuous>  dextrose 50% Injectable 12.5 Gram(s) IV Push once  dextrose 50% Injectable 25 Gram(s) IV Push once  dextrose 50% Injectable 25 Gram(s) IV Push once  digoxin     Tablet 0.25 milliGRAM(s) Oral daily  docusate sodium 100 milliGRAM(s) Oral three times a day  enoxaparin Injectable 40 milliGRAM(s) SubCutaneous daily  fluconAZOLE IVPB      fluconAZOLE IVPB 400 milliGRAM(s) IV Intermittent every 24 hours  insulin lispro (HumaLOG) corrective regimen sliding scale   SubCutaneous three times a day before meals  insulin lispro (HumaLOG) corrective regimen sliding scale   SubCutaneous at bedtime  melatonin 3 milliGRAM(s) Oral at bedtime  methadone    Tablet 2.5 milliGRAM(s) Oral two times a day  metoprolol succinate ER 50 milliGRAM(s) Oral daily  nicotine -  14 mG/24Hr(s) Patch 1 patch Transdermal daily  polyethylene glycol 3350 17 Gram(s) Oral two times a day  pyridoxine 100 milliGRAM(s) Oral daily  tiotropium 18 MICROgram(s) Capsule 1 Capsule(s) Inhalation daily  valACYclovir 1000 milliGRAM(s) Oral every 8 hours    MEDICATIONS  (PRN):  acetaminophen   Tablet .. 650 milliGRAM(s) Oral every 6 hours PRN Mild Pain (1 - 3), Moderate Pain (4 - 6)  ALBUTerol    90 MICROgram(s) HFA Inhaler 2 Puff(s) Inhalation every 6 hours PRN Shortness of Breath and/or Wheezing  dextrose 40% Gel 15 Gram(s) Oral once PRN Blood Glucose LESS THAN 70 milliGRAM(s)/deciliter  glucagon  Injectable 1 milliGRAM(s) IntraMuscular once PRN Glucose LESS THAN 70 milligrams/deciliter  ondansetron Injectable 4 milliGRAM(s) IV Push every 6 hours PRN Nausea and/or Vomiting  senna 2 Tablet(s) Oral at bedtime PRN Constipation  simethicone 160 milliGRAM(s) Chew daily PRN Gas      Vital Signs Last 24 Hrs  T(C): 37.3 (05 Apr 2019 12:11), Max: 37.3 (05 Apr 2019 12:11)  T(F): 99.1 (05 Apr 2019 12:11), Max: 99.1 (05 Apr 2019 12:11)  HR: 83 (05 Apr 2019 12:11) (68 - 83)  BP: 135/64 (05 Apr 2019 12:11) (130/60 - 141/65)  BP(mean): --  RR: 18 (05 Apr 2019 12:11) (18 - 18)  SpO2: 96% (05 Apr 2019 12:11) (95% - 98%)  CAPILLARY BLOOD GLUCOSE      POCT Blood Glucose.: 116 mg/dL (05 Apr 2019 12:41)  POCT Blood Glucose.: 100 mg/dL (05 Apr 2019 08:48)  POCT Blood Glucose.: 96 mg/dL (04 Apr 2019 21:53)  POCT Blood Glucose.: 99 mg/dL (04 Apr 2019 17:23)    PHYSICAL EXAM:  GENERAL: appears chronically ill and frail, not in respiratory distress  HEAD:  Atraumatic, Normocephalic  EYES: EOMI, conjunctiva and sclera clear  NECK: Supple, No JVD  CHEST/LUNG: (+) left chest catheter, decreased basilar breath sounds on left lung compared to right, no wheezes  HEART: Regular rate and rhythm; No murmurs, rubs, or gallops  ABDOMEN: Soft, Nontender, Nondistended; Bowel sounds present  EXTREMITIES:  2+ Peripheral Pulses, No clubbing, cyanosis, or edema  PSYCH: AAOx3  NEUROLOGY: non-focal  SKIN: No rashes or lesions    LABS:                        10.9   13.06 )-----------( 388      ( 04 Apr 2019 06:30 )             35.8     04-04    138  |  94<L>  |  6<L>  ----------------------------<  89  3.7   |  27  |  0.56    Ca    9.1      04 Apr 2019 06:30  Mg     1.9     04-04      Consultant(s) Notes Reviewed:  Cardiology, Heme/Onc, Thoracic Sx     Care Discussed with Consultants/Other Providers: Dr. Hathaway (Cardiology), ADS ACP

## 2019-04-05 NOTE — PROGRESS NOTE ADULT - PROBLEM SELECTOR PROBLEM 2
Herpes zoster
Failure to thrive in adult
Herpes zoster
Neoplasm related pain
Neoplasm related pain
Odynophagia
Neoplasm related pain

## 2019-04-05 NOTE — PROGRESS NOTE ADULT - ASSESSMENT
63M hx of current smoker (~1 ppd), with history of stage 4 left lung adenocarcinoma, on immunotherapy with Keytruda (first session on 3/4/19), recurrent left pleural effusion s/p left VATS with pleurX placement (2/2019), COPD, DM2, HTN, HLD, and anxiety presents with 1 week of worsening throat pain with odynophagia and dysphagia, started on fluconazole for thrush and possible esophageal candidiasis, also with herpes zoster.

## 2019-04-05 NOTE — PROGRESS NOTE ADULT - SUBJECTIVE AND OBJECTIVE BOX
Robinson Hathaway MD  Interventional Cardiology / Advance Heart Failure and Cardiac Transplant Specialist  [place] Office : [phone] [place] [address] [place]. [address]  Tel:   [place] Office : [phone] [facility] [place] [address]  Tel: [phone]  Cell : [phone]    Subjective : Pt lying in bed comfortable, not in distress, denies any chest pain or SOB  	  MEDICATIONS:  aspirin enteric coated 81 milliGRAM(s) Oral daily  digoxin     Tablet 0.25 milliGRAM(s) Oral daily  enoxaparin Injectable 40 milliGRAM(s) SubCutaneous daily  metoprolol succinate ER 50 milliGRAM(s) Oral daily    fluconAZOLE IVPB      fluconAZOLE IVPB 400 milliGRAM(s) IV Intermittent every 24 hours  valACYclovir 1000 milliGRAM(s) Oral every 8 hours    ALBUTerol    90 MICROgram(s) HFA Inhaler 2 Puff(s) Inhalation every 6 hours PRN  buDESOnide 160 MICROgram(s)/formoterol 4.5 MICROgram(s) Inhaler 2 Puff(s) Inhalation two times a day  tiotropium 18 MICROgram(s) Capsule 1 Capsule(s) Inhalation daily    acetaminophen   Tablet .. 650 milliGRAM(s) Oral every 6 hours PRN  melatonin 3 milliGRAM(s) Oral at bedtime  methadone    Tablet 2.5 milliGRAM(s) Oral two times a day  ondansetron Injectable 4 milliGRAM(s) IV Push every 6 hours PRN    docusate sodium 100 milliGRAM(s) Oral three times a day  polyethylene glycol 3350 17 Gram(s) Oral two times a day  senna 2 Tablet(s) Oral at bedtime PRN  simethicone 160 milliGRAM(s) Chew daily PRN    atorvastatin 40 milliGRAM(s) Oral at bedtime  dextrose 40% Gel 15 Gram(s) Oral once PRN  dextrose 50% Injectable 12.5 Gram(s) IV Push once  dextrose 50% Injectable 25 Gram(s) IV Push once  dextrose 50% Injectable 25 Gram(s) IV Push once  glucagon  Injectable 1 milliGRAM(s) IntraMuscular once PRN  insulin lispro (HumaLOG) corrective regimen sliding scale   SubCutaneous three times a day before meals  insulin lispro (HumaLOG) corrective regimen sliding scale   SubCutaneous at bedtime    dextrose 5%. 1000 milliLiter(s) IV Continuous <Continuous>  pyridoxine 100 milliGRAM(s) Oral daily      PHYSICAL EXAM:  T(C): 37.3 (04-05-19 @ 12:11), Max: 37.3 (04-05-19 @ 12:11)  HR: 83 (04-05-19 @ 12:11) (68 - 83)  BP: 135/64 (04-05-19 @ 12:11) (130/60 - 141/65)  RR: 18 (04-05-19 @ 12:11) (18 - 18)  SpO2: 96% (04-05-19 @ 12:11) (95% - 98%)  Wt(kg): --  I&O's Summary    04 Apr 2019 07:01  -  05 Apr 2019 07:00  --------------------------------------------------------  IN: 490 mL / OUT: 150 mL / NET: 340 mL          	  HEENT:   Normal oral mucosa, PERRL, EOMI	  Cardiovascular: Normal S1 S2, No JVD, No murmurs, No edema  Respiratory: Lungs clear to auscultation	  Gastrointestinal:  Soft, Non-tender, + BS	  Extremities: no edema                                    10.9   13.06 )-----------( 388      ( 04 Apr 2019 06:30 )             35.8     04-04    138  |  94<L>  |  6<L>  ----------------------------<  89  3.7   |  27  |  0.56    Ca    9.1      04 Apr 2019 06:30  Mg     1.9     04-04      proBNP:   Lipid Profile:   HgA1c:   TSH:

## 2019-04-05 NOTE — PROGRESS NOTE ADULT - PROBLEM SELECTOR PROBLEM 3
Odynophagia
Failure to thrive in adult
Odynophagia
Pleural effusion, left
Odynophagia

## 2019-04-05 NOTE — PROGRESS NOTE ADULT - PROBLEM SELECTOR PROBLEM 4
Failure to thrive in adult
Failure to thrive in adult
Lung cancer
Malignant neoplasm of lung, unspecified laterality, unspecified part of lung
Malignant neoplasm of lung, unspecified laterality, unspecified part of lung
Pleural effusion, left
Malignant neoplasm of lung, unspecified laterality, unspecified part of lung
none

## 2019-04-05 NOTE — DISCHARGE NOTE NURSING/CASE MANAGEMENT/SOCIAL WORK - NSDCPEEMAIL_GEN_ALL_CORE
[facility] Tobacco Control email [email or web address]

## 2019-04-05 NOTE — PROGRESS NOTE ADULT - PROBLEM SELECTOR PLAN 5
-pt wishes to attempt Keytruda   -full code, full interventions.
-pt wishes to attempt Keytruda   -full code, full interventions.
No S/S of exacerbation.  - C/w albuterol PRN  - Anoro Ellipta not available through pharmacy. Will do Symbicort and Spiriva for now and have pt's family bring in medication from home.
Pt with recurrent left loculated pleural effusion, s/p left VATS and pleurX placement. PleurX still in place, draining serosanguinous fluid likely due to malignancy  - CXR stable L pleural effusion  - C/w PleurX drainage; monitor output  - Monitor respiratory status  - f/u CTS recs, pt follows with Dr. Moore
Pt with recurrent left loculated pleural effusion, s/p left VATS and pleurX placement. PleurX still in place, draining serosanguinous fluid likely due to malignancy  - CXR stable L pleural effusion  - C/w PleurX drainage; monitor output  - Monitor respiratory status  - f/u CTS recs, pt follows with Dr. Moore
Pt with stage 4 left lung adenocarcinoma, started on immunotherapy on 3/4/19 with Keytruda.  - Oncology consult appreciated,  MRI brain nondiagnostic, pt couldn't tolerate it   -f/u palliative care recs  - C/w methadone (I-Stop Reference #: [id number]) and oxycodone PRN  -Leg doppler negative for DVT
-pt wishes to attempt Keytruda   -full code, full interventions.

## 2019-04-05 NOTE — DISCHARGE NOTE NURSING/CASE MANAGEMENT/SOCIAL WORK - NSDCDPATPORTLINK_GEN_ALL_CORE
You can access the [facility] Patient Portal, offered by [facility], by registering with the following website: [email or web address]

## 2019-04-05 NOTE — PROGRESS NOTE ADULT - ATTENDING COMMENTS
d/w wife extensively at bedside. They are interested in home hospice now.
Patient seen and examined with GI fellow. I agree with the above assessment and plan. The EGD did not show an etiology for dysphagia. Plan for speech and swallow evaluation after ativan as the pt has tremendous anxiety.
Patient seen and examined with Gi fellow. I agree with the above A/P.
Patient seen and examined with GI fellow. I agree with the above.
Patient seen and examined.  Agree with resident note.
Attending Attestation:    Patient seen at bedside. Deemed medically stable for discharge today for home hospice. Patient and family counselled on importance of adherence to medications for outpatient and to follow up with Heme/Onc    Time spent for discharge plannin min
Overall prognosis is very poor. Pt and the wife are interested in hospice at this time.

## 2019-04-05 NOTE — PROGRESS NOTE ADULT - REASON FOR ADMISSION
Odynophagia/dysphagia, minimal PO intake

## 2019-04-16 ENCOUNTER — APPOINTMENT (OUTPATIENT)
Dept: INFUSION THERAPY | Facility: HOSPITAL | Age: 64
End: 2019-04-16

## 2019-05-01 ENCOUNTER — APPOINTMENT (OUTPATIENT)
Dept: PULMONOLOGY | Facility: CLINIC | Age: 64
End: 2019-05-01

## 2019-05-08 ENCOUNTER — APPOINTMENT (OUTPATIENT)
Dept: ENDOCRINOLOGY | Facility: CLINIC | Age: 64
End: 2019-05-08

## 2019-06-12 NOTE — PATIENT PROFILE ADULT - DO YOU FEEL LIKE HURTING OTHERS?
Health Maintenance Due   Topic Date Due   • Pneumococcal Vaccine 0-64 (1 of 1 - PPSV23) 10/09/1968   • Shingles Vaccine (1 of 2) 10/09/2011   • Colorectal Cancer Screening-Fecal Occult Blood  01/22/2019       Patient is due for topics listed above, he wishes to proceed with Colorectal Cancer Screening: Colonoscopy, but is not proceeding with Immunization(s) Pneumococcal and Shingles at this time. The following has occured: patient is scheduled for colonoscopy on 7/30/19             no

## 2020-02-26 NOTE — PHYSICAL THERAPY INITIAL EVALUATION ADULT - NS ASR RISK AREAS PT EVAL
[FreeTextEntry1] : \par \par                                                                                                                            
fall

## 2021-12-17 NOTE — DIETITIAN INITIAL EVALUATION ADULT. - CURRENT DIET ORDER MEETS ESTIMATED NUTRIENT REQUIREMENTS
Statement Selected Seen and examined, states 1mo ago had L ear pain and swelling, Rx Bactrim by PMD, used for 1 wk with some relief but ear still bothered. Returned last wk. and had recurrent ear swelling, resumed Bactrim but this time was told may need to FU ENT. Took 1st dose last Sat. and Sun. c/o eye redness, gen. weakness and dizziness. No rashes, no itching. Pt. called PMD, was told to cont. Bactrim but then worsened all wk. with room spinning dizziness and unsteadiness, also states worsened L ear pain and swelling, cont. eye redness, no itching or discharge. GONZALO, EOMI, bilat. conj. injection and mild chemosis, no crusting, L ear swollen/tender ext. pinna/warm, swollen canal with small amt of TM visible but clear, neck NT, no ant. LNs, clear lungs, heart reg, abd soft, NT to palp, ext. no edema, NT calves. Seen and examined, states 1mo ago had L ear pain and swelling, Rx Bactrim by PMD, used for 1 wk with some relief but ear still bothered. Returned last wk. and had recurrent ear swelling, resumed Bactrim but this time was told may need to FU ENT. Took 1st dose last Sat. and Sun. c/o eye redness, gen. weakness and dizziness. No rashes, no itching. Pt. called PMD, was told to cont. Bactrim but then worsened all wk. with room spinning dizziness and unsteadiness, worsened with standing and walking, improved sitting and lying down, no assoc. N/V or vision c/o, also states worsened L ear pain and swelling, cont. eye redness, no itching or discharge. GONZALO, EOMI, bilat. conj. injection and mild chemosis, no crusting, L ear swollen/tender ext. pinna/warm, swollen canal with small amt of TM visible but clear, neck NT, no ant. LNs, clear lungs, heart reg, abd soft, NT to palp, ext. no edema, NT calves, CN 3-12 intact, no nystagmus, neg. [place]pike

## 2023-01-19 NOTE — DISCHARGE NOTE ADULT - INSTRUCTIONS
Consistent Carbohydrate Diet Intermediate Repair Preamble Text (Leave Blank If You Do Not Want): Undermining was performed with blunt dissection.

## 2023-06-09 NOTE — ED PROVIDER NOTE - CARE PLAN
no Principal Discharge DX:	Lung mass  Secondary Diagnosis:	Hypoxia  Secondary Diagnosis:	Hypercarbia

## 2024-04-25 NOTE — PROGRESS NOTE ADULT - PROBLEM SELECTOR PROBLEM 7
well developed, well nourished , in no acute distress , ambulating without difficulty , normal communication ability Diabetes mellitus

## 2024-05-07 NOTE — DISCHARGE NOTE ADULT - PROCEDURE 1
Structured MSE Structured MSE Structured MSE Structured MSE Structured MSE Structured MSE Chest tube placement Structured MSE Structured MSE

## 2024-11-08 NOTE — ED ADULT TRIAGE NOTE - RESPIRATORY RATE (BREATHS/MIN)
Airway  Urgency: elective    Date/Time: 11/8/2024 8:00 AM  End Time:11/8/2024 8:00 AM  Airway not difficult    General Information and Staff    Patient location during procedure: OR  CRNA/CAA: Ernst Bar CRNA    Indications and Patient Condition  Indications for airway management: airway protection    Preoxygenated: yes  MILS maintained throughout  Mask difficulty assessment: 0 - not attempted    Final Airway Details  Final airway type: endotracheal airway      Successful airway: ETT  Cuffed: yes   Successful intubation technique: direct laryngoscopy  Endotracheal tube insertion site: oral  Blade: Melva  Blade size: 3  ETT size (mm): 7.0  Cormack-Lehane Classification: grade IIa - partial view of glottis  Placement verified by: chest auscultation, capnometry and palpation of cuff   Cuff volume (mL): 8  Measured from: lips  ETT/EBT  to lips (cm): 22  Number of attempts at approach: 1  Assessment: lips, teeth, and gum same as pre-op and atraumatic intubation             16

## 2025-05-03 NOTE — PHYSICAL THERAPY INITIAL EVALUATION ADULT - CRITERIA FOR SKILLED THERAPEUTIC INTERVENTIONS
rehab potential/functional limitations in following categories/risk reduction/prevention/impairments found unable to assess